# Patient Record
Sex: FEMALE | Race: WHITE | Employment: OTHER | URBAN - METROPOLITAN AREA
[De-identification: names, ages, dates, MRNs, and addresses within clinical notes are randomized per-mention and may not be internally consistent; named-entity substitution may affect disease eponyms.]

---

## 2018-08-28 ENCOUNTER — HOSPITAL ENCOUNTER (OUTPATIENT)
Dept: LAB | Age: 83
Discharge: HOME | End: 2018-08-28
Payer: MEDICARE

## 2018-08-28 ENCOUNTER — OFFICE VISIT (OUTPATIENT)
Dept: INTERNAL MEDICINE CLINIC | Age: 83
End: 2018-08-28

## 2018-08-28 ENCOUNTER — TELEPHONE (OUTPATIENT)
Dept: INTERNAL MEDICINE CLINIC | Age: 83
End: 2018-08-28

## 2018-08-28 VITALS
HEART RATE: 68 BPM | SYSTOLIC BLOOD PRESSURE: 117 MMHG | RESPIRATION RATE: 12 BRPM | HEIGHT: 63 IN | OXYGEN SATURATION: 98 % | BODY MASS INDEX: 24.98 KG/M2 | TEMPERATURE: 97.8 F | WEIGHT: 141 LBS | DIASTOLIC BLOOD PRESSURE: 55 MMHG

## 2018-08-28 DIAGNOSIS — F41.9 ANXIETY: ICD-10-CM

## 2018-08-28 DIAGNOSIS — M51.9 LUMBAR DISC DISEASE: ICD-10-CM

## 2018-08-28 DIAGNOSIS — R60.0 LEG EDEMA: ICD-10-CM

## 2018-08-28 DIAGNOSIS — I10 ESSENTIAL HYPERTENSION: ICD-10-CM

## 2018-08-28 DIAGNOSIS — Z13.220 SCREENING FOR HYPERLIPIDEMIA: ICD-10-CM

## 2018-08-28 DIAGNOSIS — E87.1 HYPONATREMIA: Primary | ICD-10-CM

## 2018-08-28 DIAGNOSIS — K58.2 IRRITABLE BOWEL SYNDROME WITH BOTH CONSTIPATION AND DIARRHEA: ICD-10-CM

## 2018-08-28 DIAGNOSIS — C18.7 CANCER OF SIGMOID (HCC): ICD-10-CM

## 2018-08-28 DIAGNOSIS — Z12.31 ENCOUNTER FOR SCREENING MAMMOGRAM FOR BREAST CANCER: ICD-10-CM

## 2018-08-28 DIAGNOSIS — E87.1 HYPONATREMIA: ICD-10-CM

## 2018-08-28 DIAGNOSIS — R33.9 URINARY RETENTION: ICD-10-CM

## 2018-08-28 DIAGNOSIS — Z13.0 SCREENING FOR DEFICIENCY ANEMIA: ICD-10-CM

## 2018-08-28 DIAGNOSIS — J30.9 ALLERGIC RHINITIS, UNSPECIFIED SEASONALITY, UNSPECIFIED TRIGGER: ICD-10-CM

## 2018-08-28 DIAGNOSIS — E55.9 VITAMIN D DEFICIENCY: ICD-10-CM

## 2018-08-28 DIAGNOSIS — M54.2 NECK PAIN: ICD-10-CM

## 2018-08-28 LAB
ALBUMIN SERPL-MCNC: 3.9 G/DL (ref 3.4–5)
ALBUMIN/GLOB SERPL: 1.1 {RATIO} (ref 0.8–1.7)
ALP SERPL-CCNC: 121 U/L (ref 45–117)
ALT SERPL-CCNC: 19 U/L (ref 13–56)
ANION GAP SERPL CALC-SCNC: 4 MMOL/L (ref 3–18)
AST SERPL-CCNC: 25 U/L (ref 15–37)
BASOPHILS # BLD: 0 K/UL (ref 0–0.1)
BASOPHILS NFR BLD: 1 % (ref 0–2)
BILIRUB SERPL-MCNC: 0.4 MG/DL (ref 0.2–1)
BUN SERPL-MCNC: 15 MG/DL (ref 7–18)
BUN/CREAT SERPL: 16 (ref 12–20)
CALCIUM SERPL-MCNC: 9.4 MG/DL (ref 8.5–10.1)
CHLORIDE SERPL-SCNC: 104 MMOL/L (ref 100–108)
CHOLEST SERPL-MCNC: 212 MG/DL
CO2 SERPL-SCNC: 31 MMOL/L (ref 21–32)
CREAT SERPL-MCNC: 0.94 MG/DL (ref 0.6–1.3)
DIFFERENTIAL METHOD BLD: ABNORMAL
EOSINOPHIL # BLD: 0.2 K/UL (ref 0–0.4)
EOSINOPHIL NFR BLD: 5 % (ref 0–5)
ERYTHROCYTE [DISTWIDTH] IN BLOOD BY AUTOMATED COUNT: 13.4 % (ref 11.6–14.5)
GLOBULIN SER CALC-MCNC: 3.7 G/DL (ref 2–4)
GLUCOSE SERPL-MCNC: 97 MG/DL (ref 74–99)
HCT VFR BLD AUTO: 40.1 % (ref 35–45)
HDLC SERPL-MCNC: 63 MG/DL (ref 40–60)
HDLC SERPL: 3.4 {RATIO} (ref 0–5)
HGB BLD-MCNC: 12.6 G/DL (ref 12–16)
LDLC SERPL CALC-MCNC: 126.4 MG/DL (ref 0–100)
LIPID PROFILE,FLP: ABNORMAL
LYMPHOCYTES # BLD: 1.4 K/UL (ref 0.9–3.6)
LYMPHOCYTES NFR BLD: 31 % (ref 21–52)
MCH RBC QN AUTO: 28.8 PG (ref 24–34)
MCHC RBC AUTO-ENTMCNC: 31.4 G/DL (ref 31–37)
MCV RBC AUTO: 91.8 FL (ref 74–97)
MONOCYTES # BLD: 0.6 K/UL (ref 0.05–1.2)
MONOCYTES NFR BLD: 13 % (ref 3–10)
NEUTS SEG # BLD: 2.3 K/UL (ref 1.8–8)
NEUTS SEG NFR BLD: 50 % (ref 40–73)
PLATELET # BLD AUTO: 284 K/UL (ref 135–420)
PMV BLD AUTO: 10.6 FL (ref 9.2–11.8)
POTASSIUM SERPL-SCNC: 4.3 MMOL/L (ref 3.5–5.5)
PROT SERPL-MCNC: 7.6 G/DL (ref 6.4–8.2)
RBC # BLD AUTO: 4.37 M/UL (ref 4.2–5.3)
SODIUM SERPL-SCNC: 139 MMOL/L (ref 136–145)
T4 FREE SERPL-MCNC: 1 NG/DL (ref 0.7–1.5)
TRIGL SERPL-MCNC: 113 MG/DL (ref ?–150)
TSH SERPL DL<=0.05 MIU/L-ACNC: 2.8 UIU/ML (ref 0.36–3.74)
VLDLC SERPL CALC-MCNC: 22.6 MG/DL
WBC # BLD AUTO: 4.6 K/UL (ref 4.6–13.2)

## 2018-08-28 PROCEDURE — 80061 LIPID PANEL: CPT | Performed by: INTERNAL MEDICINE

## 2018-08-28 PROCEDURE — 84439 ASSAY OF FREE THYROXINE: CPT | Performed by: INTERNAL MEDICINE

## 2018-08-28 PROCEDURE — 36415 COLL VENOUS BLD VENIPUNCTURE: CPT | Performed by: INTERNAL MEDICINE

## 2018-08-28 PROCEDURE — 85025 COMPLETE CBC W/AUTO DIFF WBC: CPT | Performed by: INTERNAL MEDICINE

## 2018-08-28 PROCEDURE — 80053 COMPREHEN METABOLIC PANEL: CPT | Performed by: INTERNAL MEDICINE

## 2018-08-28 RX ORDER — DICYCLOMINE HYDROCHLORIDE 10 MG/1
10 CAPSULE ORAL 4 TIMES DAILY
Qty: 90 CAP | Refills: 3 | Status: SHIPPED | OUTPATIENT
Start: 2018-08-28

## 2018-08-28 RX ORDER — MIRTAZAPINE 15 MG/1
15 TABLET, FILM COATED ORAL
COMMUNITY
End: 2018-08-28 | Stop reason: SDUPTHER

## 2018-08-28 RX ORDER — FUROSEMIDE 20 MG/1
20 TABLET ORAL EVERY OTHER DAY
COMMUNITY
End: 2018-08-28 | Stop reason: SDUPTHER

## 2018-08-28 RX ORDER — FUROSEMIDE 20 MG/1
20 TABLET ORAL EVERY OTHER DAY
Qty: 90 TAB | Refills: 3 | Status: SHIPPED | OUTPATIENT
Start: 2018-08-28 | End: 2021-12-21

## 2018-08-28 RX ORDER — MIRTAZAPINE 15 MG/1
15 TABLET, FILM COATED ORAL
Qty: 90 TAB | Refills: 3 | Status: SHIPPED | OUTPATIENT
Start: 2018-08-28 | End: 2021-04-15

## 2018-08-28 RX ORDER — POLYETHYLENE GLYCOL 3350 17 G/17G
17 POWDER, FOR SOLUTION ORAL
COMMUNITY

## 2018-08-28 RX ORDER — CETIRIZINE HCL 10 MG
10 TABLET ORAL DAILY
COMMUNITY

## 2018-08-28 RX ORDER — CHOLECALCIFEROL (VITAMIN D3) 125 MCG
10 CAPSULE ORAL
COMMUNITY

## 2018-08-28 RX ORDER — DICYCLOMINE HYDROCHLORIDE 10 MG/1
10 CAPSULE ORAL 4 TIMES DAILY
COMMUNITY
End: 2018-08-28 | Stop reason: SDUPTHER

## 2018-08-28 RX ORDER — FAMOTIDINE 10 MG/1
10 TABLET ORAL 2 TIMES DAILY
COMMUNITY
End: 2019-06-11 | Stop reason: DRUGHIGH

## 2018-08-28 RX ORDER — ACETAMINOPHEN 325 MG/1
500 TABLET ORAL
COMMUNITY
End: 2021-03-09

## 2018-08-28 RX ORDER — GLUCOSAMINE SULFATE 1500 MG
1000 POWDER IN PACKET (EA) ORAL DAILY
COMMUNITY

## 2018-08-28 NOTE — PATIENT INSTRUCTIONS
Body Mass Index: Care Instructions  Your Care Instructions    Body mass index (BMI) can help you see if your weight is raising your risk for health problems. It uses a formula to compare how much you weigh with how tall you are. · A BMI lower than 18.5 is considered underweight. · A BMI between 18.5 and 24.9 is considered healthy. · A BMI between 25 and 29.9 is considered overweight. A BMI of 30 or higher is considered obese. If your BMI is in the normal range, it means that you have a lower risk for weight-related health problems. If your BMI is in the overweight or obese range, you may be at increased risk for weight-related health problems, such as high blood pressure, heart disease, stroke, arthritis or joint pain, and diabetes. If your BMI is in the underweight range, you may be at increased risk for health problems such as fatigue, lower protection (immunity) against illness, muscle loss, bone loss, hair loss, and hormone problems. BMI is just one measure of your risk for weight-related health problems. You may be at higher risk for health problems if you are not active, you eat an unhealthy diet, or you drink too much alcohol or use tobacco products. Follow-up care is a key part of your treatment and safety. Be sure to make and go to all appointments, and call your doctor if you are having problems. It's also a good idea to know your test results and keep a list of the medicines you take. How can you care for yourself at home? · Practice healthy eating habits. This includes eating plenty of fruits, vegetables, whole grains, lean protein, and low-fat dairy. · If your doctor recommends it, get more exercise. Walking is a good choice. Bit by bit, increase the amount you walk every day. Try for at least 30 minutes on most days of the week. · Do not smoke. Smoking can increase your risk for health problems. If you need help quitting, talk to your doctor about stop-smoking programs and medicines. These can increase your chances of quitting for good. · Limit alcohol to 2 drinks a day for men and 1 drink a day for women. Too much alcohol can cause health problems. If you have a BMI higher than 25  · Your doctor may do other tests to check your risk for weight-related health problems. This may include measuring the distance around your waist. A waist measurement of more than 40 inches in men or 35 inches in women can increase the risk of weight-related health problems. · Talk with your doctor about steps you can take to stay healthy or improve your health. You may need to make lifestyle changes to lose weight and stay healthy, such as changing your diet and getting regular exercise. If you have a BMI lower than 18.5  · Your doctor may do other tests to check your risk for health problems. · Talk with your doctor about steps you can take to stay healthy or improve your health. You may need to make lifestyle changes to gain or maintain weight and stay healthy, such as getting more healthy foods in your diet and doing exercises to build muscle. Where can you learn more? Go to http://michael-holland.info/. Enter S176 in the search box to learn more about \"Body Mass Index: Care Instructions. \"  Current as of: October 9, 2017  Content Version: 11.7  © 5451-0736 [employer]. Care instructions adapted under license by [employer] (which disclaims liability or warranty for this information). If you have questions about a medical condition or this instruction, always ask your healthcare professional. [place] [address] any warranty or liability for your use of this information.   Patient and daughter were given a copy of the Advanced Medical Directive form and understands to bring it in once completed  Health Maintenance Due   Topic Date Due    DTaP/Tdap/Td series (1 - Tdap) 06/03/1956    ZOSTER VACCINE AGE 60>  04/03/1995    GLAUCOMA SCREENING Q2Y 06/03/2000    Bone Densitometry (Dexa) Screening  06/03/2000    Pneumococcal 65+ Low/Medium Risk (1 of 2 - PCV13) 06/03/2000    Influenza Age 9 to Adult  08/01/2018

## 2018-08-28 NOTE — PROGRESS NOTES
INTERNISTS [facility]:  9/1/2018, MRN: [id number]      Rick Nash is a 80 y.o. female and presents to clinic for Establish Care and Irritable Bowel Syndrome (due to a recent move to our area the IBS is acting up )    Subjective: The patient is an 51-year-old female with history of sigmoid colon cancer status post surgery, history of strictures, osteopenia, hyponatremia, lumbar disc disease, hypertension, IBS, vitamin D deficiency, allergic rhinitis, GERD, and urinary retention. 1. Health Maintenance:  - Last Mammogram: >1 yr ago. No abnormal mammograms. No breast pain/masses. - Pap Hx: No h/o abnormal PAPs per her hx  - Last colon cancer screening: Roughly 5 yrs ago. She had sigmoid colon cancer diagnosed in her 62s. S/p surgery. No chemotherapy. Postoperatively, she had strictures. No hematochezia/melena. - Bone density study: It was done in the past and she was diagnosed with ?osteopenia or osteoporosis and rx was recommended but she declined. - Diet: Lots of fruits of vegetables  - At [address] [place] [address] home/assisted living facility.   - Uses a walker for ambulation. She had one fall that sent her to the ED; she was diagnosed with hyponatremia at that time. - Exercise: She does not regularly exercise  - Tobacco use: Never  - ETOH use: 1-2 glasses of wine per wk  - Drug use: None  - Energy drink consumption: None  - Last formal eye exam: 6 months ago. She wears glasses. 2. BackNeck Pain: +Present for yrs. She has lower back pain associated with BLE weakness/numbness - from disc disease. She completed PT in the spring. She has chronic neck pain - from occipital neuralgia per her hx. She has numbness along the base of her scalp but not her arms. Her neck pain has worsened recently. She believes is the way she watches TV while sitting up. 3. HTN: Taking lasix. Her blood pressure today is 117/55. She denies adverse side effects with taking Lasix.   He has been on Lasix for over 3 months. She has bilateral lower extremity edema thought to be secondary to venous insufficiency. 4. IBS - Mixed: On bentyl for 4 yrs. She has a h/o abdominal pain (that worsens in times of stress) along her epigastric area. Bentyl seems to help with this discomfort. No constipation or diarrhea recently. She has a h/o constipation. As mentioned above, she was diagnosed with sigmoid colon cancer in her 62s. She has not had a colonoscopy for 5 years. No chemotherapy was administered. Her postoperative course was complicated by strictures. 5. Allergic rhinitis: Well controlled on zyrtec. She is presently asymptomatic. 6. Vitamin D Deficiency: She takes vitamin D over-the-counter. She has a history of vitamin D deficiency for over 6 months. 7. GERD: On pepcid. She reports no adverse side effects with taking Pepcid. She is presently asymptomatic. She can tell when she stops his medication. 8. Urinary Retention: She has a history of recurrent UTIs. She was followed by the urology team in the past.  She tried pelvic floor dysfunction PT and now engages in self intermittent catheterization 3 times daily or 4 times daily. She states that she did feel when her bladder is full. No urge incontinence. No stress incontinence. She will often urinate without difficulty at night. During the day, her urinary symptoms are different. She is unable to urinate on demand. 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 9. Anxiety: Present for years. She is taking mirtazapine. She is a refill on this medication. She used to take Lexapro but had hyponatremia which she associated with taking this medication. Her sodium corrected after stopping Lexapro and starting mirtazapine per her history. She believes that mirtazapine is working well to control her symptoms of anxiety.   She denies depression symptoms. Patient Active Problem List    Diagnosis Date Noted    Hyponatremia 09/01/2018    Leg edema 09/01/2018    Urinary retention 09/01/2018    Cancer of sigmoid ([facility] [address] 09/01/2018    Anxiety 09/01/2018    Irritable bowel syndrome with both constipation and diarrhea 09/01/2018    Neck pain 09/01/2018    Lumbar disc disease 09/01/2018           No Known Allergies    Past Medical History:   Diagnosis Date    Arthritis     Cancer [facility]) 2002    Colon     Chronic pain     abdominal due to IBS    Depression     GERD (gastroesophageal reflux disease)     Hypertension     Migraine headache 2014    Occipital neuralgia 2014    PUD (peptic ulcer disease)     S/P colon resection 2002    clear presently        Past Surgical History:   Procedure Laterality Date    HX CHOLECYSTECTOMY  2015    HX HYSTERECTOMY  1965    HX TONSIL AND ADENOIDECTOMY      HX WISDOM TEETH EXTRACTION      x4       Family History   Problem Relation Age of Onset    Hypertension Mother     Stroke Mother     Heart Attack Father     Hypertension Father     Parkinson's Disease Sister     Hypertension Brother     Cancer Brother      prostate    No Known Problems Son     No Known Problems Daughter        Social History   Substance Use Topics    Smoking status: Never Smoker    Smokeless tobacco: Never Used    Alcohol use 1.8 oz/week     3 Glasses of wine per week       ROS   Review of Systems   Constitutional: Negative for chills and fever. HENT: Negative for ear pain and sore throat. Eyes: Negative for blurred vision and pain. Respiratory: Negative for cough and shortness of breath. Cardiovascular: Negative for chest pain. Gastrointestinal: Negative for abdominal pain, blood in stool and melena. Genitourinary: Negative for dysuria and hematuria. Musculoskeletal: Positive for back pain and joint pain. Negative for myalgias. Skin: Negative for rash. Neurological: Positive for tingling. Negative for focal weakness and headaches. Endo/Heme/Allergies: Does not bruise/bleed easily. Psychiatric/Behavioral: Negative for substance abuse. Objective     Vitals:    08/28/18 1415   BP: 117/55   Pulse: 68   Resp: 12   Temp: 97.8 °F (36.6 °C)   TempSrc: Oral   SpO2: 98%   Weight: 141 lb (64 kg)   Height: 5' 2.99\" (1.6 m)   PainSc:   4   PainLoc: Abdomen       Physical Exam   Constitutional: She is oriented to person, place, and time and well-developed, well-nourished, and in no distress. HENT:   Head: Normocephalic and atraumatic. Right Ear: External ear normal.   Left Ear: External ear normal.   Nose: Nose normal.   Mouth/Throat: Oropharynx is clear and moist. No oropharyngeal exudate. Eyes: Conjunctivae and EOM are normal. Right eye exhibits no discharge. Left eye exhibits no discharge. No scleral icterus. Neck: Neck supple. Cardiovascular: Normal rate, regular rhythm, normal heart sounds and intact distal pulses. Exam reveals no gallop and no friction rub. No murmur heard. Pulmonary/Chest: Effort normal and breath sounds normal. No respiratory distress. She has no wheezes. She has no rales. Abdominal: Soft. Bowel sounds are normal. She exhibits no distension. There is no tenderness. There is no rebound and no guarding. Musculoskeletal: She exhibits no edema or tenderness (BUE). All sponge processes are nontender to palpation, except she has mild tenderness to palpation along her lumbar spinous processes. There are no paraspinal muscles are tender to palpation. She has limited range of motion along her cervical spine secondary to some discomfort. Lymphadenopathy:     She has no cervical adenopathy. Neurological: She is alert and oriented to person, place, and time. She exhibits normal muscle tone. Gait normal.   Skin: Skin is warm and dry. No erythema. Psychiatric: Affect normal.   Nursing note and vitals reviewed. Assessment/Plan:   1.  HTN and Hyponatremia Hx: Unlikely to be from lexapro. I suspect it is from lasix. -I will check a CMP and TFTs.  -For now, I will add Lexapro as a medication intolerance to her chart.  -Requesting records from her previous doctor.  -Okay to continue with Lasix which I am refilling for her today pending review of her lab results. ORDERS:  - METABOLIC PANEL, COMPREHENSIVE; Future  - TSH AND FREE T4; Future  - furosemide (LASIX) 20 mg tablet; Take 1 Tab by mouth every other day. Indications: Edema  Dispense: 90 Tab; Refill: 3    2. Health Maintenance:  -Ordering a CBC to screen for anemia.  -Requesting records from her previous PCP. I am also requesting her last colonoscopy, mammogram, and vaccine records.  -A lipid panel was ordered to screen for hyperlipidemia. - A mammogram was ordered to screen for breast cancer.  - RTC for a MWV    ORDERS:  - CBC WITH AUTOMATED DIFF; Future  - LIPID PANEL; Future  - ERIC MAMMO BI SCREENING INCL CAD; Future    3. Urinary retention: +H/o recurrent UTIs per pt hx. Urinary retention is only present during the \"day. \" +Doing self catheterization  - Referral to Urology team placed. - Requesting her last Urology records. ORDERS:  - REFERRAL TO UROLOGY    4. Cancer of sigmoid Hx: No colonoscopy in 5 ys. - Referral to GI team for surveillance. - Requesting her previous cancer records. ORDERS:  - REFERRAL TO GASTROENTEROLOGY    5. Anxiety: Stable. - Refilling mirtazapine. ORDERS:  - mirtazapine (REMERON) 15 mg tablet; Take 1 Tab by mouth nightly. Indications: major depressive disorder  Dispense: 90 Tab; Refill: 3    6. Irritable bowel syndrome with both constipation and diarrhea:   - Bentyl refilled. ORDERS:  - dicyclomine (BENTYL) 10 mg capsule; Take 1 Cap by mouth four (4) times daily. Dispense: 90 Cap; Refill: 3    7. Neck/Lower Back Pain: +H/o lumbar disc disease per pt hx and occipital neuralgia per pt hx.  - Referral to PT placed. - Activity as tolerated.     ORDERS:  - [facility]    8. GERD: Stable. - C/w pepcid    9. Allergic Rhinitis: Stable. - C/w use of an antihistamine OTC of nasal steroid. 10. Vitamin D Deficiency: Stable. ?H/o osteopenia.  - Requesting records. - C/w OTC vitamin D      Health Maintenance Due   Topic Date Due    DTaP/Tdap/Td series (1 - Tdap) 06/03/1956    ZOSTER VACCINE AGE 60>  04/03/1995    GLAUCOMA SCREENING Q2Y  06/03/2000    Bone Densitometry (Dexa) Screening  06/03/2000    Pneumococcal 65+ High/Highest Risk (1 of 2 - PCV13) 06/03/2000    Influenza Age 9 to Adult  08/01/2018    [address] [place] [address]  08/28/2018     Lab review: labs are reviewed in the EHR    I have discussed the diagnosis with the patient and the intended plan as seen in the above orders. The patient has received an after-visit summary and questions were answered concerning future plans. I have discussed medication side effects and warnings with the patient as well. I have reviewed the plan of care with the patient, accepted their input and they are in agreement with the treatment goals. All questions were answered. The patient understands the plan of care. Handouts provided today with above information. Pt instructed if symptoms worsen to call the office or report to the ED for continued care. Greater than 50% of the visit time was spent in counseling and/or coordination of care. Voice recognition was used to generate this report, which may have resulted in some phonetic based errors in grammar and contents. Even though attempts were made to correct all the mistakes, some may have been missed, and remained in the body of the document. Follow-up Disposition:  Return in about 4 weeks (around 9/25/2018) for lab results, hyponatremia, IBS.     Ashely Cali MD

## 2018-08-28 NOTE — ACP (ADVANCE CARE PLANNING)
Patient and daughter were given a copy of the Advanced Medical Directive form and understands to bring it in once completed

## 2018-08-28 NOTE — PROGRESS NOTES
Chief Complaint   Patient presents with   Sandra Establish Care    Irritable Bowel Syndrome     due to a recent move to our area the IBS is acting up      Patient and daughter were given a copy of the Advanced Medical Directive form and understands to bring it in once completed. 1. Have you been to the ER, urgent care clinic since your last visit? Hospitalized since your last visit? No    2. Have you seen or consulted any other health care providers outside of the [address] [place] [address] since your last visit? Include any pap smears or colon screening.  No

## 2018-08-28 NOTE — MR AVS SNAPSHOT
[facility] 
 
 
 [address], [address] [place] [address] [facility] 
[phone] Patient: Theodora Dotson MRN: [id number] NXF:9[birth year] Visit Information Date & Time Provider Department Dept. Phone Encounter #  
 8/28/2018  2:00 PM Shabbir Bernal MD [facility] [phone] Follow-up Instructions Return in about 4 weeks (around 9/25/2018) for lab results, hyponatremia, IBS. Your Appointments 9/27/2018 11:30 AM  
Office Visit with Shabbir Bernal MD  
[facility]) Appt Note: 4 week follow up  
 [address], [address] [place] [address] [address] [place]  
  
   
 [address] [facility] [address] Upcoming Health Maintenance Date Due DTaP/Tdap/Td series (1 - Tdap) 6/3/1956 ZOSTER VACCINE AGE 60> 4/3/1995 GLAUCOMA SCREENING Q2Y 6/3/2000 Bone Densitometry (Dexa) Screening 6/3/2000 Pneumococcal 65+ Low/Medium Risk (1 of 2 - PCV13) 6/3/2000 Influenza Age 5 to Adult 8/1/2018 Allergies as of 8/28/2018  Review Complete On: 8/28/2018 By: Luis Eduardo Petersen No Known Allergies Current Immunizations  Never Reviewed No immunizations on file. Not reviewed this visit You Were Diagnosed With   
  
 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)     ICD-10-CM: C18.7 ICD-9-CM: 153.3 Encounter for screening mammogram for breast cancer     ICD-10-CM: Z12.31 
ICD-9-CM: V76.12 Anxiety     ICD-10-CM: F41.9 ICD-9-CM: 300.00 Irritable bowel syndrome with both constipation and diarrhea     ICD-10-CM: K58.2 ICD-9-CM: 901.9 Neck pain     ICD-10-CM: M54.2 ICD-9-CM: 723.1 Lumbar disc disease     ICD-10-CM: M51.9 ICD-9-CM: 722.93 Vitals BP Pulse Temp Resp Height(growth percentile) Weight(growth percentile) 117/55 (BP 1 Location: Left arm, BP Patient Position: Sitting) 68 97.8 °F (36.6 °C) (Oral) 12 5' 2.99\" (1.6 m) 141 lb (64 kg) SpO2 BMI OB Status Smoking Status 98% 24.98 kg/m2 Hysterectomy Never Smoker BMI and BSA Data Body Mass Index Body Surface Area 24.98 kg/m 2 1.69 m 2 Your Updated Medication List  
  
   
This list is accurate as of 8/28/18  4:04 PM.  Always use your most recent med list.  
  
  
  
  
 ALIGN 4 mg Cap Generic drug:  Bifidobacterium Infantis Take 4 mg by mouth daily. CENTRUM WOMEN PO Take  by mouth daily. dicyclomine 10 mg capsule Commonly known as:  BENTYL Take 1 Cap by mouth four (4) times daily. famotidine 10 mg tablet Commonly known as:  PEPCID Take 10 mg by mouth two (2) times a day. furosemide 20 mg tablet Commonly known as:  LASIX Take 1 Tab by mouth every other day. Indications: Edema  
  
 melatonin Tab tablet Take 5 mg by mouth nightly. MIRALAX 17 gram packet Generic drug:  polyethylene glycol Take 17 g by mouth daily as needed. mirtazapine 15 mg tablet Commonly known as:  Onofre Rosadonel Take 1 Tab by mouth nightly. Indications: major depressive disorder TYLENOL 325 mg tablet Generic drug:  acetaminophen Take 325 mg by mouth every four (4) hours as needed for Pain. VITAMIN D3 1,000 unit Cap Generic drug:  cholecalciferol Take 1,000 Units by mouth daily. ZyrTEC 10 mg tablet Generic drug:  cetirizine Take 10 mg by mouth daily. Prescriptions Printed Refills  
 furosemide (LASIX) 20 mg tablet 3 Sig: Take 1 Tab by mouth every other day. Indications: Edema Class: Print  Route: Oral  
 mirtazapine (REMERON) 15 mg tablet 3  
 Sig: Take 1 Tab by mouth nightly. Indications: major depressive disorder Class: Print Route: Oral  
 dicyclomine (BENTYL) 10 mg capsule 3 Sig: Take 1 Cap by mouth four (4) times daily. Class: Print Route: Oral  
  
We Performed the Following REFERRAL TO GASTROENTEROLOGY [AUE35 Custom] REFERRAL TO PHYSICAL THERAPY [THP64 Custom] REFERRAL TO UROLOGY [HSA787 Custom] Follow-up Instructions Return in about 4 weeks (around 9/25/2018) for lab results, hyponatremia, IBS. To-Do List   
 08/28/2018 Lab:  CBC WITH AUTOMATED DIFF   
  
 08/28/2018 Lab:  LIPID PANEL   
  
 08/28/2018 Imaging:  ERIC MAMMO BI SCREENING INCL CAD   
  
 08/28/2018 Lab:  METABOLIC PANEL, COMPREHENSIVE Around 08/28/2018 Lab:  TSH AND FREE T4 Referral Information Referral ID Referred By Referred To  
  
 [id number] MASOUD, [address] [facility],[address] [place] [address]. Phone: [phone] Fax: [phone] Visits Status Start Date End Date 1 New Request 8/28/18 8/28/19 If your referral has a status of pending review or denied, additional information will be sent to support the outcome of this decision. Referral ID Referred By Referred To  
 [id number] MASOUD, [address] [address] Phone: [phone] Fax: [phone] Visits Status Start Date End Date 1 New Request 8/28/18 8/28/19 If your referral has a status of pending review or denied, additional information will be sent to support the outcome of this decision. Referral ID Referred By Referred To  
 [id number] MASOUD, [facility] [address] [facility] [address]  
   [address] [facility] [address] Phone: [phone] Fax: [phone] Visits Status Start Date End Date 1 New Request 8/28/18 8/28/19 If your referral has a status of pending review or denied, additional information will be sent to support the outcome of this decision. Patient Instructions Body Mass Index: Care Instructions Your Care Instructions Body mass index (BMI) can help you see if your weight is raising your risk for health problems. It uses a formula to compare how much you weigh with how tall you are. · A BMI lower than 18.5 is considered underweight. · A BMI between 18.5 and 24.9 is considered healthy. · A BMI between 25 and 29.9 is considered overweight. A BMI of 30 or higher is considered obese. If your BMI is in the normal range, it means that you have a lower risk for weight-related health problems. If your BMI is in the overweight or obese range, you may be at increased risk for weight-related health problems, such as high blood pressure, heart disease, stroke, arthritis or joint pain, and diabetes. If your BMI is in the underweight range, you may be at increased risk for health problems such as fatigue, lower protection (immunity) against illness, muscle loss, bone loss, hair loss, and hormone problems. BMI is just one measure of your risk for weight-related health problems. You may be at higher risk for health problems if you are not active, you eat an unhealthy diet, or you drink too much alcohol or use tobacco products. Follow-up care is a key part of your treatment and safety. Be sure to make and go to all appointments, and call your doctor if you are having problems. It's also a good idea to know your test results and keep a list of the medicines you take. How can you care for yourself at home? · Practice healthy eating habits. This includes eating plenty of fruits, vegetables, whole grains, lean protein, and low-fat dairy. · If your doctor recommends it, get more exercise. Walking is a good choice. Bit by bit, increase the amount you walk every day.  Try for at least 30 minutes on most days of the week. · Do not smoke. Smoking can increase your risk for health problems. If you need help quitting, talk to your doctor about stop-smoking programs and medicines. These can increase your chances of quitting for good. · Limit alcohol to 2 drinks a day for men and 1 drink a day for women. Too much alcohol can cause health problems. If you have a BMI higher than 25 · Your doctor may do other tests to check your risk for weight-related health problems. This may include measuring the distance around your waist. A waist measurement of more than 40 inches in men or 35 inches in women can increase the risk of weight-related health problems. · Talk with your doctor about steps you can take to stay healthy or improve your health. You may need to make lifestyle changes to lose weight and stay healthy, such as changing your diet and getting regular exercise. If you have a BMI lower than 18.5 · Your doctor may do other tests to check your risk for health problems. · Talk with your doctor about steps you can take to stay healthy or improve your health. You may need to make lifestyle changes to gain or maintain weight and stay healthy, such as getting more healthy foods in your diet and doing exercises to build muscle. Where can you learn more? Go to http://michael-holland.info/. Enter S176 in the search box to learn more about \"Body Mass Index: Care Instructions. \" Current as of: October 9, 2017 Content Version: 11.7 © 9556-1456 [employer]. Care instructions adapted under license by [employer] (which disclaims liability or warranty for this information). If you have questions about a medical condition or this instruction, always ask your healthcare professional. [place] [address] any warranty or liability for your use of this information.  
Patient and daughter were given a copy of the Advanced Medical Directive form and understands to bring it in once completed Health Maintenance Due Topic Date Due  
 DTaP/Tdap/Td series (1 - Tdap) 06/03/1956  ZOSTER VACCINE AGE 60>  04/03/1995  GLAUCOMA SCREENING Q2Y  06/03/2000  Bone Densitometry (Dexa) Screening  06/03/2000  Pneumococcal 65+ Low/Medium Risk (1 of 2 - PCV13) 06/03/2000  Influenza Age 5 to Adult  08/01/2018 Introducing [facility]! [facility] introduces [employer] patient portal. Now you can access parts of your medical record, email your doctor's office, and request medication refills online. 1. In your internet browser, go to [email or web address] 2. Click on the First Time User? Click Here link in the Sign In box. You will see the New Member Sign Up page. 3. Enter your [employer] Access Code exactly as it appears below. You will not need to use this code after youve completed the sign-up process. If you do not sign up before the expiration date, you must request a new code. · [employer] Access Code: [email or web address] Expires: 11/26/2018  1:13 PM 
 
4. Enter the last four digits of your Social Security Number (xxxx) and Date of Birth (mm/dd/yyyy) as indicated and click Submit. You will be taken to the next sign-up page. 5. Create a [employer] ID. This will be your [employer] login ID and cannot be changed, so think of one that is secure and easy to remember. 6. Create a [employer] password. You can change your password at any time. 7. Enter your Password Reset Question and Answer. This can be used at a later time if you forget your password. 8. Enter your e-mail address. You will receive e-mail notification when new information is available in [address] 9. Click Sign Up. You can now view and download portions of your medical record. 10. Click the Download Summary menu link to download a portable copy of your medical information.  
 
If you have questions, please visit the Frequently Asked Questions section of the [employer]. Remember, [employer] is NOT to be used for urgent needs. For medical emergencies, dial 911. Now available from your iPhone and Android! Please provide this summary of care documentation to your next provider. Your primary care clinician is listed as Daniel Isidro. If you have any questions after today's visit, please call [phone].

## 2018-08-28 NOTE — TELEPHONE ENCOUNTER
Frieda is calling from [address] [place] [address]. Stating she had [place] [facility] [place]. Stating everything sent over is from 2017. She wants to know if you can put in a new referral after you see the patient today to establish care.      PT/OT for chronic neck pain and balance and strreghthening

## 2018-08-29 ENCOUNTER — HOME HEALTH ADMISSION (OUTPATIENT)
Dept: HOME HEALTH SERVICES | Facility: HOME HEALTH | Age: 83
End: 2018-08-29

## 2018-08-31 ENCOUNTER — HOME CARE VISIT (OUTPATIENT)
Dept: SCHEDULING | Facility: HOME HEALTH | Age: 83
End: 2018-08-31

## 2018-08-31 ENCOUNTER — TELEPHONE (OUTPATIENT)
Dept: INTERNAL MEDICINE CLINIC | Age: 83
End: 2018-08-31

## 2018-08-31 NOTE — TELEPHONE ENCOUNTER
Katina Tilley from [facility] called- PT was Not admitted to [facility] - she is more suited to out patient therapy  She Needs referral for out patient therapy that would specialize in  Her neck

## 2018-09-01 PROBLEM — F41.9 ANXIETY: Status: ACTIVE | Noted: 2018-09-01

## 2018-09-01 PROBLEM — J30.9 ALLERGIC RHINITIS: Status: ACTIVE | Noted: 2018-09-01

## 2018-09-01 PROBLEM — E55.9 VITAMIN D DEFICIENCY: Status: ACTIVE | Noted: 2018-09-01

## 2018-09-01 PROBLEM — R33.9 URINARY RETENTION: Status: ACTIVE | Noted: 2018-09-01

## 2018-09-01 PROBLEM — M51.9 LUMBAR DISC DISEASE: Status: ACTIVE | Noted: 2018-09-01

## 2018-09-01 PROBLEM — E87.1 HYPONATREMIA: Status: ACTIVE | Noted: 2018-09-01

## 2018-09-01 PROBLEM — M54.2 NECK PAIN: Status: ACTIVE | Noted: 2018-09-01

## 2018-09-01 PROBLEM — R60.0 LEG EDEMA: Status: ACTIVE | Noted: 2018-09-01

## 2018-09-01 PROBLEM — K58.2 IRRITABLE BOWEL SYNDROME WITH BOTH CONSTIPATION AND DIARRHEA: Status: ACTIVE | Noted: 2018-09-01

## 2018-09-01 PROBLEM — I10 ESSENTIAL HYPERTENSION: Status: ACTIVE | Noted: 2018-09-01

## 2018-09-01 PROBLEM — C18.7 CANCER OF SIGMOID (HCC): Status: ACTIVE | Noted: 2018-09-01

## 2018-09-04 DIAGNOSIS — M54.2 NECK PAIN: Primary | ICD-10-CM

## 2018-09-10 ENCOUNTER — TELEPHONE (OUTPATIENT)
Dept: INTERNAL MEDICINE CLINIC | Age: 83
End: 2018-09-10

## 2018-09-10 ENCOUNTER — HOME CARE VISIT (OUTPATIENT)
Dept: HOME HEALTH SERVICES | Facility: HOME HEALTH | Age: 83
End: 2018-09-10

## 2018-09-10 NOTE — PROGRESS NOTES
Please let her know that her electrolytes are normal. Her renal function is normal. Her cholesterol is mildly elevated. Her total cholesterol is 212. Her LDL is 126. We will recheck this in 6-12 months. If her cholesterol increases, she would benefit from a statin. Her thyroid function labs are normal. Her blood count labs show no significant abnormalities at this time.      [facility]  [facility], [address] [place] [address], [address] [place] [address] Str.  Phone: [phone]  Fax: [phone]

## 2018-09-10 NOTE — TELEPHONE ENCOUNTER
[facility] was [facility] to [facility], they have been trying to get in touch with the patient but the phone number is out of service

## 2018-09-10 NOTE — TELEPHONE ENCOUNTER
Chief Complaint   Patient presents with    Labs     done 8-28-18 per Dr Rojelio Neely     Please let her know that her electrolytes are normal. Her renal function is normal. Her cholesterol is mildly elevated. Her total cholesterol is 212. Her LDL is 126. We will recheck this in 6-12 months. If her cholesterol increases, she would benefit from a statin. Her thyroid function labs are normal. Her blood count labs show no significant abnormalities at this time. Patient reached and informed, she states she understands all.

## 2018-09-10 NOTE — TELEPHONE ENCOUNTER
That's because when she was here for her last appt, her demographics were never updated in [facility] when she was checked in. I have updated her numbers.

## 2018-09-10 NOTE — TELEPHONE ENCOUNTER
[facility] called- they received the referral for out patient PT- needs to go to [facility] who ever can work with necks so please resend

## 2018-09-27 ENCOUNTER — HOSPITAL ENCOUNTER (OUTPATIENT)
Dept: LAB | Age: 83
Discharge: HOME | End: 2018-09-27
Payer: MEDICARE

## 2018-09-27 ENCOUNTER — OFFICE VISIT (OUTPATIENT)
Dept: INTERNAL MEDICINE CLINIC | Age: 83
End: 2018-09-27

## 2018-09-27 VITALS
HEIGHT: 62 IN | SYSTOLIC BLOOD PRESSURE: 141 MMHG | OXYGEN SATURATION: 99 % | DIASTOLIC BLOOD PRESSURE: 59 MMHG | BODY MASS INDEX: 26.35 KG/M2 | RESPIRATION RATE: 14 BRPM | WEIGHT: 143.2 LBS | HEART RATE: 58 BPM | TEMPERATURE: 97.1 F

## 2018-09-27 DIAGNOSIS — R31.0 GROSS HEMATURIA: ICD-10-CM

## 2018-09-27 DIAGNOSIS — Z71.89 ADVANCE CARE PLANNING: ICD-10-CM

## 2018-09-27 DIAGNOSIS — M25.562 ACUTE PAIN OF BOTH KNEES: ICD-10-CM

## 2018-09-27 DIAGNOSIS — R35.0 FREQUENT URINATION: ICD-10-CM

## 2018-09-27 DIAGNOSIS — Z23 ENCOUNTER FOR IMMUNIZATION: ICD-10-CM

## 2018-09-27 DIAGNOSIS — Z00.00 INITIAL MEDICARE ANNUAL WELLNESS VISIT: ICD-10-CM

## 2018-09-27 DIAGNOSIS — N30.00 ACUTE CYSTITIS WITHOUT HEMATURIA: Primary | ICD-10-CM

## 2018-09-27 DIAGNOSIS — M25.561 ACUTE PAIN OF BOTH KNEES: ICD-10-CM

## 2018-09-27 DIAGNOSIS — R30.0 DYSURIA: ICD-10-CM

## 2018-09-27 LAB
BILIRUB UR QL STRIP: NEGATIVE
GLUCOSE UR-MCNC: NEGATIVE MG/DL
KETONES P FAST UR STRIP-MCNC: NEGATIVE MG/DL
PH UR STRIP: 6 [PH] (ref 4.6–8)
PROT UR QL STRIP: NEGATIVE
SP GR UR STRIP: 1.01 (ref 1–1.03)
UA UROBILINOGEN AMB POC: NORMAL (ref 0.2–1)
URINALYSIS CLARITY POC: NORMAL
URINALYSIS COLOR POC: YELLOW
URINE BLOOD POC: NORMAL
URINE LEUKOCYTES POC: NORMAL
URINE NITRITES POC: NEGATIVE

## 2018-09-27 PROCEDURE — 87086 URINE CULTURE/COLONY COUNT: CPT | Performed by: INTERNAL MEDICINE

## 2018-09-27 PROCEDURE — 87186 SC STD MICRODIL/AGAR DIL: CPT | Performed by: INTERNAL MEDICINE

## 2018-09-27 PROCEDURE — 87077 CULTURE AEROBIC IDENTIFY: CPT | Performed by: INTERNAL MEDICINE

## 2018-09-27 RX ORDER — LEVOFLOXACIN 750 MG/1
750 TABLET ORAL DAILY
Qty: 5 TAB | Refills: 0 | Status: SHIPPED | OUTPATIENT
Start: 2018-09-27 | End: 2018-10-02

## 2018-09-27 NOTE — PATIENT INSTRUCTIONS
Patient still has a copy of the Advanced Medical Directive form and understands to bring it in once completed. Health Maintenance Due   Topic Date Due    DTaP/Tdap/Td series (1 - Tdap) 06/03/1956    Shingrix Vaccine Age 50> (1 of 2) 06/03/1985    GLAUCOMA SCREENING Q2Y  06/03/2000    Bone Densitometry (Dexa) Screening  06/03/2000    Pneumococcal 65+ High/Highest Risk (1 of 2 - PCV13) 06/03/2000    Influenza Age 5 to Adult  08/01/2018         Medicare Wellness Visit, Female     The best way to live healthy is to have a lifestyle where you eat a well-balanced diet, exercise regularly, limit alcohol use, and quit all forms of tobacco/nicotine, if applicable. Regular preventive services are another way to keep healthy. Preventive services (vaccines, screening tests, monitoring & exams) can help personalize your care plan, which helps you manage your own care. Screening tests can find health problems at the earliest stages, when they are easiest to treat. [facility] follows the current, evidence-based guidelines published by the [facility] [place] (USPSTF) when recommending preventive services for our patients. Because we follow these guidelines, sometimes recommendations change over time as research supports it. (For example, mammograms used to be recommended annually. Even though Medicare will still pay for an annual mammogram, the newer guidelines recommend a mammogram every two years for women of average risk.)  Of course, you and your doctor may decide to screen more often for some diseases, based on your risk and your health status. Preventive services for you include:  - Medicare offers their members a free annual wellness visit, which is time for you and your primary care provider to discuss and plan for your preventive service needs. Take advantage of this benefit every year!  -All adults over the age of 72 should receive the recommended pneumonia vaccines.  Current USPSTF guidelines recommend a series of two vaccines for the best pneumonia protection.   -All adults should have a flu vaccine yearly and a tetanus vaccine every 10 years. All adults age 61 and older should receive a shingles vaccine once in their lifetime.    -A bone mass density test is recommended when a woman turns 65 to screen for osteoporosis. This test is only recommended one time, as a screening. Some providers will use this same test as a disease monitoring tool if you already have osteoporosis. -All adults age 38-68 who are overweight should have a diabetes screening test once every three years.   -Other screening tests and preventive services for persons with diabetes include: an eye exam to screen for diabetic retinopathy, a kidney function test, a foot exam, and stricter control over your cholesterol.   -Cardiovascular screening for adults with routine risk involves an electrocardiogram (ECG) at intervals determined by your doctor.   -Colorectal cancer screenings should be done for adults age 54-65 with no increased risk factors for colorectal cancer. There are a number of acceptable methods of screening for this type of cancer. Each test has its own benefits and drawbacks. Discuss with your doctor what is most appropriate for you during your annual wellness visit. The different tests include: colonoscopy (considered the best screening method), a fecal occult blood test, a fecal DNA test, and sigmoidoscopy. -Breast cancer screenings are recommended every other year for women of normal risk, age 54-69.  -Cervical cancer screenings for women over age 72 are only recommended with certain risk factors.   -All adults born between [facility] should be screened once for Hepatitis C.      Here is a list of your current Health Maintenance items (your personalized list of preventive services) with a due date:  Health Maintenance Due   Topic Date Due    DTaP/Tdap/Td  (1 - Tdap) 06/03/1956    Shingles Vaccine (1 of 2) 06/03/1985    Glaucoma Screening   06/03/2000    Bone Mineral Density   06/03/2000    Pneumococcal Vaccine (1 of 2 - PCV13) 06/03/2000    Flu Vaccine  08/01/2018         Medicare Part B Preventive Services Limitations Recommendation Scheduled   Bone Mass Measurement  (age 72 & older, biennial) Requires diagnosis related to osteoporosis or estrogen deficiency. Biennial benefit unless patient has history of long-term glucocorticoid tx or baseline is needed because initial test was by other method This should be done at age 72 and again if on osteoporosis medication at 2-3 year intervals. Up to date but we do not have records   Cardiovascular Screening Blood Tests (every 5 years)  Total cholesterol, HDL, Triglycerides Order as a panel if possible We should check your cholesterol panel at least once every 5 years. Up to date   Colorectal Cancer Screening  -Fecal occult blood test (annual)  -Flexible sigmoidoscopy (5y)  -Screening colonoscopy (10y)  -Barium Enema  Due per your Gastroenterologist's recommendations. Discussed today   Counseling to Prevent Tobacco Use (up to 8 sessions per year)  - Counseling greater than 3 and up to 10 minutes  - Counseling greater than 10 minutes Patients must be asymptomatic of tobacco-related conditions to receive as preventive service Continue with smoking cessation    Diabetes Screening Tests (at least every 3 years, Medicare covers annually or at 6-month intervals for prediabetic patients)    Fasting blood sugar (FBS) or glucose tolerance test (GTT) Patient must be diagnosed with one of the following:  -Hypertension, Dyslipidemia, obesity, previous impaired FBS or GTT  Or any two of the following: overweight, FH of diabetes, age ? 72, history of gestational diabetes, birth of baby weighing more than 9 pounds Should be done yearly Up to date   Diabetes Self-Management Training (DSMT) (no USPSTF recommendation) Requires referral by treating physician for patient with diabetes or renal disease. 10 hours of initial DSMT session of no less than 30 minutes each in a continuous 12-month period. 2 hours of follow-up DSMT in subsequent years. Not applicable Not applicable   Glaucoma Screening (no USPSTF recommendation) Diabetes mellitus, family history, , age 48 or over,  American, age 72 or over Continue with annual eye exams. Up to date but we do not have records. Human Immunodeficiency Virus (HIV) Screening (annually for increased risk patients)  HIV-1 and HIV-2 by EIA, AURA, rapid antibody test, or oral mucosa transudate Patient must be at increased risk for HIV infection per USPSTF guidelines or pregnant. Tests covered annually for patients at increased risk. Pregnant patients may receive up to 3 test during pregnancy. Not applicable Not applicable   Medical Nutrition Therapy (MNT) (for diabetes or renal disease not recommended schedule) Requires referral by treating physician for patient with diabetes or renal disease. Can be provided in same year as diabetes self-management training (DSMT), and CMS recommends medical nutrition therapy take place after DSMT. Up to 3 hours for initial year and 2 hours in subsequent years. Not applicable Not applicable   Shingles Vaccination A shingles vaccine is also recommended once in a lifetime after age 61 Vaccination recommended for shingles vaccination. Overdue   Seasonal Influenza Vaccination (annually)  Continue with yearly \"flu\" shot annually Overdue   Pneumococcal Vaccination (once after 65)  Please receive this vaccination at age 72. Overdue   Hepatitis B Vaccinations (if medium/high risk) Medium/high risk factors:  End-stage renal disease,  Hemophiliacs who received Factor VIII or IX concentrates, Clients of institutions for the mentally retarded, Persons who live in the same house as a HepB virus carrier, Homosexual men, Illicit injectable drug abusers.  Not applicable Not applicable   Screening Mammography (biennial age 54-69) Annually (age 36 or over) You need a mammogram yearly to screen for breast cancer. Scheduled   Screening Pap Tests and Pelvic Examination (up to age 79 and after 79 if unknown history or abnormal study last 10 years) Every 24 months except high risk You need no Pap smear at this time. Not warranted   Ultrasound Screening for Abdominal Aortic Aneurysm (AAA) (once) Patient must be referred through IPPE and not have had a screening for abdominal aortic aneurysm before under Medicare. Limited to patients who meet one of the following criteria:  - Men who are 73-68 years old and have smoked more than 100 cigarettes in their lifetime.  -Anyone with a FH of AAA  -Anyone recommended for screening by USPSTF Not applicable Not applicable          Body Mass Index: Care Instructions  Your Care Instructions    Body mass index (BMI) can help you see if your weight is raising your risk for health problems. It uses a formula to compare how much you weigh with how tall you are. · A BMI lower than 18.5 is considered underweight. · A BMI between 18.5 and 24.9 is considered healthy. · A BMI between 25 and 29.9 is considered overweight. A BMI of 30 or higher is considered obese. If your BMI is in the normal range, it means that you have a lower risk for weight-related health problems. If your BMI is in the overweight or obese range, you may be at increased risk for weight-related health problems, such as high blood pressure, heart disease, stroke, arthritis or joint pain, and diabetes. If your BMI is in the underweight range, you may be at increased risk for health problems such as fatigue, lower protection (immunity) against illness, muscle loss, bone loss, hair loss, and hormone problems. BMI is just one measure of your risk for weight-related health problems.  You may be at higher risk for health problems if you are not active, you eat an unhealthy diet, or you drink too much alcohol or use tobacco products. Follow-up care is a key part of your treatment and safety. Be sure to make and go to all appointments, and call your doctor if you are having problems. It's also a good idea to know your test results and keep a list of the medicines you take. How can you care for yourself at home? · Practice healthy eating habits. This includes eating plenty of fruits, vegetables, whole grains, lean protein, and low-fat dairy. · If your doctor recommends it, get more exercise. Walking is a good choice. Bit by bit, increase the amount you walk every day. Try for at least 30 minutes on most days of the week. · Do not smoke. Smoking can increase your risk for health problems. If you need help quitting, talk to your doctor about stop-smoking programs and medicines. These can increase your chances of quitting for good. · Limit alcohol to 2 drinks a day for men and 1 drink a day for women. Too much alcohol can cause health problems. If you have a BMI higher than 25  · Your doctor may do other tests to check your risk for weight-related health problems. This may include measuring the distance around your waist. A waist measurement of more than 40 inches in men or 35 inches in women can increase the risk of weight-related health problems. · Talk with your doctor about steps you can take to stay healthy or improve your health. You may need to make lifestyle changes to lose weight and stay healthy, such as changing your diet and getting regular exercise. If you have a BMI lower than 18.5  · Your doctor may do other tests to check your risk for health problems. · Talk with your doctor about steps you can take to stay healthy or improve your health. You may need to make lifestyle changes to gain or maintain weight and stay healthy, such as getting more healthy foods in your diet and doing exercises to build muscle. Where can you learn more? Go to http://michael-holland.info/.   Enter S176 in the search box to learn more about \"Body Mass Index: Care Instructions. \"  Current as of: October 9, 2017  Content Version: 11.7  © 2195-1064 [employer]. Care instructions adapted under license by [employer] (which disclaims liability or warranty for this information). If you have questions about a medical condition or this instruction, always ask your healthcare professional. [address] any warranty or liability for your use of this information. Medicare Wellness Visit, Female     The best way to live healthy is to have a lifestyle where you eat a well-balanced diet, exercise regularly, limit alcohol use, and quit all forms of tobacco/nicotine, if applicable. Regular preventive services are another way to keep healthy. Preventive services (vaccines, screening tests, monitoring & exams) can help personalize your care plan, which helps you manage your own care. Screening tests can find health problems at the earliest stages, when they are easiest to treat. [facility] Joyce follows the current, evidence-based guidelines published by the [facility] and [employer] [place] (USPSTF) when recommending preventive services for our patients. Because we follow these guidelines, sometimes recommendations change over time as research supports it. (For example, mammograms used to be recommended annually. Even though Medicare will still pay for an annual mammogram, the newer guidelines recommend a mammogram every two years for women of average risk.)  Of course, you and your doctor may decide to screen more often for some diseases, based on your risk and your health status. Preventive services for you include:  - Medicare offers their members a free annual wellness visit, which is time for you and your primary care provider to discuss and plan for your preventive service needs.  Take advantage of this benefit every year!  -All adults over the age of 72 should receive the recommended pneumonia vaccines. Current USPSTF guidelines recommend a series of two vaccines for the best pneumonia protection.   -All adults should have a flu vaccine yearly and a tetanus vaccine every 10 years. All adults age 61 and older should receive a shingles vaccine once in their lifetime.    -A bone mass density test is recommended when a woman turns 65 to screen for osteoporosis. This test is only recommended one time, as a screening. Some providers will use this same test as a disease monitoring tool if you already have osteoporosis. -All adults age 38-68 who are overweight should have a diabetes screening test once every three years.   -Other screening tests and preventive services for persons with diabetes include: an eye exam to screen for diabetic retinopathy, a kidney function test, a foot exam, and stricter control over your cholesterol.   -Cardiovascular screening for adults with routine risk involves an electrocardiogram (ECG) at intervals determined by your doctor.   -Colorectal cancer screenings should be done for adults age 54-65 with no increased risk factors for colorectal cancer. There are a number of acceptable methods of screening for this type of cancer. Each test has its own benefits and drawbacks. Discuss with your doctor what is most appropriate for you during your annual wellness visit. The different tests include: colonoscopy (considered the best screening method), a fecal occult blood test, a fecal DNA test, and sigmoidoscopy. -Breast cancer screenings are recommended every other year for women of normal risk, age 54-69.  -Cervical cancer screenings for women over age 72 are only recommended with certain risk factors.   -All adults born between [facility] should be screened once for Hepatitis C.      Here is a list of your current Health Maintenance items (your personalized list of preventive services) with a due date:  Health Maintenance Due   Topic Date Due    DTaP/Tdap/Td (1 - Tdap) 06/03/1956    Shingles Vaccine (1 of 2) 06/03/1985    Glaucoma Screening   06/03/2000    Bone Mineral Density   06/03/2000    Pneumococcal Vaccine (1 of 2 - PCV13) 06/03/2000

## 2018-09-27 NOTE — MR AVS SNAPSHOT
[address] [facility] 
 
 
 [address], [address] [facility] 
[phone] Patient: Truman Doyle MRN: [id number] LGA:[id number] Visit Information Date & Time Provider Department Dept. Phone Encounter #  
 9/27/2018 11:30 AM Everett Goltz, MD [facility] [phone] [id number] Your Appointments 10/18/2018 11:00 AM  
New Patient with Ana Ortega MD  
[facility] ([address] [facility]) Appt Note: NP, urinary incontinence [address] [facility] [address] [address] [address]  
  
   
 [address] [facility] [address] [address] [address] [facility] Upcoming Health Maintenance Date Due DTaP/Tdap/Td series (1 - Tdap) 6[birth year]3/1956 Shingrix Vaccine Age 50> (1 of 2) 6/3/1985 GLAUCOMA SCREENING Q2Y 6/3/2000 Bone Densitometry (Dexa) Screening 6/3/2000 Pneumococcal 65+ High/Highest Risk (1 of 2 - PCV13) 6/3/2000 Influenza Age 5 to Adult 8/1/2018 MEDICARE YEARLY EXAM 9/28/2019 Allergies as of 9/27/2018  Review Complete On: 9/27/2018 By: Everett Goltz, MD  
  
 Severity Noted Reaction Type Reactions Macrobid [Nitrofurantoin Monohyd/m-cryst] High 09/27/2018    Nausea and Vomiting Lexapro [Escitalopram Oxalate] Low 09/01/2018    Other (comments) ? Hyponatremia per pt hx Current Immunizations  Never Reviewed No immunizations on file. Not reviewed this visit You Were Diagnosed With   
  
 Codes Comments Dysuria    -  Primary ICD-10-CM: R30.0 ICD-9-CM: 788.1 Frequent urination     ICD-10-CM: R35.0 ICD-9-CM: 788.41 Acute pain of both knees     ICD-10-CM: M25.561, M25.562 ICD-9-CM: 338.19, 719.46 Gross hematuria     ICD-10-CM: R31.0 ICD-9-CM: 599.71 Acute cystitis without hematuria     ICD-10-CM: N30.00 ICD-9-CM: 595.0 Vitals BP Pulse Temp Resp Height(growth percentile) Weight(growth percentile) 141/59 (BP 1 Location: Right arm, BP Patient Position: Sitting) (!) 58 97.1 °F (36.2 °C) (Oral) 14 5' 2\" (1.575 m) 143 lb 3.2 oz (65 kg) SpO2 BMI OB Status Smoking Status 99% 26.19 kg/m2 Hysterectomy Never Smoker Vitals History BMI and BSA Data Body Mass Index Body Surface Area  
 26.19 kg/m 2 1.69 m 2 Your Updated Medication List  
  
   
This list is accurate as of 9/27/18 12:57 PM.  Always use your most recent med list.  
  
  
  
  
 ALIGN 4 mg Cap Generic drug:  Bifidobacterium Infantis Take 4 mg by mouth daily. CENTRUM WOMEN PO Take  by mouth daily. dicyclomine 10 mg capsule Commonly known as:  BENTYL Take 1 Cap by mouth four (4) times daily. famotidine 10 mg tablet Commonly known as:  PEPCID Take 10 mg by mouth two (2) times a day. furosemide 20 mg tablet Commonly known as:  LASIX Take 1 Tab by mouth every other day. Indications: Edema  
  
 levoFLOXacin 750 mg tablet Commonly known as:  Donold Thurman Take 1 Tab by mouth daily for 5 days. melatonin Tab tablet Take 5 mg by mouth nightly. MIRALAX 17 gram packet Generic drug:  polyethylene glycol Take 17 g by mouth daily as needed. mirtazapine 15 mg tablet Commonly known as:  Chad Maya Take 1 Tab by mouth nightly. Indications: major depressive disorder TYLENOL 325 mg tablet Generic drug:  acetaminophen Take 325 mg by mouth every four (4) hours as needed for Pain.  
  
 varicella-zoster recombinant (PF) 50 mcg/0.5 mL Susr injection Commonly known as:  SHINGRIX  
0.5 mL by IntraMUSCular route every two (2) months. VITAMIN D3 1,000 unit Cap Generic drug:  cholecalciferol Take 1,000 Units by mouth daily. ZyrTEC 10 mg tablet Generic drug:  cetirizine Take 10 mg by mouth daily. Prescriptions Printed  Refills  
 varicella-zoster recombinant, PF, (SHINGRIX) 50 mcg/0.5 mL susr injection 1  
 Si.5 mL by IntraMUSCular route every two (2) months. Class: Print Route: IntraMUSCular  
 levoFLOXacin (LEVAQUIN) 750 mg tablet 0 Sig: Take 1 Tab by mouth daily for 5 days. Class: Print Route: Oral  
  
We Performed the Following AMB POC URINALYSIS DIP STICK AUTO W/O MICRO [52097 CPT(R)] To-Do List   
 2018 Microbiology:  CULTURE, URINE   
  
 10/04/2018 10:30 AM  
  Appointment with [facility] 1 at [address] [place] [address] ([phone]) OUTSIDE FILMS  - Any outside films related to the study being scheduled should be brought with you on the day of the exam.  If this cannot be done there may be a delay in the reading of the study. MEDICATIONS  - Patient must bring a complete list of all medications currently taking to include prescriptions, over-the-counter meds, herbals, vitamins & any dietary supplements  GENERAL INSTRUCTIONS  - On the day of your exam do not use any bath powder, deodorant or lotions on the armpit area. -Tenderness of breasts may cause an increase of discomfort during procedure. If you are experiencing breast tenderness on the day of your appointment and would like to reschedule, please call [phone]. Patient Instructions Patient still has a copy of the Advanced Medical Directive form and understands to bring it in once completed. Health Maintenance Due Topic Date Due  
 DTaP/Tdap/Td series (1 - Tdap) 1956  Shingrix Vaccine Age 50> (1 of 2) 1985  GLAUCOMA SCREENING Q2Y  2000  Bone Densitometry (Dexa) Screening  2000  Pneumococcal 65+ High/Highest Risk (1 of 2 - PCV13) 2000  Influenza Age 5 to Adult  2018 Medicare Wellness Visit, Female The best way to live healthy is to have a lifestyle where you eat a well-balanced diet, exercise regularly, limit alcohol use, and quit all forms of tobacco/nicotine, if applicable. Regular preventive services are another way to keep healthy. Preventive services (vaccines, screening tests, monitoring & exams) can help personalize your care plan, which helps you manage your own care. Screening tests can find health problems at the earliest stages, when they are easiest to treat. [facility] follows the current, evidence-based guidelines published by the [facility] Nino Fernandez (USPSTF) when recommending preventive services for our patients. Because we follow these guidelines, sometimes recommendations change over time as research supports it. (For example, mammograms used to be recommended annually. Even though Medicare will still pay for an annual mammogram, the newer guidelines recommend a mammogram every two years for women of average risk.) Of course, you and your doctor may decide to screen more often for some diseases, based on your risk and your health status. Preventive services for you include: - Medicare offers their members a free annual wellness visit, which is time for you and your primary care provider to discuss and plan for your preventive service needs. Take advantage of this benefit every year! 
-All adults over the age of 72 should receive the recommended pneumonia vaccines. Current USPSTF guidelines recommend a series of two vaccines for the best pneumonia protection.  
-All adults should have a flu vaccine yearly and a tetanus vaccine every 10 years. All adults age 61 and older should receive a shingles vaccine once in their lifetime.   
-A bone mass density test is recommended when a woman turns 65 to screen for osteoporosis. This test is only recommended one time, as a screening. Some providers will use this same test as a disease monitoring tool if you already have osteoporosis. -All adults age 38-68 who are overweight should have a diabetes screening test once every three years. -Other screening tests and preventive services for persons with diabetes include: an eye exam to screen for diabetic retinopathy, a kidney function test, a foot exam, and stricter control over your cholesterol.  
-Cardiovascular screening for adults with routine risk involves an electrocardiogram (ECG) at intervals determined by your doctor.  
-Colorectal cancer screenings should be done for adults age 54-65 with no increased risk factors for colorectal cancer. There are a number of acceptable methods of screening for this type of cancer. Each test has its own benefits and drawbacks. Discuss with your doctor what is most appropriate for you during your annual wellness visit. The different tests include: colonoscopy (considered the best screening method), a fecal occult blood test, a fecal DNA test, and sigmoidoscopy. -Breast cancer screenings are recommended every other year for women of normal risk, age 54-69. 
-Cervical cancer screenings for women over age 72 are only recommended with certain risk factors.  
-All adults born between [facility] should be screened once for Hepatitis C. Here is a list of your current Health Maintenance items (your personalized list of preventive services) with a due date: 
Health Maintenance Due Topic Date Due  
 DTaP/Tdap/Td  (1 - Tdap) 06/03/1956  Shingles Vaccine (1 of 2) 06/03/1985  Glaucoma Screening   06/03/2000  Bone Mineral Density   06/03/2000  Pneumococcal Vaccine (1 of 2 - PCV13) 06/03/2000  Flu Vaccine  08/01/2018 Medicare Part B Preventive Services Limitations Recommendation Scheduled Bone Mass Measurement 
(age 72 & older, biennial) Requires diagnosis related to osteoporosis or estrogen deficiency.  Biennial benefit unless patient has history of long-term glucocorticoid tx or baseline is needed because initial test was by other method This should be done at age 72 and again if on osteoporosis medication at 2-3 year intervals. Up to date but we do not have records Cardiovascular Screening Blood Tests (every 5 years) Total cholesterol, HDL, Triglycerides Order as a panel if possible We should check your cholesterol panel at least once every 5 years. Up to date Colorectal Cancer Screening 
-Fecal occult blood test (annual) -Flexible sigmoidoscopy (5y) 
-Screening colonoscopy (10y) -Barium Enema  Due per your Gastroenterologist's recommendations. Discussed today Counseling to Prevent Tobacco Use (up to 8 sessions per year) - Counseling greater than 3 and up to 10 minutes - Counseling greater than 10 minutes Patients must be asymptomatic of tobacco-related conditions to receive as preventive service Continue with smoking cessation Diabetes Screening Tests (at least every 3 years, Medicare covers annually or at 6-month intervals for prediabetic patients) Fasting blood sugar (FBS) or glucose tolerance test (GTT) Patient must be diagnosed with one of the following: 
-Hypertension, Dyslipidemia, obesity, previous impaired FBS or GTT 
Or any two of the following: overweight, FH of diabetes, age ? 72, history of gestational diabetes, birth of baby weighing more than 9 pounds Should be done yearly Up to date Diabetes Self-Management Training (DSMT) (no USPSTF recommendation) Requires referral by treating physician for patient with diabetes or renal disease. 10 hours of initial DSMT session of no less than 30 minutes each in a continuous 12-month period. 2 hours of follow-up DSMT in subsequent years. Not applicable Not applicable Glaucoma Screening (no USPSTF recommendation) Diabetes mellitus, family history, , age 48 or over,  American, age 72 or over Continue with annual eye exams. Up to date but we do not have records. Human Immunodeficiency Virus (HIV) Screening (annually for increased risk patients) HIV-1 and HIV-2 by EIA, AURA, rapid antibody test, or oral mucosa transudate Patient must be at increased risk for HIV infection per USPSTF guidelines or pregnant. Tests covered annually for patients at increased risk. Pregnant patients may receive up to 3 test during pregnancy. Not applicable Not applicable Medical Nutrition Therapy (MNT) (for diabetes or renal disease not recommended schedule) Requires referral by treating physician for patient with diabetes or renal disease. Can be provided in same year as diabetes self-management training (DSMT), and CMS recommends medical nutrition therapy take place after DSMT. Up to 3 hours for initial year and 2 hours in subsequent years. Not applicable Not applicable Shingles Vaccination A shingles vaccine is also recommended once in a lifetime after age 61 Vaccination recommended for shingles vaccination. Overdue Seasonal Influenza Vaccination (annually)  Continue with yearly \"flu\" shot annually Overdue Pneumococcal Vaccination (once after 65)  Please receive this vaccination at age 72. Overdue Hepatitis B Vaccinations (if medium/high risk) Medium/high risk factors:  End-stage renal disease, Hemophiliacs who received Factor VIII or IX concentrates, Clients of institutions for the mentally retarded, Persons who live in the same house as a HepB virus carrier, Homosexual men, Illicit injectable drug abusers. Not applicable Not applicable Screening Mammography (biennial age 54-69) Annually (age 36 or over) You need a mammogram yearly to screen for breast cancer. Scheduled Screening Pap Tests and Pelvic Examination (up to age 79 and after 79 if unknown history or abnormal study last 10 years) Every 24 months except high risk You need no Pap smear at this time. Not warranted Ultrasound Screening for Abdominal Aortic Aneurysm (AAA) (once) Patient must be referred through IPPE and not have had a screening for abdominal aortic aneurysm before under Medicare. Limited to patients who meet one of the following criteria: 
- Men who are 73-68 years old and have smoked more than 100 cigarettes in their lifetime. 
-Anyone with a FH of AAA 
-Anyone recommended for screening by USPSTF Not applicable Not applicable Body Mass Index: Care Instructions Your Care Instructions Body mass index (BMI) can help you see if your weight is raising your risk for health problems. It uses a formula to compare how much you weigh with how tall you are. · A BMI lower than 18.5 is considered underweight. · A BMI between 18.5 and 24.9 is considered healthy. · A BMI between 25 and 29.9 is considered overweight. A BMI of 30 or higher is considered obese. If your BMI is in the normal range, it means that you have a lower risk for weight-related health problems. If your BMI is in the overweight or obese range, you may be at increased risk for weight-related health problems, such as high blood pressure, heart disease, stroke, arthritis or joint pain, and diabetes. If your BMI is in the underweight range, you may be at increased risk for health problems such as fatigue, lower protection (immunity) against illness, muscle loss, bone loss, hair loss, and hormone problems. BMI is just one measure of your risk for weight-related health problems. You may be at higher risk for health problems if you are not active, you eat an unhealthy diet, or you drink too much alcohol or use tobacco products. Follow-up care is a key part of your treatment and safety. Be sure to make and go to all appointments, and call your doctor if you are having problems. It's also a good idea to know your test results and keep a list of the medicines you take. How can you care for yourself at home? · Practice healthy eating habits. This includes eating plenty of fruits, vegetables, whole grains, lean protein, and low-fat dairy. · If your doctor recommends it, get more exercise. Walking is a good choice. Bit by bit, increase the amount you walk every day. Try for at least 30 minutes on most days of the week. · Do not smoke. Smoking can increase your risk for health problems. If you need help quitting, talk to your doctor about stop-smoking programs and medicines. These can increase your chances of quitting for good. · Limit alcohol to 2 drinks a day for men and 1 drink a day for women. Too much alcohol can cause health problems. If you have a BMI higher than 25 · Your doctor may do other tests to check your risk for weight-related health problems. This may include measuring the distance around your waist. A waist measurement of more than 40 inches in men or 35 inches in women can increase the risk of weight-related health problems. · Talk with your doctor about steps you can take to stay healthy or improve your health. You may need to make lifestyle changes to lose weight and stay healthy, such as changing your diet and getting regular exercise. If you have a BMI lower than 18.5 · Your doctor may do other tests to check your risk for health problems. · Talk with your doctor about steps you can take to stay healthy or improve your health. You may need to make lifestyle changes to gain or maintain weight and stay healthy, such as getting more healthy foods in your diet and doing exercises to build muscle. Where can you learn more? Go to http://michael-holland.info/. Enter S176 in the search box to learn more about \"Body Mass Index: Care Instructions. \" Current as of: October 9, 2017 Content Version: 11.7 © 3645-9198 [employer]. Care instructions adapted under license by [employer] (which disclaims liability or warranty for this information).  If you have questions about a medical condition or this instruction, always ask your healthcare professional. [employer] disclaims any warranty or liability for your use of this information. Introducing [facility]! [facility] introduces [employer] patient portal. Now you can access parts of your medical record, email your doctor's office, and request medication refills online. 1. In your internet browser, go to [email or web address] 2. Click on the First Time User? Click Here link in the Sign In box. You will see the New Member Sign Up page. 3. Enter your [employer] Access Code exactly as it appears below. You will not need to use this code after youve completed the sign-up process. If you do not sign up before the expiration date, you must request a new code. · [employer] Access Code: [email or web address] Expires: 11/26/2018  1:13 PM 
 
4. Enter the last four digits of your Social Security Number (xxxx) and Date of Birth (mm/dd/yyyy) as indicated and click Submit. You will be taken to the next sign-up page. 5. Create a [employer] ID. This will be your [employer] login ID and cannot be changed, so think of one that is secure and easy to remember. 6. Create a [employer] password. You can change your password at any time. 7. Enter your Password Reset Question and Answer. This can be used at a later time if you forget your password. 8. Enter your e-mail address. You will receive e-mail notification when new information is available in [address] 9. Click Sign Up. You can now view and download portions of your medical record. 10. Click the Download Summary menu link to download a portable copy of your medical information. If you have questions, please visit the Frequently Asked Questions section of the [employer] website. Remember, [employer] is NOT to be used for urgent needs. For medical emergencies, dial 911. Now available from your iPhone and Android! Please provide this summary of care documentation to your next provider. Your primary care clinician is listed as Jolan Curling. If you have any questions after today's visit, please call [phone].

## 2018-09-27 NOTE — PROGRESS NOTES
Dino Yo is a 80 y.o. female who presents for routine immunizations. She denies any symptoms , reactions or allergies that would exclude them from being immunized today. Risks and adverse reactions were discussed and the VIS was given to them. All questions were addressed. She was observed for 10 min post injection. There were no reactions observed.     Juliane Gabriel LPN

## 2018-09-27 NOTE — PROGRESS NOTES
INTERNISTS OF [facility] [place]:  10/7/2018, MRN: [id number]      Steve Dickerson is a 80 y.o. female and presents to clinic for Annual Wellness Visit (Medicare )    Subjective: The patient is an 61-year-old female with history of sigmoid colon cancer status post surgery, history of strictures, osteopenia, hyponatremia, lumbar disc disease, hypertension, IBS, vitamin D deficiency, allergic rhinitis, GERD, and urinary retention. 1. UTI: She had dysuria sx. She was evaluated at [facility]. She was prescribed a course of bactrom/ She reports no adverse side effects with taking this rx. Sx resolved after completing the abx. 1 wk later after sx resolution, she had recurrent dysuria sx. \"Painful urination and urgency\" have occurred off/on since then. She has some suprapubic discomfort off/on. No fever/chills. She is scheduled to see Urology. She is taking cranberry rx OTC. Incidentally, patient also has a history of recurrent UTIs per her history. She tried pelvic floor dysfunction PT prior to moving to the [facility], she was told to do self intermittent catheterization 3 times daily or 4 times daily. She continues to do intermittent self-catheterization. She denies urge incontinence or stress incontinence. Interestingly enough, the patient denies urinary retention symptoms at night. Her symptoms are mostly during the day. She has difficulty urinating on demand. 2. B/l Knee Pain: The patient suffered from 2 falls since her last appointment. No loss of consciousness. Falls were attributed to bilateral knee pain. There were accidental.  She states that sometimes her knee joints will give out. Falls typically occurred when attempting to kneel down. Since then, she has had persistent bilateral knee pain, sharp in quality. Symptoms have been present off and on for the past month. She uses her walker and cane. At her last appointment, I referred her to PT.   Although they contacted her to schedule PT sessions, no sessions of actually been scheduled. Patient Active Problem List    Diagnosis Date Noted    Hyponatremia 09/01/2018    Leg edema 09/01/2018    Urinary retention 09/01/2018    Cancer of sigmoid ([address] 09/01/2018    Anxiety 09/01/2018    Irritable bowel syndrome with both constipation and diarrhea 09/01/2018    Neck pain 09/01/2018    Lumbar disc disease 09/01/2018    Essential hypertension 09/01/2018    Allergic rhinitis 09/01/2018    Vitamin D deficiency 09/01/2018       Current Outpatient Prescriptions   Medication Sig Dispense Refill    varicella-zoster recombinant, PF, (SHINGRIX) 50 mcg/0.5 mL susr injection 0.5 mL by IntraMUSCular route every two (2) months. 0.5 mL 1    melatonin tab tablet Take 5 mg by mouth nightly.  cholecalciferol (VITAMIN D3) 1,000 unit cap Take 1,000 Units by mouth daily.  multivitamin/iron/folic acid (CENTRUM WOMEN PO) Take  by mouth daily.  acetaminophen (TYLENOL) 325 mg tablet Take 325 mg by mouth every four (4) hours as needed for Pain.  cetirizine (ZYRTEC) 10 mg tablet Take 10 mg by mouth daily.  famotidine (PEPCID) 10 mg tablet Take 10 mg by mouth two (2) times a day.  polyethylene glycol (MIRALAX) 17 gram packet Take 17 g by mouth daily as needed.  Bifidobacterium Infantis (ALIGN) 4 mg cap Take 4 mg by mouth daily.  furosemide (LASIX) 20 mg tablet Take 1 Tab by mouth every other day. Indications: Edema 90 Tab 3    mirtazapine (REMERON) 15 mg tablet Take 1 Tab by mouth nightly. Indications: major depressive disorder 90 Tab 3    dicyclomine (BENTYL) 10 mg capsule Take 1 Cap by mouth four (4) times daily. 90 Cap 3       Allergies   Allergen Reactions    Macrobid [Nitrofurantoin Monohyd/M-Cryst] Nausea and Vomiting    Lexapro [Escitalopram Oxalate] Other (comments)     ? Hyponatremia per pt hx       Past Medical History:   Diagnosis Date    Arthritis     Cancer [facility]) 2002    Colon     Chronic pain abdominal due to IBS    Depression     GERD (gastroesophageal reflux disease)     Hypertension     Migraine headache 2014    Occipital neuralgia 2014    PUD (peptic ulcer disease)     S/P colon resection 2002    clear presently        Past Surgical History:   Procedure Laterality Date    HX CHOLECYSTECTOMY  2015    HX HYSTERECTOMY  1965    HX TONSIL AND ADENOIDECTOMY      HX WISDOM TEETH EXTRACTION      x4       Family History   Problem Relation Age of Onset    Hypertension Mother     Stroke Mother     Heart Attack Father     Hypertension Father     Parkinson's Disease Sister     Hypertension Brother     Cancer Brother      prostate    No Known Problems Son     No Known Problems Daughter        Social History   Substance Use Topics    Smoking status: Never Smoker    Smokeless tobacco: Never Used    Alcohol use 1.8 oz/week     3 Glasses of wine per week       ROS   Review of Systems   Constitutional: Negative for chills and fever. HENT: Negative for ear pain and sore throat. Eyes: Negative for blurred vision and pain. Respiratory: Negative for shortness of breath. Cardiovascular: Negative for chest pain. Gastrointestinal: Positive for abdominal pain (see HPI). Negative for blood in stool and melena. Genitourinary: Positive for dysuria and frequency. Negative for hematuria. See HPI   Musculoskeletal: Negative for joint pain and myalgias. Skin: Negative for rash. Neurological: Positive for tingling (along BLE). Negative for focal weakness and headaches. Endo/Heme/Allergies: Does not bruise/bleed easily. Psychiatric/Behavioral: Negative for substance abuse.        Objective     Vitals:    09/27/18 1122   BP: 141/59   Pulse: (!) 58   Resp: 14   Temp: 97.1 °F (36.2 °C)   TempSrc: Oral   SpO2: 99%   Weight: 143 lb 3.2 oz (65 kg)   Height: 5' 2\" (1.575 m)   PainSc:   4   PainLoc: Abdomen       Physical Exam   Constitutional: She is oriented to person, place, and time and well-developed, well-nourished, and in no distress. HENT:   Head: Normocephalic and atraumatic. Right Ear: External ear normal.   Left Ear: External ear normal.   Nose: Nose normal.   Mouth/Throat: Oropharynx is clear and moist. No oropharyngeal exudate. Eyes: Conjunctivae and EOM are normal. Pupils are equal, round, and reactive to light. Right eye exhibits no discharge. Left eye exhibits no discharge. No scleral icterus. Neck: Neck supple. Cardiovascular: Normal rate, regular rhythm, normal heart sounds and intact distal pulses. Exam reveals no gallop and no friction rub. No murmur heard. Pulmonary/Chest: Effort normal and breath sounds normal. No respiratory distress. She has no wheezes. She has no rales. Abdominal: Soft. Bowel sounds are normal. She exhibits no distension. There is no tenderness. There is no rebound and no guarding. Musculoskeletal: She exhibits no edema or tenderness (Bue). Crepitus with flexion/extension along b/l knee jts. B/l knee jts are NTTP   Lymphadenopathy:     She has no cervical adenopathy. Neurological: She is alert and oriented to person, place, and time. She exhibits normal muscle tone. Gait normal.   Skin: Skin is warm and dry. No erythema. Psychiatric: Affect normal.   Nursing note and vitals reviewed.       LABS   Data Review:   Lab Results   Component Value Date/Time    WBC 4.6 08/28/2018 04:12 PM    HGB 12.6 08/28/2018 04:12 PM    HCT 40.1 08/28/2018 04:12 PM    PLATELET 397 86/37/6140 04:12 PM    MCV 91.8 08/28/2018 04:12 PM       Lab Results   Component Value Date/Time    Sodium 139 08/28/2018 04:12 PM    Potassium 4.3 08/28/2018 04:12 PM    Chloride 104 08/28/2018 04:12 PM    CO2 31 08/28/2018 04:12 PM    Anion gap 4 08/28/2018 04:12 PM    Glucose 97 08/28/2018 04:12 PM    BUN 15 08/28/2018 04:12 PM    Creatinine 0.94 08/28/2018 04:12 PM    BUN/Creatinine ratio 16 08/28/2018 04:12 PM    GFR est AA >60 08/28/2018 04:12 PM    GFR est non-AA 57 (L) 08/28/2018 04:12 PM    Calcium 9.4 08/28/2018 04:12 PM       Lab Results   Component Value Date/Time    Cholesterol, total 212 (H) 08/28/2018 04:12 PM    HDL Cholesterol 63 (H) 08/28/2018 04:12 PM    LDL, calculated 126.4 (H) 08/28/2018 04:12 PM    VLDL, calculated 22.6 08/28/2018 04:12 PM    Triglyceride 113 08/28/2018 04:12 PM    CHOL/HDL Ratio 3.4 08/28/2018 04:12 PM       No results found for: HBA1C, HGBE8, TYR7KNFA, QTG0OASE, PKG1HFUA    Results for orders placed or performed in visit on 09/27/18   AMB POC URINALYSIS DIP STICK AUTO W/O MICRO     Status: None   Result Value Ref Range Status    Color (UA POC) Yellow  Final    Clarity (UA POC) Cloudy  Final    Glucose (UA POC) Negative Negative Final    Bilirubin (UA POC) Negative Negative Final    Ketones (UA POC) Negative Negative Final    Specific gravity (UA POC) 1.010 1.001 - 1.035 Final    Blood (UA POC) 1+ Negative Final    pH (UA POC) 6.0 4.6 - 8.0 Final    Protein (UA POC) Negative Negative Final    Urobilinogen (UA POC) 0.2 mg/dL 0.2 - 1 Final    Nitrites (UA POC) Negative Negative Final    Leukocyte esterase (UA POC) 2+ Negative Final         Assessment/Plan:   1. Acute Cystitis: Per UA and HPI findings. Urinary sx of frequency/retention are only present during the day. -Prescribing a course of levofloxacin.  -Culturing her urine.  -I encouraged her to follow-up with the urology team given questionable history of recurrent UTIs and urinary retention.  -Requesting outside records. ORDERS:  - AMB POC URINALYSIS DIP STICK OR TABLET REAGENT AUTO W/O MICRO  - CULTURE, URINE; Future  - levoFLOXacin (LEVAQUIN) 750 mg tablet; Take 1 Tab by mouth daily for 5 days. Dispense: 5 Tab; Refill: 0    2. Acute pain of both knees: +2 falls since her last apt.  -Fall risk reduction measures were discussed with her today.   - I will follow-up with the patient to make sure she is scheduled for PT sessions.  -Return to clinic if symptoms worsen.  -Activity as tolerated. -Tylenol as needed. 3. Encounter for immunization:  -The flu vaccine was administered today. ORDERS:  - Influenza virus vaccine ([address]) 72 years and older ([address])      Health Maintenance Due   Topic Date Due    DTaP/Tdap/Td series (1 - Tdap) 06/03/1956    Shingrix Vaccine Age 50> (1 of 2) 06/03/1985    GLAUCOMA SCREENING Q2Y  06/03/2000    Bone Densitometry (Dexa) Screening  06/03/2000    Pneumococcal 65+ High/Highest Risk (1 of 2 - PCV13) 06/03/2000     Lab review: labs are reviewed in the EHR    I have discussed the diagnosis with the patient and the intended plan as seen in the above orders. The patient has received an after-visit summary and questions were answered concerning future plans. I have discussed medication side effects and warnings with the patient as well. I have reviewed the plan of care with the patient, accepted their input and they are in agreement with the treatment goals. All questions were answered. The patient understands the plan of care. Handouts provided today with above information. Pt instructed if symptoms worsen to call the office or report to the ED for continued care. Greater than 50% of the visit time was spent in counseling and/or coordination of care. Voice recognition was used to generate this report, which may have resulted in some phonetic based errors in grammar and contents. Even though attempts were made to correct all the mistakes, some may have been missed, and remained in the body of the document. Follow-up Disposition:  Return in about 6 months (around 3/27/2019), or if symptoms worsen or fail to improve, for BP check.     Sabrina Fuentes MD

## 2018-09-27 NOTE — PROGRESS NOTES
Chief Complaint   Patient presents with   [facility] Annual Wellness Visit     Medicare      Patient still has a copy of the Advanced Medical Directive form and understands to bring it in once completed. 1. Have you been to the ER, urgent care clinic since your last visit? Hospitalized since your last visit? Yes Where: Urgent Care facility When: 9-09-18 Reason: painful urination and diagnosed with Bladder Infection and treated with Bactrim 500 mg twice a day for 7 days. 2. Have you seen or consulted any other health care providers outside of the [address] [place] [address] since your last visit? Include any pap smears or colon screening. No      This is the Subsequent Medicare Annual Wellness Exam, performed 12 months or more after the Initial AWV or the last Subsequent AWV    I have reviewed the patient's medical history in detail and updated the computerized patient record. History   The patient is an 72-year-old female with history of sigmoid colon cancer status post surgery, history of strictures, osteopenia, hyponatremia, lumbar disc disease, hypertension, IBS, vitamin D deficiency, allergic rhinitis, GERD, and urinary retention. Health Maintenance History  Immunizations reviewed: Tdap over-due   Pneumovax: over-due   Flu: over-due  Zoster: over-due    Immunization History   Administered Date(s) Administered    Influenza High Dose Vaccine PF 09/27/2018       Colonoscopy: Discussed today. No hematochezia/melena. She has a h/o sigmoid colon cancer diagnosed in her 61. S/p surgery (no chemotherapy). Recovery was complicated by strictures. Eye exam: Up to date but we do not have records. Mammo: Scheduled. No breast pain/masses. Dexascan: Up to date but we do not have records. She was told that her study was abnormal. She declines rx.     Pelvic/Pap: No vaginal bleeding      Past Medical History:   Diagnosis Date    Arthritis     Cancer [facility]) 2002    Colon     Chronic pain     abdominal due to IBS    Depression     GERD (gastroesophageal reflux disease)     Hypertension     Migraine headache 2014    Occipital neuralgia 2014    PUD (peptic ulcer disease)     S/P colon resection 2002    clear presently       Past Surgical History:   Procedure Laterality Date    HX CHOLECYSTECTOMY  2015    HX HYSTERECTOMY  1965    HX TONSIL AND ADENOIDECTOMY      HX WISDOM TEETH EXTRACTION      x4     Current Outpatient Prescriptions   Medication Sig Dispense Refill    varicella-zoster recombinant, PF, (SHINGRIX) 50 mcg/0.5 mL susr injection 0.5 mL by IntraMUSCular route every two (2) months. 0.5 mL 1    melatonin tab tablet Take 5 mg by mouth nightly.  cholecalciferol (VITAMIN D3) 1,000 unit cap Take 1,000 Units by mouth daily.  multivitamin/iron/folic acid (CENTRUM WOMEN PO) Take  by mouth daily.  acetaminophen (TYLENOL) 325 mg tablet Take 325 mg by mouth every four (4) hours as needed for Pain.  cetirizine (ZYRTEC) 10 mg tablet Take 10 mg by mouth daily.  famotidine (PEPCID) 10 mg tablet Take 10 mg by mouth two (2) times a day.  polyethylene glycol (MIRALAX) 17 gram packet Take 17 g by mouth daily as needed.  Bifidobacterium Infantis (ALIGN) 4 mg cap Take 4 mg by mouth daily.  furosemide (LASIX) 20 mg tablet Take 1 Tab by mouth every other day. Indications: Edema 90 Tab 3    mirtazapine (REMERON) 15 mg tablet Take 1 Tab by mouth nightly. Indications: major depressive disorder 90 Tab 3    dicyclomine (BENTYL) 10 mg capsule Take 1 Cap by mouth four (4) times daily. 90 Cap 3     Allergies   Allergen Reactions    Macrobid [Nitrofurantoin Monohyd/M-Cryst] Nausea and Vomiting    Lexapro [Escitalopram Oxalate] Other (comments)     ? Hyponatremia per pt hx     Family History   Problem Relation Age of Onset    Hypertension Mother     Stroke Mother     Heart Attack Father     Hypertension Father     Parkinson's Disease Sister     Hypertension Brother     Cancer Brother prostate    No Known Problems Son     No Known Problems Daughter      Social History   Substance Use Topics    Smoking status: Never Smoker    Smokeless tobacco: Never Used    Alcohol use 1.8 oz/week     3 Glasses of wine per week     Patient Active Problem List   Diagnosis Code    Hyponatremia E87.1    Leg edema R60.0    Urinary retention R33.9    Cancer of sigmoid ([facility] [address] C18.7    Anxiety F41.9    Irritable bowel syndrome with both constipation and diarrhea K58.2    Neck pain M54.2    Lumbar disc disease M51.9    Essential hypertension I10    Allergic rhinitis J30.9    Vitamin D deficiency E55.9       Depression Risk Factor Screening:     PHQ over the last two weeks 9/27/2018   Little interest or pleasure in doing things Not at all   Feeling down, depressed, irritable, or hopeless Not at all   Total Score PHQ 2 0     Alcohol Risk Factor Screening:   Very rarely    Functional Ability and Level of Safety:   Hearing Loss  Hearing is good. Activities of Daily Living  The home contains: hand rails in shower   Patient does total self care    Fall Risk  Fall Risk Assessment, last 12 mths 9/27/2018   Able to walk? Yes   Fall in past 12 months? Yes   Fall with injury? Yes   Number of falls in past 12 months 3   Fall Risk Score 4       Abuse Screen  Patient is not abused    ROS:   Gen: No fever/chlls  HEENT: No sore throat, eye pain, ear pain, or congestion. No HA  CV: No CP  Resp: No cough/SOB  GI: No abdominal pain  : No hematuria.  +Dysuria and urinary frequency  Derm: No rash  Neuro: No new paresthesias/weakness  Musc: No new myalgias/jt pain  Psych: No depression sx      Cognitive Screening   Evaluation of Cognitive Function:  Has your family/caregiver stated any concerns about your memory: no  Normal    Visit Vitals    /59 (BP 1 Location: Right arm, BP Patient Position: Sitting)    Pulse (!) 58    Temp 97.1 °F (36.2 °C) (Oral)    Resp 14    Ht 5' 2\" (1.575 m)    Wt 143 lb 3.2 oz (65 kg)    SpO2 99%    BMI 26.19 kg/m2     General:  Alert, cooperative, no distress   Head:  Normocephalic, without obvious abnormality, atraumatic. Eyes:  Conjunctivae clear   Ears:  Clear external auditory canals   Nose: Nares normal. Septum midline. Mucosa normal. No drainage or sinus tenderness. Throat: Lips, mucosa, and tongue normal. Unremarkable oropharynx   Neck: Supple, symmetrical, trachea midline, no adenopathy. Lungs:   Clear to auscultation bilaterally. Heart:  Regular rate and rhythm, S1, S2 normal, no murmur, click, rub or gallop. Abdomen:   Soft, non-tender. Bowel sounds normal. No masses. Extremities: Extremities normal no edema. Pulses: +2 radial pulses b/l   Skin:  No rashes or lesions. Lymph nodes: Cervical LN normal.   Neurologic:  Psych: Stable gait  Euthymic       3/3 short item recall  She had difficulty completing the clock drawing exercise. Patient Care Team   Patient Care Team:  Lina Lyons MD as PCP - [facility])    Assessment/Plan   Education and counseling provided:  Are appropriate based on today's review and evaluation    Diagnoses and all orders for this visit:    1. Dysuria  -     AMB POC URINALYSIS DIP STICK OR TABLET REAGENT AUTO W/O MICRO  -     CULTURE, URINE; Future    2. Frequent urination  -     AMB POC URINALYSIS DIP STICK OR TABLET REAGENT AUTO W/O MICRO  -     CULTURE, URINE; Future    3. Acute pain of both knees    4. Gross hematuria  -     CULTURE, URINE; Future    5. Acute cystitis without hematuria  -     levoFLOXacin (LEVAQUIN) 750 mg tablet; Take 1 Tab by mouth daily for 5 days. 6. Encounter for immunization  -     Influenza virus vaccine ([address]) 72 years and older ([address])    7. Initial Medicare annual wellness visit    Other orders  -     varicella-zoster recombinant, PF, (SHINGRIX) 50 mcg/0.5 mL susr injection; 0.5 mL by IntraMUSCular route every two (2) months.       Health Maintenance Due Topic Date Due    DTaP/Tdap/Td series (1 - Tdap) 06/03/1956    Shingrix Vaccine Age 50> (1 of 2) 06/03/1985    GLAUCOMA SCREENING Q2Y  06/03/2000    Bone Densitometry (Dexa) Screening  06/03/2000    Pneumococcal 65+ High/Highest Risk (1 of 2 - PCV13) 06/03/2000     Health Maintenance:  - The flu vaccine was administered today.  -I encouraged her to get the shingles vaccine.  - Requesting records from her last PCP regarding all of her vaccine records. - I encouraged her to get her scheduled mammogram.  -She declined getting colon cancer screening.  -Requesting her last formal eye exam records and her last bone density study records. I will check her memory at her follow-up appointment      Advance Care Planning (ACP) Provider Conversation Snapshot    Date of ACP Conversation:  9/27/18  Persons included in Conversation:  patient and family  Length of ACP Conversation in minutes:  <16 minutes (Non-Billable)    Authorized Decision Maker (if patient is incapable of making informed decisions): This person is:   Healthcare Agent/Medical Power of  under Advance Directive - see below. For Patients with Decision Making Capacity:   Values/Goals: Exploration of values, goals, and preferences if recovery is not expected, even with continued medical treatment in the event of:  Imminent death  Severe, permanent brain injury    Conversation Outcomes / Follow-Up Plan:   Recommended completion of Advance Directive form after review of ACP materials and conversation with prospective healthcare agent . I encouraged the patient to complete the advance directive so that it can be scanned into the EHR. We discussed the content of the advance directive. All questions were answered.       Dr. Guerita Chappell  Internists of [address], [address] [place] [address].  Phone: [phone]  Fax: [phone]

## 2018-09-28 ENCOUNTER — TELEPHONE (OUTPATIENT)
Dept: INTERNAL MEDICINE CLINIC | Age: 83
End: 2018-09-28

## 2018-09-28 NOTE — TELEPHONE ENCOUNTER
Chief Complaint   Patient presents with    Documentation     per DR Nadira Lujan faxed completed [facility] [address] Visit Form     Per DR Nadira Lujan all Physician Visit, Physician's Orders, copy of recent Lab Result documents completed and recent Lab Results also faxed to the [facility] of [facility] facility fax number [phone]. Copies made to scan into the patient chart has been done.

## 2018-09-29 LAB
BACTERIA SPEC CULT: ABNORMAL
SERVICE CMNT-IMP: ABNORMAL

## 2018-10-01 ENCOUNTER — TELEPHONE (OUTPATIENT)
Dept: INTERNAL MEDICINE CLINIC | Age: 83
End: 2018-10-01

## 2018-10-01 NOTE — TELEPHONE ENCOUNTER
Chasidy Delacruz with [address] called--pt needs a referral for outpatient therapy to [facility], not in home care. Please call her at [phone] when done.

## 2018-10-04 ENCOUNTER — HOSPITAL ENCOUNTER (OUTPATIENT)
Dept: MAMMOGRAPHY | Age: 83
Discharge: HOME | End: 2018-10-04
Attending: INTERNAL MEDICINE
Payer: MEDICARE

## 2018-10-04 DIAGNOSIS — Z12.31 ENCOUNTER FOR SCREENING MAMMOGRAM FOR BREAST CANCER: ICD-10-CM

## 2018-10-04 PROCEDURE — 77067 SCR MAMMO BI INCL CAD: CPT

## 2018-10-07 NOTE — ACP (ADVANCE CARE PLANNING)
Advance Care Planning (ACP) Provider Conversation Snapshot    Date of ACP Conversation:  9/27/18  Persons included in Conversation:  patient and family  Length of ACP Conversation in minutes:  <16 minutes (Non-Billable)    Authorized Decision Maker (if patient is incapable of making informed decisions): This person is:   Healthcare Agent/Medical Power of  under Advance Directive - see below. For Patients with Decision Making Capacity:   Values/Goals: Exploration of values, goals, and preferences if recovery is not expected, even with continued medical treatment in the event of:  Imminent death  Severe, permanent brain injury    Conversation Outcomes / Follow-Up Plan:   Recommended completion of Advance Directive form after review of ACP materials and conversation with prospective healthcare agent . I encouraged the patient to complete the advance directive so that it can be scanned into the EHR. We discussed the content of the advance directive. All questions were answered.       Dr. Erika Raymundo  Internists of [address], [address] [place] [address].  Phone: [phone]  Fax: [phone]

## 2018-10-08 ENCOUNTER — DOCUMENTATION ONLY (OUTPATIENT)
Dept: INTERNAL MEDICINE CLINIC | Age: 83
End: 2018-10-08

## 2018-10-08 ENCOUNTER — TELEPHONE (OUTPATIENT)
Dept: INTERNAL MEDICINE CLINIC | Age: 83
End: 2018-10-08

## 2018-10-08 NOTE — PROGRESS NOTES
Received Physician's Order form from [address] [place] [address] and Dr Romel Abobtt has signed the form for Vitamin D 89264 international units by mouth once a week for 120 days. This form has been faxed to [phone].

## 2018-10-08 NOTE — TELEPHONE ENCOUNTER
PT has a Question about Vit D2 50,000 units once a week-she doesn't know why she needs to take this- if she needs to take this does she still take daily Vit D 3.  Please advise

## 2018-10-12 ENCOUNTER — TELEPHONE (OUTPATIENT)
Dept: INTERNAL MEDICINE CLINIC | Age: 83
End: 2018-10-12

## 2018-10-12 DIAGNOSIS — M54.2 NECK PAIN: Primary | ICD-10-CM

## 2018-10-12 NOTE — TELEPHONE ENCOUNTER
Angelic Grey with [facility] health calling again (see message from 10/1/18) to find out if patient has been referred to outpatient physicial therapy to [facility] because patient told them she hasn't heard anything from anyone about getting scheduled.

## 2018-10-15 NOTE — TELEPHONE ENCOUNTER
It looks like they tried to call her but none of her numbers were in service so they closed the referral after multiple attempts to reach her  2nd referral for PT placed     Tried to call venkat to let her know  but no phone number was listed for her

## 2018-10-16 ENCOUNTER — HOSPITAL ENCOUNTER (EMERGENCY)
Age: 83
Discharge: STILL ADMITTED OR ADMINISTRATIVE | End: 2018-10-16
Attending: EMERGENCY MEDICINE

## 2018-10-16 ENCOUNTER — HOSPITAL ENCOUNTER (EMERGENCY)
Age: 83
Discharge: HOME | End: 2018-10-16
Attending: EMERGENCY MEDICINE
Payer: MEDICARE

## 2018-10-16 VITALS
DIASTOLIC BLOOD PRESSURE: 54 MMHG | HEART RATE: 62 BPM | RESPIRATION RATE: 12 BRPM | OXYGEN SATURATION: 100 % | TEMPERATURE: 97.7 F | SYSTOLIC BLOOD PRESSURE: 132 MMHG

## 2018-10-16 DIAGNOSIS — R55 SYNCOPE AND COLLAPSE: ICD-10-CM

## 2018-10-16 DIAGNOSIS — E86.0 DEHYDRATION: Primary | ICD-10-CM

## 2018-10-16 LAB
ANION GAP SERPL CALC-SCNC: 5 MMOL/L (ref 3–18)
ATRIAL RATE: 62 BPM
BASOPHILS # BLD: 0 K/UL (ref 0–0.1)
BASOPHILS NFR BLD: 1 % (ref 0–2)
BUN SERPL-MCNC: 20 MG/DL (ref 7–18)
BUN/CREAT SERPL: 21 (ref 12–20)
CALCIUM SERPL-MCNC: 8.7 MG/DL (ref 8.5–10.1)
CALCULATED P AXIS, ECG09: 73 DEGREES
CALCULATED R AXIS, ECG10: 33 DEGREES
CALCULATED T AXIS, ECG11: -19 DEGREES
CHLORIDE SERPL-SCNC: 105 MMOL/L (ref 100–108)
CO2 SERPL-SCNC: 30 MMOL/L (ref 21–32)
CREAT SERPL-MCNC: 0.96 MG/DL (ref 0.6–1.3)
DIAGNOSIS, 93000: NORMAL
DIFFERENTIAL METHOD BLD: ABNORMAL
EOSINOPHIL # BLD: 0.1 K/UL (ref 0–0.4)
EOSINOPHIL NFR BLD: 4 % (ref 0–5)
ERYTHROCYTE [DISTWIDTH] IN BLOOD BY AUTOMATED COUNT: 12.8 % (ref 11.6–14.5)
GLUCOSE SERPL-MCNC: 137 MG/DL (ref 74–99)
HCT VFR BLD AUTO: 35.8 % (ref 35–45)
HGB BLD-MCNC: 11.9 G/DL (ref 12–16)
LYMPHOCYTES # BLD: 1.2 K/UL (ref 0.9–3.6)
LYMPHOCYTES NFR BLD: 37 % (ref 21–52)
MCH RBC QN AUTO: 28.1 PG (ref 24–34)
MCHC RBC AUTO-ENTMCNC: 33.2 G/DL (ref 31–37)
MCV RBC AUTO: 84.6 FL (ref 74–97)
MONOCYTES # BLD: 0.3 K/UL (ref 0.05–1.2)
MONOCYTES NFR BLD: 8 % (ref 3–10)
NEUTS SEG # BLD: 1.6 K/UL (ref 1.8–8)
NEUTS SEG NFR BLD: 50 % (ref 40–73)
P-R INTERVAL, ECG05: 144 MS
PLATELET # BLD AUTO: 194 K/UL (ref 135–420)
PMV BLD AUTO: 9.8 FL (ref 9.2–11.8)
POTASSIUM SERPL-SCNC: 4.1 MMOL/L (ref 3.5–5.5)
Q-T INTERVAL, ECG07: 416 MS
QRS DURATION, ECG06: 74 MS
QTC CALCULATION (BEZET), ECG08: 422 MS
RBC # BLD AUTO: 4.23 M/UL (ref 4.2–5.3)
SODIUM SERPL-SCNC: 140 MMOL/L (ref 136–145)
VENTRICULAR RATE, ECG03: 62 BPM
WBC # BLD AUTO: 3.2 K/UL (ref 4.6–13.2)

## 2018-10-16 PROCEDURE — 93005 ELECTROCARDIOGRAM TRACING: CPT

## 2018-10-16 PROCEDURE — 99285 EMERGENCY DEPT VISIT HI MDM: CPT

## 2018-10-16 PROCEDURE — 74011250636 HC RX REV CODE- 250/636: Performed by: STUDENT IN AN ORGANIZED HEALTH CARE EDUCATION/TRAINING PROGRAM

## 2018-10-16 PROCEDURE — 96361 HYDRATE IV INFUSION ADD-ON: CPT

## 2018-10-16 PROCEDURE — 80048 BASIC METABOLIC PNL TOTAL CA: CPT

## 2018-10-16 PROCEDURE — 85025 COMPLETE CBC W/AUTO DIFF WBC: CPT

## 2018-10-16 PROCEDURE — 96360 HYDRATION IV INFUSION INIT: CPT

## 2018-10-16 RX ORDER — SODIUM CHLORIDE 9 MG/ML
500 INJECTION, SOLUTION INTRAVENOUS ONCE
Status: COMPLETED | OUTPATIENT
Start: 2018-10-16 | End: 2018-10-16

## 2018-10-16 RX ADMIN — SODIUM CHLORIDE 500 ML: 900 INJECTION, SOLUTION INTRAVENOUS at 09:13

## 2018-10-16 RX ADMIN — SODIUM CHLORIDE 500 ML: 900 INJECTION, SOLUTION INTRAVENOUS at 10:05

## 2018-10-16 NOTE — ED PROVIDER NOTES
Patient is a 80 y.o. female presenting with syncope. The history is provided by the patient and the EMS personnel. Syncope   This is a new problem. The current episode started 1 to 2 hours ago. Pertinent negatives include no chest pain, no abdominal pain, no headaches and no shortness of breath. Nothing aggravates the symptoms. She has tried nothing for the symptoms. Was standing helping her  dress when she felt warm/sweaty and weak and sat down on the floor. When she tried to stand up again she passed out. Down for approx 1 min per . Slid against wall - did not hit head or neck. Denies cardiac history. Started on Flomax yesterday for urinary retention leading to recurrent UTIs. UA on 10/11/18 negative for infection. States, \"I probably don't drink enough water\"    Past Medical History:   Diagnosis Date    Arthritis     Cancer ([facility] [address] 2002    Colon     Chronic pain     abdominal due to IBS    Depression     GERD (gastroesophageal reflux disease)     Hypertension     Migraine headache 2014    Occipital neuralgia 2014    PUD (peptic ulcer disease)     S/P colon resection 2002    clear presently        Past Surgical History:   Procedure Laterality Date    HX CHOLECYSTECTOMY  2015    HX HYSTERECTOMY  1965    HX TONSIL AND ADENOIDECTOMY      HX WISDOM TEETH EXTRACTION      x4         Family History:   Problem Relation Age of Onset    Hypertension Mother     Stroke Mother     Heart Attack Father     Hypertension Father     Parkinson's Disease Sister     Hypertension Brother     Cancer Brother      prostate    No Known Problems Son     No Known Problems Daughter        Social History     Social History    Marital status:      Spouse name: N/A    Number of children: N/A    Years of education: N/A     Occupational History    Not on file.      Social History Main Topics    Smoking status: Never Smoker    Smokeless tobacco: Never Used    Alcohol use 1.8 oz/week     3 Glasses of wine per week    Drug use: Not on file    Sexual activity: No     Other Topics Concern    Not on file     Social History Narrative         ALLERGIES: Macrobid [nitrofurantoin monohyd/m-cryst] and Lexapro [escitalopram oxalate]    Review of Systems   Constitutional: Negative for chills and fever. Respiratory: Negative for cough, chest tightness and shortness of breath. Cardiovascular: Positive for syncope. Negative for chest pain. Gastrointestinal: Negative for abdominal pain. Endocrine: Negative for polyuria. Genitourinary: Negative for dysuria and hematuria. Neurological: Negative for seizures, syncope, numbness and headaches. Vitals:    10/16/18 0846 10/16/18 0852   BP: 122/46 116/47   Pulse: 64 64   Resp: 16 15   Temp: 97.7 °F (36.5 °C)    SpO2: 100%             Physical Exam   Constitutional: She is oriented to person, place, and time. She appears well-developed and well-nourished. No distress. HENT:   Head: Normocephalic and atraumatic. Head is without abrasion, without contusion and without laceration. Eyes: EOM are normal. Pupils are equal, round, and reactive to light. Neck: Normal range of motion and full passive range of motion without pain. Neck supple. No spinous process tenderness present. Cardiovascular: Normal rate, regular rhythm, normal heart sounds and intact distal pulses. Exam reveals no gallop and no friction rub. No murmur heard. Pulmonary/Chest: Effort normal and breath sounds normal. No accessory muscle usage. No apnea and no tachypnea. No respiratory distress. She has no decreased breath sounds. She has no wheezes. She has no rhonchi. She has no rales. She exhibits no tenderness. Abdominal: Soft. Bowel sounds are normal. She exhibits no distension and no mass. There is no tenderness. There is no rebound and no guarding. Musculoskeletal: Normal range of motion. She exhibits no edema.    Neurological: She is alert and oriented to person, place, and time. She has normal strength and normal reflexes. No cranial nerve deficit or sensory deficit. Coordination normal.   Skin: Skin is warm, dry and intact. She is not diaphoretic. Psychiatric: She has a normal mood and affect. Her speech is normal and behavior is normal.      Recent Results (from the past 8 hour(s))   EKG, 12 LEAD, INITIAL    Collection Time: 10/16/18  8:44 AM   Result Value Ref Range    Ventricular Rate 62 BPM    Atrial Rate 62 BPM    P-R Interval 144 ms    QRS Duration 74 ms    Q-T Interval 416 ms    QTC Calculation (Bezet) 422 ms    Calculated P Axis 73 degrees    Calculated R Axis 33 degrees    Calculated T Axis -19 degrees    Diagnosis       Normal sinus rhythm  Nonspecific ST and T wave abnormality  Abnormal ECG  No previous ECGs available     CBC WITH AUTOMATED DIFF    Collection Time: 10/16/18  9:05 AM   Result Value Ref Range    WBC 3.2 (L) 4.6 - 13.2 K/uL    RBC 4.23 4.20 - 5.30 M/uL    HGB 11.9 (L) 12.0 - 16.0 g/dL    HCT 35.8 35.0 - 45.0 %    MCV 84.6 74.0 - 97.0 FL    MCH 28.1 24.0 - 34.0 PG    MCHC 33.2 31.0 - 37.0 g/dL    RDW 12.8 11.6 - 14.5 %    PLATELET 703 447 - 538 K/uL    MPV 9.8 9.2 - 11.8 FL    NEUTROPHILS 50 40 - 73 %    LYMPHOCYTES 37 21 - 52 %    MONOCYTES 8 3 - 10 %    EOSINOPHILS 4 0 - 5 %    BASOPHILS 1 0 - 2 %    ABS. NEUTROPHILS 1.6 (L) 1.8 - 8.0 K/UL    ABS. LYMPHOCYTES 1.2 0.9 - 3.6 K/UL    ABS. MONOCYTES 0.3 0.05 - 1.2 K/UL    ABS. EOSINOPHILS 0.1 0.0 - 0.4 K/UL    ABS.  BASOPHILS 0.0 0.0 - 0.1 K/UL    DF AUTOMATED     METABOLIC PANEL, BASIC    Collection Time: 10/16/18  9:05 AM   Result Value Ref Range    Sodium 140 136 - 145 mmol/L    Potassium 4.1 3.5 - 5.5 mmol/L    Chloride 105 100 - 108 mmol/L    CO2 30 21 - 32 mmol/L    Anion gap 5 3.0 - 18 mmol/L    Glucose 137 (H) 74 - 99 mg/dL    BUN 20 (H) 7.0 - 18 MG/DL    Creatinine 0.96 0.6 - 1.3 MG/DL    BUN/Creatinine ratio 21 (H) 12 - 20      GFR est AA >60 >60 ml/min/1.73m2    GFR est non-AA 56 (L) >60 ml/min/1.73m2    Calcium 8.7 8.5 - 10.1 MG/DL       MDM  Number of Diagnoses or Management Options  Dehydration:   Syncope and collapse:   Diagnosis management comments: 81 yo female with episode of syncope this AM. Slid down wall - denies injury from the fall. Currently well appearing, vitals WNL with exception of mildly low BP. No CP or SOB concerning for PE or Cardiac Etiology. No headache concerning for bleed. History reassuring for vasovagal with warmth/sweating preceding event. Physical exam reassuring. EKG with no concerning findings - specifically no e/o long QT, WPW, HOCM, Brugada, or MI. No murmur concerning for AS. Recently started on Flomax and admits that she does not drink enough water. Strong suspicion for dehydration. WIll obtain CBC/BMP and start fluids. 10:09 AM  Patient endorses feeling better with fluids. BP still mildly decreased. Labs largely unremarkable with exception of increased BUN/Cr ratio - support for dehydration as cause. Will give another 500cc NS and reassess. 11:28 AM  Second 500cc bolus in. Patient continues to feel better. Blood pressure has normalized. Patient stable for discharge. Discussed importance of hydration with patient, especially while taking Flomax. Given return precautions for worsening of symptoms or development of new symptoms. Encouraged patient to follow up with her PCP. Patient expressed understanding and agreement with plan.        Amount and/or Complexity of Data Reviewed  Clinical lab tests: ordered and reviewed  Review and summarize past medical records: yes    Patient Progress  Patient progress: improved        ED Course       Procedures

## 2018-10-16 NOTE — DISCHARGE INSTRUCTIONS
Dehydration: Care Instructions  Your Care Instructions  Dehydration happens when your body loses too much fluid. This might happen when you do not drink enough water or you lose large amounts of fluids from your body because of diarrhea, vomiting, or sweating. Severe dehydration can be life-threatening. Water and minerals called electrolytes help put your body fluids back in balance. Learn the early signs of fluid loss, and drink more fluids to prevent dehydration. Follow-up care is a key part of your treatment and safety. Be sure to make and go to all appointments, and call your doctor if you are having problems. It's also a good idea to know your test results and keep a list of the medicines you take. How can you care for yourself at home? · To prevent dehydration, drink plenty of fluids, enough so that your urine is light yellow or clear like water. Choose water and other caffeine-free clear liquids until you feel better. If you have kidney, heart, or liver disease and have to limit fluids, talk with your doctor before you increase the amount of fluids you drink. · If you do not feel like eating or drinking, try taking small sips of water, sports drinks, or other rehydration drinks. · Get plenty of rest.  To prevent dehydration  · Add more fluids to your diet and daily routine, unless your doctor has told you not to. · During hot weather, drink more fluids. Drink even more fluids if you exercise a lot. Stay away from drinks with alcohol or caffeine. · Watch for the symptoms of dehydration. These include:  ¨ A dry, sticky mouth. ¨ Dark yellow urine, and not much of it. ¨ Dry and sunken eyes. ¨ Feeling very tired. · Learn what problems can lead to dehydration. These include:  ¨ Diarrhea, fever, and vomiting. ¨ Any illness with a fever, such as pneumonia or the flu. ¨ Activities that cause heavy sweating, such as endurance races and heavy outdoor work in hot or humid weather.   ¨ Alcohol or drug abuse or withdrawal.  ¨ Certain medicines, such as cold and allergy pills (antihistamines), diet pills (diuretics), and laxatives. ¨ Certain diseases, such as diabetes, cancer, and heart or kidney disease. When should you call for help? Call 911 anytime you think you may need emergency care. For example, call if:    · You passed out (lost consciousness).    Call your doctor now or seek immediate medical care if:    · You are confused and cannot think clearly.     · You are dizzy or lightheaded, or you feel like you may faint.     · You have signs of needing more fluids. You have sunken eyes and a dry mouth, and you pass only a little dark urine.     · You cannot keep fluids down.    Watch closely for changes in your health, and be sure to contact your doctor if:    · You are not making tears.     · Your skin is very dry and sags slowly back into place after you pinch it.     · Your mouth and eyes are very dry. Where can you learn more? Go to http://michael-holland.info/. Enter C197 in the search box to learn more about \"Dehydration: Care Instructions. \"  Current as of: November 20, 2017  Content Version: 11.8  © 8151-5540 [employer]. Care instructions adapted under license by [employer] (which disclaims liability or warranty for this information). If you have questions about a medical condition or this instruction, always ask your healthcare professional. [place] [address] any warranty or liability for your use of this information. Fainting: Care Instructions  Your Care Instructions    When you faint, or pass out, you lose consciousness for a short time. A brief drop in blood flow to the brain often causes it. When you fall or lie down, more blood flows to your brain and you regain consciousness. Emotional stress, pain, or overheating--especially if you have been standing--can make you faint. In these cases, fainting is usually not serious. But fainting can be a sign of a more serious problem. Your doctor may want you to have more tests to rule out other causes. The treatment you need depends on the reason why you fainted. The doctor has checked you carefully, but problems can develop later. If you notice any problems or new symptoms, get medical treatment right away. Follow-up care is a key part of your treatment and safety. Be sure to make and go to all appointments, and call your doctor if you are having problems. It's also a good idea to know your test results and keep a list of the medicines you take. How can you care for yourself at home? · Drink plenty of fluids to prevent dehydration. If you have kidney, heart, or liver disease and have to limit fluids, talk with your doctor before you increase your fluid intake. When should you call for help? Call 911 anytime you think you may need emergency care. For example, call if:    · You have symptoms of a heart problem. These may include:  ¨ Chest pain or pressure. ¨ Severe trouble breathing. ¨ A fast or irregular heartbeat. ¨ Lightheadedness or sudden weakness. ¨ Coughing up pink, foamy mucus. ¨ Passing out. After you call 911, the  may tell you to chew 1 adult-strength or 2 to 4 low-dose aspirin. Wait for an ambulance. Do not try to drive yourself.     · You have symptoms of a stroke. These may include:  ¨ Sudden numbness, tingling, weakness, or loss of movement in your face, arm, or leg, especially on only one side of your body. ¨ Sudden vision changes. ¨ Sudden trouble speaking. ¨ Sudden confusion or trouble understanding simple statements. ¨ Sudden problems with walking or balance. ¨ A sudden, severe headache that is different from past headaches.     · You passed out (lost consciousness) again.    Watch closely for changes in your health, and be sure to contact your doctor if:    · You do not get better as expected. Where can you learn more?   Go to http://michael-holland.info/. Enter W266 in the search box to learn more about \"Fainting: Care Instructions. \"  Current as of: November 20, 2017  Content Version: 11.8  © 0838-1970 [employer], [employer]. Care instructions adapted under license by [employer] (which disclaims liability or warranty for this information). If you have questions about a medical condition or this instruction, always ask your healthcare professional. [place] [address] any warranty or liability for your use of this information.

## 2018-10-17 ENCOUNTER — PATIENT OUTREACH (OUTPATIENT)
Dept: INTERNAL MEDICINE CLINIC | Age: 83
End: 2018-10-17

## 2018-10-17 NOTE — PROGRESS NOTES
ED Discharge Follow-Up    Date/Time:     10/17/2018 10:19 AM    Patient presented to [facility]  ED on 10/16/2018 and was diagnosed with syncope. Top Challenges reviewed with the provider   None       Method of communication with provider :none    Nurse Navigator(NN) contacted the  patient  by telephone to perform post ED discharge assessment. Verified name and  with patient as identifiers. Provided introduction to self, and explanation of the Nurse Navigator role. Patient reported assessment:   Saw Dr. Titus Keys last week and was prescribed Flomax  Was not drinking enough water  Stopped taking Flomax  Increased water intake  No issues with urination  Blood pressure was 156/66 this morning  Able to teach back signs and symptoms of dehydration  Trace edema to bilateral ankles-elevating when at rest    Medication(s):   New Medications at Discharge: None  Changed Medications at Discharge: None  Discontinued Medications at Discharge: None    Reviewed discharge instructions and red flags with  patient who voiced understanding. Patient given an opportunity to ask questions and does not have any further questions or concerns at this time. The patient agrees to contact the PCP office for questions related to their healthcare. Patient reminded that there are physicians on call 24 hours a day / 7 days a week (M-F 5 pm to 8 am and from Friday 5 pm until Monday 8 am for the weekend) should the patient have questions or concerns. NN provided contact information for future reference.       Offered follow up appointment with PCP: yes and declined   [facility] follow up appointment(s):   Future Appointments   Date Time Provider Sherman Heard   2018 11:00 AM Fatoumata Dunlap PT [id number]   2018 11:30 AM Mitzy Pulido [address]   2018 11:45 AM MD Christiano Ratliff      Non-[facility] follow up appointment(s): n/a  [facility]:  n/a [email or web address] 9 AM- 9 PM.   Phone [phone]      Goals        General     Hydration (pt-stated)      10/17/2018: Patient stopped Flomax and increasing water intake to prevent dehydration. Patient was able to teach back signs and symptoms of dehydration.           Post ED     Reduce ED Utilization      No unscheduled admissions within 30 days post discharge, 10/16/2018

## 2018-11-01 ENCOUNTER — HOSPITAL ENCOUNTER (OUTPATIENT)
Dept: PHYSICAL THERAPY | Age: 83
Discharge: HOME | End: 2018-11-01
Payer: MEDICARE

## 2018-11-01 PROCEDURE — G8988 SELF CARE GOAL STATUS: HCPCS

## 2018-11-01 PROCEDURE — 97162 PT EVAL MOD COMPLEX 30 MIN: CPT

## 2018-11-01 PROCEDURE — 97110 THERAPEUTIC EXERCISES: CPT

## 2018-11-01 PROCEDURE — G8987 SELF CARE CURRENT STATUS: HCPCS

## 2018-11-01 NOTE — PROGRESS NOTES
PT DAILY TREATMENT NOTE/CERVICAL [id number]    Patient Name: Candi Latham  Date:2018  : 1935  [x]  Patient  Verified  [place]or: [place] MED[place]ARE / Plan: [place] MEDICARE PART A & B / Product Type: Medicare /    In time: 11:02  Out time: 11:43  Total Treatment Time (min): 42  Visit #: 1 of 16    Medicare/BCBS Only   Total Timed Codes (min):  16 1:1 Treatment Time:  42     Treatment Area: Cervicalgia [M54.2]    SUBJECTIVE  Pain Level (0-10 scale):  5/10  []constant []intermittent []improving []worsening []no change since onset    Any medication changes, allergies to medications, adverse drug reactions, diagnosis change, or new procedure performed?: [x] No    [] Yes (see summary sheet for update)  Subjective functional status/changes:     PLOF: lives in assisted living. reading  Mechanism of Injury:  5 years ago. PT in the past helped. Hard watching TV with head tipped up and turned slightly. Pain in neck and behind ears to top of head. Saw a neurologist and diagnosed with occipital neuralgia a while back. Self massage helps some. Unable to turn head which is affecting her balance. Reports dizziness when turning as well. Denies symptoms down arm. Denies nausea. Stopped driving. Difficulty making bed.    Work Hx: retired  Living Situation: lives with  in assisted living  Pt Goals: to get rid of the pain    OBJECTIVE/EXAMINATION    8 min Therapeutic Exercise:  [] See flow sheet : HEP   Rationale: increase ROM and increase strength to improve the patients ability to increase ease of ADLs    8 min Manual Therapy:  Cervical PAIVM mobs, cervical distraction   Rationale: decrease pain, increase ROM and increase tissue extensibility to increase ease of ADLs    With   [x] TE   [] TA   [] neuro   [] other: Patient Education: [x] Review HEP    [] Progressed/Changed HEP based on:   [] positioning   [] body mechanics   [] transfers   [] heat/ice application    [] other:      Physical Therapy Evaluation Cervical Spine OBJECTIVE  Posture: [] WNL  Head Position: fwd  Shoulder/Scapular Position: fwd  C-Kyphosis:  [] increased   [] decreased   C-Lordosis:   [] increased   [x] decreased  T-Kyphosis:  [] increased   [] decreased  T-Lordosis:   [] increased   [] decreased     Cervical Retraction: [] WNL    [x] Abnormal: unable to perform deep cervical flexion endurance    Shoulder/Scapular Screen: [] WNL    [] Abnormal: flex: right: 145 left: 120 degrees    Active Movements: [] N/A   [] Too acute   [] Other:  ROM % AROM % PROM Comments:pain, area   Forward flexion 12  pain   Extension 12  Most pain   SB right 10  pain   SB left  10  pain   Rotation right 30  pain   Rotation left 21  pain     Palpation:  [] Min  [x] Mod  [] Severe    Location: cervical parapinals  [] Min  [] Mod  [] Severe    Location:  [] Min  [] Mod  [] Severe    Location:    Neuro Screen (myotome/dematome/felexes): [] WNL  Myotome Level Muscle Test Myotome Level Muscle Test   C5 Shoulder Adduction - Deltoid B: 4/5 C8 Finger Flexors B: 4/5   C6 Wrist Extension B: 4/5 T1 Finger Abduction - Interossei B:4/5   C7 Elbow Extension B: 4/5     Comments:    Special Tests:  Cervical:        Vertebral Artery:  [] R    [] L    [] +    [] -       Alar Ligament: [x] R    [x] L    [] +    [x] -       Transverse Lig: [x] R    [x] L    [] +    [] -       Spurling's:  [] R    [] L    [] +    [] -       Distraction:  [x] R    [x] L    [] +    [x] -       Compression: [x] R    [x] L    [] +    [x] -    Muscle Flexibility: [] N/A   Scalenes: [] WNL    [x] Tight    [x] R    [x] L   Upper Trap: [] WNL    [x] Tight    [x] R    [x] L   Levator: [] WNL    [x] Tight    [x] R    [x] L   Pect.  Minor: [] WNL    [x] Tight    [x] R    [x] L    Other tests/comments:       Pain Level (0-10 scale) post treatment: 5/10    ASSESSMENT/Changes in Function:      [x]  See Plan of Care  []  See progress note/recertification  []  See Discharge Summary         Progress towards goals / Updated goals:  See POC    PLAN  []  Upgrade activities as tolerated     [x]  Continue plan of care  []  Update interventions per flow sheet       []  Discharge due to:_  []  Other:_      Hyacinth Benitez, PT 11/1/2018  11:

## 2018-11-01 NOTE — PROGRESS NOTES
[facility] [employer]  [address] [place] [address]  [phone] fax    Plan of Care/ Statement of Necessity for Physical Therapy Services    Patient name: Diomedes Abraham Start of Care: 2018   Referral source: Jolene Bloch, MD : 1935    Medical Diagnosis: Cervicalgia [M54.2]  Payor: [place] MEDICARE / Plan: [place] MEDICARE PART A & B / Product Type: Medicare /  Onset Date: 5 years ago    Treatment Diagnosis: neck pain   Prior Hospitalization: see medical history Provider#: [id number]   Medications: Verified on Patient summary List    Comorbidities:  Arthritis, depression   Prior Level of Function: lives with  in assisted living, Ind with ADLs, uses rollator for ambulation       The Plan of Care and following information is based on the information from the initial evaluation. Assessment/ key information:  Pt. Is an 80year old female c/o neck pain that began 5 years ago. She reports have PT multiple times in the past which was helpful. She reports having pain with turning her head and also has dizziness symptoms with quick movements. She has difficulty reading books and making her bed. Denies symptoms into her arms. She presents with significant decrease in cervical mobility in all directions with most pain into extension. No pain with B shoulder AROM. No myotome involvement was noted. She has decreased cervical accessory motion bilaterally and tightness along cervical paraspinals. Skilled PT is medically necessary in order to improve cervical mobility and strength for increased ease of ADLs and improved quality of life.      Evaluation Complexity History MEDIUM  Complexity : 1-2 comorbidities / personal factors will impact the outcome/ POC ; Examination MEDIUM Complexity : 3 Standardized tests and measures addressing body structure, function, activity limitation and / or participation in recreation  ;Presentation MEDIUM Complexity : Evolving with changing characteristics  ; Clinical Decision Making MEDIUM Complexity : FOTO score of 26-74  Overall Complexity Rating: MEDIUM  Problem List: pain affecting function, decrease ROM, decrease strength, impaired gait/ balance, decrease ADL/ functional abilitiies, decrease activity tolerance, decrease flexibility/ joint mobility and decrease transfer abilities   Treatment Plan may include any combination of the following: Therapeutic exercise, Therapeutic activities, Neuromuscular re-education, Physical agent/modality, Gait/balance training, Manual therapy, Patient education, Self Care training, Functional mobility training and Home safety training  Patient / Family readiness to learn indicated by: asking questions  Persons(s) to be included in education: patient (P)  Barriers to Learning/Limitations: None  Patient Goal (s): to have less pain and improve mobility  Patient Self Reported Health Status: good  Rehabilitation Potential: good    Short Term Goals: To be accomplished in 1 weeks:  1. Patient will demonstrate compliance with HEP in order to improve cervical mobility for increased ease of ADLs. Long Term Goals: To be accomplished in 8 weeks:  1. Patient will improve FOTO score by 10 points in order to demonstrate a significant improvement in function. 2. Patient will improve cervical AROM rotation to 45 degrees bilaterally in order to increase ease of household chores  3. Patient will improve deep cervical flexion endurance to 10 seconds in order to increase ease of ADLs    Frequency / Duration: Patient to be seen 2 times per week for 8 weeks. Patient/ Caregiver education and instruction: Diagnosis, prognosis, exercises   [x]  Plan of care has been reviewed with PTA    G-Codes (GP)  Self Care   Current  CK= 40-59%   Goal  CK= 40-59%    The severity rating is based on clinical judgment and the FOTO score.     Certification Period: 11/1/18-12/31/18  Alyssa Chappell, PT 11/1/2018 11:51 AM    ________________________________________________________________________    I certify that the above Therapy Services are being furnished while the patient is under my care. I agree with the treatment plan and certify that this therapy is necessary.     Physician's Signature:____________Date:_________TIME:________    ** Signature, Date and Time must be completed for valid certification **    Please sign and return to [facility] - [facility] [employer]  [address] [place] [address]  [phone] fax

## 2018-11-02 ENCOUNTER — TELEPHONE (OUTPATIENT)
Dept: INTERNAL MEDICINE CLINIC | Age: 83
End: 2018-11-02

## 2018-11-02 NOTE — TELEPHONE ENCOUNTER
Chief Complaint   Patient presents with    Results     done 10-04-18 Mammogram per Dr Blair Mems     Please let her know that there are no suspicious findings on her mammogram.     Dr. Tate Lynch   Patient reached and informed, she understands all.

## 2018-11-02 NOTE — PROGRESS NOTES
Please let her know that there are no suspicious findings on her mammogram.    Dr. Arechiga Flatten  [facility], [address], [address]  Phone: [phone]  Fax: [phone]

## 2018-11-06 ENCOUNTER — PATIENT OUTREACH (OUTPATIENT)
Dept: INTERNAL MEDICINE CLINIC | Age: 83
End: 2018-11-06

## 2018-11-08 ENCOUNTER — PATIENT OUTREACH (OUTPATIENT)
Dept: INTERNAL MEDICINE CLINIC | Age: 83
End: 2018-11-08

## 2018-11-08 NOTE — PROGRESS NOTES
Patient has graduated from the Transitions of Care Coordination  program on 11/8/2018. Patient's symptoms are stable at this time. Patient/family has the ability to self-manage. Care management goals have been completed at this time. No further nurse navigator follow up scheduled. Goals Addressed                 This Visit's Progress       General     COMPLETED: Hydration (pt-stated)        10/17/2018: Patient stopped Flomax and increasing water intake to prevent dehydration. Patient was able to teach back signs and symptoms of dehydration. Post ED     COMPLETED: Reduce ED Utilization        No unscheduled admissions within 30 days post discharge, 10/16/2018            Pt has nurse navigator's contact information for any further questions, concerns, or needs.   Patients upcoming visits:    Future Appointments   Date Time Provider Sherman [place]   11/9/2018  9:30 AM Chente Liriano, PT [facility]   11/15/2018  2:00 PM Chente Liriano PT [facility]   11/20/2018  1:00 PM Kennedi Jimneez PTA [facility]   11/27/2018 11:30 AM [address]   11/30/2018 10:00 AM Kennedi Jimenez PTA [facility]   12/13/2018 11:45 AM [facility] Alex MD [facility]

## 2018-11-09 ENCOUNTER — HOSPITAL ENCOUNTER (OUTPATIENT)
Dept: PHYSICAL THERAPY | Age: 83
Discharge: HOME | End: 2018-11-09
Payer: MEDICARE

## 2018-11-09 PROCEDURE — 97140 MANUAL THERAPY 1/> REGIONS: CPT

## 2018-11-09 PROCEDURE — 97110 THERAPEUTIC EXERCISES: CPT

## 2018-11-09 NOTE — PROGRESS NOTES
PT DAILY TREATMENT NOTE 10-18    Patient Name: Jennifer Ren  Date:2018  : 1935  [x]  Patient  Verified  Payor: VA MEDICARE / Plan: VA MEDICARE PART A & B / Product Type: Medicare /    In time: 9:30  Out time: 10:15  Total Treatment Time (min): 45  Visit #: 2 of 16    Medicare/BCBS Only   Total Timed Codes (min):  35 1:1 Treatment Time:  35       Treatment Area: Cervicalgia [M54.2]    SUBJECTIVE  Pain Level (0-10 scale): 5/10  Any medication changes, allergies to medications, adverse drug reactions, diagnosis change, or new procedure performed?: [x] No    [] Yes (see summary sheet for update)  Subjective functional status/changes:   [] No changes reported  Pt. Reports she is doing about the same today.  She has been doing her HEP    OBJECTIVE    Modality rationale: decrease pain and increase tissue extensibility to improve the patients ability to increase ease of ADLs   Min Type Additional Details    [] Estim:  []Unatt       []IFC  []Premod                        []Other:  []w/ice   []w/heat  Position:  Location:    [] Estim: []Att    []TENS instruct  []NMES                    []Other:  []w/US   []w/ice   []w/heat  Position:  Location:    []  Traction: [] Cervical       []Lumbar                       [] Prone          []Supine                       []Intermittent   []Continuous Lbs:  [] before manual  [] after manual    []  Ultrasound: []Continuous   [] Pulsed                           []1MHz   []3MHz W/cm2:  Location:    []  Iontophoresis with dexamethasone         Location: [] Take home patch   [] In clinic   10 []  Ice     [x]  heat  []  Ice massage  []  Laser   []  Anodyne Position: seated  Location: neck    []  Laser with stim  []  Other:  Position:  Location:    []  Vasopneumatic Device Pressure:       [] lo [] med [] hi   Temperature: [] lo [] med [] hi   [x] Skin assessment post-treatment:  [x]intact []redness- no adverse reaction    []redness - adverse reaction:     15 min Therapeutic Exercise:  [x] See flow sheet :   Rationale: increase ROM and increase strength to improve the patients ability to increase ease of ADLs    20 min: manual: trigger point release B UT and cervical paraspinals, PAIVM mobs, cervical distraction  Rationale: increase ROM for increased ease of ADLs          With   [x] TE   [] TA   [] neuro   [] other: Patient Education: [x] Review HEP    [] Progressed/Changed HEP based on:   [] positioning   [] body mechanics   [] transfers   [] heat/ice application    [] other:      Other Objective/Functional Measures:   Pt. Tolerated PT well with no reports of increased pain  She continues to have significant decrease in cervical AROM  She required cues to perform cervical rotations correctly. Pain Level (0-10 scale) post treatment: 4/10    ASSESSMENT/Changes in Function:  Pt. Initiated PT and is compliant with her HEP. Patient will continue to benefit from skilled PT services to modify and progress therapeutic interventions, address functional mobility deficits, address ROM deficits, address strength deficits, analyze and address soft tissue restrictions, analyze and cue movement patterns, analyze and modify body mechanics/ergonomics and assess and modify postural abnormalities to attain remaining goals. Progress towards goals / Updated goals:  Short Term Goals: To be accomplished in 1 weeks:  1. Patient will demonstrate compliance with HEP in order to improve cervical mobility for increased ease of ADLs. met     Long Term Goals: To be accomplished in 8 weeks:  1. Patient will improve FOTO score by 10 points in order to demonstrate a significant improvement in function. 2. Patient will improve cervical AROM rotation to 45 degrees bilaterally in order to increase ease of household chores  3.  Patient will improve deep cervical flexion endurance to 10 seconds in order to increase ease of ADLs        PLAN  []  Upgrade activities as tolerated     [x]  Continue plan of care  []  Update interventions per flow sheet       []  Discharge due to:_  []  Other:_      Geena Reddy, PT 11/9/2018  8:03 AM    Future Appointments   Date Time Provider Sherman Heard   11/9/2018  9:30 AM Tony Reynoso, PT [facility]   11/15/2018  2:00 PM Tony Reynoso, PT [facility]   11/20/2018  1:00 PM Beni Gordon PTA [facility]   11/27/2018 11:30 AM BILLIE Romero [facility]   11/30/2018 10:00 AM Beni Gordon PTA [facility]   12/13/2018 11:45 AM Korina Stringer MD [id number] [facility]

## 2018-11-15 ENCOUNTER — HOSPITAL ENCOUNTER (OUTPATIENT)
Dept: PHYSICAL THERAPY | Age: 83
Discharge: HOME | End: 2018-11-15
Payer: MEDICARE

## 2018-11-15 PROCEDURE — 97110 THERAPEUTIC EXERCISES: CPT

## 2018-11-15 PROCEDURE — 97140 MANUAL THERAPY 1/> REGIONS: CPT

## 2018-11-15 NOTE — PROGRESS NOTES
PT DAILY TREATMENT NOTE 10-18    Patient Name: Mel Silva  Date:11/15/2018  : 1935  [x]  Patient  Verified  Payor: VA MEDICARE / Plan: VA MEDICARE PART A & B / Product Type: Medicare /    In time: 2:00  Out time: 2:30  Total Treatment Time (min): 30  Visit #: 3 of 16    Medicare/BCBS Only   Total Timed Codes (min):  30 1:1 Treatment Time:  30       Treatment Area: Cervicalgia [M54.2]    SUBJECTIVE  Pain Level (0-10 scale):  210  Any medication changes, allergies to medications, adverse drug reactions, diagnosis change, or new procedure performed?: [x] No    [] Yes (see summary sheet for update)  Subjective functional status/changes:   [] No changes reported  Pt. Reports she had a lot of soreness after last PT session but feels like she is moving better now. OBJECTIVE    10 min Therapeutic Exercise:  [x] See flow sheet :   Rationale: increase ROM and increase strength to improve the patients ability to increase ease of ADLs    20 min Manual Therapy:  Trigger point release B UT and cervical paraspinals, B cervical PAIVM mobs, cervical distraction    Rationale: decrease pain, increase ROM and increase tissue extensibility to increase ease of ADLs          With   [x] TE   [] TA   [] neuro   [] other: Patient Education: [x] Review HEP    [] Progressed/Changed HEP based on:   [] positioning   [] body mechanics   [] transfers   [] heat/ice application    [] other:      Other Objective/Functional Measures:   Cervical AROM rotation right: 38 left: 35 degrees   Pt. Was challenged with laser proprioception exercises   Pt. Tolerated PT well with no reports of increased pain    Pain Level (0-10 scale) post treatment: 2/10    ASSESSMENT/Changes in Function:  Pt. Is progressing slowly towards goals. Cervical mobility has improved since [facility] but she continues to have significant limitations. She also continues to have decreased cervical strength.      Patient will continue to benefit from skilled PT services to modify and progress therapeutic interventions, address functional mobility deficits, address ROM deficits, address strength deficits, analyze and address soft tissue restrictions, analyze and cue movement patterns, analyze and modify body mechanics/ergonomics and assess and modify postural abnormalities to attain remaining goals. Progress towards goals / Updated goals:  Short Term Goals: To be accomplished in 1 weeks:  1. Patient will demonstrate compliance with HEP in order to improve cervical mobility for increased ease of ADLs.    met     Long Term Goals: To be accomplished in 8 weeks:  1. Patient will improve FOTO score by 10 points in order to demonstrate a significant improvement in function. 2. Patient will improve cervical AROM rotation to 45 degrees bilaterally in order to increase ease of household chores  Progressing: right: 38 left: 35 degrees (11/15/18)  3.  Patient will improve deep cervical flexion endurance to 10 seconds in order to increase ease of ADLs        PLAN  []  Upgrade activities as tolerated     [x]  Continue plan of care  []  Update interventions per flow sheet       []  Discharge due to:_  []  Other:_      Barbie Au PT 11/15/2018  1:57 PM    Future Appointments   Date Time Provider Sherman Heard   11/15/2018  2:00 PM Scott Harrell PT [facility]   11/20/2018  1:00 PM Verito Parker PTA [facility]   11/27/2018 11:30 AM [address]   11/30/2018 10:00 AM Verito Parker PTA [facility]   12/13/2018 11:45 AM MD Christiano Matias

## 2018-11-20 ENCOUNTER — APPOINTMENT (OUTPATIENT)
Dept: PHYSICAL THERAPY | Age: 83
End: 2018-11-20
Payer: MEDICARE

## 2018-11-30 ENCOUNTER — HOSPITAL ENCOUNTER (OUTPATIENT)
Dept: PHYSICAL THERAPY | Age: 83
Discharge: HOME | End: 2018-11-30
Payer: MEDICARE

## 2018-11-30 PROCEDURE — 97140 MANUAL THERAPY 1/> REGIONS: CPT

## 2018-11-30 PROCEDURE — 97110 THERAPEUTIC EXERCISES: CPT

## 2018-11-30 NOTE — PROGRESS NOTES
PT DAILY TREATMENT NOTE 10-18    Patient Name: Candi Latham  Date:2018  : 1935  [x]  Patient  Verified  Payor: VA MEDICARE / Plan: VA MEDICARE PART A & B / Product Type: Medicare /    In time:1000  Out time:1036  Total Treatment Time (min): 36  Visit #: 4 of 16    Medicare/BCBS Only   Total Timed Codes (min):  26 1:1 Treatment Time:  26       Treatment Area: Cervicalgia [M54.2]    SUBJECTIVE  Pain Level (0-10 scale): 3/10  Any medication changes, allergies to medications, adverse drug reactions, diagnosis change, or new procedure performed?: [x] No    [] Yes (see summary sheet for update)  Subjective functional status/changes:   [] No changes reported  Pt stated that her pain increases in the evenings while watching TV    OBJECTIVE    Modality rationale: decrease pain and increase tissue extensibility to improve the patients ability to increase ease with ADLs   Min Type Additional Details    [] Estim:  []Unatt       []IFC  []Premod                        []Other:  []w/ice   []w/heat  Position:  Location:    [] Estim: []Att    []TENS instruct  []NMES                    []Other:  []w/US   []w/ice   []w/heat  Position:  Location:    []  Traction: [] Cervical       []Lumbar                       [] Prone          []Supine                       []Intermittent   []Continuous Lbs:  [] before manual  [] after manual    []  Ultrasound: []Continuous   [] Pulsed                           []1MHz   []3MHz W/cm2:  Location:    []  Iontophoresis with dexamethasone         Location: [] Take home patch   [] In clinic   10 []  Ice     [x]  heat  []  Ice massage  []  Laser   []  Anodyne Position: seated  Location:neck    []  Laser with stim  []  Other:  Position:  Location:    []  Vasopneumatic Device Pressure:       [] lo [] med [] hi   Temperature: [] lo [] med [] hi   [x] Skin assessment post-treatment:  [x]intact []redness- no adverse reaction    []redness - adverse reaction:     11 min Therapeutic Exercise: [x] See flow sheet :   Rationale: increase ROM and increase strength to improve the patients ability to increase ease with ADLs    15 min Manual Therapy:  DTM to C/S paraspinals. SOR and manual traction   Rationale: decrease pain, increase ROM and increase tissue extensibility to increase ease with ADLs    With   [x] TE   [] TA   [] neuro   [] other: Patient Education: [x] Review HEP    [] Progressed/Changed HEP based on:   [] positioning   [] body mechanics   [] transfers   [] heat/ice application    [] other:      Other Objective/Functional Measures:   Pt had significant tightness in sub-occipitals and C/S paraspinals that resolved well with manual.   Had poor tolerance with exercises  Had significantly decreased range of motion for all directions     Pain Level (0-10 scale) post treatment: 2/10    ASSESSMENT/Changes in Function:   Pt is making limited progress toward goals. Pt cont with decreased strength and range of motion in cervical musculature. Cont to report decreased ability to perform ADLs and to watch TV comfortably    Patient will continue to benefit from skilled PT services to modify and progress therapeutic interventions, address functional mobility deficits, address ROM deficits and address strength deficits to attain remaining goals. [x]  See Plan of Care  []  See progress note/recertification  []  See Discharge Summary         Progress towards goals / Updated goals:  Short Term Goals: To be accomplished in 1 weeks:  1. Patient will demonstrate compliance with HEP in order to improve cervical mobility for increased ease of ADLs.    met     Long Term Goals: To be accomplished in 8 weeks:  1. Patient will improve FOTO score by 10 points in order to demonstrate a significant improvement in function. 2. Patient will improve cervical AROM rotation to 45 degrees bilaterally in order to increase ease of household chores  Progressing: right: 38 left: 35 degrees (11/15/18)  3.  Patient will improve deep cervical flexion endurance to 10 seconds in order to increase ease of ADLs    PLAN  []  Upgrade activities as tolerated     [x]  Continue plan of care  []  Update interventions per flow sheet       []  Discharge due to:_  []  Other:_      Sylvia Herrera PTA 11/30/2018  9:50 AM    Future Appointments   Date Time Provider Sherman Orquidea   11/30/2018 10:00 AM Rober Torrez PTA [facility]   12/10/2018 10:00 AM Oswald Ratliff, PT [facility]   12/13/2018 11:45 AM Swati King MD [facility] JESSICA   12/14/2018 10:00 AM Dana Styles PTA [facility]   12/17/2018 11:00 AM Oswald Ratliff, PT [facility]   12/19/2018  9:30 AM Dana Styles PTA [facility]   12/26/2018  9:30 AM Oswald Ratliff, PT [facility]   12/28/2018  1:00 PM Dana Styles PTA [facility]

## 2018-12-10 ENCOUNTER — HOSPITAL ENCOUNTER (OUTPATIENT)
Dept: PHYSICAL THERAPY | Age: 83
End: 2018-12-10
Payer: MEDICARE

## 2018-12-14 ENCOUNTER — HOSPITAL ENCOUNTER (OUTPATIENT)
Dept: PHYSICAL THERAPY | Age: 83
Discharge: HOME | End: 2018-12-14
Payer: MEDICARE

## 2018-12-14 PROCEDURE — 97140 MANUAL THERAPY 1/> REGIONS: CPT

## 2018-12-14 PROCEDURE — 97110 THERAPEUTIC EXERCISES: CPT

## 2018-12-14 NOTE — PROGRESS NOTES
PT DAILY TREATMENT NOTE 10-18    Patient Name: Rosalia Taylor  Date:2018  : 1935  [x]  Patient  Verified  Payor: [place] MEDICARE / Plan: [place] MEDICARE PART A & B / Product Type: Medicare /    In time: 10:00 Out time:11:01  Total Treatment Time (min): 61  Visit #: 5 of 16    Medicare/BCBS Only   Total Timed Codes (min):  51 1:1 Treatment Time:  45       Treatment Area: Cervicalgia [M54.2]    SUBJECTIVE  Pain Level (0-10 scale): 7/10  Any medication changes, allergies to medications, adverse drug reactions, diagnosis change, or new procedure performed?: [x] No    [] Yes (see summary sheet for update)  Subjective functional status/changes:   [] No changes reported  Pt reports an easier time turning her head since starting and she does her exercises 2 times a day. She notes difficulty turning her head to the right to look at her clock when laying in bed. She does have pain watching TV and sits on the computer a lot (desktop computer). She is having an easier time turning her head when driving.      OBJECTIVE    Modality rationale: decrease pain and increase tissue extensibility to improve the patients ability to perform ADLs with decreased pain   Min Type Additional Details    [] Estim:  []Unatt       []IFC  []Premod                        []Other:  []w/ice   []w/heat  Position:  Location:    [] Estim: []Att    []TENS instruct  []NMES                    []Other:  []w/US   []w/ice   []w/heat  Position:  Location:    []  Traction: [] Cervical       []Lumbar                       [] Prone          []Supine                       []Intermittent   []Continuous Lbs:  [] before manual  [] after manual    []  Ultrasound: []Continuous   [] Pulsed                           []1MHz   []3MHz W/cm2:  Location:    []  Iontophoresis with dexamethasone         Location: [] Take home patch   [] In clinic   10 []  Ice     [x]  heat  []  Ice massage  []  Laser   []  Anodyne Position: seated  Location: c/s    []  Laser with stim  []  Other:  Position:  Location:    []  Vasopneumatic Device Pressure:       [] lo [] med [] hi   Temperature: [] lo [] med [] hi   [x] Skin assessment post-treatment:  [x]intact []redness- no adverse reaction    []redness - adverse reaction:     36 min Therapeutic Exercise:  [x] See flow sheet :   Rationale: increase ROM and increase strength to improve the patients ability to watch TV without c/s pain    15 min Manual Therapy:  MET for left C3-C5 rotation; SOR; TPR to right scalenes near origin   Rationale: decrease pain, increase ROM and increase tissue extensibility to improve ease of watching TV with less pain          With   [] TE   [] TA   [] neuro   [] other: Patient Education: [x] Review HEP    [] Progressed/Changed HEP based on:   [] positioning   [] body mechanics   [] transfers   [] heat/ice application    [] other:      Other Objective/Functional Measures:   Posture education regarding computers (given computer ergonomics sheet to correct height of monitor for computer)  Education on tennis ball in sock and cane use for TPR and SOR  Reviewed SNAGs for correct form and to work on stiff areas keeping head in neutral (pt likes to go into extension while performing)  Improved ease of right rotation after MET for left c/s rotation  Reviewed having low back support to keep head from going into forward posture     Pain Level (0-10 scale) post treatment: 1/10    ASSESSMENT/Changes in Function: Pt making progress with improving ease of turning her head when driving. She does still have difficulty with right rotation to look at her alarm clock when laying in bed. She has forward head posture especially in sitting to watch TV and work on her computer. Will continue working to correct posture, improve c/s mobility and decrease muscle restrictions for pain free movement.      Progress towards goals / Updated goals:  Short Term Goals: To be accomplished in 1 weeks:  1. Patient will demonstrate compliance with HEP in order to improve cervical mobility for increased ease of ADLs.    met   Long Term Goals: To be accomplished in 8 weeks:  1. Patient will improve FOTO score by 10 points in order to demonstrate a significant improvement in function. 2. Patient will improve cervical AROM rotation to 45 degrees bilaterally in order to increase ease of household chores  Progressing: right: 38 left: 35 degrees (11/15/18)  3.  Patient will improve deep cervical flexion endurance to 10 seconds in order to increase ease of ADLs    PLAN  [x]  Upgrade activities as tolerated     [x]  Continue plan of care  []  Update interventions per flow sheet       []  Discharge due to:_  []  Other:_      Martinez Mansfield, PTA 12/14/2018  10:06 AM    Future Appointments   Date Time Provider Sherman Heard   12/17/2018 11:00 AM Janis Evans PT [facility]   12/19/2018  9:30 AM Mukesh Cain PTA [facility]   12/26/2018  9:30 AM Janis Evans PT [facility]   12/28/2018  1:00 PM Mukesh aCin PTA [facility]   1/29/2019  1:15 PM MD Christiano Wells

## 2018-12-17 ENCOUNTER — APPOINTMENT (OUTPATIENT)
Dept: PHYSICAL THERAPY | Age: 83
End: 2018-12-17
Payer: MEDICARE

## 2018-12-19 ENCOUNTER — APPOINTMENT (OUTPATIENT)
Dept: PHYSICAL THERAPY | Age: 83
End: 2018-12-19
Payer: MEDICARE

## 2018-12-26 ENCOUNTER — APPOINTMENT (OUTPATIENT)
Dept: PHYSICAL THERAPY | Age: 83
End: 2018-12-26
Payer: MEDICARE

## 2018-12-28 ENCOUNTER — APPOINTMENT (OUTPATIENT)
Dept: PHYSICAL THERAPY | Age: 83
End: 2018-12-28
Payer: MEDICARE

## 2019-01-11 NOTE — PROGRESS NOTES
[facility]  CARLA  [address] [place] [address]  [phone] fax    Physical Therapy Discharge Summary    Patient name: Britta Andre Start of Care:  2018   Referral source: Danis Pichardo MD : 1935   Medical/Treatment Diagnosis: Cervicalgia [M54.2]  Payor: Enoc Reynoso / Plan: [place] MEDICARE PART A & B / Product Type: Medicare /  Onset Date: 5 years ago     Prior Hospitalization: see medical history Provider#: [id number]   Medications: Verified on Patient Summary List    Comorbidities:  Arthritis, depression  Prior Level of Function: lives with  in assisted living, Ind with ADLs, uses rollator for ambulation     Visits from Start of Care: 5    Missed Visits: 2    Reporting Period : 18 to 18    Summary of Care:  Goal: Patient will improve FOTO score by 10 points in order to demonstrate a significant improvement in function. Status at last note/certification: 45  Status at discharge: not met    Goal: Patient will improve cervical AROM rotation to 45 degrees bilaterally in order to increase ease of household chores  Status at last note/certification: right: 30 left: 21 degrees  Status at discharge: not met    Goal: Patient will improve deep cervical flexion endurance to 10 seconds in order to increase ease of ADLs  Status at last note/certification: unable  Status at discharge: not met    Pt. Was seen for 5 visits and then did not return to PT secondary to lack or time and difficulty with transportation. She made some improvement in cervical mobility rotation right: 38 degrees, left: 35 degrees. She was educated on a HEP at [facility].      ASSESSMENT/RECOMMENDATIONS:  [x]Discontinue therapy: []Patient has reached or is progressing toward set goals      [x]Patient is non-compliant or has abdicated      []Due to lack of appreciable progress towards set goals    Carlos Meza, PT 2019 9:34 AM

## 2019-03-07 ENCOUNTER — HOSPITAL ENCOUNTER (OUTPATIENT)
Dept: CT IMAGING | Age: 84
Discharge: HOME | End: 2019-03-07
Attending: NURSE PRACTITIONER
Payer: MEDICARE

## 2019-03-07 DIAGNOSIS — Z85.038 PERSONAL HISTORY OF COLON CANCER: ICD-10-CM

## 2019-03-07 DIAGNOSIS — R10.9 ABDOMINAL PAIN: ICD-10-CM

## 2019-03-07 DIAGNOSIS — R19.7 DIARRHEA: ICD-10-CM

## 2019-03-07 LAB — CREAT UR-MCNC: 0.9 MG/DL (ref 0.6–1.3)

## 2019-03-07 PROCEDURE — 74011636320 HC RX REV CODE- 636/320: Performed by: NURSE PRACTITIONER

## 2019-03-07 PROCEDURE — 82565 ASSAY OF CREATININE: CPT

## 2019-03-07 PROCEDURE — 74177 CT ABD & PELVIS W/CONTRAST: CPT

## 2019-03-07 RX ADMIN — IOPAMIDOL 100 ML: 612 INJECTION, SOLUTION INTRAVENOUS at 13:41

## 2019-03-29 ENCOUNTER — TELEPHONE (OUTPATIENT)
Dept: INTERNAL MEDICINE CLINIC | Age: 84
End: 2019-03-29

## 2019-03-29 NOTE — TELEPHONE ENCOUNTER
Patient's daughter dropped off DMV forms to be completed to handicap placard. Placed in Dr. Conroy Beverage box.

## 2019-05-07 ENCOUNTER — OFFICE VISIT (OUTPATIENT)
Dept: INTERNAL MEDICINE CLINIC | Age: 84
End: 2019-05-07

## 2019-05-07 VITALS
SYSTOLIC BLOOD PRESSURE: 151 MMHG | OXYGEN SATURATION: 99 % | TEMPERATURE: 97 F | RESPIRATION RATE: 16 BRPM | HEIGHT: 62 IN | DIASTOLIC BLOOD PRESSURE: 68 MMHG | BODY MASS INDEX: 28.34 KG/M2 | HEART RATE: 67 BPM | WEIGHT: 154 LBS

## 2019-05-07 DIAGNOSIS — M51.9 LUMBAR DISC DISEASE: Primary | ICD-10-CM

## 2019-05-07 RX ORDER — GABAPENTIN 100 MG/1
CAPSULE ORAL
Qty: 180 CAP | Refills: 1 | Status: SHIPPED | OUTPATIENT
Start: 2019-05-07 | End: 2019-06-11

## 2019-05-07 RX ORDER — METHYLPREDNISOLONE 4 MG/1
TABLET ORAL
Qty: 1 DOSE PACK | Refills: 0 | Status: SHIPPED | OUTPATIENT
Start: 2019-05-07 | End: 2019-06-11 | Stop reason: ALTCHOICE

## 2019-05-07 NOTE — PROGRESS NOTES
Chica Mir presents today for   Chief Complaint   Patient presents with    Leg Pain     Patient reports having bilateral leg pain in the morning described as a \"burning pain\"              Depression Screening:  3 most recent PHQ Screens 5/7/2019   Little interest or pleasure in doing things Not at all   Feeling down, depressed, irritable, or hopeless Not at all   Total Score PHQ 2 0       Learning Assessment:  Learning Assessment 8/28/2018   PRIMARY LEARNER Patient   HIGHEST LEVEL OF EDUCATION - PRIMARY LEARNER  GRADUATED HIGH SCHOOL OR GED   BARRIERS PRIMARY LEARNER NONE   CO-LEARNER CAREGIVER No   PRIMARY LANGUAGE ENGLISH   LEARNER PREFERENCE PRIMARY DEMONSTRATION   ANSWERED BY patient   RELATIONSHIP SELF       Abuse Screening:  Abuse Screening Questionnaire 5/7/2019   Do you ever feel afraid of your partner? N   Are you in a relationship with someone who physically or mentally threatens you? N   Is it safe for you to go home? Y       Fall Risk  Fall Risk Assessment, last 12 mths 5/7/2019   Able to walk? Yes   Fall in past 12 months? No   Fall with injury? -   Number of falls in past 12 months -   Fall Risk Score -           Coordination of Care:  1. Have you been to the ER, urgent care clinic since your last visit? Hospitalized since your last visit? no    2. Have you seen or consulted any other health care providers outside of the [address] [place] [address] since your last visit? Include any pap smears or colon screening. yes      Advance Directive:  1. Do you have an advance directive in place? Patient Reply:yes        2. Per patient no changes to their ACP contact no.

## 2019-05-07 NOTE — PATIENT INSTRUCTIONS
Gabapentin (By mouth)   Gabapentin (angela-a-PEN-tin)  Treats seizures and pain caused by shingles. Brand Name(s): ACTIVE-PAC with Gabapentin, Convenience Ernesto, Cyclo/Fred 10/300 Pack, FusePaq Fanatrex, Fred-V, Gralise, [address] Pack, Neurontin, SmartRx Fred Kit   There may be other brand names for this medicine. When This Medicine Should Not Be Used: This medicine is not right for everyone. Do not use it if you had an allergic reaction to gabapentin. How to Use This Medicine:   Capsule, Liquid, Tablet  · Take your medicine as directed. Your dose may need to be changed several times to find what works best for you. If you have epilepsy, do not allow more than 12 hours to pass between doses. · Capsule: Swallow the capsule whole with plenty of water. Do not open, crush, or chew it. · Gralise® tablet: Swallow the tablet whole . Do not crush, break, or chew it. · Neurontin® tablet: If you break a tablet into 2 pieces, use the second half as your next dose. If you don't use it within 28 days, throw it away. · Measure the oral liquid medicine with a marked measuring spoon, oral syringe, or medicine cup. · This medicine should come with a Medication Guide. Ask your pharmacist for a copy if you do not have one. · Missed dose: Take a dose as soon as you remember. If it is almost time for your next dose, wait until then and take a regular dose. Do not take extra medicine to make up for a missed dose. · Store the medicine in a closed container at room temperature, away from heat, moisture, and direct light. Store the Neurontin® oral liquid in the refrigerator. Do not freeze. Drugs and Foods to Avoid:   Ask your doctor or pharmacist before using any other medicine, including over-the-counter medicines, vitamins, and herbal products. · Some medicines can affect how gabapentin works.  Tell your doctor if you also use any of the following:   ¨ Hydrocodone  ¨ Morphine  · If you take an antacid, wait at least 2 hours before you take gabapentin. · Tell your doctor if you use anything else that makes you sleepy. Some examples are allergy medicine, narcotic pain medicine, and alcohol. Warnings While Using This Medicine:   · Tell your doctor if you are pregnant or breastfeeding, or if you have kidney problems or are receiving dialysis. Tell your doctor if you have a history of depression or mental health problems. · This medicine may increase depression or thoughts of suicide. Tell your doctor right away if you start to feel more depressed or think about hurting yourself. · This medicine may cause a serious allergic reaction called multiorgan hypersensitivity, which can damage organs and be life-threatening. · Do not stop using this medicine suddenly. Your doctor will need to slowly decrease your dose before you stop it completely. If you take this medicine to prevent seizures, your seizures may return or occur more often if you stop this medicine suddenly. · This medicine may make you dizzy or drowsy. Do not drive or do anything else that could be dangerous until you know how this medicine affects you. · Tell any doctor or dentist who treats you that you are using this medicine. This medicine may affect certain medical test results. · Your doctor will check your progress and the effects of this medicine at regular visits. Keep all appointments. · Keep all medicine out of the reach of children. Never share your medicine with anyone.   Possible Side Effects While Using This Medicine:   Call your doctor right away if you notice any of these side effects:  · Allergic reaction: Itching or hives, swelling in your face or hands, swelling or tingling in your mouth or throat, chest tightness, trouble breathing  · Behavior problems, aggression, restlessness, trouble concentrating, moodiness (especially in children)  · Blistering, peeling, red skin rash  · Change in how much or how often you urinate, bloody or cloudy urine,  · Chest pain, fast heartbeat, trouble breathing  · Dark urine or pale stools, nausea, vomiting, loss of appetite, stomach pain, yellow skin or eyes  · Fever, rash, swollen or tender glands in the neck, armpit, or groin  · Problems with coordination, shakiness, unsteadiness  · Rapid weight gain, swelling in your hands, ankles, or feet  · Unusual moods or behaviors, thoughts of hurting yourself, feeling depressed  If you notice these less serious side effects, talk with your doctor:   · Dizziness, drowsiness, sleepiness, tiredness  If you notice other side effects that you think are caused by this medicine, tell your doctor. Call your doctor for medical advice about side effects. You may report side effects to FDA at 9-499-FDA-8441  © 2017 [facility] Information is for End User's use only and may not be sold, redistributed or otherwise used for commercial purposes. The above information is an  only. It is not intended as medical advice for individual conditions or treatments. Talk to your doctor, nurse or pharmacist before following any medical regimen to see if it is safe and effective for you.

## 2019-05-07 NOTE — PROGRESS NOTES
[facility] [place]:  5/7/2019, MRN: [id number]      Rafy Ramirez is a 80 y.o. female and presents to clinic for Leg Pain (Patient reports having bilateral leg pain in the morning described as a \"burning pain\")    Subjective: The patient is an 75-year-old female with history of sigmoid colon cancer status post surgery, history of strictures, osteopenia, hyponatremia, lumbar disc disease, hypertension, IBS, vitamin D deficiency, allergic rhinitis, GERD, and urinary retention. B/l Leg Pain and Chronic Lower Back Pain: Present x yrs but worse along her RLE. Associated sx: flushing sensation along her legs. Aggravating factors: walking. Pain is burning in quality \"like someone put boiling water on them. \" Exercises/stretching help to improve her sx. Sx are worsening. No recent trauma. Her sx are limiting her mobility. She uses a walker long term for ambulation. She has lumbar disc disease related pain for yrs. She continues to have lower back pain and has for yrs. Pain is along \"the base of my spine. \" She also uses asper cream and warm compresses for sx relief. She had some weakness in her legs over the weekend when walking in the grocery store with lower back pain and b/l leg pain. No falls recently. Patient Active Problem List    Diagnosis Date Noted    Hyponatremia 09/01/2018    Leg edema 09/01/2018    Urinary retention 09/01/2018    Cancer of sigmoid ([facility] [address] 09/01/2018    Anxiety 09/01/2018    Irritable bowel syndrome with both constipation and diarrhea 09/01/2018    Neck pain 09/01/2018    Lumbar disc disease 09/01/2018    Essential hypertension 09/01/2018    Allergic rhinitis 09/01/2018    Vitamin D deficiency 09/01/2018       Current Outpatient Medications   Medication Sig Dispense Refill    montelukast (SINGULAIR) 10 mg tablet       famotidine (PEPCID) 20 mg tablet       tamsulosin (FLOMAX) 0.4 mg capsule Take 1 Cap by mouth daily (after dinner).  90 Cap 3    melatonin tab tablet Take 5 mg by mouth nightly.  cholecalciferol (VITAMIN D3) 1,000 unit cap Take 1,000 Units by mouth daily.  multivitamin/iron/folic acid (CENTRUM WOMEN PO) Take  by mouth daily.  acetaminophen (TYLENOL) 325 mg tablet Take 325 mg by mouth every four (4) hours as needed for Pain.  cetirizine (ZYRTEC) 10 mg tablet Take 10 mg by mouth daily.  famotidine (PEPCID) 10 mg tablet Take 10 mg by mouth two (2) times a day.  polyethylene glycol (MIRALAX) 17 gram packet Take 17 g by mouth daily as needed.  Bifidobacterium Infantis (ALIGN) 4 mg cap Take 4 mg by mouth daily.  furosemide (LASIX) 20 mg tablet Take 1 Tab by mouth every other day. Indications: Edema 90 Tab 3    mirtazapine (REMERON) 15 mg tablet Take 1 Tab by mouth nightly. Indications: major depressive disorder 90 Tab 3    dicyclomine (BENTYL) 10 mg capsule Take 1 Cap by mouth four (4) times daily. 90 Cap 3    trimethoprim-sulfamethoxazole (BACTRIM DS, SEPTRA DS) 160-800 mg per tablet Take 1 Tab by mouth two (2) times a day. 10 Tab 0    varicella-zoster recombinant, PF, (SHINGRIX) 50 mcg/0.5 mL susr injection 0.5 mL by IntraMUSCular route every two (2) months. 0.5 mL 1       Allergies   Allergen Reactions    Macrobid [Nitrofurantoin Monohyd/M-Cryst] Nausea and Vomiting    Lexapro [Escitalopram Oxalate] Other (comments)     ? Hyponatremia per pt hx       Past Medical History:   Diagnosis Date    Arthritis     Cancer [facility]) 2002    Colon     Chronic pain     abdominal due to IBS    Depression     GERD (gastroesophageal reflux disease)     Hypertension     Migraine headache 2014    Occipital neuralgia 2014    PUD (peptic ulcer disease)     S/P colon resection 2002    clear presently        Past Surgical History:   Procedure Laterality Date    HX CHOLECYSTECTOMY  2015    HX HYSTERECTOMY  1965    HX TONSIL AND ADENOIDECTOMY      HX WISDOM TEETH EXTRACTION      x4       Family History   Problem Relation Age of Onset  Hypertension Mother     Stroke Mother     Heart Attack Father     Hypertension Father     Parkinson's Disease Sister     Hypertension Brother     Cancer Brother         prostate    No Known Problems Son     No Known Problems Daughter        Social History     Tobacco Use    Smoking status: Never Smoker    Smokeless tobacco: Never Used   Substance Use Topics    Alcohol use: Yes     Alcohol/week: 1.8 oz     Types: 3 Glasses of wine per week       ROS   Review of Systems   Constitutional: Negative for chills and fever. HENT: Negative for ear pain and sore throat. Eyes: Negative for blurred vision and pain. Respiratory: Negative for cough and shortness of breath. Cardiovascular: Negative for chest pain. Gastrointestinal: Negative for abdominal pain, blood in stool and melena. Genitourinary: Negative for dysuria and hematuria. Musculoskeletal: Positive for joint pain. Negative for myalgias. Skin: Negative for rash. Neurological: Negative for tingling, focal weakness and headaches. Endo/Heme/Allergies: Does not bruise/bleed easily. Psychiatric/Behavioral: Negative for substance abuse. Objective     Vitals:    05/07/19 1124 05/07/19 1127   BP: 157/71 151/68   Pulse: 67    Resp: 16    Temp: 97 °F (36.1 °C)    TempSrc: Oral    SpO2: 99%    Weight: 154 lb (69.9 kg)    Height: 5' 2\" (1.575 m)    PainSc:   0 - No pain        Physical Exam   Constitutional: She is oriented to person, place, and time and well-developed, well-nourished, and in no distress. HENT:   Head: Normocephalic and atraumatic. Right Ear: External ear normal.   Left Ear: External ear normal.   Nose: Nose normal.   Mouth/Throat: Oropharynx is clear and moist. No oropharyngeal exudate. Eyes: Pupils are equal, round, and reactive to light. Conjunctivae and EOM are normal. Right eye exhibits no discharge. Left eye exhibits no discharge. No scleral icterus. Neck: Neck supple.    Cardiovascular: Normal rate, regular rhythm, normal heart sounds and intact distal pulses. Exam reveals no gallop and no friction rub. No murmur heard. Pulmonary/Chest: Effort normal and breath sounds normal. No respiratory distress. She has no wheezes. She has no rales. Abdominal: Soft. Bowel sounds are normal. She exhibits no distension. There is no tenderness. There is no rebound and no guarding. Musculoskeletal: She exhibits no edema or tenderness (BUE). Lymphadenopathy:     She has no cervical adenopathy. Neurological: She is alert and oriented to person, place, and time. She exhibits normal muscle tone. Gait normal.   Skin: Skin is warm and dry. No erythema. Psychiatric: Affect normal.   Nursing note and vitals reviewed.       LABS   Data Review:   Lab Results   Component Value Date/Time    WBC 3.2 (L) 10/16/2018 09:05 AM    HGB 11.9 (L) 10/16/2018 09:05 AM    HCT 35.8 10/16/2018 09:05 AM    PLATELET 318 43/49/8436 09:05 AM    MCV 84.6 10/16/2018 09:05 AM       Lab Results   Component Value Date/Time    Sodium 140 10/16/2018 09:05 AM    Potassium 4.1 10/16/2018 09:05 AM    Chloride 105 10/16/2018 09:05 AM    CO2 30 10/16/2018 09:05 AM    Anion gap 5 10/16/2018 09:05 AM    Glucose 137 (H) 10/16/2018 09:05 AM    BUN 20 (H) 10/16/2018 09:05 AM    Creatinine 0.96 10/16/2018 09:05 AM    BUN/Creatinine ratio 21 (H) 10/16/2018 09:05 AM    GFR est AA >60 10/16/2018 09:05 AM    GFR est non-AA 56 (L) 10/16/2018 09:05 AM    Calcium 8.7 10/16/2018 09:05 AM       Lab Results   Component Value Date/Time    Cholesterol, total 212 (H) 08/28/2018 04:12 PM    HDL Cholesterol 63 (H) 08/28/2018 04:12 PM    LDL, calculated 126.4 (H) 08/28/2018 04:12 PM    VLDL, calculated 22.6 08/28/2018 04:12 PM    Triglyceride 113 08/28/2018 04:12 PM    CHOL/HDL Ratio 3.4 08/28/2018 04:12 PM       No results found for: HBA1C, HGBE8, ZZK7UUJD, ICV8YRPI, UUO3TSBQ    Assessment/Plan:   B/l Leg Pain:  +Chronic lower back pain from lumbar disc disease  - Activity as tolerated. -Ordering a Medrol Dosepak. -Trial of gabapentin prescribed.  -We discussed how gabapentin can interact with her mirtazapine. She will let me know she is having any adverse side effects of taking gabapentin.  -We would discuss having her return to PT for her lower back at her follow-up appointment, when she is more mobile. We discussed briefly today but per her symptoms, I would recommend waiting on a PT referral for 4 weeks. Health Maintenance Due   Topic Date Due    DTaP/Tdap/Td series (1 - Tdap) 06/03/1956    Shingrix Vaccine Age 50> (1 of 2) 06/03/1985    GLAUCOMA SCREENING Q2Y  06/03/2000    Bone Densitometry (Dexa) Screening  06/03/2000    Pneumococcal 65+ years (1 of 2 - PCV13) 06/03/2000     Lab review: labs are reviewed in the EHR    I have discussed the diagnosis with the patient and the intended plan as seen in the above orders. The patient has received an after-visit summary and questions were answered concerning future plans. I have discussed medication side effects and warnings with the patient as well. I have reviewed the plan of care with the patient, accepted their input and they are in agreement with the treatment goals. All questions were answered. The patient understands the plan of care. Handouts provided today with above information. Pt instructed if symptoms worsen to call the office or report to the ED for continued care. Greater than 50% of the visit time was spent in counseling and/or coordination of care. Voice recognition was used to generate this report, which may have resulted in some phonetic based errors in grammar and contents. Even though attempts were made to correct all the mistakes, some may have been missed, and remained in the body of the document.           Debbi Lee MD

## 2019-06-05 ENCOUNTER — HOSPITAL ENCOUNTER (OUTPATIENT)
Dept: CT IMAGING | Age: 84
Discharge: HOME | End: 2019-06-05
Attending: NURSE PRACTITIONER
Payer: MEDICARE

## 2019-06-05 DIAGNOSIS — R19.7 DIARRHEA: ICD-10-CM

## 2019-06-05 DIAGNOSIS — Z85.038 PERSONAL HISTORY OF COLON CANCER: ICD-10-CM

## 2019-06-05 DIAGNOSIS — R10.9 ABDOMINAL PAIN: ICD-10-CM

## 2019-06-05 LAB — CREAT UR-MCNC: 0.8 MG/DL (ref 0.6–1.3)

## 2019-06-05 PROCEDURE — 74011636320 HC RX REV CODE- 636/320: Performed by: NURSE PRACTITIONER

## 2019-06-05 PROCEDURE — 82565 ASSAY OF CREATININE: CPT

## 2019-06-05 PROCEDURE — 74177 CT ABD & PELVIS W/CONTRAST: CPT

## 2019-06-05 RX ADMIN — IOPAMIDOL 85 ML: 612 INJECTION, SOLUTION INTRAVENOUS at 10:00

## 2019-06-07 ENCOUNTER — HOSPITAL ENCOUNTER (OUTPATIENT)
Age: 84
Discharge: HOME | End: 2019-06-07
Attending: NURSE PRACTITIONER
Payer: MEDICARE

## 2019-06-07 DIAGNOSIS — R93.89 ABNORMAL CT SCAN: ICD-10-CM

## 2019-06-07 DIAGNOSIS — K86.89 MASS OF PANCREAS: ICD-10-CM

## 2019-06-07 PROCEDURE — 74183 MRI ABD W/O CNTR FLWD CNTR: CPT

## 2019-06-07 PROCEDURE — 74011250636 HC RX REV CODE- 250/636: Performed by: NURSE PRACTITIONER

## 2019-06-07 PROCEDURE — A9577 INJ MULTIHANCE: HCPCS | Performed by: NURSE PRACTITIONER

## 2019-06-07 RX ADMIN — GADOBENATE DIMEGLUMINE 20 ML: 529 INJECTION, SOLUTION INTRAVENOUS at 17:00

## 2019-06-11 ENCOUNTER — OFFICE VISIT (OUTPATIENT)
Dept: INTERNAL MEDICINE CLINIC | Age: 84
End: 2019-06-11

## 2019-06-11 VITALS
TEMPERATURE: 96.7 F | HEIGHT: 62 IN | SYSTOLIC BLOOD PRESSURE: 129 MMHG | DIASTOLIC BLOOD PRESSURE: 61 MMHG | HEART RATE: 62 BPM | RESPIRATION RATE: 14 BRPM | WEIGHT: 158.4 LBS | BODY MASS INDEX: 29.15 KG/M2 | OXYGEN SATURATION: 98 %

## 2019-06-11 DIAGNOSIS — Z13.220 SCREENING FOR HYPERLIPIDEMIA: ICD-10-CM

## 2019-06-11 DIAGNOSIS — M51.9 LUMBAR DISC DISEASE: ICD-10-CM

## 2019-06-11 DIAGNOSIS — K86.89 PANCREATIC MASS: Primary | ICD-10-CM

## 2019-06-11 DIAGNOSIS — Z51.81 ENCOUNTER FOR THERAPEUTIC DRUG LEVEL MONITORING: ICD-10-CM

## 2019-06-11 DIAGNOSIS — I10 ESSENTIAL HYPERTENSION: ICD-10-CM

## 2019-06-11 DIAGNOSIS — R73.9 HYPERGLYCEMIA: ICD-10-CM

## 2019-06-11 RX ORDER — LIDOCAINE 50 MG/G
1 PATCH TOPICAL EVERY 12 HOURS
Qty: 30 EACH | Refills: 11 | Status: SHIPPED | OUTPATIENT
Start: 2019-06-11 | End: 2021-03-09

## 2019-06-11 RX ORDER — GABAPENTIN 300 MG/1
300 CAPSULE ORAL 3 TIMES DAILY
Qty: 90 CAP | Refills: 5 | Status: SHIPPED | OUTPATIENT
Start: 2019-06-11 | End: 2019-07-29 | Stop reason: SDUPTHER

## 2019-06-11 NOTE — PATIENT INSTRUCTIONS
Health Maintenance Due   Topic Date Due    DTaP/Tdap/Td series (1 - Tdap) 06/03/1956    Shingrix Vaccine Age 50> (1 of 2) 06/03/1985    GLAUCOMA SCREENING Q2Y  06/03/2000    Bone Densitometry (Dexa) Screening  06/03/2000    Pneumococcal 65+ years (1 of 2 - PCV13) 06/03/2000     Gabapentin (By mouth)   Gabapentin (angela-a-PEN-tin)  Treats seizures and pain caused by shingles. Brand Name(s): ACTIVE-PAC with Gabapentin, Convenience Ernesto, Cyclo/Fred 10/300 Pack, FusePaq Fanatrex, Fred-V, Gralise, [address] Pack, Neurontin, SmartRx Fred Kit   There may be other brand names for this medicine. When This Medicine Should Not Be Used: This medicine is not right for everyone. Do not use it if you had an allergic reaction to gabapentin. How to Use This Medicine:   Capsule, Liquid, Tablet  · Take your medicine as directed. Your dose may need to be changed several times to find what works best for you. If you have epilepsy, do not allow more than 12 hours to pass between doses. · Capsule: Swallow the capsule whole with plenty of water. Do not open, crush, or chew it. · Gralise® tablet: Swallow the tablet whole . Do not crush, break, or chew it. · Neurontin® tablet: If you break a tablet into 2 pieces, use the second half as your next dose. If you don't use it within 28 days, throw it away. · Measure the oral liquid medicine with a marked measuring spoon, oral syringe, or medicine cup. · This medicine should come with a Medication Guide. Ask your pharmacist for a copy if you do not have one. · Missed dose: Take a dose as soon as you remember. If it is almost time for your next dose, wait until then and take a regular dose. Do not take extra medicine to make up for a missed dose. · Store the medicine in a closed container at room temperature, away from heat, moisture, and direct light. Store the Neurontin® oral liquid in the refrigerator. Do not freeze.   Drugs and Foods to Avoid:   Ask your doctor or pharmacist before using any other medicine, including over-the-counter medicines, vitamins, and herbal products. · Some medicines can affect how gabapentin works. Tell your doctor if you also use any of the following:   ¨ Hydrocodone  ¨ Morphine  · If you take an antacid, wait at least 2 hours before you take gabapentin. · Tell your doctor if you use anything else that makes you sleepy. Some examples are allergy medicine, narcotic pain medicine, and alcohol. Warnings While Using This Medicine:   · Tell your doctor if you are pregnant or breastfeeding, or if you have kidney problems or are receiving dialysis. Tell your doctor if you have a history of depression or mental health problems. · This medicine may increase depression or thoughts of suicide. Tell your doctor right away if you start to feel more depressed or think about hurting yourself. · This medicine may cause a serious allergic reaction called multiorgan hypersensitivity, which can damage organs and be life-threatening. · Do not stop using this medicine suddenly. Your doctor will need to slowly decrease your dose before you stop it completely. If you take this medicine to prevent seizures, your seizures may return or occur more often if you stop this medicine suddenly. · This medicine may make you dizzy or drowsy. Do not drive or do anything else that could be dangerous until you know how this medicine affects you. · Tell any doctor or dentist who treats you that you are using this medicine. This medicine may affect certain medical test results. · Your doctor will check your progress and the effects of this medicine at regular visits. Keep all appointments. · Keep all medicine out of the reach of children. Never share your medicine with anyone.   Possible Side Effects While Using This Medicine:   Call your doctor right away if you notice any of these side effects:  · Allergic reaction: Itching or hives, swelling in your face or hands, swelling or tingling in your mouth or throat, chest tightness, trouble breathing  · Behavior problems, aggression, restlessness, trouble concentrating, moodiness (especially in children)  · Blistering, peeling, red skin rash  · Change in how much or how often you urinate, bloody or cloudy urine,  · Chest pain, fast heartbeat, trouble breathing  · Dark urine or pale stools, nausea, vomiting, loss of appetite, stomach pain, yellow skin or eyes  · Fever, rash, swollen or tender glands in the neck, armpit, or groin  · Problems with coordination, shakiness, unsteadiness  · Rapid weight gain, swelling in your hands, ankles, or feet  · Unusual moods or behaviors, thoughts of hurting yourself, feeling depressed  If you notice these less serious side effects, talk with your doctor:   · Dizziness, drowsiness, sleepiness, tiredness  If you notice other side effects that you think are caused by this medicine, tell your doctor. Call your doctor for medical advice about side effects. You may report side effects to FDA at 3-562-FDA-3896  © 2017 [facility] Information is for End User's use only and may not be sold, redistributed or otherwise used for commercial purposes. The above information is an  only. It is not intended as medical advice for individual conditions or treatments. Talk to your doctor, nurse or pharmacist before following any medical regimen to see if it is safe and effective for you.

## 2019-06-11 NOTE — PROGRESS NOTES
Chief Complaint   Patient presents with    LOW BACK PAIN     Chronic Back Pain follow up ROOM 4       1. Have you been to the ER, urgent care clinic since your last visit? Hospitalized since your last visit? No    2. Have you seen or consulted any other health care providers outside of the [address] [place] [address] since your last visit? Include any pap smears or colon screening.  No      Health Maintenance Due   Topic Date Due    DTaP/Tdap/Td series (1 - Tdap) 06/03/1956    Shingrix Vaccine Age 50> (1 of 2) 06/03/1985    GLAUCOMA SCREENING Q2Y  06/03/2000    Bone Densitometry (Dexa) Screening  06/03/2000    Pneumococcal 65+ years (1 of 2 - PCV13) 06/03/2000

## 2019-06-11 NOTE — PROGRESS NOTES
INTERNISTS OF [facility]:  6/11/2019, MRN: [id number]      Michaela Mills is a 80 y.o. female and presents to clinic for LOW BACK PAIN (Chronic Back Pain follow up ROOM 4)    Subjective: The patient is an 80-year-old female with history of sigmoid colon cancer status post surgery, history of strictures, osteopenia, hyponatremia, lumbar disc disease, hypertension, IBS, vitamin D deficiency, allergic rhinitis, GERD, and urinary retention. 1.  Pancreatic masses: She has had imaging studies obtained. Findings show cystic-appearing pancreatic lesions. She has off/on mid abdominal pain. \"It just hurts funny. \" \"It doesn't last long. \" Mylanta helps a little to relieve pain sx. Sx are not associated with po intake. No change in sx with defecation or urination. She has chronic constipation for \"years. \" She is taking Miralax prn. No hematuria, vaginal bleeding, or hematochezia. Her next apt with GI is tomorrow. She has a h/o IBS. She is on famotidine given a history of GERD.    3/7/19 Abdomen/Pelvis CT: No acute findings. A subcentimeter cystic lesion in the pancreatic body. From an imaging standpoint, recommend MRI/MRCP of the abdomen at some point to further characterize. Probable renal cysts as above. Status post cholecystectomy and hysterectomy. 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 Recommend MRI follow-up in one year to reassess. Probable hepatic and renal cysts as above. Status post cholecystectomy. 6/5/19 Abdomen/Pelvis CT: Possible 8 mm small mass in the lower outer quadrant of the left breast vs. asymmetric small island of glandular tissue. No definite change when compared to most recent mammography. Small stable low-density mass at the proximal body of the pancreas unchanged x3 months. Small pancreatic malignancy cannot be excluded. As clinically indicated would recommend follow-up CT.  Rectal colonic anastomosis appears intact. Marked increase volume of stool throughout the entire colon. Bilateral renal simple cysts. 2.  Lower Back Pain: At her last appointment, the patient reported bilateral lower extremity paresthesias. She was given a Medrol Dosepak and trial of gabapentin. She has a history of lumbar disc disease. Since her last appointment, she had improvement in her sx with taking the medrol dose pack. She is taking gabapentin since then but over the past 2 days, she had pain that caused her mobility to be limited. Sx are worse with standing from a seated position. She is tolerating gabapentin well. She has some relief with her daughter's lidocaine patches. \"My legs burn so badly. \"     3. Hypertension: BP is 129/61 today. Her weight is 158 pounds. Her weight is increasing. She takes Lasix. No adverse side effects of taking his medication. Patient Active Problem List    Diagnosis Date Noted    Hyponatremia 09/01/2018    Leg edema 09/01/2018    Urinary retention 09/01/2018    Cancer of sigmoid ([facility] [address] 09/01/2018    Anxiety 09/01/2018    Irritable bowel syndrome with both constipation and diarrhea 09/01/2018    Neck pain 09/01/2018    Lumbar disc disease 09/01/2018    Essential hypertension 09/01/2018    Allergic rhinitis 09/01/2018    Vitamin D deficiency 09/01/2018       Current Outpatient Medications   Medication Sig Dispense Refill    gabapentin (NEURONTIN) 300 mg capsule Take 1 Cap by mouth three (3) times daily. 90 Cap 5    lidocaine (LIDODERM) 5 % 1 Patch by TransDERmal route every twelve (12) hours. Apply patch to the affected area for 12 hours a day and remove for 12 hours a day. 30 Each 11    montelukast (SINGULAIR) 10 mg tablet Take 10 mg by mouth daily.  famotidine (PEPCID) 20 mg tablet       melatonin tab tablet Take 5 mg by mouth nightly.  cholecalciferol (VITAMIN D3) 1,000 unit cap Take 1,000 Units by mouth daily.       multivitamin/iron/folic acid (CENTRUM WOMEN PO) Take  by mouth daily.  acetaminophen (TYLENOL) 325 mg tablet Take 325 mg by mouth every four (4) hours as needed for Pain.  cetirizine (ZYRTEC) 10 mg tablet Take 10 mg by mouth daily.  polyethylene glycol (MIRALAX) 17 gram packet Take 17 g by mouth daily as needed.  Bifidobacterium Infantis (ALIGN) 4 mg cap Take 4 mg by mouth daily.  furosemide (LASIX) 20 mg tablet Take 1 Tab by mouth every other day. Indications: Edema 90 Tab 3    mirtazapine (REMERON) 15 mg tablet Take 1 Tab by mouth nightly. Indications: major depressive disorder 90 Tab 3    dicyclomine (BENTYL) 10 mg capsule Take 1 Cap by mouth four (4) times daily. 90 Cap 3    tamsulosin (FLOMAX) 0.4 mg capsule Take 1 Cap by mouth daily (after dinner). 90 Cap 3    varicella-zoster recombinant, PF, (SHINGRIX) 50 mcg/0.5 mL susr injection 0.5 mL by IntraMUSCular route every two (2) months. 0.5 mL 1       Allergies   Allergen Reactions    Macrobid [Nitrofurantoin Monohyd/M-Cryst] Nausea and Vomiting    Lexapro [Escitalopram Oxalate] Other (comments)     ? Hyponatremia per pt hx       Past Medical History:   Diagnosis Date    Arthritis     Cancer [facility]) 2002    Colon     Chronic pain     abdominal due to IBS    Depression     GERD (gastroesophageal reflux disease)     Hypertension     Migraine headache 2014    Occipital neuralgia 2014    PUD (peptic ulcer disease)     S/P colon resection 2002    clear presently        Past Surgical History:   Procedure Laterality Date    HX CHOLECYSTECTOMY  2015    HX HYSTERECTOMY  1965    HX TONSIL AND ADENOIDECTOMY      HX WISDOM TEETH EXTRACTION      x4       Family History   Problem Relation Age of Onset    Hypertension Mother     Stroke Mother     Heart Attack Father     Hypertension Father     Parkinson's Disease Sister     Hypertension Brother     Cancer Brother         prostate    No Known Problems Son     No Known Problems Daughter        Social History     Tobacco Use  Smoking status: Never Smoker    Smokeless tobacco: Never Used   Substance Use Topics    Alcohol use: Yes     Alcohol/week: 0.0 - 0.6 oz       ROS   Review of Systems   Constitutional: Negative for chills and fever. HENT: Negative for ear pain and sore throat. Eyes: Negative for blurred vision and pain. Respiratory: Negative for cough and shortness of breath. Cardiovascular: Negative for chest pain. Gastrointestinal: Positive for abdominal pain. Negative for blood in stool and melena. Genitourinary: Negative for dysuria and hematuria. Musculoskeletal: Positive for back pain. Negative for joint pain and myalgias. Skin: Negative for rash. Neurological: Positive for tingling. Negative for focal weakness and headaches. Endo/Heme/Allergies: Does not bruise/bleed easily. Psychiatric/Behavioral: Negative for substance abuse. Objective     Vitals:    06/11/19 1107 06/11/19 1108   BP: 129/61    Pulse: 62    Resp: 14    Temp: 96.7 °F (35.9 °C)    TempSrc: Oral    SpO2: 98%    Weight: 158 lb 6.4 oz (71.8 kg)    Height: 5' 2\" (1.575 m)    PainSc:  10 - Worst pain ever  10 - Worst pain ever   PainLoc: Back Leg       Physical Exam   Constitutional: She is oriented to person, place, and time and well-developed, well-nourished, and in no distress. HENT:   Head: Normocephalic and atraumatic. Right Ear: External ear normal.   Left Ear: External ear normal.   Nose: Nose normal.   Mouth/Throat: No oropharyngeal exudate. Eyes: Conjunctivae and EOM are normal. Right eye exhibits no discharge. Left eye exhibits no discharge. No scleral icterus. Neck: Neck supple. Cardiovascular: Normal rate, regular rhythm, normal heart sounds and intact distal pulses. Exam reveals no gallop and no friction rub. No murmur heard. Pulmonary/Chest: Effort normal and breath sounds normal. No respiratory distress. She has no wheezes. She has no rales. Abdominal: Soft.  Bowel sounds are normal. She exhibits no distension. There is no tenderness. There is no rebound and no guarding. Musculoskeletal: She exhibits no edema or tenderness (BUE). She has no tenderness to palpation along paraspinal muscles and spinous processes. She has pain along her lower back that shoots down her legs when she stands from a seated position. Lymphadenopathy:     She has no cervical adenopathy. Neurological: She is alert and oriented to person, place, and time. She exhibits normal muscle tone. Skin: Skin is warm and dry. No erythema. Psychiatric: Affect normal.   Nursing note and vitals reviewed. LABS   Data Review:   Lab Results   Component Value Date/Time    WBC 3.2 (L) 10/16/2018 09:05 AM    HGB 11.9 (L) 10/16/2018 09:05 AM    HCT 35.8 10/16/2018 09:05 AM    PLATELET 253 57/21/2146 09:05 AM    MCV 84.6 10/16/2018 09:05 AM       Lab Results   Component Value Date/Time    Sodium 140 10/16/2018 09:05 AM    Potassium 4.1 10/16/2018 09:05 AM    Chloride 105 10/16/2018 09:05 AM    CO2 30 10/16/2018 09:05 AM    Anion gap 5 10/16/2018 09:05 AM    Glucose 137 (H) 10/16/2018 09:05 AM    BUN 20 (H) 10/16/2018 09:05 AM    Creatinine 0.96 10/16/2018 09:05 AM    BUN/Creatinine ratio 21 (H) 10/16/2018 09:05 AM    GFR est AA >60 10/16/2018 09:05 AM    GFR est non-AA 56 (L) 10/16/2018 09:05 AM    Calcium 8.7 10/16/2018 09:05 AM       Lab Results   Component Value Date/Time    Cholesterol, total 212 (H) 08/28/2018 04:12 PM    HDL Cholesterol 63 (H) 08/28/2018 04:12 PM    LDL, calculated 126.4 (H) 08/28/2018 04:12 PM    VLDL, calculated 22.6 08/28/2018 04:12 PM    Triglyceride 113 08/28/2018 04:12 PM    CHOL/HDL Ratio 3.4 08/28/2018 04:12 PM       No results found for: HBA1C, HGBE8, EJC8VIMY, AFC2BZEJ, LNI2PNXZ    Assessment/Plan:   1. Lumbar disc disease:   -Increasing her gabapentin to 300 mg 3 times daily.  - Placing a referral to Orthopedics  -Activity as tolerated.   - UDS ordered so that we are compliant with [facility] regarding controlled medications. ORDERS:  - gabapentin (NEURONTIN) 300 mg capsule; Take 1 Cap by mouth three (3) times daily. Dispense: 90 Cap; Refill: 5  - REFERRAL TO ORTHOPEDICS    2. Pancreatic masses: She also has IBS and GERD. She also has chronic constipation. She has a stool burden present on her most recent CT.  -We discussed the MRI and CT findings of her most recent studies. -I encouraged her to follow-up with GI tomorrow for recommendations regarding pancreatic mass surveillance recommendations.  - Okay to continue taking famotidine.  -I encouraged her to take MiraLAX daily for constipation symptoms.  - Checking a CBC, lipase, and CMP just before her f/u apt.    3.  Hypertension: Stable. -Continue with Rx as prescribed.  -Checking a urine protein screen, CMP, and a lipid panel just before her follow-up appointment. - I encouraged her to maintain a heart healthy diet. I will recheck her weight at her f/u apt.  - Checking an A1C just before her f/u apt given elevated BGs noted on random BMPs      Health Maintenance Due   Topic Date Due    DTaP/Tdap/Td series (1 - Tdap) 06/03/1956    Shingrix Vaccine Age 50> (1 of 2) 06/03/1985    GLAUCOMA SCREENING Q2Y  06/03/2000    Bone Densitometry (Dexa) Screening  06/03/2000    Pneumococcal 65+ years (1 of 2 - PCV13) 06/03/2000     Lab review: labs are reviewed in the EHR and ordered as mentioned above. I have discussed the diagnosis with the patient and the intended plan as seen in the above orders. The patient has received an after-visit summary and questions were answered concerning future plans. I have discussed medication side effects and warnings with the patient as well. I have reviewed the plan of care with the patient, accepted their input and they are in agreement with the treatment goals. All questions were answered. The patient understands the plan of care. Handouts provided today with above information.  Pt instructed if symptoms worsen to call the office or report to the ED for continued care. Greater than 50% of the visit time was spent in counseling and/or coordination of care. Voice recognition was used to generate this report, which may have resulted in some phonetic based errors in grammar and contents. Even though attempts were made to correct all the mistakes, some may have been missed, and remained in the body of the document. Follow-up and Dispositions    · Return in about 4 months (around 10/11/2019) for BP check, weight check, pancreatic masses, lumbar disc disease.          Yessi Knowles MD

## 2019-06-25 ENCOUNTER — HOME HEALTH ADMISSION (OUTPATIENT)
Dept: HOME HEALTH SERVICES | Facility: HOME HEALTH | Age: 84
End: 2019-06-25
Payer: MEDICARE

## 2019-06-26 ENCOUNTER — HOME CARE VISIT (OUTPATIENT)
Dept: HOME HEALTH SERVICES | Facility: HOME HEALTH | Age: 84
End: 2019-06-26
Payer: MEDICARE

## 2019-06-26 ENCOUNTER — HOME CARE VISIT (OUTPATIENT)
Dept: SCHEDULING | Facility: HOME HEALTH | Age: 84
End: 2019-06-26
Payer: MEDICARE

## 2019-06-26 VITALS
SYSTOLIC BLOOD PRESSURE: 120 MMHG | OXYGEN SATURATION: 98 % | TEMPERATURE: 98 F | HEART RATE: 70 BPM | DIASTOLIC BLOOD PRESSURE: 70 MMHG

## 2019-06-26 PROCEDURE — 400013 HH SOC

## 2019-06-26 PROCEDURE — 3331090002 HH PPS REVENUE DEBIT

## 2019-06-26 PROCEDURE — G0151 HHCP-SERV OF PT,EA 15 MIN: HCPCS

## 2019-06-26 PROCEDURE — 3331090001 HH PPS REVENUE CREDIT

## 2019-06-27 ENCOUNTER — HOME CARE VISIT (OUTPATIENT)
Dept: SCHEDULING | Facility: HOME HEALTH | Age: 84
End: 2019-06-27
Payer: MEDICARE

## 2019-06-27 ENCOUNTER — HOME CARE VISIT (OUTPATIENT)
Dept: HOME HEALTH SERVICES | Facility: HOME HEALTH | Age: 84
End: 2019-06-27
Payer: MEDICARE

## 2019-06-27 VITALS
TEMPERATURE: 98.2 F | OXYGEN SATURATION: 97 % | RESPIRATION RATE: 18 BRPM | HEART RATE: 61 BPM | SYSTOLIC BLOOD PRESSURE: 101 MMHG | DIASTOLIC BLOOD PRESSURE: 61 MMHG

## 2019-06-27 PROCEDURE — G0157 HHC PT ASSISTANT EA 15: HCPCS

## 2019-06-27 PROCEDURE — 3331090001 HH PPS REVENUE CREDIT

## 2019-06-27 PROCEDURE — 3331090002 HH PPS REVENUE DEBIT

## 2019-06-28 PROCEDURE — 3331090002 HH PPS REVENUE DEBIT

## 2019-06-28 PROCEDURE — 3331090001 HH PPS REVENUE CREDIT

## 2019-06-29 PROCEDURE — 3331090002 HH PPS REVENUE DEBIT

## 2019-06-29 PROCEDURE — 3331090001 HH PPS REVENUE CREDIT

## 2019-06-30 PROCEDURE — 3331090001 HH PPS REVENUE CREDIT

## 2019-06-30 PROCEDURE — 3331090002 HH PPS REVENUE DEBIT

## 2019-07-01 ENCOUNTER — HOME CARE VISIT (OUTPATIENT)
Dept: SCHEDULING | Facility: HOME HEALTH | Age: 84
End: 2019-07-01
Payer: MEDICARE

## 2019-07-01 PROCEDURE — 3331090001 HH PPS REVENUE CREDIT

## 2019-07-01 PROCEDURE — G0157 HHC PT ASSISTANT EA 15: HCPCS

## 2019-07-01 PROCEDURE — 3331090002 HH PPS REVENUE DEBIT

## 2019-07-02 VITALS
TEMPERATURE: 97.8 F | HEART RATE: 62 BPM | DIASTOLIC BLOOD PRESSURE: 62 MMHG | OXYGEN SATURATION: 98 % | RESPIRATION RATE: 18 BRPM | SYSTOLIC BLOOD PRESSURE: 120 MMHG

## 2019-07-02 PROCEDURE — 3331090001 HH PPS REVENUE CREDIT

## 2019-07-02 PROCEDURE — 3331090002 HH PPS REVENUE DEBIT

## 2019-07-03 PROCEDURE — 3331090001 HH PPS REVENUE CREDIT

## 2019-07-03 PROCEDURE — 3331090002 HH PPS REVENUE DEBIT

## 2019-07-04 PROCEDURE — 3331090001 HH PPS REVENUE CREDIT

## 2019-07-04 PROCEDURE — 3331090002 HH PPS REVENUE DEBIT

## 2019-07-05 ENCOUNTER — HOME CARE VISIT (OUTPATIENT)
Dept: SCHEDULING | Facility: HOME HEALTH | Age: 84
End: 2019-07-05
Payer: MEDICARE

## 2019-07-05 PROCEDURE — 3331090001 HH PPS REVENUE CREDIT

## 2019-07-05 PROCEDURE — G0157 HHC PT ASSISTANT EA 15: HCPCS

## 2019-07-05 PROCEDURE — 3331090002 HH PPS REVENUE DEBIT

## 2019-07-06 PROCEDURE — 3331090002 HH PPS REVENUE DEBIT

## 2019-07-06 PROCEDURE — 3331090001 HH PPS REVENUE CREDIT

## 2019-07-07 VITALS
TEMPERATURE: 96.9 F | RESPIRATION RATE: 17 BRPM | DIASTOLIC BLOOD PRESSURE: 68 MMHG | OXYGEN SATURATION: 96 % | SYSTOLIC BLOOD PRESSURE: 119 MMHG | HEART RATE: 60 BPM

## 2019-07-07 PROCEDURE — 3331090001 HH PPS REVENUE CREDIT

## 2019-07-07 PROCEDURE — 3331090002 HH PPS REVENUE DEBIT

## 2019-07-08 ENCOUNTER — HOME CARE VISIT (OUTPATIENT)
Dept: SCHEDULING | Facility: HOME HEALTH | Age: 84
End: 2019-07-08
Payer: MEDICARE

## 2019-07-08 ENCOUNTER — TELEPHONE (OUTPATIENT)
Dept: INTERNAL MEDICINE CLINIC | Age: 84
End: 2019-07-08

## 2019-07-08 VITALS
OXYGEN SATURATION: 96 % | HEART RATE: 64 BPM | SYSTOLIC BLOOD PRESSURE: 138 MMHG | DIASTOLIC BLOOD PRESSURE: 60 MMHG | TEMPERATURE: 98.1 F | RESPIRATION RATE: 17 BRPM

## 2019-07-08 DIAGNOSIS — Z74.09 IMPAIRED FUNCTIONAL MOBILITY, BALANCE, GAIT, AND ENDURANCE: ICD-10-CM

## 2019-07-08 DIAGNOSIS — Z91.81 AT RISK FOR FALLS: Primary | ICD-10-CM

## 2019-07-08 PROCEDURE — 3331090001 HH PPS REVENUE CREDIT

## 2019-07-08 PROCEDURE — G0157 HHC PT ASSISTANT EA 15: HCPCS

## 2019-07-08 PROCEDURE — 3331090002 HH PPS REVENUE DEBIT

## 2019-07-08 NOTE — TELEPHONE ENCOUNTER
[facility] nurse, Bisi Gonzales, is requesting order to extend PT effective 7/14 for 1 wk 2 and 2 weeks 3 to improve gait stability and  decrease fall risk.     Fax: [phone]

## 2019-07-09 ENCOUNTER — HOME CARE VISIT (OUTPATIENT)
Dept: SCHEDULING | Facility: HOME HEALTH | Age: 84
End: 2019-07-09
Payer: MEDICARE

## 2019-07-09 VITALS
OXYGEN SATURATION: 38 % | HEART RATE: 59 BPM | TEMPERATURE: 96.9 F | DIASTOLIC BLOOD PRESSURE: 71 MMHG | SYSTOLIC BLOOD PRESSURE: 132 MMHG

## 2019-07-09 PROCEDURE — G0152 HHCP-SERV OF OT,EA 15 MIN: HCPCS

## 2019-07-09 PROCEDURE — 3331090002 HH PPS REVENUE DEBIT

## 2019-07-09 PROCEDURE — 3331090001 HH PPS REVENUE CREDIT

## 2019-07-10 ENCOUNTER — HOME CARE VISIT (OUTPATIENT)
Dept: SCHEDULING | Facility: HOME HEALTH | Age: 84
End: 2019-07-10
Payer: MEDICARE

## 2019-07-10 PROCEDURE — G0157 HHC PT ASSISTANT EA 15: HCPCS

## 2019-07-10 PROCEDURE — 3331090002 HH PPS REVENUE DEBIT

## 2019-07-10 PROCEDURE — 3331090001 HH PPS REVENUE CREDIT

## 2019-07-11 VITALS
TEMPERATURE: 97.4 F | RESPIRATION RATE: 18 BRPM | SYSTOLIC BLOOD PRESSURE: 119 MMHG | HEART RATE: 62 BPM | DIASTOLIC BLOOD PRESSURE: 60 MMHG | OXYGEN SATURATION: 98 %

## 2019-07-11 PROCEDURE — 3331090001 HH PPS REVENUE CREDIT

## 2019-07-11 PROCEDURE — 3331090002 HH PPS REVENUE DEBIT

## 2019-07-12 ENCOUNTER — HOME CARE VISIT (OUTPATIENT)
Dept: SCHEDULING | Facility: HOME HEALTH | Age: 84
End: 2019-07-12
Payer: MEDICARE

## 2019-07-12 PROCEDURE — 3331090001 HH PPS REVENUE CREDIT

## 2019-07-12 PROCEDURE — 3331090002 HH PPS REVENUE DEBIT

## 2019-07-12 PROCEDURE — G0152 HHCP-SERV OF OT,EA 15 MIN: HCPCS

## 2019-07-13 PROCEDURE — 3331090002 HH PPS REVENUE DEBIT

## 2019-07-13 PROCEDURE — 3331090001 HH PPS REVENUE CREDIT

## 2019-07-14 PROCEDURE — 3331090002 HH PPS REVENUE DEBIT

## 2019-07-14 PROCEDURE — 3331090001 HH PPS REVENUE CREDIT

## 2019-07-15 ENCOUNTER — HOME CARE VISIT (OUTPATIENT)
Dept: HOME HEALTH SERVICES | Facility: HOME HEALTH | Age: 84
End: 2019-07-15
Payer: MEDICARE

## 2019-07-15 PROCEDURE — G0151 HHCP-SERV OF PT,EA 15 MIN: HCPCS

## 2019-07-15 PROCEDURE — 3331090001 HH PPS REVENUE CREDIT

## 2019-07-15 PROCEDURE — 3331090002 HH PPS REVENUE DEBIT

## 2019-07-16 VITALS
SYSTOLIC BLOOD PRESSURE: 110 MMHG | OXYGEN SATURATION: 97 % | TEMPERATURE: 97.8 F | HEART RATE: 71 BPM | DIASTOLIC BLOOD PRESSURE: 60 MMHG

## 2019-07-16 PROCEDURE — 3331090002 HH PPS REVENUE DEBIT

## 2019-07-16 PROCEDURE — 3331090001 HH PPS REVENUE CREDIT

## 2019-07-17 ENCOUNTER — HOME CARE VISIT (OUTPATIENT)
Dept: SCHEDULING | Facility: HOME HEALTH | Age: 84
End: 2019-07-17
Payer: MEDICARE

## 2019-07-17 VITALS — SYSTOLIC BLOOD PRESSURE: 131 MMHG | TEMPERATURE: 97 F | DIASTOLIC BLOOD PRESSURE: 66 MMHG

## 2019-07-17 PROCEDURE — G0152 HHCP-SERV OF OT,EA 15 MIN: HCPCS

## 2019-07-17 PROCEDURE — 3331090002 HH PPS REVENUE DEBIT

## 2019-07-17 PROCEDURE — 3331090001 HH PPS REVENUE CREDIT

## 2019-07-18 ENCOUNTER — HOME CARE VISIT (OUTPATIENT)
Dept: SCHEDULING | Facility: HOME HEALTH | Age: 84
End: 2019-07-18
Payer: MEDICARE

## 2019-07-18 VITALS
DIASTOLIC BLOOD PRESSURE: 66 MMHG | SYSTOLIC BLOOD PRESSURE: 118 MMHG | OXYGEN SATURATION: 97 % | HEART RATE: 64 BPM | TEMPERATURE: 96.8 F

## 2019-07-18 PROCEDURE — 3331090002 HH PPS REVENUE DEBIT

## 2019-07-18 PROCEDURE — 3331090001 HH PPS REVENUE CREDIT

## 2019-07-18 PROCEDURE — G0151 HHCP-SERV OF PT,EA 15 MIN: HCPCS

## 2019-07-19 ENCOUNTER — HOME CARE VISIT (OUTPATIENT)
Dept: SCHEDULING | Facility: HOME HEALTH | Age: 84
End: 2019-07-19
Payer: MEDICARE

## 2019-07-19 VITALS — HEART RATE: 78 BPM | SYSTOLIC BLOOD PRESSURE: 124 MMHG | OXYGEN SATURATION: 96 % | DIASTOLIC BLOOD PRESSURE: 78 MMHG

## 2019-07-19 PROCEDURE — 3331090002 HH PPS REVENUE DEBIT

## 2019-07-19 PROCEDURE — G0152 HHCP-SERV OF OT,EA 15 MIN: HCPCS

## 2019-07-19 PROCEDURE — 3331090001 HH PPS REVENUE CREDIT

## 2019-07-20 PROCEDURE — 3331090001 HH PPS REVENUE CREDIT

## 2019-07-20 PROCEDURE — 3331090002 HH PPS REVENUE DEBIT

## 2019-07-21 PROCEDURE — 3331090001 HH PPS REVENUE CREDIT

## 2019-07-21 PROCEDURE — 3331090002 HH PPS REVENUE DEBIT

## 2019-07-22 ENCOUNTER — HOME CARE VISIT (OUTPATIENT)
Dept: SCHEDULING | Facility: HOME HEALTH | Age: 84
End: 2019-07-22
Payer: MEDICARE

## 2019-07-22 PROCEDURE — 3331090001 HH PPS REVENUE CREDIT

## 2019-07-22 PROCEDURE — G0151 HHCP-SERV OF PT,EA 15 MIN: HCPCS

## 2019-07-22 PROCEDURE — 3331090002 HH PPS REVENUE DEBIT

## 2019-07-23 VITALS
SYSTOLIC BLOOD PRESSURE: 104 MMHG | TEMPERATURE: 97 F | HEART RATE: 69 BPM | DIASTOLIC BLOOD PRESSURE: 60 MMHG | OXYGEN SATURATION: 97 %

## 2019-07-23 PROCEDURE — 3331090001 HH PPS REVENUE CREDIT

## 2019-07-23 PROCEDURE — 3331090002 HH PPS REVENUE DEBIT

## 2019-07-24 ENCOUNTER — HOME CARE VISIT (OUTPATIENT)
Dept: SCHEDULING | Facility: HOME HEALTH | Age: 84
End: 2019-07-24
Payer: MEDICARE

## 2019-07-24 PROCEDURE — A4351 STRAIGHT TIP URINE CATHETER: HCPCS

## 2019-07-24 PROCEDURE — G0152 HHCP-SERV OF OT,EA 15 MIN: HCPCS

## 2019-07-24 PROCEDURE — 3331090002 HH PPS REVENUE DEBIT

## 2019-07-24 PROCEDURE — 3331090001 HH PPS REVENUE CREDIT

## 2019-07-25 PROCEDURE — 3331090001 HH PPS REVENUE CREDIT

## 2019-07-25 PROCEDURE — 3331090002 HH PPS REVENUE DEBIT

## 2019-07-26 ENCOUNTER — HOME CARE VISIT (OUTPATIENT)
Dept: HOME HEALTH SERVICES | Facility: HOME HEALTH | Age: 84
End: 2019-07-26
Payer: MEDICARE

## 2019-07-26 PROCEDURE — 3331090002 HH PPS REVENUE DEBIT

## 2019-07-26 PROCEDURE — 3331090001 HH PPS REVENUE CREDIT

## 2019-07-27 ENCOUNTER — APPOINTMENT (OUTPATIENT)
Dept: CT IMAGING | Age: 84
End: 2019-07-27
Attending: EMERGENCY MEDICINE
Payer: MEDICARE

## 2019-07-27 ENCOUNTER — APPOINTMENT (OUTPATIENT)
Dept: GENERAL RADIOLOGY | Age: 84
End: 2019-07-27
Attending: EMERGENCY MEDICINE
Payer: MEDICARE

## 2019-07-27 ENCOUNTER — HOSPITAL ENCOUNTER (EMERGENCY)
Age: 84
Discharge: HOME | End: 2019-07-27
Attending: EMERGENCY MEDICINE
Payer: MEDICARE

## 2019-07-27 VITALS
HEART RATE: 90 BPM | DIASTOLIC BLOOD PRESSURE: 82 MMHG | RESPIRATION RATE: 16 BRPM | TEMPERATURE: 100.3 F | SYSTOLIC BLOOD PRESSURE: 156 MMHG | OXYGEN SATURATION: 98 %

## 2019-07-27 DIAGNOSIS — N30.00 ACUTE CYSTITIS WITHOUT HEMATURIA: Primary | ICD-10-CM

## 2019-07-27 LAB
ALBUMIN SERPL-MCNC: 3.7 G/DL (ref 3.4–5)
ALBUMIN/GLOB SERPL: 1 {RATIO} (ref 0.8–1.7)
ALP SERPL-CCNC: 111 U/L (ref 45–117)
ALT SERPL-CCNC: 36 U/L (ref 13–56)
ANION GAP SERPL CALC-SCNC: 4 MMOL/L (ref 3–18)
APPEARANCE UR: CLEAR
AST SERPL-CCNC: 45 U/L (ref 10–38)
ATRIAL RATE: 85 BPM
BACTERIA URNS QL MICRO: ABNORMAL /HPF
BASOPHILS # BLD: 0 K/UL (ref 0–0.1)
BASOPHILS NFR BLD: 0 % (ref 0–2)
BILIRUB SERPL-MCNC: 0.7 MG/DL (ref 0.2–1)
BILIRUB UR QL: NEGATIVE
BUN SERPL-MCNC: 10 MG/DL (ref 7–18)
BUN/CREAT SERPL: 11 (ref 12–20)
CALCIUM SERPL-MCNC: 9 MG/DL (ref 8.5–10.1)
CALCULATED P AXIS, ECG09: 67 DEGREES
CALCULATED R AXIS, ECG10: 7 DEGREES
CALCULATED T AXIS, ECG11: 28 DEGREES
CHLORIDE SERPL-SCNC: 100 MMOL/L (ref 100–111)
CK MB CFR SERPL CALC: NORMAL % (ref 0–4)
CK MB SERPL-MCNC: <1 NG/ML (ref 5–25)
CK SERPL-CCNC: 59 U/L (ref 26–192)
CO2 SERPL-SCNC: 29 MMOL/L (ref 21–32)
COLOR UR: YELLOW
CREAT SERPL-MCNC: 0.94 MG/DL (ref 0.6–1.3)
DIAGNOSIS, 93000: NORMAL
DIFFERENTIAL METHOD BLD: ABNORMAL
EOSINOPHIL # BLD: 0 K/UL (ref 0–0.4)
EOSINOPHIL NFR BLD: 1 % (ref 0–5)
EPITH CASTS URNS QL MICRO: ABNORMAL /LPF (ref 0–5)
ERYTHROCYTE [DISTWIDTH] IN BLOOD BY AUTOMATED COUNT: 14.1 % (ref 11.6–14.5)
GLOBULIN SER CALC-MCNC: 3.8 G/DL (ref 2–4)
GLUCOSE SERPL-MCNC: 113 MG/DL (ref 74–99)
GLUCOSE UR STRIP.AUTO-MCNC: NEGATIVE MG/DL
HCT VFR BLD AUTO: 39.1 % (ref 35–45)
HGB BLD-MCNC: 13 G/DL (ref 12–16)
HGB UR QL STRIP: NEGATIVE
KETONES UR QL STRIP.AUTO: ABNORMAL MG/DL
LACTATE BLD-SCNC: 1.27 MMOL/L (ref 0.4–2)
LEUKOCYTE ESTERASE UR QL STRIP.AUTO: ABNORMAL
LYMPHOCYTES # BLD: 0.6 K/UL (ref 0.9–3.6)
LYMPHOCYTES NFR BLD: 11 % (ref 21–52)
MAGNESIUM SERPL-MCNC: 2 MG/DL (ref 1.6–2.6)
MCH RBC QN AUTO: 28.1 PG (ref 24–34)
MCHC RBC AUTO-ENTMCNC: 33.2 G/DL (ref 31–37)
MCV RBC AUTO: 84.4 FL (ref 74–97)
MONOCYTES # BLD: 0.4 K/UL (ref 0.05–1.2)
MONOCYTES NFR BLD: 8 % (ref 3–10)
NEUTS SEG # BLD: 4.1 K/UL (ref 1.8–8)
NEUTS SEG NFR BLD: 80 % (ref 40–73)
NITRITE UR QL STRIP.AUTO: NEGATIVE
P-R INTERVAL, ECG05: 132 MS
PH UR STRIP: 7 [PH] (ref 5–8)
PLATELET # BLD AUTO: 217 K/UL (ref 135–420)
PMV BLD AUTO: 9.8 FL (ref 9.2–11.8)
POTASSIUM SERPL-SCNC: 4.2 MMOL/L (ref 3.5–5.5)
PROT SERPL-MCNC: 7.5 G/DL (ref 6.4–8.2)
PROT UR STRIP-MCNC: NEGATIVE MG/DL
Q-T INTERVAL, ECG07: 382 MS
QRS DURATION, ECG06: 78 MS
QTC CALCULATION (BEZET), ECG08: 454 MS
RBC # BLD AUTO: 4.63 M/UL (ref 4.2–5.3)
RBC #/AREA URNS HPF: ABNORMAL /HPF (ref 0–5)
SODIUM SERPL-SCNC: 133 MMOL/L (ref 136–145)
SP GR UR REFRACTOMETRY: 1.02 (ref 1–1.03)
TROPONIN I SERPL-MCNC: <0.02 NG/ML (ref 0–0.04)
UROBILINOGEN UR QL STRIP.AUTO: 0.2 EU/DL (ref 0.2–1)
VENTRICULAR RATE, ECG03: 85 BPM
WBC # BLD AUTO: 5.1 K/UL (ref 4.6–13.2)
WBC URNS QL MICRO: ABNORMAL /HPF (ref 0–4)

## 2019-07-27 PROCEDURE — 80053 COMPREHEN METABOLIC PANEL: CPT

## 2019-07-27 PROCEDURE — 93005 ELECTROCARDIOGRAM TRACING: CPT

## 2019-07-27 PROCEDURE — 71045 X-RAY EXAM CHEST 1 VIEW: CPT

## 2019-07-27 PROCEDURE — 74177 CT ABD & PELVIS W/CONTRAST: CPT

## 2019-07-27 PROCEDURE — 87086 URINE CULTURE/COLONY COUNT: CPT

## 2019-07-27 PROCEDURE — 85025 COMPLETE CBC W/AUTO DIFF WBC: CPT

## 2019-07-27 PROCEDURE — 74011000250 HC RX REV CODE- 250: Performed by: EMERGENCY MEDICINE

## 2019-07-27 PROCEDURE — 74011636320 HC RX REV CODE- 636/320: Performed by: EMERGENCY MEDICINE

## 2019-07-27 PROCEDURE — 74011250637 HC RX REV CODE- 250/637: Performed by: EMERGENCY MEDICINE

## 2019-07-27 PROCEDURE — 96374 THER/PROPH/DIAG INJ IV PUSH: CPT

## 2019-07-27 PROCEDURE — 83605 ASSAY OF LACTIC ACID: CPT

## 2019-07-27 PROCEDURE — 74011250636 HC RX REV CODE- 250/636: Performed by: EMERGENCY MEDICINE

## 2019-07-27 PROCEDURE — 83735 ASSAY OF MAGNESIUM: CPT

## 2019-07-27 PROCEDURE — 3331090001 HH PPS REVENUE CREDIT

## 2019-07-27 PROCEDURE — 96361 HYDRATE IV INFUSION ADD-ON: CPT

## 2019-07-27 PROCEDURE — 3331090002 HH PPS REVENUE DEBIT

## 2019-07-27 PROCEDURE — 81001 URINALYSIS AUTO W/SCOPE: CPT

## 2019-07-27 PROCEDURE — 99285 EMERGENCY DEPT VISIT HI MDM: CPT

## 2019-07-27 PROCEDURE — 82550 ASSAY OF CK (CPK): CPT

## 2019-07-27 PROCEDURE — 87040 BLOOD CULTURE FOR BACTERIA: CPT

## 2019-07-27 RX ORDER — SODIUM CHLORIDE 0.9 % (FLUSH) 0.9 %
5-10 SYRINGE (ML) INJECTION AS NEEDED
Status: DISCONTINUED | OUTPATIENT
Start: 2019-07-27 | End: 2019-07-27 | Stop reason: HOSPADM

## 2019-07-27 RX ORDER — CEPHALEXIN 500 MG/1
500 CAPSULE ORAL 4 TIMES DAILY
Qty: 20 CAP | Refills: 0 | Status: SHIPPED | OUTPATIENT
Start: 2019-07-27 | End: 2019-08-01

## 2019-07-27 RX ORDER — ACETAMINOPHEN 500 MG
1000 TABLET ORAL ONCE
Status: COMPLETED | OUTPATIENT
Start: 2019-07-27 | End: 2019-07-27

## 2019-07-27 RX ADMIN — ACETAMINOPHEN 1000 MG: 500 TABLET ORAL at 14:25

## 2019-07-27 RX ADMIN — CEFTRIAXONE SODIUM 2 G: 2 INJECTION, POWDER, FOR SOLUTION INTRAMUSCULAR; INTRAVENOUS at 14:25

## 2019-07-27 RX ADMIN — IOPAMIDOL 80 ML: 612 INJECTION, SOLUTION INTRAVENOUS at 15:29

## 2019-07-27 RX ADMIN — SODIUM CHLORIDE 250 ML: 900 INJECTION, SOLUTION INTRAVENOUS at 14:27

## 2019-07-27 NOTE — DISCHARGE INSTRUCTIONS
Your evaluation today showed urinary tract infection. Please follow up with a doctor as discussed. The evaluation and treatment done today requires that you follow up with a physician for re-evaluation. Not following up could result in chronic pain/disability/injury. Medical problems can change over time and symptoms can get worse or new symptoms can develop over time, therefore, it is important that you follow up as we discussed or return immedediately to the ER. Diseases don't read textbooks and there are limitations to our evaluation and testing, so please immediately return to the ER if you have any concerns. Call the ER if you have any questions about what we discussed. Please visit [employer] for discounts on any prescriptions you may have. At the [facility] Emergency Department we are genuinely concerned about your health and comfort. You may be selected to participate in a patient satisfaction survey mailed to your home. We are excited about the opportunity to learn from your experience so we may continue to improve. In striving for the very best we believe good is not good enough, but if you rate us as EXCELLENT in all boxes, we have succeeded. We strive to provide EXCELLENT care to you and your family. We appreciate the opportunity to take care of you, and hope you do well. Urinary Tract Infection in Women: Care Instructions  Your Care Instructions    A urinary tract infection, or UTI, is a general term for an infection anywhere between the kidneys and the urethra (where urine comes out). Most UTIs are bladder infections. They often cause pain or burning when you urinate. UTIs are caused by bacteria and can be cured with antibiotics. Be sure to complete your treatment so that the infection goes away. Follow-up care is a key part of your treatment and safety. Be sure to make and go to all appointments, and call your doctor if you are having problems.  It's also a good idea to know your test results and keep a list of the medicines you take. How can you care for yourself at home? · Take your antibiotics as directed. Do not stop taking them just because you feel better. You need to take the full course of antibiotics. · Drink extra water and other fluids for the next day or two. This may help wash out the bacteria that are causing the infection. (If you have kidney, heart, or liver disease and have to limit fluids, talk with your doctor before you increase your fluid intake.)  · Avoid drinks that are carbonated or have caffeine. They can irritate the bladder. · Urinate often. Try to empty your bladder each time. · To relieve pain, take a hot bath or lay a heating pad set on low over your lower belly or genital area. Never go to sleep with a heating pad in place. To prevent UTIs  · Drink plenty of water each day. This helps you urinate often, which clears bacteria from your system. (If you have kidney, heart, or liver disease and have to limit fluids, talk with your doctor before you increase your fluid intake.)  · Urinate when you need to. · Urinate right after you have sex. · Change sanitary pads often. · Avoid douches, bubble baths, feminine hygiene sprays, and other feminine hygiene products that have deodorants. · After going to the bathroom, wipe from front to back. When should you call for help? Call your doctor now or seek immediate medical care if:    · Symptoms such as fever, chills, nausea, or vomiting get worse or appear for the first time.     · You have new pain in your back just below your rib cage. This is called flank pain.     · There is new blood or pus in your urine.     · You have any problems with your antibiotic medicine.    Watch closely for changes in your health, and be sure to contact your doctor if:    · You are not getting better after taking an antibiotic for 2 days.     · Your symptoms go away but then come back. Where can you learn more?   Go to http://michael-holland.info/. Enter K886 in the search box to learn more about \"Urinary Tract Infection in Women: Care Instructions. \"  Current as of: December 19, 2018  Content Version: 12.1  © 8695-3212 [employer], [employer]. Care instructions adapted under license by [employer] (which disclaims liability or warranty for this information). If you have questions about a medical condition or this instruction, always ask your healthcare professional. [address] any warranty or liability for your use of this information.

## 2019-07-27 NOTE — ED PROVIDER NOTES
EMERGENCY DEPARTMENT HISTORY AND PHYSICAL EXAM    2:09 PM  Date: 7/27/2019  Patient Name: Barbara Zamora    History of Presenting Illness     Chief Complaint   Patient presents with    Bladder Infection        History Provided By: Patient    HPI: Barbara Zamora is a 80 y.o. female with History of colon cancer status post resection and strictures. , depression, GERD, hypertension, arthritis, pancreatic mass, here for weakness. Per clinic notes patient had increase in gabapentin for lumbar disc disease, they also following her pancreatic masses. Patient says she has issues with Macrobid but recently she has some fever and urinary frequency and presented to a urgent care where she was prescribed Macrobid. Shortly after taking it she felt generalized weakness. She states that she gets this usually when she takes Macrobid. Denies any seizures, syncope, weakness, numbness. She also has some upper abdominal pain like when she has her prior UTIs.     Location: Suprapubic  Severity: Mild to moderate  Timing/course: Constant  Onset/Duration: 2 days     PCP: Carlito Guo MD    Past History     Past Medical History:  Past Medical History:   Diagnosis Date    Arthritis     Cancer [facility]) 2002    Colon     Chronic pain     abdominal due to IBS    Depression     GERD (gastroesophageal reflux disease)     Hypertension     Migraine headache 2014    Occipital neuralgia 2014    PUD (peptic ulcer disease)     S/P colon resection 2002    clear presently        Past Surgical History:  Past Surgical History:   Procedure Laterality Date    HX CHOLECYSTECTOMY  2015    HX HYSTERECTOMY  1965    HX TONSIL AND ADENOIDECTOMY      HX WISDOM TEETH EXTRACTION      x4       Family History:  Family History   Problem Relation Age of Onset    Hypertension Mother     Stroke Mother     Heart Attack Father     Hypertension Father     Parkinson's Disease Sister     Hypertension Brother     Cancer Brother prostate    No Known Problems Son     No Known Problems Daughter        Social History:  Social History     Tobacco Use    Smoking status: Never Smoker    Smokeless tobacco: Never Used   Substance Use Topics    Alcohol use: Yes     Alcohol/week: 0.0 - 1.0 standard drinks    Drug use: Not on file       Allergies: Allergies   Allergen Reactions    Macrobid [Nitrofurantoin Monohyd/M-Cryst] Nausea and Vomiting    Lexapro [Escitalopram Oxalate] Other (comments)     ? Hyponatremia per pt hx       Review of Systems     Constitutional: Fever. Eyes: No visual symptoms. ENT: No sore throat, runny nose, or ear pain. Respiratory: No cough, dyspnea, or wheezing. Cardiovascular: No chest pain, palpitations, or heaviness. Gastrointestinal: No nausea, vomiting, diarrhea. Genitourinary: Urinary frequency  Musculoskeletal: No joint pain or swelling. Integumentary: No rashes. Neurological: No headaches, sensory or motor symptoms. All other systems negative. Physical Exam     Patient Vitals for the past 12 hrs:   Temp Pulse Resp BP SpO2   07/27/19 1430 -- 90 16 156/82 98 %   07/27/19 1345 -- 83 19 140/50 97 %   07/27/19 1341 100.3 °F (37.9 °C) -- -- -- --   07/27/19 1330 -- 85 20 127/40 97 %   07/27/19 1315 -- 83 14 (!) 107/39 96 %       Physical Exam   Constitutional: Appears well-developed. Is not diaphoretic. Eyes: Conjunctiva clear, EOMI  Head: Normocephalic and atraumatic. Neck: Normal range of motion. No stridor or tracheal deviation. Cardiovascular: Intact distal pulses. Pulmonary/Chest: Effort normal and no respiratory distress. Abdominal: Non distended. Mild suprapubic tenderness. No CVA tenderness bilaterally. Musculoskeletal: Normal range of motion. Exhibits no tenderness. Neurological: Conversant, alert. Skin: Skin is warm and dry. Psychiatric: Has a normal mood and affect. Behavior is normal.   Nursing note and vitals reviewed.     Diagnostic Study Results     Labs -  Recent Results (from the past 12 hour(s))   METABOLIC PANEL, COMPREHENSIVE    Collection Time: 07/27/19  1:00 PM   Result Value Ref Range    Sodium 133 (L) 136 - 145 mmol/L    Potassium 4.2 3.5 - 5.5 mmol/L    Chloride 100 100 - 111 mmol/L    CO2 29 21 - 32 mmol/L    Anion gap 4 3.0 - 18 mmol/L    Glucose 113 (H) 74 - 99 mg/dL    BUN 10 7.0 - 18 MG/DL    Creatinine 0.94 0.6 - 1.3 MG/DL    BUN/Creatinine ratio 11 (L) 12 - 20      GFR est AA >60 >60 ml/min/1.73m2    GFR est non-AA 57 (L) >60 ml/min/1.73m2    Calcium 9.0 8.5 - 10.1 MG/DL    Bilirubin, total 0.7 0.2 - 1.0 MG/DL    ALT (SGPT) 36 13 - 56 U/L    AST (SGOT) 45 (H) 10 - 38 U/L    Alk. phosphatase 111 45 - 117 U/L    Protein, total 7.5 6.4 - 8.2 g/dL    Albumin 3.7 3.4 - 5.0 g/dL    Globulin 3.8 2.0 - 4.0 g/dL    A-G Ratio 1.0 0.8 - 1.7     CBC WITH AUTOMATED DIFF    Collection Time: 07/27/19  1:00 PM   Result Value Ref Range    WBC 5.1 4.6 - 13.2 K/uL    RBC 4.63 4.20 - 5.30 M/uL    HGB 13.0 12.0 - 16.0 g/dL    HCT 39.1 35.0 - 45.0 %    MCV 84.4 74.0 - 97.0 FL    MCH 28.1 24.0 - 34.0 PG    MCHC 33.2 31.0 - 37.0 g/dL    RDW 14.1 11.6 - 14.5 %    PLATELET 161 558 - 503 K/uL    MPV 9.8 9.2 - 11.8 FL    NEUTROPHILS 80 (H) 40 - 73 %    LYMPHOCYTES 11 (L) 21 - 52 %    MONOCYTES 8 3 - 10 %    EOSINOPHILS 1 0 - 5 %    BASOPHILS 0 0 - 2 %    ABS. NEUTROPHILS 4.1 1.8 - 8.0 K/UL    ABS. LYMPHOCYTES 0.6 (L) 0.9 - 3.6 K/UL    ABS. MONOCYTES 0.4 0.05 - 1.2 K/UL    ABS. EOSINOPHILS 0.0 0.0 - 0.4 K/UL    ABS.  BASOPHILS 0.0 0.0 - 0.1 K/UL    DF AUTOMATED     CARDIAC PANEL,(CK, CKMB & TROPONIN)    Collection Time: 07/27/19  1:00 PM   Result Value Ref Range    CK 59 26 - 192 U/L    CK - MB <1.0 <3.6 ng/ml    CK-MB Index  0.0 - 4.0 %     CALCULATION NOT PERFORMED WHEN RESULT IS BELOW LINEAR LIMIT    Troponin-I, QT <0.02 0.0 - 0.045 NG/ML   MAGNESIUM    Collection Time: 07/27/19  1:00 PM   Result Value Ref Range    Magnesium 2.0 1.6 - 2.6 mg/dL   POC LACTIC ACID    Collection Time: 07/27/19 1:16 PM   Result Value Ref Range    Lactic Acid (POC) 1.27 0.40 - 2.00 mmol/L   EKG, 12 LEAD, INITIAL    Collection Time: 07/27/19  1:22 PM   Result Value Ref Range    Ventricular Rate 85 BPM    Atrial Rate 85 BPM    P-R Interval 132 ms    QRS Duration 78 ms    Q-T Interval 382 ms    QTC Calculation (Bezet) 454 ms    Calculated P Axis 67 degrees    Calculated R Axis 7 degrees    Calculated T Axis 28 degrees    Diagnosis       Normal sinus rhythm  Normal ECG  When compared with ECG of 16-OCT-2018 08:44,  No significant change was found     URINALYSIS W/ RFLX MICROSCOPIC    Collection Time: 07/27/19  3:40 PM   Result Value Ref Range    Color YELLOW      Appearance CLEAR      Specific gravity 1.021 1.005 - 1.030      pH (UA) 7.0 5.0 - 8.0      Protein NEGATIVE  NEG mg/dL    Glucose NEGATIVE  NEG mg/dL    Ketone TRACE (A) NEG mg/dL    Bilirubin NEGATIVE  NEG      Blood NEGATIVE  NEG      Urobilinogen 0.2 0.2 - 1.0 EU/dL    Nitrites NEGATIVE  NEG      Leukocyte Esterase TRACE (A) NEG     URINE MICROSCOPIC ONLY    Collection Time: 07/27/19  3:40 PM   Result Value Ref Range    WBC 4 to 11 0 - 4 /hpf    RBC 0 to 3 0 - 5 /hpf    Epithelial cells 2+ 0 - 5 /lpf    Bacteria FEW (A) NEG /hpf       Radiologic Studies -   Ct Abd Pelv W Cont    Result Date: 7/27/2019  IMPRESSION: 1. Minimal ileus. . 2.   No hydronephrosis or hydroureter. Stable simple bilateral renal cystic lesions. . 3. Colostomy. Functional dilatation of the CBD. Unchanged. Xr Chest Port    Result Date: 7/27/2019   IMPRESSION: 1. No acute cardiopulmonary process. Medical Decision Making     ED Course: Progress Notes, Reevaluation, and Consults:    2:09 PM Initial assessment performed. The patients presenting problems have been discussed, and they/their family are in agreement with the care plan formulated and outlined with them. I have encouraged them to ask questions as they arise throughout their visit.     4:47 PM-on reassessment patient feeling much better after ceftriaxone and fluids. I counseled the patient and her daughter on the results so far, and give her a printout of the CT scan results so she may follow-up with her primary doctor Dr. Taiwo Prakash for the incidental findings. She had all questions answered and knew return precautions. Provider Notes (Medical Decision Making): Based on my history, physical exam, and diagnostic evaluation, the patient appears to have symptoms consistent with an acute cystitis. On physical exam patient has no CVA tenderness. Patients urinalysis was consistent with a UTI. Patient tolerates the ceftriaxone here well so we will switch to Keflex for additional p.o. therapy as outpatient. Pt appears well-hydrated and ambulating in the emergency department without difficulty. They will be discharged with instructions to return if severe back pain, not tolerating oral food or fluids, any respiratory distress, or new symptoms. Patient was discharged on oral antibiotics, I encouraged follow-up with the primary care physician for repeat exam in 24-48 hours      Vital Signs-Reviewed the patient's vital signs. Reviewed pt's pulse ox reading. EKG: Interpreted by the EP. Time Interpreted: 1744   Rate: 85   Rhythm: Normal sinus rhythm   Interpretation: Nonspecific STs,        Records Reviewed: Nursing Notes and Old Medical Records (Time of Review: 2:09 PM)  -I am the first provider for this patient.  -I reviewed the vital signs, available nursing notes, past medical history, past surgical history, family history and social history. Current Facility-Administered Medications   Medication Dose Route Frequency Provider Last Rate Last Dose    sodium chloride (NS) flush 5-10 mL  5-10 mL IntraVENous PRN Shraddha Short MD         Current Outpatient Medications   Medication Sig Dispense Refill    cephALEXin (KEFLEX) 500 mg capsule Take 1 Cap by mouth four (4) times daily for 5 days.  20 Cap 0    alum-mag hydroxide-simeth (MYLANTA) 200-200-20 mg/5 mL susp Take 30 mL by mouth daily as needed for Other (PRN: Gastric Reflux, in PM). PRN: Gastric Reflux, in PM      lidocaine 4 % gel Apply 1 mL to affected area daily as needed for Pain (PRN Pain). PRN Pain, instead of Lidocaine Patch      gabapentin (NEURONTIN) 300 mg capsule Take 1 Cap by mouth three (3) times daily. (Patient taking differently: Take 1 Cap by mouth daily.) 90 Cap 5    lidocaine (LIDODERM) 5 % 1 Patch by TransDERmal route every twelve (12) hours. Apply patch to the affected area for 12 hours a day and remove for 12 hours a day. 30 Each 11    montelukast (SINGULAIR) 10 mg tablet Take 10 mg by mouth daily.  famotidine (PEPCID) 20 mg tablet Take 20 mg by mouth daily.  tamsulosin (FLOMAX) 0.4 mg capsule Take 1 Cap by mouth daily (after dinner). 90 Cap 3    varicella-zoster recombinant, PF, (SHINGRIX) 50 mcg/0.5 mL susr injection 0.5 mL by IntraMUSCular route every two (2) months. 0.5 mL 1    melatonin tab tablet Take 5 mg by mouth nightly.  cholecalciferol (VITAMIN D3) 1,000 unit cap Take 1,000 Units by mouth daily.  multivitamin/iron/folic acid (CENTRUM WOMEN PO) Take 1 Tab by mouth daily.  acetaminophen (TYLENOL) 325 mg tablet Take 500 mg by mouth every four (4) hours as needed for Pain. 500 mg tabs      cetirizine (ZYRTEC) 10 mg tablet Take 10 mg by mouth daily.  polyethylene glycol (MIRALAX) 17 gram packet Take 17 g by mouth daily as needed for Other (PRN Constipation). PRN Constipation      Bifidobacterium Infantis (ALIGN) 4 mg cap Take 4 mg by mouth daily.  furosemide (LASIX) 20 mg tablet Take 1 Tab by mouth every other day. Indications: Edema 90 Tab 3    mirtazapine (REMERON) 15 mg tablet Take 1 Tab by mouth nightly. Indications: major depressive disorder 90 Tab 3    dicyclomine (BENTYL) 10 mg capsule Take 1 Cap by mouth four (4) times daily.  (Patient taking differently: Take 1 Cap by mouth four (4) times daily as needed for Other (PRN:  IBS symptoms). PRN:  IBS symptoms) 90 Cap 3        Clinical Impression     Clinical Impression:   1. Acute cystitis without hematuria        Disposition: DC home        This note was dictated utilizing voice recognition software which may lead to typographical errors. I apologize in advance if the situation occurs. If questions arise please do not hesitate to contact me or call our department.

## 2019-07-28 PROCEDURE — 3331090002 HH PPS REVENUE DEBIT

## 2019-07-28 PROCEDURE — 3331090001 HH PPS REVENUE CREDIT

## 2019-07-29 ENCOUNTER — TELEPHONE (OUTPATIENT)
Dept: INTERNAL MEDICINE CLINIC | Age: 84
End: 2019-07-29

## 2019-07-29 DIAGNOSIS — M51.9 LUMBAR DISC DISEASE: ICD-10-CM

## 2019-07-29 LAB
BACTERIA SPEC CULT: NORMAL
SERVICE CMNT-IMP: NORMAL

## 2019-07-29 PROCEDURE — 3331090002 HH PPS REVENUE DEBIT

## 2019-07-29 PROCEDURE — 3331090001 HH PPS REVENUE CREDIT

## 2019-07-30 ENCOUNTER — HOME CARE VISIT (OUTPATIENT)
Dept: SCHEDULING | Facility: HOME HEALTH | Age: 84
End: 2019-07-30
Payer: MEDICARE

## 2019-07-30 PROCEDURE — 3331090001 HH PPS REVENUE CREDIT

## 2019-07-30 PROCEDURE — 3331090002 HH PPS REVENUE DEBIT

## 2019-07-30 RX ORDER — GABAPENTIN 100 MG/1
100 CAPSULE ORAL 3 TIMES DAILY
Qty: 90 CAP | Refills: 5 | Status: SHIPPED | OUTPATIENT
Start: 2019-07-30 | End: 2019-08-12 | Stop reason: SDUPTHER

## 2019-07-31 ENCOUNTER — HOME CARE VISIT (OUTPATIENT)
Dept: SCHEDULING | Facility: HOME HEALTH | Age: 84
End: 2019-07-31
Payer: MEDICARE

## 2019-07-31 PROCEDURE — 3331090001 HH PPS REVENUE CREDIT

## 2019-07-31 PROCEDURE — G0152 HHCP-SERV OF OT,EA 15 MIN: HCPCS

## 2019-07-31 PROCEDURE — 3331090002 HH PPS REVENUE DEBIT

## 2019-08-01 ENCOUNTER — HOME CARE VISIT (OUTPATIENT)
Dept: SCHEDULING | Facility: HOME HEALTH | Age: 84
End: 2019-08-01
Payer: MEDICARE

## 2019-08-01 VITALS
DIASTOLIC BLOOD PRESSURE: 62 MMHG | TEMPERATURE: 98.2 F | HEART RATE: 63 BPM | SYSTOLIC BLOOD PRESSURE: 130 MMHG | OXYGEN SATURATION: 97 %

## 2019-08-01 VITALS
OXYGEN SATURATION: 96 % | SYSTOLIC BLOOD PRESSURE: 141 MMHG | TEMPERATURE: 97 F | DIASTOLIC BLOOD PRESSURE: 68 MMHG | HEART RATE: 61 BPM

## 2019-08-01 PROCEDURE — 3331090001 HH PPS REVENUE CREDIT

## 2019-08-01 PROCEDURE — G0151 HHCP-SERV OF PT,EA 15 MIN: HCPCS

## 2019-08-01 PROCEDURE — 3331090002 HH PPS REVENUE DEBIT

## 2019-08-02 ENCOUNTER — HOME CARE VISIT (OUTPATIENT)
Dept: SCHEDULING | Facility: HOME HEALTH | Age: 84
End: 2019-08-02
Payer: MEDICARE

## 2019-08-02 VITALS
DIASTOLIC BLOOD PRESSURE: 64 MMHG | HEART RATE: 67 BPM | TEMPERATURE: 95.9 F | SYSTOLIC BLOOD PRESSURE: 151 MMHG | OXYGEN SATURATION: 98 %

## 2019-08-02 LAB
BACTERIA SPEC CULT: NORMAL
BACTERIA SPEC CULT: NORMAL
SERVICE CMNT-IMP: NORMAL
SERVICE CMNT-IMP: NORMAL

## 2019-08-02 PROCEDURE — 3331090003 HH PPS REVENUE ADJ

## 2019-08-02 PROCEDURE — 3331090001 HH PPS REVENUE CREDIT

## 2019-08-02 PROCEDURE — G0152 HHCP-SERV OF OT,EA 15 MIN: HCPCS

## 2019-08-02 PROCEDURE — 3331090002 HH PPS REVENUE DEBIT

## 2019-08-03 PROCEDURE — 3331090002 HH PPS REVENUE DEBIT

## 2019-08-03 PROCEDURE — 3331090001 HH PPS REVENUE CREDIT

## 2019-08-04 PROCEDURE — 3331090001 HH PPS REVENUE CREDIT

## 2019-08-04 PROCEDURE — 3331090002 HH PPS REVENUE DEBIT

## 2019-08-05 ENCOUNTER — OFFICE VISIT (OUTPATIENT)
Dept: INTERNAL MEDICINE CLINIC | Age: 84
End: 2019-08-05

## 2019-08-05 VITALS
HEART RATE: 66 BPM | OXYGEN SATURATION: 98 % | RESPIRATION RATE: 12 BRPM | DIASTOLIC BLOOD PRESSURE: 63 MMHG | BODY MASS INDEX: 29.44 KG/M2 | WEIGHT: 160 LBS | TEMPERATURE: 98.1 F | HEIGHT: 62 IN | SYSTOLIC BLOOD PRESSURE: 140 MMHG

## 2019-08-05 DIAGNOSIS — N30.00 ACUTE CYSTITIS WITHOUT HEMATURIA: Primary | ICD-10-CM

## 2019-08-05 DIAGNOSIS — R19.7 DIARRHEA, UNSPECIFIED TYPE: ICD-10-CM

## 2019-08-05 RX ORDER — NITROFURANTOIN 25; 75 MG/1; MG/1
100 CAPSULE ORAL 2 TIMES DAILY
COMMUNITY
Start: 2019-07-25 | End: 2019-10-10 | Stop reason: SINTOL

## 2019-08-05 RX ORDER — AMOXICILLIN AND CLAVULANATE POTASSIUM 875; 125 MG/1; MG/1
TABLET, FILM COATED ORAL
COMMUNITY
Start: 2019-07-24 | End: 2019-10-10 | Stop reason: ALTCHOICE

## 2019-08-05 NOTE — PATIENT INSTRUCTIONS
Health Maintenance Due   Topic Date Due    DTaP/Tdap/Td series (1 - Tdap) 06/03/1956    Shingrix Vaccine Age 50> (1 of 2) 06/03/1985    GLAUCOMA SCREENING Q2Y  06/03/2000    Bone Densitometry (Dexa) Screening  06/03/2000    Pneumococcal 65+ years (1 of 2 - PCV13) 06/03/2000    Influenza Age 9 to Adult  08/01/2019

## 2019-08-05 NOTE — PROGRESS NOTES
Chief Complaint   Patient presents with   [address] [place] [address] ED Follow-up     [facility] 7-27-19 for Acute Cystitis without Hematuria        1. Have you been to the ER, urgent care clinic since your last visit? Hospitalized since your last visit? Yes When: 7-27-19 Where: [facility] Reason: Acute Cystitis without Hematuria. The patient states she had been previously to [facility] back on 7-24-19 for UTI and treated with antibiotic Augmentin 875-125 mg  On 7-25-19 starting having severe diarrhea , due to the antibiotic, and was given a different antibiotic Macrobid, which she informed [facility] facility that she was allergic to [facility], and they told her she had to take it anyway. The patient became sicker, and had to go into [facility] by ambulance on 7-27-19.    2. Have you seen or consulted any other health care providers outside of the [address] [place] [address] since your last visit? Include any pap smears or colon screening.  No      Health Maintenance Due   Topic Date Due    DTaP/Tdap/Td series (1 - Tdap) 06[birth year]03/1956    Shingrix Vaccine Age 50> (1 of 2) 06/03/1985    GLAUCOMA SCREENING Q2Y  06/03/2000    Bone Densitometry (Dexa) Screening  06/03/2000    Pneumococcal 65+ years (1 of 2 - PCV13) 06/03/2000    Influenza Age 9 to Adult  08/01/2019

## 2019-08-05 NOTE — PROGRESS NOTES
INTERNISTS OF [facility]:  8/10/2019, MRN: [id number]      Emma Albright is a 80 y.o. female and presents to clinic for ED Follow-up [facility] 7-27-19 for Acute Cystitis without Hematuria )    Subjective: The patient is an 80-year-old female with history of sigmoid colon cancer status post surgery, history of strictures, osteopenia, hyponatremia, lumbar disc disease, hypertension, IBS, vitamin D deficiency, allergic rhinitis, GERD, and urinary retention. UTI: She was diagnosed with a UTI and augmentin was ordered. S/p part of augmentin course. Augmentin was d/c 2/2 diarrhea. She was prescribed macrobid instead but after 2 doses she developed delirium and was sent to the ED. In the ED, she was diagnosed with a persistent UTI. She was given a course of keflex. She completed this rx w/o adverse side effects. She has not had a BM for 3 days. As a result, she took miralax yesterday. Since then, she had some diarrhea today s/p the miralax dose yesterday. She feels like her abdomen is burning. These sx and some abdominal bloating are improving day by day. She denies urinary symptoms today. In the evaluation of her sx, incidentally, she had a CT study done last month. Findings are listed below. 7/27/19 Abdomen CT: Minimal ileus. No hydronephrosis or hydroureter. Stable simple bilateral renal cystic lesions. Colostomy. Functional dilatation of the CBD. Unchanged.         Patient Active Problem List    Diagnosis Date Noted    Hyponatremia 09/01/2018    Leg edema 09/01/2018    Urinary retention 09/01/2018    Cancer of sigmoid ([facility] [address] 09/01/2018    Anxiety 09/01/2018    Irritable bowel syndrome with both constipation and diarrhea 09/01/2018    Neck pain 09/01/2018    Lumbar disc disease 09/01/2018    Essential hypertension 09/01/2018    Allergic rhinitis 09/01/2018    Vitamin D deficiency 09/01/2018       Current Outpatient Medications   Medication Sig Dispense Refill    gabapentin (NEURONTIN) 100 mg capsule Take 1 Cap by mouth three (3) times daily. Max Daily Amount: 300 mg. (Patient taking differently: Take 1 Cap by mouth three (3) times daily. Takes 100mg at night only) 90 Cap 5    alum-mag hydroxide-simeth (MYLANTA) 200-200-20 mg/5 mL susp Take 30 mL by mouth daily as needed for Other (PRN: Gastric Reflux, in PM). PRN: Gastric Reflux, in PM      lidocaine 4 % gel Apply 1 mL to affected area daily as needed for Pain (PRN Pain). PRN Pain, instead of Lidocaine Patch      lidocaine (LIDODERM) 5 % 1 Patch by TransDERmal route every twelve (12) hours. Apply patch to the affected area for 12 hours a day and remove for 12 hours a day. 30 Each 11    montelukast (SINGULAIR) 10 mg tablet Take 10 mg by mouth daily.  famotidine (PEPCID) 20 mg tablet Take 20 mg by mouth daily.  tamsulosin (FLOMAX) 0.4 mg capsule Take 1 Cap by mouth daily (after dinner). 90 Cap 3    melatonin tab tablet Take 5 mg by mouth nightly.  cholecalciferol (VITAMIN D3) 1,000 unit cap Take 1,000 Units by mouth daily.  multivitamin/iron/folic acid (CENTRUM WOMEN PO) Take 1 Tab by mouth daily.  acetaminophen (TYLENOL) 325 mg tablet Take 500 mg by mouth every four (4) hours as needed for Pain. 500 mg tabs      cetirizine (ZYRTEC) 10 mg tablet Take 10 mg by mouth daily.  polyethylene glycol (MIRALAX) 17 gram packet Take 17 g by mouth daily as needed for Other (PRN Constipation). PRN Constipation      Bifidobacterium Infantis (ALIGN) 4 mg cap Take 4 mg by mouth two (2) times a day.  furosemide (LASIX) 20 mg tablet Take 1 Tab by mouth every other day. Indications: Edema 90 Tab 3    mirtazapine (REMERON) 15 mg tablet Take 1 Tab by mouth nightly. Indications: major depressive disorder 90 Tab 3    amoxicillin-clavulanate (AUGMENTIN) 875-125 mg per tablet       nitrofurantoin, macrocrystal-monohydrate, (MACROBID) 100 mg capsule Take 100 mg by mouth two (2) times a day.       varicella-zoster recombinant, PF, [facility]) 50 mcg/0.5 mL susr injection 0.5 mL by IntraMUSCular route every two (2) months. 0.5 mL 1    dicyclomine (BENTYL) 10 mg capsule Take 1 Cap by mouth four (4) times daily. (Patient taking differently: Take 1 Cap by mouth four (4) times daily as needed for Other (PRN:  IBS symptoms). PRN:  IBS symptoms) 90 Cap 3       Allergies   Allergen Reactions    Macrobid [Nitrofurantoin Monohyd/M-Cryst] Nausea and Vomiting    Lexapro [Escitalopram Oxalate] Other (comments)     ? Hyponatremia per pt hx       Past Medical History:   Diagnosis Date    Arthritis     Cancer [facility]) 2002    Colon     Chronic pain     abdominal due to IBS    Depression     GERD (gastroesophageal reflux disease)     Hypertension     Migraine headache 2014    Occipital neuralgia 2014    PUD (peptic ulcer disease)     S/P colon resection 2002    clear presently        Past Surgical History:   Procedure Laterality Date    HX CHOLECYSTECTOMY  2015    HX HYSTERECTOMY  1965    HX TONSIL AND ADENOIDECTOMY      HX WISDOM TEETH EXTRACTION      x4       Family History   Problem Relation Age of Onset    Hypertension Mother     Stroke Mother     Heart Attack Father     Hypertension Father     Parkinson's Disease Sister     Hypertension Brother     Cancer Brother         prostate    No Known Problems Son     No Known Problems Daughter        Social History     Tobacco Use    Smoking status: Never Smoker    Smokeless tobacco: Never Used   Substance Use Topics    Alcohol use: Yes     Alcohol/week: 0.0 - 1.0 standard drinks       ROS   Review of Systems   Constitutional: Negative for chills and fever. HENT: Negative for ear pain and sore throat. Eyes: Negative for blurred vision and pain. Respiratory: Negative for cough and shortness of breath. Cardiovascular: Negative for chest pain. Gastrointestinal: Positive for abdominal pain (improving) and heartburn. Negative for blood in stool and melena.    Genitourinary: Negative for dysuria and hematuria. Musculoskeletal: Positive for back pain (chronic). Negative for joint pain and myalgias. Skin: Negative for rash. Neurological: Positive for tingling (chronic, unchanged). Negative for focal weakness and headaches. Endo/Heme/Allergies: Does not bruise/bleed easily. Psychiatric/Behavioral: Negative for substance abuse. Objective     Vitals:    08/05/19 1443 08/05/19 1449   BP: 142/65 140/63   Pulse: 67 66   Resp: 14 12   Temp: 98.1 °F (36.7 °C)    TempSrc: Oral    SpO2: 98%    Weight: 160 lb (72.6 kg)    Height: 5' 2\" (1.575 m)    PainSc:   5    PainLoc: Abdomen        Physical Exam   Constitutional: She is oriented to person, place, and time and well-developed, well-nourished, and in no distress. HENT:   Head: Normocephalic and atraumatic. Right Ear: External ear normal.   Left Ear: External ear normal.   Nose: Nose normal.   Mouth/Throat: Oropharynx is clear and moist. No oropharyngeal exudate. Eyes: Conjunctivae and EOM are normal. Right eye exhibits no discharge. Left eye exhibits no discharge. No scleral icterus. Neck: Neck supple. Cardiovascular: Normal rate, regular rhythm, normal heart sounds and intact distal pulses. Exam reveals no gallop and no friction rub. No murmur heard. Pulmonary/Chest: Effort normal and breath sounds normal. No respiratory distress. She has no wheezes. She has no rales. Abdominal: Soft. Bowel sounds are normal. She exhibits no distension. There is no tenderness. There is no rebound and no guarding. Musculoskeletal: She exhibits no edema or tenderness (Bue). Lymphadenopathy:     She has no cervical adenopathy. Neurological: She is alert and oriented to person, place, and time. She exhibits normal muscle tone. Skin: Skin is warm and dry. No erythema. Psychiatric: Affect normal.   Nursing note and vitals reviewed.       LABS   Data Review:   Lab Results   Component Value Date/Time    WBC 5.1 07/27/2019 01:00 PM    HGB 13.0 07/27/2019 01:00 PM    HCT 39.1 07/27/2019 01:00 PM    PLATELET 293 92/01/6802 01:00 PM    MCV 84.4 07/27/2019 01:00 PM       Lab Results   Component Value Date/Time    Sodium 133 (L) 07/27/2019 01:00 PM    Potassium 4.2 07/27/2019 01:00 PM    Chloride 100 07/27/2019 01:00 PM    CO2 29 07/27/2019 01:00 PM    Anion gap 4 07/27/2019 01:00 PM    Glucose 113 (H) 07/27/2019 01:00 PM    BUN 10 07/27/2019 01:00 PM    Creatinine 0.94 07/27/2019 01:00 PM    BUN/Creatinine ratio 11 (L) 07/27/2019 01:00 PM    GFR est AA >60 07/27/2019 01:00 PM    GFR est non-AA 57 (L) 07/27/2019 01:00 PM    Calcium 9.0 07/27/2019 01:00 PM       Lab Results   Component Value Date/Time    Cholesterol, total 212 (H) 08/28/2018 04:12 PM    HDL Cholesterol 63 (H) 08/28/2018 04:12 PM    LDL, calculated 126.4 (H) 08/28/2018 04:12 PM    VLDL, calculated 22.6 08/28/2018 04:12 PM    Triglyceride 113 08/28/2018 04:12 PM    CHOL/HDL Ratio 3.4 08/28/2018 04:12 PM       No results found for: HBA1C, HGBE8, HRX0QUYZ, KIJ1LBRJ, KNQ6VDXW    Assessment/Plan:   1. UTI: S/p antibiotics. Having any urinary symptoms. Observation. 2.  Diarrhea: Status post MiraLAX. - I suspect her symptoms are secondary to taking MiraLAX. I encouraged her to stay hydrated into the notify me if her symptoms worsen. If they worsen, I will rule out C. difficile colitis with a stool antigen test.    Health Maintenance Due   Topic Date Due    DTaP/Tdap/Td series (1 - Tdap) 06/03/1956    Shingrix Vaccine Age 50> (1 of 2) 06/03/1985    GLAUCOMA SCREENING Q2Y  06/03/2000    Bone Densitometry (Dexa) Screening  06/03/2000    Pneumococcal 65+ years (1 of 2 - PCV13) 06/03/2000    Influenza Age 5 to Adult  08/01/2019     Lab review: labs are reviewed in the EHR    I have discussed the diagnosis with the patient and the intended plan as seen in the above orders. The patient has received an after-visit summary and questions were answered concerning future plans.   I have discussed medication side effects and warnings with the patient as well. I have reviewed the plan of care with the patient, accepted their input and they are in agreement with the treatment goals. All questions were answered. The patient understands the plan of care. Handouts provided today with above information. Pt instructed if symptoms worsen to call the office or report to the ED for continued care. Greater than 50% of the visit time was spent in counseling and/or coordination of care. Voice recognition was used to generate this report, which may have resulted in some phonetic based errors in grammar and contents. Even though attempts were made to correct all the mistakes, some may have been missed, and remained in the body of the document.           Cindy Thomas MD

## 2019-08-12 DIAGNOSIS — M51.9 LUMBAR DISC DISEASE: ICD-10-CM

## 2019-08-12 RX ORDER — GABAPENTIN 100 MG/1
100 CAPSULE ORAL 3 TIMES DAILY
Qty: 90 CAP | Refills: 5 | Status: SHIPPED | OUTPATIENT
Start: 2019-08-12 | End: 2020-08-17 | Stop reason: SDUPTHER

## 2019-08-12 NOTE — TELEPHONE ENCOUNTER
Chief Complaint   Patient presents with    Medication Refill     need printed script for Gabapentin per the [facility]     The office received a fax today from [facility] requesting the [place] [facility] for Gabapentin, they are not accepting faxed forms of this medication.

## 2019-08-29 ENCOUNTER — OFFICE VISIT (OUTPATIENT)
Dept: ORTHOPEDIC SURGERY | Age: 84
End: 2019-08-29

## 2019-08-29 VITALS
HEART RATE: 74 BPM | HEIGHT: 62 IN | TEMPERATURE: 98 F | OXYGEN SATURATION: 99 % | DIASTOLIC BLOOD PRESSURE: 85 MMHG | BODY MASS INDEX: 29.3 KG/M2 | SYSTOLIC BLOOD PRESSURE: 164 MMHG | WEIGHT: 159.2 LBS

## 2019-08-29 DIAGNOSIS — M54.2 NECK PAIN: Primary | ICD-10-CM

## 2019-08-29 DIAGNOSIS — M48.062 SPINAL STENOSIS OF LUMBAR REGION WITH NEUROGENIC CLAUDICATION: ICD-10-CM

## 2019-08-29 DIAGNOSIS — M54.50 LUMBAR SPINE PAIN: ICD-10-CM

## 2019-08-29 DIAGNOSIS — M79.18 CERVICAL MYOFASCIAL PAIN SYNDROME: ICD-10-CM

## 2019-08-29 DIAGNOSIS — M47.812 CERVICAL FACET JOINT SYNDROME: ICD-10-CM

## 2019-08-29 DIAGNOSIS — M47.816 LUMBAR FACET ARTHROPATHY: ICD-10-CM

## 2019-08-29 NOTE — PROGRESS NOTES
[address]  [address], [address]  [place]  Phone: [phone]  Fax: [phone]        Margarita Cross  : 1935  PCP: Malini Stringer MD  2019    NEW PATIENT      HISTORY OF PRESENT ILLNESS  Sissy Felipe is a 80 y.o. female c/o neck and low back pain with episodic pain radiating into the RLE. Her neck pain is the most bothersome. Pt notes that over the last 6 weeks, she has had PT and OT at [facility] in [facility]. She notes that she found it beneficial, but when she is complete with PT, her pain returns. She has a limited standing tolerance. Lumbar MRI 4/3/18. Per my read, multilevel degenerative changes, most significantly facet arthropathy, no more than moderate spinal stenosis. Multilevel foraminal stenosis without any focal disc herniation. Pt notes that she is able to do her own laundry, but she has pain reproduced when she makes the bed. She has h/o occipital headaches that she notes have progressively worsened. She reports limited ROM of her neck, especially towards the left. She previously found benefit from PT and manipulations for her neck pain. She finds some relief from heat, massage, and Lidocaine cream. She denies radicular symptoms into her upper extremities. Pt notes that she was previously active with swimming, but over the last 3 years she has been. She is currently taking Gabapentin 100 mg QHS (TID caused somnolence). She recently had a Prednisone dose pack that was not very beneficial. She rates her pain as a 6/10 today. ASSESSMENT  Her pain is likely due to cervical myofascial pain, however, I would like to obtain an MRI to r/o underlying pathology. PLAN  1. Cervical MRI to r/o underlying pathology. 2. PT with dry needling    Pt will f/u after MRI or sooner if needed. Diagnoses and all orders for this visit:    1.  Neck pain  -     MRI CERV SPINE WO CONT; Future  -     REFERRAL TO PHYSICAL THERAPY    2. Cervical myofascial pain syndrome  -     MRI CERV SPINE WO CONT; Future  -     REFERRAL TO PHYSICAL THERAPY    3. Cervical facet joint syndrome  -     MRI CERV SPINE WO CONT; Future  -     REFERRAL TO PHYSICAL THERAPY    4. Lumbar spine pain  -     REFERRAL TO PHYSICAL THERAPY    5. Lumbar facet arthropathy  -     REFERRAL TO PHYSICAL THERAPY    6. Spinal stenosis of lumbar region with neurogenic claudication  -     REFERRAL TO PHYSICAL THERAPY             CHIEF COMPLAINT  Lincoln Lucio is seen today in consultation at the request of Jamey Melgar MD for complaints of neck and low back pain radiating into RLE. PAST MEDICAL HISTORY   Past Medical History:   Diagnosis Date    Arthritis     Cancer [facility]) 2002    Colon     Chronic pain     abdominal due to IBS    Depression     GERD (gastroesophageal reflux disease)     Hypertension     Migraine headache 2014    Occipital neuralgia 2014    PUD (peptic ulcer disease)     S/P colon resection 2002    clear presently        Past Surgical History:   Procedure Laterality Date    HX CHOLECYSTECTOMY  2015    HX HYSTERECTOMY  1965    HX TONSIL AND ADENOIDECTOMY      HX WISDOM TEETH EXTRACTION      x4       MEDICATIONS    Current Outpatient Medications   Medication Sig Dispense Refill    gabapentin (NEURONTIN) 100 mg capsule Take 1 Cap by mouth three (3) times daily. Max Daily Amount: 300 mg. 90 Cap 5    amoxicillin-clavulanate (AUGMENTIN) 875-125 mg per tablet       nitrofurantoin, macrocrystal-monohydrate, (MACROBID) 100 mg capsule Take 100 mg by mouth two (2) times a day.  alum-mag hydroxide-simeth (MYLANTA) 200-200-20 mg/5 mL susp Take 30 mL by mouth daily as needed for Other (PRN: Gastric Reflux, in PM). PRN: Gastric Reflux, in PM      lidocaine 4 % gel Apply 1 mL to affected area daily as needed for Pain (PRN Pain).  PRN Pain, instead of Lidocaine Patch      lidocaine (LIDODERM) 5 % 1 Patch by TransDERmal route every twelve (12) hours. Apply patch to the affected area for 12 hours a day and remove for 12 hours a day. 30 Each 11    montelukast (SINGULAIR) 10 mg tablet Take 10 mg by mouth daily.  famotidine (PEPCID) 20 mg tablet Take 20 mg by mouth daily.  tamsulosin (FLOMAX) 0.4 mg capsule Take 1 Cap by mouth daily (after dinner). 90 Cap 3    varicella-zoster recombinant, PF, (SHINGRIX) 50 mcg/0.5 mL susr injection 0.5 mL by IntraMUSCular route every two (2) months. 0.5 mL 1    melatonin tab tablet Take 5 mg by mouth nightly.  cholecalciferol (VITAMIN D3) 1,000 unit cap Take 1,000 Units by mouth daily.  multivitamin/iron/folic acid (CENTRUM WOMEN PO) Take 1 Tab by mouth daily.  acetaminophen (TYLENOL) 325 mg tablet Take 500 mg by mouth every four (4) hours as needed for Pain. 500 mg tabs      cetirizine (ZYRTEC) 10 mg tablet Take 10 mg by mouth daily.  polyethylene glycol (MIRALAX) 17 gram packet Take 17 g by mouth daily as needed for Other (PRN Constipation). PRN Constipation      Bifidobacterium Infantis (ALIGN) 4 mg cap Take 4 mg by mouth two (2) times a day.  furosemide (LASIX) 20 mg tablet Take 1 Tab by mouth every other day. Indications: Edema 90 Tab 3    mirtazapine (REMERON) 15 mg tablet Take 1 Tab by mouth nightly. Indications: major depressive disorder 90 Tab 3    dicyclomine (BENTYL) 10 mg capsule Take 1 Cap by mouth four (4) times daily. (Patient taking differently: Take 1 Cap by mouth four (4) times daily as needed for Other (PRN:  IBS symptoms). PRN:  IBS symptoms) 90 Cap 3       ALLERGIES  Allergies   Allergen Reactions    Macrobid [Nitrofurantoin Monohyd/M-Cryst] Nausea and Vomiting    Lexapro [Escitalopram Oxalate] Other (comments)     ? Hyponatremia per pt hx          SOCIAL HISTORY    Social History     Socioeconomic History    Marital status:      Spouse name: Not on file    Number of children: Not on file    Years of education: Not on file    Highest education level: Not on file   Tobacco Use    Smoking status: Never Smoker    Smokeless tobacco: Never Used   Substance and Sexual Activity    Alcohol use: Yes     Alcohol/week: 0.0 - 1.0 standard drinks    Sexual activity: Never       FAMILY HISTORY  Family History   Problem Relation Age of Onset    Hypertension Mother     Stroke Mother     Heart Attack Father     Hypertension Father     Parkinson's Disease Sister     Hypertension Brother     Cancer Brother         prostate    No Known Problems Son     No Known Problems Daughter          REVIEW OF SYSTEMS  Review of Systems   Musculoskeletal: Positive for back pain and neck pain. RLE pain         PHYSICAL EXAMINATION  Visit Vitals  /85 (BP 1 Location: Left arm, BP Patient Position: Sitting)   Pulse 74   Temp 98 °F (36.7 °C) (Oral)   Ht 5' 2\" (1.575 m)   Wt 159 lb 3.2 oz (72.2 kg)   SpO2 99%   BMI 29.12 kg/m²         Pain Assessment  8/29/2019   Location of Pain Back;Leg;Hip;Neck   Location Modifiers Right;Left   Severity of Pain 6   Quality of Pain Aching   Duration of Pain Persistent   Frequency of Pain Constant   Aggravating Factors (No Data)   Aggravating Factors Comment sitting too long   Limiting Behavior Yes   Relieving Factors Heat;Ice         Constitutional:  Well developed, well nourished, in no acute distress. Psychiatric: Affect and mood are appropriate. HEENT: Normocephalic, atraumatic. Extraocular movements intact. Integumentary: No rashes or abrasions noted on exposed areas. Cardiovascular: Regular rate and rhythm. Pulmonary: Clear to auscultation bilaterally. SPINE/MUSCULOSKELETAL EXAM    Cervical spine:  Neck is midline. Normal muscle tone. No focal atrophy is noted. ROM limited bilaterally (L>R). Shoulder ROM intact. Tenderness to palpation of trapezii, scalenes, sternocleidomastoids, and cervicothoracic paraspinals bilaterally. Negative Spurling's sign. Negative Tinel's sign.    Negative Gutierrez's sign.               Sensation in the bilateral arms grossly intact to light touch. Lumbar spine:  No rash, ecchymosis, or gross obliquity. No fasciculations. No focal atrophy is noted. No pain with hip ROM. Full range of motion. Mild tenderness to palpation. No tenderness to palpation at the sciatic notch. SI joints non-tender. Trochanters non tender. Sensation in the bilateral legs grossly intact to light touch. No pain with lumbar extension, flexion, or rotation. MOTOR:      Biceps  Triceps Deltoids Wrist Ext Wrist Flex Hand Intrin   Right 5/5 5/5 5/5 5/5 5/5 5/5   Left 5/5 5/5 5/5 5/5 5/5 5/5             Hip Flex  Quads Hamstrings Ankle DF EHL Ankle PF   Right 5/5 5/5 5/5 5/5 5/5 5/5   Left 5/5 5/5 5/5 5/5 5/5 5/5     DTRs are 1+ biceps, triceps, brachioradialis, patella, and Achilles. Negative Straight Leg raise. Squat not tested. No difficulty with tandem gait. Ambulation with Rollator walker. FWB. RADIOGRAPHS  Lumbar MRI images taken on 4/3/18 personally reviewed with patient:  No report available. Per my read, multilevel degenerative changes, most significantly facet arthropathy, no more than moderate spinal stenosis. Multilevel foraminal stenosis without any focal disc herniation.  reviewed    Ms. Benito Fall has a reminder for a \"due or due soon\" health maintenance. I have asked that she contact her primary care provider for follow-up on this health maintenance. 27 minutes of face-to-face contact were spent with the patient during today's visit extensively discussing symptoms and treatment plan. All questions were answered. More than half of this visit today was spent on counseling. Written by Alix Andersen, as dictated by Dr. Shannan Tyson. I, Dr. Shannan Tyson, confirm that all documentation is accurate.

## 2019-09-18 ENCOUNTER — HOSPITAL ENCOUNTER (OUTPATIENT)
Dept: PHYSICAL THERAPY | Age: 84
Discharge: HOME | End: 2019-09-18
Payer: MEDICARE

## 2019-09-18 PROCEDURE — 97162 PT EVAL MOD COMPLEX 30 MIN: CPT

## 2019-09-18 PROCEDURE — 97112 NEUROMUSCULAR REEDUCATION: CPT

## 2019-09-18 NOTE — PROGRESS NOTES
PT DAILY TREATMENT NOTE 10-18    Patient Name: Jaziel Mcwilliams  Date:2019  : 1935  [x]  Patient  Verified  Payor: [place] MEDICARE / Plan: [place] MEDICARE PART A & B / Product Type: Medicare /    In time:3:00  Out time:3:30  Total Treatment Time (min): 30  Visit #: 1 of 10      Medicare/BCBS Only   Total Timed Codes (min):  10 1:1 Treatment Time:  30       Treatment Area: Neck pain [M54.2]  Low back pain [M54.5]     SUBJECTIVE  Pain Level (0-10 scale): 3/10  Any medication changes, allergies to medications, adverse drug reactions, diagnosis change, or new procedure performed?: [x] No    [] Yes (see summary sheet for update)  Subjective functional status/changes:   [x] See paper initial evaluation form in patient chart. OBJECTIVE    20 min [x]Eval                  []Re-Eval     10 min Neuromuscular Re-education:  []  See flow sheet : diagnosis; prognosis; postural correction; HEP given and reviewed with Pt   Rationale: increase ROM, increase strength, improve coordination and increase proprioception  to improve the patients ability to improve postural awareness, flexibility to reduce pain with head movements and with prolonged positioning     With   [] TE   [] TA   [x] neuro   [] other: Patient Education: [x] Review HEP    [] Progressed/Changed HEP based on:   [] positioning   [] body mechanics   [] transfers   [] heat/ice application    [] other:      Other Objective/Functional Measures: FOTO 41 pts     Pain Level (0-10 scale) post treatment: 3/10    ASSESSMENT/Changes in Function:     Patient will continue to benefit from skilled PT services to address functional mobility deficits, address ROM deficits, address strength deficits, analyze and address soft tissue restrictions, analyze and cue movement patterns, analyze and modify body mechanics/ergonomics, assess and modify postural abnormalities, address imbalance/dizziness and instruct in home and community integration to attain remaining goals. [x]  See Plan of Care  []  See progress note/recertification  []  See Discharge Summary         PLAN  []  Upgrade activities as tolerated     [x]  Continue plan of care  []  Update interventions per flow sheet       []  Discharge due to:_  []  Other:_      Samm Austin, PT 9/18/2019  2:44 PM

## 2019-09-18 NOTE — PROGRESS NOTES
[facility] - [facility], [address] [place] [address]  [phone] fax  Plan of Care/ Statement of Necessity for Physical Therapy Services    Patient name: Karina Guerrero Start of Care: 2019   Referral source: Zulay Mauricio MD : 1935    Medical Diagnosis: Neck pain [M54.2]  Low back pain [M54.5]  Payor: Fabio Serrato / Plan: [place] MED[place] & B / Product Type: Medicare /  Onset Date:19    Treatment Diagnosis: Neck, Low Back Pain   Prior Hospitalization: see medical history Provider#: [id number]   Medications: Verified on Patient summary List    Comorbidities: Arthritis; Depression; Headaches; Incontinence; Osteoporosis   Prior Level of Function: Lives with spouse in 2nd floor apt - elevator; uses Rollator for all ambulation; modified independence with ADLs      The Plan of Care and following information is based on the information from the initial evaluation. Assessment/ key information: Pt is a 80 y.o. female who presents with main c/o posterior neck pain that radiates to top of head that has persisted and worsened since  and also central LBP that radiates into right buttocks and lateral thigh/leg intermittently with prolonged amb x several yrs. Functional deficits include: pain with turning head, looking down or up, dizziness with quick head movements that can result in falls, limited standing/amb tolerance, requiring use of Rollator to prevent LOB or falls - no falls in past 3 months.  Upon exam, Pt exhibited kyphotic posture; limited C/S AROM of Flex 38 deg, Ext 10 deg, S/B right 14 deg, left 7 deg, Rot 20 deg B; palpable tenderness, tightness in C/S paraspinals, upper trapezius, scalenes, SCM, L/S paraspinals; poor core/glute activation; 4-/5 B hip flex/abd/ext strength; and LBP and right LE radicular symptom provocation with palpation of right glutes/piriformis, seated slump test.  Pt would benefit from skilled PT to address above deficits to improve posture, mobility, and pain for increased activity tolerance in home/community. Evaluation Complexity History MEDIUM  Complexity : 1-2 comorbidities / personal factors will impact the outcome/ POC ; Examination MEDIUM Complexity : 3 Standardized tests and measures addressing body structure, function, activity limitation and / or participation in recreation  ;Presentation MEDIUM Complexity : Evolving with changing characteristics  ; Clinical Decision Making MEDIUM Complexity : FOTO score of 26-74  Overall Complexity Rating: MEDIUM  Problem List: pain affecting function, decrease ROM, decrease strength, impaired gait/ balance, decrease ADL/ functional abilitiies, decrease activity tolerance, decrease flexibility/ joint mobility and decrease transfer abilities   Treatment Plan may include any combination of the following: Therapeutic exercise, Therapeutic activities, Neuromuscular re-education, Physical agent/modality, Gait/balance training, Manual therapy, Patient education, Functional mobility training and Stair training  Patient / Family readiness to learn indicated by: asking questions, trying to perform skills and interest  Persons(s) to be included in education: patient (P)  Barriers to Learning/Limitations: None  Patient Goal (s): Lessen stiffness and pain in neck and head.   Patient Self Reported Health Status: good  Rehabilitation Potential: good    Short Term Goals: To be accomplished in 1 weeks:  Goal: Pt to be compliant with initial HEP to improve posture and flexibility to reduce neck and lower back pain. Status at last note/certification: Established and reviewed with Pt  Long Term Goals: To be accomplished in 5 weeks:  Goal: Pt to increase C/S AROM by 10 deg all planes for ease with reading and turning head.   Status at last note/certification: Flex 38 deg, Ext 10 deg, S/B right 14 deg, left 7 deg, Rot 20 deg B  Goal: Pt to report no limitations with lifting or carrying objects to increase ease with completing laundry. Status at last note/certification: limited a lot in performing  Goal: Pt to increase B hip flex/abd/ext strength to at least 4/5 grossly to increase stability with ambulation in home, community. Status at last note/certification: 4-/5 grossly  Goal: Pt to report < 5/10 pain at worst to increase ease with ADLs. Status at last note/certification: 24/61 pain at worst  Goal: Pt to report FOTO score of 47 pts to show improved function and quality of life. Status at last note/certification: FOTO 41 pts     Frequency / Duration: Patient to be seen 2 times per week for 5 weeks. Patient/ Caregiver education and instruction: Diagnosis, prognosis, exercises   [x]  Plan of care has been reviewed with PTA    Certification Period: 9/18/19 - 10/17/19  Cadence Austin, PT 9/18/2019 2:52 PM  _____________________________________________________________________  I certify that the above Therapy Services are being furnished while the patient is under my care. I agree with the treatment plan and certify that this therapy is necessary.     Physician's Signature:____________Date:_________TIME:________    ** Signature, Date and Time must be completed for valid certification **    Please sign and return to [facility] - [address]  [phone] fax

## 2019-09-23 ENCOUNTER — HOSPITAL ENCOUNTER (OUTPATIENT)
Dept: PHYSICAL THERAPY | Age: 84
Discharge: HOME | End: 2019-09-23
Payer: MEDICARE

## 2019-09-23 PROCEDURE — 97140 MANUAL THERAPY 1/> REGIONS: CPT

## 2019-09-23 PROCEDURE — 97110 THERAPEUTIC EXERCISES: CPT

## 2019-09-23 NOTE — PROGRESS NOTES
PT DAILY TREATMENT NOTE 10-18    Patient Name: Mckayla Harrell  Date:2019  : 1935  [x]  Patient  Verified  Payor: Flory Dawkins / Plan: [place] MEDICARE PART A & B / Product Type: Medicare /    In time:11:15  Out time:12:10  Total Treatment Time (min): 55  Visit #: 2 of 10    Medicare/BCBS Only   Total Timed Codes (min):  45 1:1 Treatment Time:  45       Treatment Area: Neck pain [M54.2]  Low back pain [M54.5]    SUBJECTIVE  Pain Level (0-10 scale): 4  Any medication changes, allergies to medications, adverse drug reactions, diagnosis change, or new procedure performed?: [x] No    [] Yes (see summary sheet for update)  Subjective functional status/changes:   [] No changes reported  Pt reports feeling pretty good today    OBJECTIVE    Modality rationale: decrease pain to improve the patients ability to perform daily tasks   Min Type Additional Details    [] Estim:  []Unatt       []IFC  []Premod                        []Other:  []w/ice   []w/heat  Position:  Location:    [] Estim: []Att    []TENS instruct  []NMES                    []Other:  []w/US   []w/ice   []w/heat  Position:  Location:    []  Traction: [] Cervical       []Lumbar                       [] Prone          []Supine                       []Intermittent   []Continuous Lbs:  [] before manual  [] after manual    []  Ultrasound: []Continuous   [] Pulsed                           []1MHz   []3MHz W/cm2:  Location:    []  Iontophoresis with dexamethasone         Location: [] Take home patch   [] In clinic   10 []  Ice     [x]  heat  []  Ice massage  []  Laser   []  Anodyne Position:supine  Location:neck    []  Laser with stim  []  Other:  Position:  Location:    []  Vasopneumatic Device Pressure:       [] lo [] med [] hi   Temperature: [] lo [] med [] hi   [] Skin assessment post-treatment:  []intact []redness- no adverse reaction    []redness - adverse reaction:       35 min Therapeutic Exercise:  [x] See flow sheet :   Rationale: increase ROM and increase strength to improve the patients ability to perform ADLs    10 min Manual Therapy:  SOR, STM/DTM to bilateral c/s paraspinals, UT/LS and scalenes   Rationale: decrease pain, increase ROM and increase tissue extensibility to improve functional mobility          With   [] TE   [] TA   [] neuro   [] other: Patient Education: [x] Review HEP    [] Progressed/Changed HEP based on:   [] positioning   [] body mechanics   [] transfers   [] heat/ice application    [] other:      Other Objective/Functional Measures:   First f/u after Eval     Reviewed proper seated posture for reading     Pain Level (0-10 scale) post treatment: 0    ASSESSMENT/Changes in Function: Initiated treatment program per flow sheet. Reviewed HEP for understanding and compliance. Reviewed proper seated posture while reading to decr ms strain and pain as pt reports reading 4-5 hour/day. Noted ms tension and TTP @ bilateral UT and scalenes, left > right. Plan for DN trial next visit. Patient will continue to benefit from skilled PT services to modify and progress therapeutic interventions, address functional mobility deficits, address ROM deficits, address strength deficits, analyze and address soft tissue restrictions, analyze and cue movement patterns, analyze and modify body mechanics/ergonomics and assess and modify postural abnormalities to attain remaining goals. []  See Plan of Care  []  See progress note/recertification  []  See Discharge Summary         Progress towards goals / Updated goals:  Short Term Goals: To be accomplished in 1 weeks:  Goal: Pt to be compliant with initial HEP to improve posture and flexibility to reduce neck and lower back pain. Status at last note/certification: Established and reviewed with Pt  Current: Met- pt reports compliance 9/23/19  Long Term Goals: To be accomplished in 5 weeks:  Goal: Pt to increase C/S AROM by 10 deg all planes for ease with reading and turning head.   Status at last note/certification: Flex 38 deg, Ext 10 deg, S/B right 14 deg, left 7 deg, Rot 20 deg B  Goal: Pt to report no limitations with lifting or carrying objects to increase ease with completing laundry. Status at last note/certification: limited a lot in performing  Goal: Pt to increase B hip flex/abd/ext strength to at least 4/5 grossly to increase stability with ambulation in home, community. Status at last note/certification: 4-/5 grossly  Goal: Pt to report < 5/10 pain at worst to increase ease with ADLs. Status at last note/certification: 31/20 pain at worst  Goal: Pt to report FOTO score of 47 pts to show improved function and quality of life.   Status at last note/certification: FOTO 41 pts     PLAN  []  Upgrade activities as tolerated     [x]  Continue plan of care  []  Update interventions per flow sheet       []  Discharge due to:_  []  Other:_      Unique Ian, PTA 9/23/2019  11:15 AM    Future Appointments   Date Time Provider Sherman Heard   9/25/2019  2:30 PM [facility] MRI RM 1 [facility]   9/27/2019 12:00 PM [facility] CLARISSA [facility]   10/1/2019 10:30 AM Estuardo [facility] CLARISSA [facility]   10/3/2019 10:15 AM IOC NURSE VISIT IO JANAY SCHED   10/4/2019 10:30 AM Estuardo Jones [facility] ROMO [facility]   10/8/2019  2:15 PM Shawna Muir  [address]   10/10/2019 10:15 AM Carlito Guo MD [facility]

## 2019-09-25 ENCOUNTER — HOSPITAL ENCOUNTER (OUTPATIENT)
Dept: MRI IMAGING | Age: 84
Discharge: HOME | End: 2019-09-25
Attending: PHYSICAL MEDICINE & REHABILITATION
Payer: MEDICARE

## 2019-09-25 DIAGNOSIS — M47.812 CERVICAL FACET JOINT SYNDROME: ICD-10-CM

## 2019-09-25 DIAGNOSIS — M54.2 NECK PAIN: ICD-10-CM

## 2019-09-25 DIAGNOSIS — M79.18 CERVICAL MYOFASCIAL PAIN SYNDROME: ICD-10-CM

## 2019-09-25 PROCEDURE — 72141 MRI NECK SPINE W/O DYE: CPT

## 2019-09-27 ENCOUNTER — HOSPITAL ENCOUNTER (OUTPATIENT)
Dept: PHYSICAL THERAPY | Age: 84
Discharge: HOME | End: 2019-09-27
Payer: MEDICARE

## 2019-09-27 PROCEDURE — 97140 MANUAL THERAPY 1/> REGIONS: CPT

## 2019-09-27 PROCEDURE — 97110 THERAPEUTIC EXERCISES: CPT

## 2019-09-27 NOTE — PROGRESS NOTES
PT DAILY TREATMENT NOTE 10-18    Patient Name: Moises Morales  Date:2019  : 1935  [x]  Patient  Verified  Payor: [place] MEDICARE / Plan: [place] MEDICARE PART A & B / Product Type: Medicare /    In time:1201  Out time:1253  Total Treatment Time (min): 52  Visit #: 3 of 10    Medicare/BCBS Only   Total Timed Codes (min):  47 1:1 Treatment Time:  47       Treatment Area: Neck pain [M54.2]  Low back pain [M54.5]    SUBJECTIVE  Pain Level (0-10 scale): 0 pain (4 numbness)  Any medication changes, allergies to medications, adverse drug reactions, diagnosis change, or new procedure performed?: [x] No    [] Yes (see summary sheet for update)  Subjective functional status/changes:   [] No changes reported  I don't have any pain but I'm completely numb in my neck.      OBJECTIVE    Modality rationale: decrease inflammation and decrease pain to improve the patients ability to improve activity tolerance   Min Type Additional Details    [] Estim:  []Unatt       []IFC  []Premod                        []Other:  []w/ice   []w/heat  Position:  Location:    [] Estim: []Att    []TENS instruct  []NMES                    []Other:  []w/US   []w/ice   []w/heat  Position:  Location:    []  Traction: [] Cervical       []Lumbar                       [] Prone          []Supine                       []Intermittent   []Continuous Lbs:  [] before manual  [] after manual    []  Ultrasound: []Continuous   [] Pulsed                           []1MHz   []3MHz W/cm2:  Location:    []  Iontophoresis with dexamethasone         Location: [] Take home patch   [] In clinic   5 [x]  Ice     []  heat  []  Ice massage  []  Laser   []  Anodyne Position:supine  Location:Left neck/shoulder    []  Laser with stim  []  Other:  Position:  Location:    []  Vasopneumatic Device Pressure:       [] lo [] med [] hi   Temperature: [] lo [] med [] hi   [] Skin assessment post-treatment:  []intact []redness- no adverse reaction    []redness - adverse reaction: 22 min Therapeutic Exercise:  [x] See flow sheet :   Rationale: increase ROM and increase strength to improve the patients ability to perform ADL    25 min Manual Therapy:  Prone palpation of cervical and thoracic musculature, STM to bilateral UT and cervical paraspinals, gentle effleurage post-needling   Rationale: decrease pain, increase ROM, increase tissue extensibility and decrease trigger points to reduce numbness  *time of needle insertion not billed  Dry Needling Procedure Note    Procedure: An intramuscular manual therapy (dry needling) and a neuro-muscular re-education treatment was done to deactivate myofascial trigger points with a 30 gauge filament needle under aseptic technique. Indications:  [x] Myofascial pain and dysfunction [x] Muscled spasms  [x] Myalgia/myositis   [] Muscle cramps  [x] Muscle imbalances  [] Temporomandibular Dysfunction  [] Other:    Chart reviewed for the following:  ILazarus, PT, have reviewed the medical history, summary list and precautions/contraindications for Radha Fraser.   TIME OUT performed immediately prior to start of procedure:  Lazarus MCCULLOUGH, PT, have performed the following reviews on Radha Fraser prior to the start of the session:      [x] Verified patient identification by name and date of birth    [x] Agreement on all muscles being treated was verified   [x] Purpose of dry needling, side effects, possible complications, risks and benefits were explained to the patient   [x] Procedure site(s) verified  [x] Patient was positioned for comfort and draped for privacy  [x] Informed Consent was signed (initial visit) and verified verbally (subsequent visits)  [x] Patient was instructed on the need to report the use of blood thinners and/or immunosuppressant medications  [x] How to respond to possible adverse effects of treatment  [x] Self treatment of post needling soreness: ice, heat (moist heat, heat wraps) and stretching  [x] Opportunity was given to ask any questions, all questions were answered            Time: 1205  Date of procedure: 9/27/2019  Treatment: The following muscles were treated today with intramuscular dry needling  [] Left [] Right Masster  [] Left [] Right Temporalis  [] Left [] Right Zygomaticus Major / Minor  [] Left [] Right Lateral Pterygoid  [] Left [] Right Medial Pterygoid  [] Left [] Right Digastric Post / Anterior Belly  [] Left [] Right Sternocleidomastoid  [] Left [] Right Scalene Anterior / Medial / Posterior  [] Left [] Right Extra Laryngeal Muscles  [x] Left [] Right Upper Trapezius  [] Left [] Right Middle Trapezius  [] Left [] Right Lower Trapezius  [] Left [] Right Oblique Capitis Inferior  [x] Left [] Right Splenius Capitis / Cervicis  [x] Left [] Right Semispinalis: Capitis / Cervicis  [x] Left [] Right Multifidi / Rotatores Cervicis / Thoracic  [] Left [] Right Longissimus Thoracis / Illiocostalis  [] Left [] Right Levator Scapulae  [] Left [] Right Supraspinatus / Infraspinatus  [] Left [] Right Teres Major / Minor  [] Left [] Right Rhomboids / Serratus posterior superior  [] Left [] Right Pectoralis Major / Minor  [] Left [] Right Serratus Anterior  [] Left [] Right Latissimus Dorsi  [] Left [] Right Subscapularis  [] Left [] Right Coracobrachialis  [] Left [] Right Biceps Brachii  [] Left [] Right Deltoid: Anterior / Medial / Posterior  [] Left [] Right Brachialis  [] Left [] Right Triceps  [] Left [] Right Brachioradialis  [] Left [] Right Extensor Carpi Radialis Brevis / Extensor Carpi Radialis Longus    [] Left [] Right  Extensor digitorum  [] Left [] Right Supinator / Pronator Teres  [] Left [] Right Flexor Carpi Radialis/ Flexor Carpi Ulnaris   [] Left [] Right  Flexor Digitorum Superficialis/ Flexor Digitorum Profundus  [] Left [] Right Flexor Pollicis Longus / Flexor Pollicis Brevis / Palmaris Longus  [] Left [] Right Abductor Pollicis Longus / Abductor Pollicis Brevis  [] Left [] Right Opponens Pollicis / Adductor Pollicis  [] Left [] Right Dorsal / Palmar Interossei / Lumbricalis  [] Left [] Right Abductor Digiti Minimi / Opponens Digiti Minimi    Patient's response to today's treatment:  [x] Latent Twitch Response     [] Muscle relaxation [] Pain Relief  [x] Post needling soreness    [x] without complications  [] Increased Range of Motion   [] Decreased headaches    [] Decreased Tinnitus  [] Other:     Performed by: Eric Veloz, PT        With   [] TE   [] TA   [] neuro   [] other: Patient Education: [x] Review HEP    [] Progressed/Changed HEP based on:   [] positioning   [] body mechanics   [] transfers   [] heat/ice application    [] other:      Other Objective/Functional Measures: initiated dry needling     Pain Level (0-10 scale) post treatment: 0    ASSESSMENT/Changes in Function: Patient reports having an MRI of the head Wednesday of this week; will follow up next week for results. Initiated dry needling today without complication. Patient able to tolerate full session without having to stop therapist. Patient education on purpose of needling and expected results. Instructed her to drink water today and avoid heavy lifting as well as inactivity. Patient instructed to call with questions. All questions were answered today. Patient will continue to benefit from skilled PT services to modify and progress therapeutic interventions, address functional mobility deficits, address ROM deficits, address strength deficits, analyze and address soft tissue restrictions, analyze and cue movement patterns, analyze and modify body mechanics/ergonomics, assess and modify postural abnormalities, address imbalance/dizziness and instruct in home and community integration to attain remaining goals.      []  See Plan of Care  []  See progress note/recertification  []  See Discharge Summary         Progress towards goals / Updated goals:  Short Term Goals: To be accomplished in 1 weeks:  Goal: Pt to be compliant with initial HEP to improve posture and flexibility to reduce neck and lower back pain. Status at last note/certification: Established and reviewed with Pt  Current: Met- pt reports compliance 9/23/19  Long Term Goals: To be accomplished in 5 weeks:  Goal: Pt to increase C/S AROM by 10 deg all planes for ease with reading and turning head. Status at last note/certification: Flex 38 deg, Ext 10 deg, S/B right 14 deg, left 7 deg, Rot 20 deg B  Current status:   Goal: Pt to report no limitations with lifting or carrying objects to increase ease with completing laundry. Status at last note/certification: limited a lot in performing  Current status:   Goal: Pt to increase B hip flex/abd/ext strength to at least 4/5 grossly to increase stability with ambulation in home, community. Status at last note/certification: 4-/5 grossly  Current status:   Goal: Pt to report < 5/10 pain at worst to increase ease with ADLs. Status at last note/certification: 10/10 pain at worst  Current status:   Goal: Pt to report FOTO score of 47 pts to show improved function and quality of life.   Status at last note/certification: ZXBU 80 TMS   Current status:     PLAN  [x]  Upgrade activities as tolerated     [x]  Continue plan of care  []  Update interventions per flow sheet       []  Discharge due to:_  []  Other:_      Chantell Moralez, PT 9/27/2019  11:58 AM    Future Appointments   Date Time Provider Sherman Heard   9/27/2019 12:00 PM Arturo Lorenzo, PT [facility]   10/1/2019 10:30 AM Arturo Lorenzo PT [facility]   10/3/2019 10:15 AM IO NURSE VISIT IO JANAY [facility]   10/4/2019 10:30 [facility]   10/8/2019  2:15 PM Tanya Dalal  [address]   10/10/2019 10:15 AM Natali Mathias MD [facility]

## 2019-10-01 ENCOUNTER — APPOINTMENT (OUTPATIENT)
Dept: PHYSICAL THERAPY | Age: 84
End: 2019-10-01
Payer: MEDICARE

## 2019-10-03 ENCOUNTER — APPOINTMENT (OUTPATIENT)
Dept: INTERNAL MEDICINE CLINIC | Age: 84
End: 2019-10-03

## 2019-10-03 ENCOUNTER — HOSPITAL ENCOUNTER (OUTPATIENT)
Dept: LAB | Age: 84
Discharge: HOME | End: 2019-10-03
Payer: MEDICARE

## 2019-10-03 DIAGNOSIS — I10 ESSENTIAL HYPERTENSION: ICD-10-CM

## 2019-10-03 DIAGNOSIS — Z13.220 SCREENING FOR HYPERLIPIDEMIA: ICD-10-CM

## 2019-10-03 DIAGNOSIS — R73.9 HYPERGLYCEMIA: ICD-10-CM

## 2019-10-03 DIAGNOSIS — K86.89 PANCREATIC MASS: ICD-10-CM

## 2019-10-03 LAB
ALBUMIN SERPL-MCNC: 3.9 G/DL (ref 3.4–5)
ALBUMIN/GLOB SERPL: 1.1 {RATIO} (ref 0.8–1.7)
ALP SERPL-CCNC: 103 U/L (ref 45–117)
ALT SERPL-CCNC: 21 U/L (ref 13–56)
ANION GAP SERPL CALC-SCNC: 8 MMOL/L (ref 3–18)
AST SERPL-CCNC: 22 U/L (ref 10–38)
BASOPHILS # BLD: 0 K/UL (ref 0–0.1)
BASOPHILS NFR BLD: 0 % (ref 0–2)
BILIRUB SERPL-MCNC: 0.4 MG/DL (ref 0.2–1)
BUN SERPL-MCNC: 14 MG/DL (ref 7–18)
BUN/CREAT SERPL: 16 (ref 12–20)
CALCIUM SERPL-MCNC: 9 MG/DL (ref 8.5–10.1)
CHLORIDE SERPL-SCNC: 100 MMOL/L (ref 100–111)
CO2 SERPL-SCNC: 30 MMOL/L (ref 21–32)
CREAT SERPL-MCNC: 0.88 MG/DL (ref 0.6–1.3)
DIFFERENTIAL METHOD BLD: ABNORMAL
EOSINOPHIL # BLD: 0.3 K/UL (ref 0–0.4)
EOSINOPHIL NFR BLD: 7 % (ref 0–5)
ERYTHROCYTE [DISTWIDTH] IN BLOOD BY AUTOMATED COUNT: 14.2 % (ref 11.6–14.5)
GLOBULIN SER CALC-MCNC: 3.6 G/DL (ref 2–4)
GLUCOSE SERPL-MCNC: 96 MG/DL (ref 74–99)
HBA1C MFR BLD: 6 % (ref 4.2–5.6)
HCT VFR BLD AUTO: 39.3 % (ref 35–45)
HGB BLD-MCNC: 12.2 G/DL (ref 12–16)
LIPASE SERPL-CCNC: 205 U/L (ref 73–393)
LYMPHOCYTES # BLD: 1.6 K/UL (ref 0.9–3.6)
LYMPHOCYTES NFR BLD: 32 % (ref 21–52)
MCH RBC QN AUTO: 27.6 PG (ref 24–34)
MCHC RBC AUTO-ENTMCNC: 31 G/DL (ref 31–37)
MCV RBC AUTO: 88.9 FL (ref 74–97)
MONOCYTES # BLD: 0.6 K/UL (ref 0.05–1.2)
MONOCYTES NFR BLD: 12 % (ref 3–10)
NEUTS SEG # BLD: 2.4 K/UL (ref 1.8–8)
NEUTS SEG NFR BLD: 49 % (ref 40–73)
PLATELET # BLD AUTO: 254 K/UL (ref 135–420)
PMV BLD AUTO: 10.2 FL (ref 9.2–11.8)
POTASSIUM SERPL-SCNC: 4.7 MMOL/L (ref 3.5–5.5)
PROT SERPL-MCNC: 7.5 G/DL (ref 6.4–8.2)
RBC # BLD AUTO: 4.42 M/UL (ref 4.2–5.3)
SODIUM SERPL-SCNC: 138 MMOL/L (ref 136–145)
WBC # BLD AUTO: 5 K/UL (ref 4.6–13.2)

## 2019-10-03 PROCEDURE — 82043 UR ALBUMIN QUANTITATIVE: CPT

## 2019-10-03 PROCEDURE — 80061 LIPID PANEL: CPT

## 2019-10-03 PROCEDURE — 80053 COMPREHEN METABOLIC PANEL: CPT

## 2019-10-03 PROCEDURE — 85025 COMPLETE CBC W/AUTO DIFF WBC: CPT

## 2019-10-03 PROCEDURE — 83690 ASSAY OF LIPASE: CPT

## 2019-10-03 PROCEDURE — 36415 COLL VENOUS BLD VENIPUNCTURE: CPT

## 2019-10-03 PROCEDURE — 83036 HEMOGLOBIN GLYCOSYLATED A1C: CPT

## 2019-10-04 ENCOUNTER — APPOINTMENT (OUTPATIENT)
Dept: PHYSICAL THERAPY | Age: 84
End: 2019-10-04
Payer: MEDICARE

## 2019-10-04 LAB
CHOLEST SERPL-MCNC: 230 MG/DL
CREAT UR-MCNC: 37 MG/DL (ref 30–125)
HDLC SERPL-MCNC: 61 MG/DL (ref 40–60)
HDLC SERPL: 3.8 {RATIO} (ref 0–5)
LDLC SERPL CALC-MCNC: 145.6 MG/DL (ref 0–100)
LIPID PROFILE,FLP: ABNORMAL
MICROALBUMIN UR-MCNC: 1.7 MG/DL (ref 0–3)
MICROALBUMIN/CREAT UR-RTO: 46 MG/G (ref 0–30)
TRIGL SERPL-MCNC: 117 MG/DL (ref ?–150)
VLDLC SERPL CALC-MCNC: 23.4 MG/DL

## 2019-10-07 ENCOUNTER — HOSPITAL ENCOUNTER (OUTPATIENT)
Dept: PHYSICAL THERAPY | Age: 84
Discharge: HOME | End: 2019-10-07
Payer: MEDICARE

## 2019-10-07 PROCEDURE — 97140 MANUAL THERAPY 1/> REGIONS: CPT

## 2019-10-07 PROCEDURE — 97112 NEUROMUSCULAR REEDUCATION: CPT

## 2019-10-07 NOTE — PROGRESS NOTES
PT DAILY TREATMENT NOTE 10-18    Patient Name: Sachi Otero  Date:10/7/2019  : 1935  [x]  Patient  Verified  [place]or: [place] MED[place] / Plan: [place] MEDICARE PART A & B / Product Type: Medicare /    In time:2:02  Out time:2:54  Total Treatment Time (min): 52  Visit #: 4 of 10    Medicare/BCBS Only   Total Timed Codes (min):  42 1:1 Treatment Time:  42       Treatment Area: Neck pain [M54.2]  Low back pain [M54.5]    SUBJECTIVE  Pain Level (0-10 scale): 3/10  Any medication changes, allergies to medications, adverse drug reactions, diagnosis change, or new procedure performed?: [x] No    [] Yes (see summary sheet for update)  Subjective functional status/changes:   [] No changes reported  \"I just have a little pain. The dry needling helped but it was only on one side of my neck. So, I could turn my head better to one side but not the other. I just need to be evened out but it worked out well. \"    OBJECTIVE    Modality rationale: decrease pain and increase tissue extensibility to improve the patients ability to turn head, bend to  items   Min Type Additional Details    [] Estim:  []Unatt       []IFC  []Premod                        []Other:  []w/ice   []w/heat  Position:  Location:    [] Estim: []Att    []TENS instruct  []NMES                    []Other:  []w/US   []w/ice   []w/heat  Position:  Location:    []  Traction: [] Cervical       []Lumbar                       [] Prone          []Supine                       []Intermittent   []Continuous Lbs:  [] before manual  [] after manual    []  Ultrasound: []Continuous   [] Pulsed                           []1MHz   []3MHz W/cm2:  Location:    []  Iontophoresis with dexamethasone         Location: [] Take home patch   [] In clinic   10 []  Ice     [x]  heat  []  Ice massage  []  Laser   []  Anodyne Position: seated  Location: neck, low back    []  Laser with stim  []  Other:  Position:  Location:    []  Vasopneumatic Device Pressure:       [] lo [] med [] hi   Temperature: [] lo [] med [] hi   [] Skin assessment post-treatment:  []intact []redness- no adverse reaction    []redness - adverse reaction:     23 min Neuromuscular Re-education:  []  See flow sheet :   Rationale: increase ROM, increase strength and increase proprioception  to improve the patients ability to improve posture, cervical, scapular stability to reduce pain with ADLs    19 min Manual Therapy:  Supine SOR, STM to B scalenes, SCM, C/S paraspinals, upper trapezius, Levator scapulae; manual stretching   Rationale: increase tissue extensibility and decrease trigger points to reduce pain with turning head and reading          With   [] TE   [] TA   [] neuro   [] other: Patient Education: [x] Review HEP    [] Progressed/Changed HEP based on:   [] positioning   [] body mechanics   [] transfers   [] heat/ice application    [] other:      Other Objective/Functional Measures: Performed exercises per flow sheet to focus on cervical and core stabilization to improve cervical mobility without pain and lower back symptoms. Pain Level (0-10 scale) post treatment: 0/10    ASSESSMENT/Changes in Function: Pt reports she is working on posture while sitting to read and notes less pain when turning to look at the clock in bed. Less pain noted turning to look over shoulder towards the left. Patient will continue to benefit from skilled PT services to address functional mobility deficits, address ROM deficits, address strength deficits, analyze and address soft tissue restrictions, analyze and cue movement patterns, analyze and modify body mechanics/ergonomics, assess and modify postural abnormalities and instruct in home and community integration to attain remaining goals.      []  See Plan of Care  []  See progress note/recertification  []  See Discharge Summary         Progress towards goals / Updated goals:  Short Term Goals: To be accomplished in 1 weeks:  Goal: Pt to be compliant with initial HEP to improve posture and flexibility to reduce neck and lower back pain. Status at last note/certification: Established and reviewed with Pt  Current: Met- pt reports compliance 9/23/19  Long Term Goals: To be accomplished in 5 weeks:  Goal: Pt to increase C/S AROM by 10 deg all planes for ease with reading and turning head. Status at last note/certification: Flex 38 deg, Ext 10 deg, S/B right 14 deg, left 7 deg, Rot 20 deg B  Current status:   Goal: Pt to report no limitations with lifting or carrying objects to increase ease with completing laundry. Status at last note/certification: limited a lot in performing  Current status:   Goal: Pt to increase B hip flex/abd/ext strength to at least 4/5 grossly to increase stability with ambulation in home, community. Status at last note/certification: 4-/5 grossly  Current status:   Goal: Pt to report < 5/10 pain at worst to increase ease with ADLs. Status at last note/certification: 10/10 pain at worst  Current status:   Goal: Pt to report FOTO score of 47 pts to show improved function and quality of life.   Status at last note/certification: FYBV 43 TNI   Current status:     PLAN  []  Upgrade activities as tolerated     [x]  Continue plan of care  []  Update interventions per flow sheet       []  Discharge due to:_  []  Other:_      Braden [place], PT 10/7/2019  2:30 PM    Future Appointments   Date Time Provider Sherman Heard   10/10/2019 10:15 AM Cynthia Espinosa MD [facility]   10/11/2019 11:15 AM [place], [address]   10/21/2019  1:10 PM Nayla Pratt  [address]

## 2019-10-09 NOTE — PROGRESS NOTES
I will discuss her results with her tomorrow at her appointment. Her CBC is unremarkable for anemia. Her eosinophil count is mildly elevated at 7 and her monocyte count is mildly elevated at 12. Her lipase level is normal at 205. Kidney and liver function labs are normal.  Her A1c is 6. Her total cholesterol is 230. Her triglycerides are 117. Her HDL is 61. Her LDL is up to 145 from 126 just a year ago.     Dr. Jayda Corona  [facility], [address] [place] [address], [address] [place] [address] Str.  Phone: [phone]  Fax: [phone]

## 2019-10-10 ENCOUNTER — OFFICE VISIT (OUTPATIENT)
Dept: INTERNAL MEDICINE CLINIC | Age: 84
End: 2019-10-10

## 2019-10-10 VITALS
HEART RATE: 68 BPM | SYSTOLIC BLOOD PRESSURE: 122 MMHG | DIASTOLIC BLOOD PRESSURE: 78 MMHG | BODY MASS INDEX: 29.63 KG/M2 | RESPIRATION RATE: 12 BRPM | OXYGEN SATURATION: 98 % | HEIGHT: 62 IN | WEIGHT: 161 LBS | TEMPERATURE: 97.8 F

## 2019-10-10 DIAGNOSIS — Z23 ENCOUNTER FOR IMMUNIZATION: ICD-10-CM

## 2019-10-10 DIAGNOSIS — N30.00 ACUTE CYSTITIS WITHOUT HEMATURIA: ICD-10-CM

## 2019-10-10 DIAGNOSIS — E78.2 MIXED HYPERLIPIDEMIA: Primary | ICD-10-CM

## 2019-10-10 DIAGNOSIS — I10 ESSENTIAL HYPERTENSION: ICD-10-CM

## 2019-10-10 DIAGNOSIS — M51.9 LUMBAR DISC DISEASE: ICD-10-CM

## 2019-10-10 DIAGNOSIS — R73.02 IMPAIRED GLUCOSE TOLERANCE: ICD-10-CM

## 2019-10-10 DIAGNOSIS — I87.2 VENOUS INSUFFICIENCY: ICD-10-CM

## 2019-10-10 PROBLEM — M54.2 NECK PAIN: Status: RESOLVED | Noted: 2018-09-01 | Resolved: 2019-10-10

## 2019-10-10 PROBLEM — E87.1 HYPONATREMIA: Status: RESOLVED | Noted: 2018-09-01 | Resolved: 2019-10-10

## 2019-10-10 PROBLEM — R60.0 LEG EDEMA: Status: RESOLVED | Noted: 2018-09-01 | Resolved: 2019-10-10

## 2019-10-10 RX ORDER — CEPHALEXIN 500 MG/1
500 CAPSULE ORAL 4 TIMES DAILY
COMMUNITY
Start: 2019-09-30 | End: 2019-12-11 | Stop reason: ALTCHOICE

## 2019-10-10 RX ORDER — PRAVASTATIN SODIUM 10 MG/1
10 TABLET ORAL
Qty: 30 TAB | Refills: 5 | Status: SHIPPED | OUTPATIENT
Start: 2019-10-10 | End: 2020-12-21 | Stop reason: SDUPTHER

## 2019-10-10 NOTE — PROGRESS NOTES
[facility]:  10/10/2019, MRN: [id number]      Dhiraj Macias is a 80 y.o. female and presents to clinic for Hypertension (follow up ROOM  2) and Labs (done 10-03-19 )    Subjective: The patient is an 28-year-old female with history of sigmoid colon cancer status post surgery, prediabetes, history of strictures, osteopenia, hyponatremia, lumbar disc disease, hypertension, IBS, vitamin D deficiency, allergic rhinitis, GERD, and urinary retention. 1.  Prediabetes: Her most recent labs show that her A1C is 6 and c/w prediabetes. This is a new diagnosis for her. 2. HLD: Most recent labs show:  Her total cholesterol is 230. Her triglycerides are 117. Her HDL is 61. Her LDL is up to 145 from 126 just a year ago. She is not on any cholesterol-lowering medication. 3.  Hypertension: Present for 3 months. Taking Lasix. No adverse effects of taking this medicine. Bp is 122/78 today. 4. Lumbar Disc Disease: He continues to have lower back pain, treated with gabapentin. No adverse side effects of taking this medicine. No drug use. No ETOH use. Pain location: lower back. She is participating in PT. Followed by [facility]. Radiation of pain: to her BLE. Paresthesias: BLE. Weakness: BLE, using a walker. 5. UTI and Venous Insufficiency: She had urinary sx and went to an urgent care facility. She was diagnosed with a UTI and given keflex 500mg qid. She is completing this rx w/o adverse side effects. Her 's podiatry doctor mentioned to her that she should consider compression stockings for venous insufficiency. Sx are more pronounced in her BLE at night. No tightness or pain in her BLE.        Patient Active Problem List    Diagnosis Date Noted    Impaired glucose tolerance 10/10/2019    Urinary retention 09/01/2018    Cancer of sigmoid ([facility] [address] 09/01/2018    Anxiety 09/01/2018    Irritable bowel syndrome with both constipation and diarrhea 09/01/2018    Lumbar disc disease 09/01/2018    Essential hypertension 09/01/2018    Allergic rhinitis 09/01/2018    Vitamin D deficiency 09/01/2018       Current Outpatient Medications   Medication Sig Dispense Refill    cephALEXin (KEFLEX) 500 mg capsule Take 500 mg by mouth four (4) times daily.  pravastatin (PRAVACHOL) 10 mg tablet Take 1 Tab by mouth nightly. 30 Tab 5    gabapentin (NEURONTIN) 100 mg capsule Take 1 Cap by mouth three (3) times daily. Max Daily Amount: 300 mg. 90 Cap 5    alum-mag hydroxide-simeth (MYLANTA) 200-200-20 mg/5 mL susp Take 30 mL by mouth daily as needed for Other (PRN: Gastric Reflux, in PM). PRN: Gastric Reflux, in PM      lidocaine 4 % gel Apply 1 mL to affected area daily as needed for Pain (PRN Pain). PRN Pain, instead of Lidocaine Patch      lidocaine (LIDODERM) 5 % 1 Patch by TransDERmal route every twelve (12) hours. Apply patch to the affected area for 12 hours a day and remove for 12 hours a day. 30 Each 11    montelukast (SINGULAIR) 10 mg tablet Take 10 mg by mouth daily.  famotidine (PEPCID) 20 mg tablet Take 20 mg by mouth daily.  tamsulosin (FLOMAX) 0.4 mg capsule Take 1 Cap by mouth daily (after dinner). 90 Cap 3    melatonin tab tablet Take 5 mg by mouth nightly.  cholecalciferol (VITAMIN D3) 1,000 unit cap Take 1,000 Units by mouth daily.  multivitamin/iron/folic acid (CENTRUM WOMEN PO) Take 1 Tab by mouth daily.  acetaminophen (TYLENOL) 325 mg tablet Take 500 mg by mouth every four (4) hours as needed for Pain. 500 mg tabs      cetirizine (ZYRTEC) 10 mg tablet Take 10 mg by mouth daily.  polyethylene glycol (MIRALAX) 17 gram packet Take 17 g by mouth daily as needed for Other (PRN Constipation). PRN Constipation      Bifidobacterium Infantis (ALIGN) 4 mg cap Take 4 mg by mouth daily.  furosemide (LASIX) 20 mg tablet Take 1 Tab by mouth every other day. Indications: Edema 90 Tab 3    mirtazapine (REMERON) 15 mg tablet Take 1 Tab by mouth nightly. Indications: major depressive disorder 90 Tab 3    dicyclomine (BENTYL) 10 mg capsule Take 1 Cap by mouth four (4) times daily. (Patient taking differently: Take 1 Cap by mouth four (4) times daily as needed for Other (PRN:  IBS symptoms). PRN:  IBS symptoms) 90 Cap 3    varicella-zoster recombinant, PF, (SHINGRIX) 50 mcg/0.5 mL susr injection 0.5 mL by IntraMUSCular route every two (2) months. 0.5 mL 1       Allergies   Allergen Reactions    Macrobid [Nitrofurantoin Monohyd/M-Cryst] Nausea and Vomiting    Lexapro [Escitalopram Oxalate] Other (comments)     ? Hyponatremia per pt hx       Past Medical History:   Diagnosis Date    Arthritis     Cancer [facility]) 2002    Colon     Chronic pain     abdominal due to IBS    Depression     GERD (gastroesophageal reflux disease)     Hypertension     Migraine headache 2014    Occipital neuralgia 2014    PUD (peptic ulcer disease)     S/P colon resection 2002    clear presently        Past Surgical History:   Procedure Laterality Date    HX CHOLECYSTECTOMY  2015    HX HYSTERECTOMY  1965    HX TONSIL AND ADENOIDECTOMY      HX WISDOM TEETH EXTRACTION      x4       Family History   Problem Relation Age of Onset    Hypertension Mother     Stroke Mother     Heart Attack Father     Hypertension Father     Parkinson's Disease Sister     Hypertension Brother     Cancer Brother         prostate    No Known Problems Son     No Known Problems Daughter        Social History     Tobacco Use    Smoking status: Never Smoker    Smokeless tobacco: Never Used   Substance Use Topics    Alcohol use: Yes     Alcohol/week: 0.0 - 1.0 standard drinks       ROS   Review of Systems   Constitutional: Negative for chills and fever. HENT: Negative for ear pain and sore throat. Eyes: Negative for blurred vision and pain. Respiratory: Negative for cough and shortness of breath. Cardiovascular: Positive for leg swelling (BLE). Negative for chest pain.    Gastrointestinal: Negative for abdominal pain, blood in stool and melena. Genitourinary: Negative for dysuria and hematuria. Musculoskeletal: Positive for back pain, myalgias and neck pain. Negative for joint pain. Skin: Negative for rash. Neurological: Positive for tingling. Negative for focal weakness and headaches. Endo/Heme/Allergies: Does not bruise/bleed easily. Psychiatric/Behavioral: Negative for substance abuse. Objective     Vitals:    10/10/19 1027   BP: 122/78   Pulse: 68   Resp: 12   Temp: 97.8 °F (36.6 °C)   TempSrc: Oral   SpO2: 98%   Weight: 161 lb (73 kg)   Height: 5' 2\" (1.575 m)   PainSc:   3   PainLoc: Neck       Physical Exam   Constitutional: She is oriented to person, place, and time and well-developed, well-nourished, and in no distress. HENT:   Head: Normocephalic and atraumatic. Right Ear: External ear normal.   Left Ear: External ear normal.   Nose: Nose normal.   Mouth/Throat: Oropharynx is clear and moist. No oropharyngeal exudate. Eyes: Conjunctivae and EOM are normal. Right eye exhibits no discharge. Left eye exhibits no discharge. No scleral icterus. Neck: Neck supple. Cardiovascular: Normal rate, regular rhythm, normal heart sounds and intact distal pulses. Exam reveals no gallop and no friction rub. No murmur heard. Pulmonary/Chest: Effort normal and breath sounds normal. No respiratory distress. She has no wheezes. She has no rales. Abdominal: Soft. Bowel sounds are normal. She exhibits no distension. There is no tenderness. There is no rebound and no guarding. Musculoskeletal: She exhibits no edema (BUE) or tenderness (BUE). Mild edema in BLE - symmetric   Lymphadenopathy:     She has no cervical adenopathy. Neurological: She is alert and oriented to person, place, and time. She exhibits normal muscle tone. Gait normal.   Skin: Skin is warm and dry. No erythema. Psychiatric: Affect normal.   Nursing note and vitals reviewed.       LABS   Data Review:   Lab Results   Component Value Date/Time    WBC 5.0 10/03/2019 10:36 AM    HGB 12.2 10/03/2019 10:36 AM    HCT 39.3 10/03/2019 10:36 AM    PLATELET 279 96/52/5603 10:36 AM    MCV 88.9 10/03/2019 10:36 AM       Lab Results   Component Value Date/Time    Sodium 138 10/03/2019 10:36 AM    Potassium 4.7 10/03/2019 10:36 AM    Chloride 100 10/03/2019 10:36 AM    CO2 30 10/03/2019 10:36 AM    Anion gap 8 10/03/2019 10:36 AM    Glucose 96 10/03/2019 10:36 AM    BUN 14 10/03/2019 10:36 AM    Creatinine 0.88 10/03/2019 10:36 AM    BUN/Creatinine ratio 16 10/03/2019 10:36 AM    GFR est AA >60 10/03/2019 10:36 AM    GFR est non-AA >60 10/03/2019 10:36 AM    Calcium 9.0 10/03/2019 10:36 AM       Lab Results   Component Value Date/Time    Cholesterol, total 230 (H) 10/03/2019 10:36 AM    HDL Cholesterol 61 (H) 10/03/2019 10:36 AM    LDL, calculated 145.6 (H) 10/03/2019 10:36 AM    VLDL, calculated 23.4 10/03/2019 10:36 AM    Triglyceride 117 10/03/2019 10:36 AM    CHOL/HDL Ratio 3.8 10/03/2019 10:36 AM       Lab Results   Component Value Date/Time    Hemoglobin A1c 6.0 (H) 10/03/2019 10:36 AM       Assessment/Plan:   1. Health Maintenance:  -The flu vaccine was administered today. -Return to clinic for Medicare Wellness Visit. ORDERS:  - INFLUENZA VACCINE INACTIVATED (IIV), SUBUNIT, ADJUVANTED, IM  - ADMIN INFLUENZA VIRUS VAC    2. Mixed hyperlipidemia:   -We will try pravastatin. The patient seemed hesitant to try statin. As result, we are trying only 10 mg daily. Return to clinic to assess her response.  -We discussed the potential side effects of this medicine. All questions were answered. ORDERS:  - pravastatin (PRAVACHOL) 10 mg tablet; Take 1 Tab by mouth nightly. Dispense: 30 Tab; Refill: 5    3. UTI: Asymptomatic status post antibiotics. Observation. 4.  Prediabetes: New diagnosis.  -We discussed the need to cut back on her carbs.   I encouraged her to reduce her carbs as a means of improving her weight and her A1c.  I will recheck her weight at her follow-up appointment. 5. HTN: Stable. -Continue with Rx as prescribed. -Return to clinic for BP check. 6. Chronic Lower Back Pain: Stable. -Continue with gabapentin as prescribed.  -Activity as tolerated. -Continue to follow-up with Orthopedics    7. Venous Insufficiency: We had a discussion about her diagnosis of venous insufficiency. All questions were answered. - The patient was continue taking Lasix. - She will try compression stockings        Health Maintenance Due   Topic Date Due    DTaP/Tdap/Td series (1 - Tdap) 06/03/1956    Shingrix Vaccine Age 50> (1 of 2) 06/03/1985    GLAUCOMA SCREENING Q2Y  06/03/2000    Bone Densitometry (Dexa) Screening  06/03/2000    MEDICARE YEARLY EXAM  09/28/2019     Lab review: labs are reviewed in the EHR    I have discussed the diagnosis with the patient and the intended plan as seen in the above orders. The patient has received an after-visit summary and questions were answered concerning future plans. I have discussed medication side effects and warnings with the patient as well. I have reviewed the plan of care with the patient, accepted their input and they are in agreement with the treatment goals. All questions were answered. The patient understands the plan of care. Handouts provided today with above information. Pt instructed if symptoms worsen to call the office or report to the ED for continued care. Greater than 50% of the visit time was spent in counseling and/or coordination of care. Voice recognition was used to generate this report, which may have resulted in some phonetic based errors in grammar and contents. Even though attempts were made to correct all the mistakes, some may have been missed, and remained in the body of the document. Follow-up and Dispositions    · Return in about 2 months (around 12/10/2019) for [address] Courtney Membreno MD

## 2019-10-10 NOTE — PROGRESS NOTES
Chief Complaint   Patient presents with    Hypertension     follow up ROOM  2    Labs     done 10-03-19        1. Have you been to the ER, urgent care clinic since your last visit? Hospitalized since your last visit? Yes When: 10-01-19 [facility] Reason: Urinary Tract Infection. 2. Have you seen or consulted any other health care providers outside of the [address] [place] [address] since your last visit? Include any pap smears or colon screening. No    Patient was given a copy of the Advanced Directive and understands to bring it in once completed. Lea Sport 1935 female   who presents for routine immunizations. Patient denies any symptoms , reactions or allergies that would exclude them from being immunized today. Risks and adverse reactions were discussed and the VIS was given to them. All questions were addressed. Influenza High Dose Vaccine  administered per Dr Qing Brito. Areatha Head with read back. Patient was observed for 15 min post injection. There were no reactions observed.      [facility] Maintenance Due   Topic Date Due    DTaP/Tdap/Td series (1 - Tdap) 06/03/1956    Shingrix Vaccine Age 50> (1 of 2) 06/03/1985    GLAUCOMA SCREENING Q2Y  06/03/2000    Bone Densitometry (Dexa) Screening  06/03/2000    MEDICARE YEARLY EXAM  09/28/2019

## 2019-10-10 NOTE — PATIENT INSTRUCTIONS
Eating Healthy Foods: Care Instructions  Your Care Instructions    Eating healthy foods can help lower your risk for disease. Healthy food gives you energy and keeps your heart strong, your brain active, your muscles working, and your bones strong. A healthy diet includes a variety of foods from the basic food groups: grains, vegetables, fruits, milk and milk products, and meat and beans. Some people may eat more of their favorite foods from only one food group and, as a result, miss getting the nutrients they need. So, it is important to pay attention not only to what you eat but also to what you are missing from your diet. You can eat a healthy, balanced diet by making a few small changes. Follow-up care is a key part of your treatment and safety. Be sure to make and go to all appointments, and call your doctor if you are having problems. It's also a good idea to know your test results and keep a list of the medicines you take. How can you care for yourself at home? Look at what you eat  · Keep a food diary for a week or two and record everything you eat or drink. Track the number of servings you eat from each food group. · For a balanced diet every day, eat a variety of:  ? 6 or more ounce-equivalents of grains, such as cereals, breads, crackers, rice, or pasta, every day. An ounce-equivalent is 1 slice of bread, 1 cup of ready-to-eat cereal, or ½ cup of cooked rice, cooked pasta, or cooked cereal.  ? 2½ cups of vegetables, especially:  § Dark-green vegetables such as broccoli and spinach. § Orange vegetables such as carrots and sweet potatoes. § Dry beans (such as eubanks and kidney beans) and peas (such as lentils). ? 2 cups of fresh, frozen, or canned fruit. A small apple or 1 banana or orange equals 1 cup. ? 3 cups of nonfat or low-fat milk, yogurt, or other milk products. ? 5½ ounces of meat and beans, such as chicken, fish, lean meat, beans, nuts, and seeds.  One egg, 1 tablespoon of peanut butter, ½ ounce nuts or seeds, or ¼ cup of cooked beans equals 1 ounce of meat. · Learn how to read food labels for serving sizes and ingredients. Fast-food and convenience-food meals often contain few or no fruits or vegetables. Make sure you eat some fruits and vegetables to make the meal more nutritious. · Look at your food diary. For each food group, add up what you have eaten and then divide the total by the number of days. This will give you an idea of how much you are eating from each food group. See if you can find some ways to change your diet to make it more healthy. Start small  · Do not try to make dramatic changes to your diet all at once. You might feel that you are missing out on your favorite foods and then be more likely to fail. · Start slowly, and gradually change your habits. Try some of the following:  ? Use whole wheat bread instead of white bread. ? Use nonfat or low-fat milk instead of whole milk. ? Eat brown rice instead of white rice, and eat whole wheat pasta instead of white-flour pasta. ? Try low-fat cheeses and low-fat yogurt. ? Add more fruits and vegetables to meals and have them for snacks. ? Add lettuce, tomato, cucumber, and onion to sandwiches. ? Add fruit to yogurt and cereal.  Enjoy food  · You can still eat your favorite foods. You just may need to eat less of them. If your favorite foods are high in fat, salt, and sugar, limit how often you eat them, but do not cut them out entirely. · Eat a wide variety of foods. Make healthy choices when eating out  · The type of restaurant you choose can help you make healthy choices. Even fast-food chains are now offering more low-fat or healthier choices on the menu. · Choose smaller portions, or take half of your meal home. · When eating out, try:  ? A veggie pizza with a whole wheat crust or grilled chicken (instead of sausage or pepperoni).   ? Pasta with roasted vegetables, grilled chicken, or marinara sauce instead of cream sauce. ? A vegetable wrap or grilled chicken wrap. ? Broiled or poached food instead of fried or breaded items. Make healthy choices easy  · Buy packaged, prewashed, ready-to-eat fresh vegetables and fruits, such as baby carrots, salad mixes, and chopped or shredded broccoli and cauliflower. · Buy packaged, presliced fruits, such as melon or pineapple. · Choose 100% fruit or vegetable juice instead of soda. Limit juice intake to 4 to 6 oz (½ to ¾ cup) a day. · Blend low-fat yogurt, fruit juice, and canned or frozen fruit to make a smoothie for breakfast or a snack. Where can you learn more? Go to http://m[employer]land.info/. Enter T756 in the search box to learn more about \"Eating Healthy Foods: Care Instructions. \"  Current as of: November 7, 2018  Content Version: 12.2  © 1261-5307 [employer]. Care instructions adapted under license by [employer] (which disclaims liability or warranty for this information). If you have questions about a medical condition or this instruction, always ask your healthcare professional. [place] [address] any warranty or liability for your use of this information. Vaccine Information Statement    Influenza (Flu) Vaccine (Inactivated or Recombinant): What You Need to Know    Many Vaccine Information Statements are available in [place] and other languages. See www.immunize.org/vis  Hojas de información sobre vacunas están disponibles en español y en muchos otros idiomas. Visite www.immunize.org/vis    1. Why get vaccinated? Influenza vaccine can prevent influenza (flu). Flu is a contagious disease that spreads around the [place] every year, usually between October and May. Anyone can get the flu, but it is more dangerous for some people.  Infants and young children, people 72years of age and older, pregnant women, and people with certain health conditions or a weakened immune system are at greatest risk of flu complications. Pneumonia, bronchitis, sinus infections and ear infections are examples of flu-related complications. If you have a medical condition, such as heart disease, cancer or diabetes, flu can make it worse. Flu can cause fever and chills, sore throat, muscle aches, fatigue, cough, headache, and runny or stuffy nose. Some people may have vomiting and diarrhea, though this is more common in children than adults. Each year thousands of people in the [facility] die from flu, and many more are hospitalized. Flu vaccine prevents millions of illnesses and flu-related visits to the doctor each year. 2. Influenza vaccines     CDC recommends everyone 10months of age and older get vaccinated every flu season. Children 6 months through 6years of age may need 2 doses during a single flu season. Everyone else needs only 1 dose each flu season. It takes about 2 weeks for protection to develop after vaccination. There are many flu viruses, and they are always changing. Each year a new flu vaccine is made to protect against three or four viruses that are likely to cause disease in the upcoming flu season. Even when the vaccine doesnt exactly match these viruses, it may still provide some protection. Influenza vaccine does not cause flu. Influenza vaccine may be given at the same time as other vaccines. 3. Talk with your health care provider    Tell your vaccine provider if the person getting the vaccine:   Has had an allergic reaction after a previous dose of influenza vaccine, or has any severe, life-threatening allergies.  Has ever had Guillain-Barré Syndrome (also called GBS). In some cases, your health care provider may decide to postpone influenza vaccination to a future visit. People with minor illnesses, such as a cold, may be vaccinated. People who are moderately or severely ill should usually wait until they recover before getting influenza vaccine.     Your health care provider can give you more information. 4. Risks of a reaction     Soreness, redness, and swelling where shot is given, fever, muscle aches, and headache can happen after influenza vaccine.  There may be a very small increased risk of Guillain-Barré Syndrome (GBS) after inactivated influenza vaccine (the flu shot). Mak Mera children who get the flu shot along with pneumococcal vaccine (PCV13), and/or DTaP vaccine at the same time might be slightly more likely to have a seizure caused by fever. Tell your health care provider if a child who is getting flu vaccine has ever had a seizure. People sometimes faint after medical procedures, including vaccination. Tell your provider if you feel dizzy or have vision changes or ringing in the ears. As with any medicine, there is a very remote chance of a vaccine causing a severe allergic reaction, other serious injury, or death. 5. What if there is a serious problem? An allergic reaction could occur after the vaccinated person leaves the clinic. If you see signs of a severe allergic reaction (hives, swelling of the face and throat, difficulty breathing, a fast heartbeat, dizziness, or weakness), call 9-1-1 and get the person to the nearest hospital.    For other signs that concern you, call your health care provider. Adverse reactions should be reported to the Vaccine Adverse Event Reporting System (VAERS). Your health care provider will usually file this report, or you can do it yourself. Visit the VAERS website at www.vaers. hhs.gov or call 0-604.708.6394. VAERS is only for reporting reactions, and VAERS staff do not give medical advice. 6. The National Vaccine Injury Compensation Program    The [facility] Vaccine Injury Compensation Program (VICP) is a federal program that was created to compensate people who may have been injured by certain vaccines.  Visit the VICP website at www.hrsa.gov/vaccinecompensation or call 0-914.943.2825 to learn about the program and about filing a claim. There is a time limit to file a claim for compensation. 7. How can I learn more?  Ask your health care provider.  Call your local or state health department.  Contact the Centers for Disease Control and Prevention (CDC):  - Call 3-888.545.3525 (6-519-PPJ-INFO) or  - Visit CDCs influenza website at www.cdc.gov/flu    Vaccine Information Statement (Interim)  Inactivated Influenza Vaccine   8/15/2019  42 SACHIN Machado [id number]   Department of Health and Human Services  Centers for Disease Control and Prevention    Office Use Only    Health Maintenance Due   Topic Date Due    DTaP/Tdap/Td series (1 - Tdap) 06/03/1956    Shingrix Vaccine Age 50> (1 of 2) 06/03/1985    GLAUCOMA SCREENING Q2Y  06/03/2000    Bone Densitometry (Dexa) Screening  06/03/2000    MEDICARE YEARLY EXAM  09/28/2019

## 2019-10-11 ENCOUNTER — APPOINTMENT (OUTPATIENT)
Dept: PHYSICAL THERAPY | Age: 84
End: 2019-10-11
Payer: MEDICARE

## 2019-10-16 ENCOUNTER — HOSPITAL ENCOUNTER (OUTPATIENT)
Dept: PHYSICAL THERAPY | Age: 84
Discharge: HOME | End: 2019-10-16
Payer: MEDICARE

## 2019-10-16 PROCEDURE — 97140 MANUAL THERAPY 1/> REGIONS: CPT

## 2019-10-16 PROCEDURE — 97110 THERAPEUTIC EXERCISES: CPT

## 2019-10-16 NOTE — PROGRESS NOTES
[facility] - [facility], [address] [place] [address]  [phone] fax    Continued Plan of Care/ Re-certification for Physical Therapy Services    Patient name: Lyudmila Sanchez Start of Care: 2019   Referral source: Sandie Street MD : 1935               Medical Diagnosis: Neck pain [M54.2]  Low back pain [M54.5]  Payor: Stepan Vasquez / Plan: [place] MEDICARE PART A & B / Product Type: Medicare /  Onset Date:19               Treatment Diagnosis: Neck, Low Back Pain   Prior Hospitalization: see medical history Provider#: [id number]   Medications: Verified on Patient summary List    Comorbidities: Arthritis; Depression; Headaches; Incontinence; Osteoporosis   Prior Level of Function: Lives with spouse in 2nd floor apt - elevator; uses Rollator for all ambulation; modified independence with ADLs      Visits from Start of Care: 5    Missed Visits: 0    The Plan of Care and following information is based on the patient's current status:    Short Term Goals: To be accomplished in 1 weeks:  Goal: Pt to be compliant with initial HEP to improve posture and flexibility to reduce neck and lower back pain. Status at last note/certification: Established and reviewed with Pt  Current: Met- pt reports compliance  Long Term Goals: To be accomplished in 5 weeks:  Goal: Pt to increase C/S AROM by 10 deg all planes for ease with reading and turning head. Status at last note/certification: Flex 38 deg, Ext 10 deg, S/B right 14 deg, left 7 deg, Rot 20 deg B  Current status:  progressing- flexion 30 deg, extension 24 deg, left SB 11 deg, right SB 23 deg, rotation 33 deg B  Goal: Pt to report no limitations with lifting or carrying objects to increase ease with completing laundry.   Status at last note/certification: limited a lot in performing  Current status: met- does not feel limited with laundry anymore  Goal: Pt to increase B hip flex/abd/ext strength to at least 4/5 grossly to increase stability with ambulation in home, community. Status at last note/certification: 4-/5 grossly  Current status: progressing- flexion 4-/5 B, abduction 4/5 grossly B  Goal: Pt to report < 5/10 pain at worst to increase ease with ADLs. Status at last note/certification: 10/10 pain at worst  Current status: progressing, 8/10 in right hip   Goal: Pt to report FOTO score of 47 pts to show improved function and quality of life. Status at last note/certification: [facility] 87 XFJ   Current status: 54 pts       Key functional changes:   Functional Gains: neck feels like it is moving better most of the time with improved rotation; no limitation with lifting/cleaning laundry anymore  Functional Deficits: back still feels painful and limited  % improvement: 50%  Pain   Average: 6/10                  Best: 0/10                Worst: 8/10  Patient Goal: \"improve core strength, get the kinks out of my neck\"      Problems/ barriers to goal attainment: transportation     Problem List: pain affecting function, decrease ROM, decrease strength, impaired gait/ balance, decrease ADL/ functional abilitiies, decrease activity tolerance, decrease flexibility/ joint mobility and decrease transfer abilities    Treatment Plan: Therapeutic exercise, Therapeutic activities, Neuromuscular re-education, Physical agent/modality, Gait/balance training, Manual therapy, Patient education, Self Care training and Functional mobility training     Patient Goal (s) has been updated and includes: \"improve core strength, get the kinks out of my neck\"     Goals for this certification period to be accomplished in 10 treatments:  Goal: Pt to increase C/S AROM by 10 deg all planes for ease with reading and turning head.   Status at last note/certification: At eval: Flex 38 deg, Ext 10 deg, S/B right 14 deg, left 7 deg, Rot 20 deg B , At last note :flexion 30 deg, extension 24 deg, left SB 11 deg, right SB 23 deg, rotation 33 deg B  Goal: Pt to increase B hip flex/abd/ext strength to at least 4/5 grossly to increase stability with ambulation in home, community. Status at last note/certification:  flexion 4-/5 B, abduction 4/5 grossly B  Goal: Pt to report < 5/10 pain at worst to increase ease with ADLs. Status at last note/certification: 8/10       Frequency / Duration: Patient to be seen 2 times per week for 10 treatments:    Assessment / Recommendations:Patient has attended therapy with varied consistence for 5 sessions for the treatment of neck and low back pain. At this time, the patient has made improvements in cervical AROM in all planes except forward flexion. However, the patient presents with continued functional deficits with increased neck/low back pain and bilateral hip strength that limits the patient's ability to transfer and ambulate with improved activity tolerance. The patient will benefit from continued skilled outpatient therapy to address these remaining functional deficits and improve overall quality of life by improving core/hip strength to decrease overall pain levels and improve activity tolerance. Certification Period: 10/18/19-11/16/19    Severa Ria 10/16/2019 12:05 PM    ________________________________________________________________________  I certify that the above Therapy Services are being furnished while the patient is under my care. I agree with the treatment plan and certify that this therapy is necessary. [] I have read the above and request that my patient continue as recommended.   [] I have read the above report and request that my patient continue therapy with the following changes/special instructions: ______________________________________  [] I have read the above report and request that my patient be discharged from therapy    Physician's Signature:____________Date:_________TIME:________    ** Signature, Date and Time must be completed for valid certification **    Please sign and return to [facility] [place]  [phone] fax

## 2019-10-16 NOTE — PROGRESS NOTES
PT DAILY TREATMENT NOTE 10-18    Patient Name: Nelly Keys  Date:10/16/2019  : 1935  [x]  Patient  Verified  Payor: VA MEDICARE / Plan: VA MEDICARE PART A & B / Product Type: Medicare /    In time:11:17  Out time:12:00  Total Treatment Time (min): 43  Visit #: 5 of 10    Medicare/BCBS Only   Total Timed Codes (min):  43 1:1 Treatment Time:  43       Treatment Area: Neck pain [M54.2]  Low back pain [M54.5]    SUBJECTIVE  Pain Level (0-10 scale): 6  Any medication changes, allergies to medications, adverse drug reactions, diagnosis change, or new procedure performed?: [x] No    [] Yes (see summary sheet for update)  Subjective functional status/changes:   [] No changes reported  \"I am in more pain today because I haven't been able to come to therapy in the past week due to transportation issues from where I live. \"    OBJECTIVE    18 min Therapeutic Exercise:  [x] See flow sheet :   Rationale: increase ROM and increase strength to improve the patients ability to perform ADLs with improved shoulder/elbow strength and mobility. 25 min Manual Therapy:   Supine SOR; STM to B scalenes, SCM, C/S paraspinals, upper trapezius, Levator scapulae; manual stretching into lateral flexion and cervical rotation   Rationale: decrease pain, increase ROM, increase tissue extensibility, decrease trigger points and increase postural awareness to improve ease of ADLs in the home environment.           With   [x] TE   [] TA   [] neuro   [] other: Patient Education: [x] Review HEP    [] Progressed/Changed HEP based on:   [] positioning   [] body mechanics   [] transfers   [] heat/ice application    [] other:      Other Objective/Functional Measures:   Functional Gains: neck feels like it is moving better most of the time with improved rotation; no limitation with lifting/cleaning laundry anymore  Functional Deficits: back still feels painful and limited  % improvement: 50%  Pain   Average: 6/10       Best: 0/10 Worst: 8/10  Patient Goal: \"improve core strength, get the kinks out of my neck\"       Pain Level (0-10 scale) post treatment: 0    ASSESSMENT/Changes in Function: Patient respond favorably to manual interventions today with increased firm end feel noted with bilateral cervical side bending. She reports that regular transportation to her therapy sessions remains a barrier to progress at this time since she can no longer drive. Patient will continue to benefit from skilled PT services to modify and progress therapeutic interventions, address functional mobility deficits, address ROM deficits, address strength deficits, analyze and address soft tissue restrictions, analyze and cue movement patterns, analyze and modify body mechanics/ergonomics, assess and modify postural abnormalities, address imbalance/dizziness and instruct in home and community integration to attain remaining goals. []  See Plan of Care  [x]  See progress note/recertification  []  See Discharge Summary         Progress towards goals / Updated goals:  Short Term Goals: To be accomplished in 1 weeks:  Goal: Pt to be compliant with initial HEP to improve posture and flexibility to reduce neck and lower back pain. Status at last note/certification: Established and reviewed with Pt  Current: Met- pt reports compliance  Long Term Goals: To be accomplished in 5 weeks:  Goal: Pt to increase C/S AROM by 10 deg all planes for ease with reading and turning head. Status at last note/certification: Flex 38 deg, Ext 10 deg, S/B right 14 deg, left 7 deg, Rot 20 deg B  Current status:  progressing- flexion 30 deg, extension 24 deg, left SB 11 deg, right SB 23 deg, rotation 33 deg B  Goal: Pt to report no limitations with lifting or carrying objects to increase ease with completing laundry.   Status at last note/certification: limited a lot in performing  Current status: met- does not feel limited with laundry anymore  Goal: Pt to increase B hip flex/abd/ext strength to at least 4/5 grossly to increase stability with ambulation in home, community. Status at last note/certification: 4-/5 grossly  Current status: progressing- flexion 4-/5 B, abduction 4/5 grossly B  Goal: Pt to report < 5/10 pain at worst to increase ease with ADLs. Status at last note/certification: 10/10 pain at worst  Current status: progressing, 8/10 in right hip   Goal: Pt to report FOTO score of 47 pts to show improved function and quality of life.   Status at last note/certification: BTZW 00 DMU   Current status: 54 pts     PLAN  [x]  Upgrade activities as tolerated     [x]  Continue plan of care  []  Update interventions per flow sheet       []  Discharge due to:_  []  Other:_      [place] [facility] 10/16/2019  11:20 AM    Future Appointments   Date Time Provider Sherman Heard   10/18/2019 10:30 AM Theron Etienne [facility]   10/21/2019  1:10 PM Rich Luong  [id number]    10/24/2019 10:30 AM Una Rodriguez, PT [facility]   12/11/2019 11:00 AM Alanna Greer MD [facility]

## 2019-10-18 ENCOUNTER — HOSPITAL ENCOUNTER (OUTPATIENT)
Dept: PHYSICAL THERAPY | Age: 84
Discharge: HOME | End: 2019-10-18
Payer: MEDICARE

## 2019-10-18 PROCEDURE — 97140 MANUAL THERAPY 1/> REGIONS: CPT

## 2019-10-18 PROCEDURE — 97110 THERAPEUTIC EXERCISES: CPT

## 2019-10-18 NOTE — PROGRESS NOTES
PT DAILY TREATMENT NOTE 10-18    Patient Name: Deedee Taylor  Date:10/18/2019  : 1935  [x]  Patient  Verified  [place]or: [place] MED[place] / Plan: [place] MEDICARE PART A & B / Product Type: Medicare /    In time:10:31  Out time:11:16  Total Treatment Time (min): 45  Visit #: 1 of 10    Medicare/BCBS Only   Total Timed Codes (min):  39 1:1 Treatment Time:  39       Treatment Area: Neck pain [M54.2]  Low back pain [M54.5]    SUBJECTIVE  Pain Level (0-10 scale): 8  Any medication changes, allergies to medications, adverse drug reactions, diagnosis change, or new procedure performed?: [x] No    [] Yes (see summary sheet for update)  Subjective functional status/changes:   [] No changes reported  \"I think we did too much last session with the manual therapy. My neck has been very sore ever since and I am not able to turn it very well. Mojgan Cari \"    OBJECTIVE    Modality rationale: decrease pain to improve the patients ability to relax and sleep following therapy session to improve restorative sleep patterns.     Min Type Additional Details    [x] Estim:  []Unatt       []IFC  []Premod                        []Other:  []w/ice   []w/heat  Position:   Location:     [] Estim: []Att    []TENS instruct  []NMES                    []Other:  []w/US   []w/ice   []w/heat  Position:  Location:    []  Traction: [] Cervical       []Lumbar                       [] Prone          []Supine                       []Intermittent   []Continuous Lbs:  [] before manual  [] after manual    []  Ultrasound: []Continuous   [] Pulsed                           []1MHz   []3MHz W/cm2:  Location:    []  Iontophoresis with dexamethasone         Location: [] Take home patch   [] In clinic   6 [x]  Ice     []  heat  []  Ice massage  []  Laser   []  Anodyne Position: seated  Location: posterior neck    []  Laser with stim  []  Other:  Position:  Location:    []  Vasopneumatic Device Pressure:       [] lo [] med [] hi   Temperature: [] lo [] med [] hi   [x] Skin assessment post-treatment:  [x]intact []redness- no adverse reaction    []redness - adverse reaction:       16 min Therapeutic Exercise:  [x] See flow sheet :   Rationale: increase ROM and increase strength to improve the patients ability to perform ADLs with improved shoulder/elbow strength and mobility. 23 min Manual Therapy: STM to B scalenes, C/S paraspinals, upper trapezius, Levator scapulae; gentle manual stretching into lateral flexion and cervical rotation; hold relax stretching into bilateral lateral flexion/rotation; gentle manual cervical traction with intermittent pressure   Rationale: decrease pain, increase ROM, increase tissue extensibility, decrease trigger points and increase postural awareness to improve ease of ADLs in the home environment. With   [x] TE   [] TA   [] neuro   [] other: Patient Education: [x] Review HEP    [] Progressed/Changed HEP based on:   [] positioning   [] body mechanics   [] transfers   [] heat/ice application    [] other:      Other Objective/Functional Measures: Decreased intensity of manual therapy per patient's symptoms     Pain Level (0-10 scale) post treatment: 2    ASSESSMENT/Changes in Function: Focus of today's session on decreasing patient's symptoms through gentle manual therapy with associated isometric contractions. She is able to report decreased pain and improved active range of motion at the end of today's session. She requires tactile cuing to isolate posterior pelvic tilt in supine today.     Patient will continue to benefit from skilled PT services to modify and progress therapeutic interventions, address functional mobility deficits, address ROM deficits, address strength deficits, analyze and address soft tissue restrictions, analyze and cue movement patterns, analyze and modify body mechanics/ergonomics, assess and modify postural abnormalities, address imbalance/dizziness and instruct in home and community integration to attain remaining goals.     []  See Plan of Care  []  See progress note/recertification  []  See Discharge Summary         Progress towards goals / Updated goals:  Goal: Pt to increase C/S AROM by 10 deg all planes for ease with reading and turning head. Status at last note/certification: At eval: Flex 38 deg, Ext 10 deg, S/B right 14 deg, left 7 deg, Rot 20 deg B , At last note :flexion 30 deg, extension 24 deg, left SB 11 deg, right SB 23 deg, rotation 33 deg B   Current:   Goal: Pt to increase B hip flex/abd/ext strength to at least 4/5 grossly to increase stability with ambulation in home, community. Status at last note/certification:  flexion 4-/5 B, abduction 4/5 grossly B  Current:  Goal: Pt to report < 5/10 pain at worst to increase ease with ADLs.   Status at last note/certification: 8/10   Current:     PLAN  [x]  Upgrade activities as tolerated     [x]  Continue plan of care  []  Update interventions per flow sheet       []  Discharge due to:_  []  Other:_      [place]up 10/18/2019  10:26 AM    Future Appointments   Date Time Provider [place]   10/18/2019 10:30 AM Grant Bah [facility]   10/21/2019  1:10 PM Cesar Michael  [address]   10/24/2019 10:30 AM [facility]   12/11/2019 11:00 AM Kierra Kc MD [facility]

## 2019-10-21 ENCOUNTER — OFFICE VISIT (OUTPATIENT)
Dept: ORTHOPEDIC SURGERY | Age: 84
End: 2019-10-21

## 2019-10-21 VITALS
DIASTOLIC BLOOD PRESSURE: 66 MMHG | TEMPERATURE: 98.1 F | BODY MASS INDEX: 29.45 KG/M2 | SYSTOLIC BLOOD PRESSURE: 123 MMHG | HEIGHT: 62 IN | HEART RATE: 74 BPM | RESPIRATION RATE: 18 BRPM

## 2019-10-21 DIAGNOSIS — M79.18 CERVICAL MYOFASCIAL PAIN SYNDROME: ICD-10-CM

## 2019-10-21 DIAGNOSIS — M54.2 NECK PAIN: Primary | ICD-10-CM

## 2019-10-21 DIAGNOSIS — M54.12 CERVICAL RADICULOPATHY: ICD-10-CM

## 2019-10-21 DIAGNOSIS — M47.812 CERVICAL FACET JOINT SYNDROME: ICD-10-CM

## 2019-10-21 RX ORDER — FLUTICASONE PROPIONATE 50 MCG
2 SPRAY, SUSPENSION (ML) NASAL
COMMUNITY
End: 2021-03-09

## 2019-10-21 RX ORDER — PHENAZOPYRIDINE HYDROCHLORIDE 100 MG/1
TABLET, FILM COATED ORAL
COMMUNITY
End: 2019-12-11 | Stop reason: ALTCHOICE

## 2019-10-21 RX ORDER — ONDANSETRON 4 MG/1
4 TABLET, FILM COATED ORAL
COMMUNITY

## 2019-10-21 RX ORDER — DOCUSATE SODIUM 100 MG/1
100 CAPSULE, LIQUID FILLED ORAL
COMMUNITY
End: 2021-03-09

## 2019-10-21 NOTE — PROGRESS NOTES
[address]  [address], [address]  [place] [address]  Phone: [phone]  Fax: [phone]        Gretta Moreno  : 1935  PCP: Willa Rouse MD  10/21/2019    PROGRESS NOTE      HISTORY OF PRESENT ILLNESS  Karlie Ambrose is a 80 y.o. female who was seen as a new patient 19 with c/o neck and low back pain with episodic pain radiating into the RLE. Her neck pain is the most bothersome. Pt notes that over the last 6 weeks, she has had PT and OT at [facility] in [place]. She notes that she found it beneficial, but when she is complete with PT, her pain returns. She has a limited standing tolerance. Lumbar MRI 4/3/18. Per my read, multilevel degenerative changes, most significantly facet arthropathy, no more than moderate spinal stenosis. Multilevel foraminal stenosis without any focal disc herniation. Pt notes that she is able to do her own laundry, but she has pain reproduced when she makes the bed. She has h/o occipital headaches that she notes have progressively worsened. She reports limited ROM of her neck, especially towards the left. She previously found benefit from PT and manipulations for her neck pain. She finds some relief from heat, massage, and Lidocaine cream. She denies radicular symptoms into her upper extremities. Pt notes that she was previously active with swimming, but over the last 3 years she has been. She is currently taking Gabapentin 100 mg QHS (TID caused somnolence). She recently had a Prednisone dose pack that was not very beneficial.     Karlie Ambrose comes in to the office today for f/u. She attended PT (19- current; Diane Shine). She notes that she has only had one session of dry needling so far (on the left), but she has found it very beneficial (she is supposed to have her session on the right soon). Pt notes that the exercises in PT have been painful.  Cervical MRI 19: Multilevel degenerative findings along cervical spine most pronounced at C5-6 where there is a kyphosis as well as mild cord impingement. Multilevel foraminal stenoses, some levels of severe, such as on the right at C5-6; as delineated above. She rates her pain as a 3/10 today. ASSESSMENT  Her symptoms are likely multifactorial due to cervical radiculopathy (right C5-6), facet arthropathy, and myofascial pain. She is doing well in PT with dry needling so far. PLAN  1. Continue with PT and dry needling [facility])  2. Cervical interlaminar injections    Pt will f/u after cervical interlaminar injections or sooner as needed. Diagnoses and all orders for this visit:    1. Neck pain    2. Cervical myofascial pain syndrome    3. Cervical facet joint syndrome    4. Cervical radiculopathy  -     REFERRAL TO PAIN MANAGEMENT       PAST MEDICAL HISTORY   Past Medical History:   Diagnosis Date    Arthritis     Cancer [facility]) 2002    Colon     Chronic pain     abdominal due to IBS    Depression     GERD (gastroesophageal reflux disease)     Hypertension     Migraine headache 2014    Occipital neuralgia 2014    PUD (peptic ulcer disease)     S/P colon resection 2002    clear presently        Past Surgical History:   Procedure Laterality Date    HX CHOLECYSTECTOMY  2015    HX HYSTERECTOMY  1965    HX TONSIL AND ADENOIDECTOMY      HX WISDOM TEETH EXTRACTION      x4   . MEDICATIONS    Current Outpatient Medications   Medication Sig Dispense Refill    cephALEXin (KEFLEX) 500 mg capsule Take 500 mg by mouth four (4) times daily.  pravastatin (PRAVACHOL) 10 mg tablet Take 1 Tab by mouth nightly. 30 Tab 5    gabapentin (NEURONTIN) 100 mg capsule Take 1 Cap by mouth three (3) times daily. Max Daily Amount: 300 mg. 90 Cap 5    alum-mag hydroxide-simeth (MYLANTA) 200-200-20 mg/5 mL susp Take 30 mL by mouth daily as needed for Other (PRN: Gastric Reflux, in PM).  PRN: Gastric Reflux, in PM      lidocaine 4 % gel Apply 1 mL to affected area daily as needed for Pain (PRN Pain). PRN Pain, instead of Lidocaine Patch      lidocaine (LIDODERM) 5 % 1 Patch by TransDERmal route every twelve (12) hours. Apply patch to the affected area for 12 hours a day and remove for 12 hours a day. 30 Each 11    montelukast (SINGULAIR) 10 mg tablet Take 10 mg by mouth daily.  famotidine (PEPCID) 20 mg tablet Take 20 mg by mouth daily.  tamsulosin (FLOMAX) 0.4 mg capsule Take 1 Cap by mouth daily (after dinner). 90 Cap 3    varicella-zoster recombinant, PF, (SHINGRIX) 50 mcg/0.5 mL susr injection 0.5 mL by IntraMUSCular route every two (2) months. 0.5 mL 1    melatonin tab tablet Take 5 mg by mouth nightly.  cholecalciferol (VITAMIN D3) 1,000 unit cap Take 1,000 Units by mouth daily.  multivitamin/iron/folic acid (CENTRUM WOMEN PO) Take 1 Tab by mouth daily.  acetaminophen (TYLENOL) 325 mg tablet Take 500 mg by mouth every four (4) hours as needed for Pain. 500 mg tabs      cetirizine (ZYRTEC) 10 mg tablet Take 10 mg by mouth daily.  polyethylene glycol (MIRALAX) 17 gram packet Take 17 g by mouth daily as needed for Other (PRN Constipation). PRN Constipation      Bifidobacterium Infantis (ALIGN) 4 mg cap Take 4 mg by mouth daily.  furosemide (LASIX) 20 mg tablet Take 1 Tab by mouth every other day. Indications: Edema 90 Tab 3    mirtazapine (REMERON) 15 mg tablet Take 1 Tab by mouth nightly. Indications: major depressive disorder 90 Tab 3    dicyclomine (BENTYL) 10 mg capsule Take 1 Cap by mouth four (4) times daily. (Patient taking differently: Take 1 Cap by mouth four (4) times daily as needed for Other (PRN:  IBS symptoms). PRN:  IBS symptoms) 90 Cap 3        ALLERGIES  Allergies   Allergen Reactions    Macrobid [Nitrofurantoin Monohyd/M-Cryst] Nausea and Vomiting    Lexapro [Escitalopram Oxalate] Other (comments)     ? Hyponatremia per pt hx          SOCIAL HISTORY    Social History Socioeconomic History    Marital status:      Spouse name: Not on file    Number of children: Not on file    Years of education: Not on file    Highest education level: Not on file   Tobacco Use    Smoking status: Never Smoker    Smokeless tobacco: Never Used   Substance and Sexual Activity    Alcohol use: Yes     Alcohol/week: 0.0 - 1.0 standard drinks    Sexual activity: Never       FAMILY HISTORY  Family History   Problem Relation Age of Onset    Hypertension Mother     Stroke Mother     Heart Attack Father     Hypertension Father     Parkinson's Disease Sister     Hypertension Brother     Cancer Brother         prostate    No Known Problems Son     No Known Problems Daughter          REVIEW OF SYSTEMS  Review of Systems   Musculoskeletal: Positive for back pain and neck pain. RLE pain           PHYSICAL EXAMINATION  Visit Vitals  /66   Pulse 74   Temp 98.1 °F (36.7 °C) (Oral)   Resp 18   Ht 5' 2\" (1.575 m)   BMI 29.45 kg/m²       Pain Assessment  10/21/2019   Location of Pain Back;Neck   Location Modifiers -   Severity of Pain 3   Quality of Pain Dull;Aching   Duration of Pain Persistent   Frequency of Pain Intermittent   Aggravating Factors -   Aggravating Factors Comment -   Limiting Behavior -   Relieving Factors -       Constitutional:  Well developed, well nourished, in no acute distress. Psychiatric: Affect and mood are appropriate. Integumentary: No rashes or abrasions noted on exposed areas. SPINE/MUSCULOSKELETAL EXAM    Cervical spine:  Neck is midline. Normal muscle tone. No focal atrophy is noted. ROM limited bilaterally (L>R). Shoulder ROM intact. Tenderness to palpation of trapezii, scalenes, sternocleidomastoids, and cervicothoracic paraspinals bilaterally. Negative Spurling's sign. Negative Tinel's sign. Negative Gutierrez's sign. Sensation in the bilateral arms grossly intact to light touch.      Lumbar spine:  No rash, ecchymosis, or gross obliquity. No fasciculations. No focal atrophy is noted. No pain with hip ROM. Full range of motion. Mild tenderness to palpation. No tenderness to palpation at the sciatic notch. SI joints non-tender. Trochanters non tender. Sensation in the bilateral legs grossly intact to light touch.     No pain with lumbar extension, flexion, or rotation. MOTOR:      Biceps  Triceps Deltoids Wrist Ext Wrist Flex Hand Intrin   Right 5/5 5/5 5/5 5/5 5/5 5/5   Left 5/5 5/5 5/5 5/5 5/5 5/5             Hip Flex  Quads Hamstrings Ankle DF EHL Ankle PF   Right 5/5 5/5 5/5 5/5 5/5 5/5   Left 5/5 5/5 5/5 5/5 5/5 5/5     DTRs are 1+ biceps, triceps, brachioradialis, patella, and Achilles.     Negative Straight Leg raise. Squat not tested. No difficulty with tandem gait.      Ambulation with Rollator walker. FWB.       RADIOGRAPHS  Cervical MRI images taken on 9/25/19 personally reviewed with patient:  Alignment: Kyphosis centered at C5-6. Slight C6 retrolisthesis. Vertebral body height: Normal  Marrow signal: No focal concerning marrow signal. There is mild to moderate  discogenic endplate edema straddling C5-6 and less so C6-7. Also some edema  within the dens. No fracture or bone erosion. Disc spaces: Severe narrowing and desiccation of C5-6 followed by C6-7.     Cervicomedullary Junction: Patent  Cervical cord: No cord expansion or atrophy. Further discussed below where  relevant.     On axial imaging, findings at each level are as follows:     C2/C3: Small left paracentral disc protrusion. Bilateral facet arthropathy with  buckling of ligamentum flavum. Minor abutment and flattening of posterior cord  without cord edema. Patent canal and foramina.     C3/C4: Mild disc osteophyte ridge formation. Bilateral facet arthropathy. Buckling of ligamentum flavum.  Patent canal. Moderate left foraminal stenosis.     C4/C5: Slight anterolisthesis. Likely facet ankylosis on the left with  hypertrophic spurring. The canal is patent. Mild to moderate left foraminal  stenosis.     C5/C6: Uncovering of the disc. Broad-based disc osteophyte complex. Bilateral  facet arthropathy, right more than left. There is low-grade active facet  inflammation on the right. Buckling of ligamentum flavum. Anterior cord contact  and flattening. No cord edema. Minor spinal stenosis. Severe right foraminal  stenosis.     C6/C7: Bilobed broadly based disc osteophyte complex. Minor buckling of  ligamentum flavum. Minor spinal stenosis. Moderate foraminal stenoses.     C7/T1: Patent canal and foramina.     Other structures: Unremarkable     IMPRESSION  IMPRESSION:     1. Multilevel degenerative findings along cervical spine  -most pronounced at C5-6 where there is a kyphosis as well as mild cord  impingement  -Multilevel foraminal stenoses, some levels of severe, such as on the right at  C5-6; as delineated above    Lumbar MRI images taken on 4/3/18 personally reviewed with patient:  No report available.     Per my read, multilevel degenerative changes, most significantly facet arthropathy, no more than moderate spinal stenosis. Multilevel foraminal stenosis without any focal disc herniation. 15 minutes of face-to-face contact were spent with the patient during today's visit extensively discussing symptoms and treatment plan. All questions were answered. More than half of this visit today was spent on counseling.      Written by Angelina Kramer as dictated by Pernell Pelletier MD

## 2019-10-24 ENCOUNTER — HOSPITAL ENCOUNTER (OUTPATIENT)
Dept: PHYSICAL THERAPY | Age: 84
Discharge: HOME | End: 2019-10-24
Payer: MEDICARE

## 2019-10-24 PROCEDURE — 97112 NEUROMUSCULAR REEDUCATION: CPT

## 2019-10-24 PROCEDURE — 97140 MANUAL THERAPY 1/> REGIONS: CPT

## 2019-10-24 PROCEDURE — 97110 THERAPEUTIC EXERCISES: CPT

## 2019-10-24 NOTE — PROGRESS NOTES
PT DAILY TREATMENT NOTE 10-18    Patient Name: Lynnell Buerger  Date:10/24/2019  : 1935  [x]  Patient  Verified  Payor: [place] MED[place] / Plan: [place] MEDICARE PART A & B / Product Type: Medicare /    In time:1030  Out time:1122  Total Treatment Time (min): 52  Visit #: 2 of 10    Medicare/BCBS Only   Total Timed Codes (min):  47 1:1 Treatment Time:  47       Treatment Area: Neck pain [M54.2]  Low back pain [M54.5]    SUBJECTIVE  Pain Level (0-10 scale): 3  Any medication changes, allergies to medications, adverse drug reactions, diagnosis change, or new procedure performed?: [x] No    [] Yes (see summary sheet for update)  Subjective functional status/changes:   [] No changes reported  I saw the doctor this week. I told him that I was benefiting from this.     OBJECTIVE    Modality rationale: decrease inflammation and decrease pain to improve the patients ability to improve activity tolerance   Min Type Additional Details    [] Estim:  []Unatt       []IFC  []Premod                        []Other:  []w/ice   []w/heat  Position:  Location:    [] Estim: []Att    []TENS instruct  []NMES                    []Other:  []w/US   []w/ice   []w/heat  Position:  Location:    []  Traction: [] Cervical       []Lumbar                       [] Prone          []Supine                       []Intermittent   []Continuous Lbs:  [] before manual  [] after manual    []  Ultrasound: []Continuous   [] Pulsed                           []1MHz   []3MHz W/cm2:  Location:    []  Iontophoresis with dexamethasone         Location: [] Take home patch   [] In clinic   5 [x]  Ice     []  heat  []  Ice massage  []  Laser   []  Anodyne Position:seated  Location:Right shoulder/neck    []  Laser with stim  []  Other:  Position:  Location:    []  Vasopneumatic Device Pressure:       [] lo [] med [] hi   Temperature: [] lo [] med [] hi   [] Skin assessment post-treatment:  []intact []redness- no adverse reaction    []redness - adverse reaction: 20 min Therapeutic Exercise:  [x] See flow sheet :   Rationale: increase ROM and increase strength to improve the patients ability to perform ADL    8 min Neuromuscular Re-education:  [x]  See flow sheet :   Rationale: increase strength and improve coordination  to improve the patients ability to improve core activation     23 min Manual Therapy:  Prone STM bilateral cervical paraspinals/Right UT/levator scapulae, gentle effleurage post needling   Rationale: decrease pain, increase ROM, increase tissue extensibility and decrease trigger points to improve ease of turning head  *not billed for time of needle insertion      Dry Needling Procedure Note    Procedure: An intramuscular manual therapy (dry needling) and a neuro-muscular re-education treatment was done to deactivate myofascial trigger points with a 30 gauge filament needle under aseptic technique. Indications:  [x] Myofascial pain and dysfunction [] Muscled spasms  [x] Myalgia/myositis   [] Muscle cramps  [x] Muscle imbalances  [] Temporomandibular Dysfunction  [] Other:    Chart reviewed for the following:  ICelso PT, have reviewed the medical history, summary list and precautions/contraindications for Stephanie Reid.   TIME OUT performed immediately prior to start of procedure:  Celso MCCULLOUGH PT, have performed the following reviews on Stephanie Reid prior to the start of the session:      [x] Verified patient identification by name and date of birth    [x] Agreement on all muscles being treated was verified   [x] Purpose of dry needling, side effects, possible complications, risks and benefits were explained to the patient   [x] Procedure site(s) verified  [x] Patient was positioned for comfort and draped for privacy  [x] Informed Consent was signed (initial visit) and verified verbally (subsequent visits)  [x] Patient was instructed on the need to report the use of blood thinners and/or immunosuppressant medications  [x] How to respond to possible adverse effects of treatment  [x] Self treatment of post needling soreness: ice, heat (moist heat, heat wraps) and stretching  [x] Opportunity was given to ask any questions, all questions were answered            Time: 1035  Date of procedure: 10/24/2019  Treatment: The following muscles were treated today with intramuscular dry needling  [] Left [] Right Masster  [] Left [] Right Temporalis  [] Left [] Right Zygomaticus Major / Minor  [] Left [] Right Lateral Pterygoid  [] Left [] Right Medial Pterygoid  [] Left [] Right Digastric Post / Anterior Belly  [] Left [] Right Sternocleidomastoid  [] Left [] Right Scalene Anterior / Medial / Posterior  [] Left [] Right Extra Laryngeal Muscles  [] Left [x] Right Upper Trapezius  [] Left [] Right Middle Trapezius  [] Left [] Right Lower Trapezius  [] Left [] Right Oblique Capitis Inferior  [] Left [x] Right Splenius Capitis / Cervicis  [] Left [x] Right Semispinalis: Capitis / Cervicis  [] Left [x] Right Multifidi / Rotatores Cervicis / Thoracic  [] Left [] Right Longissimus Thoracis / Illiocostalis  [] Left [x] Right Levator Scapulae  [] Left [] Right Supraspinatus / Infraspinatus  [] Left [] Right Teres Major / Minor  [] Left [] Right Rhomboids / Serratus posterior superior  [] Left [] Right Pectoralis Major / Minor  [] Left [] Right Serratus Anterior  [] Left [] Right Latissimus Dorsi  [] Left [] Right Subscapularis  [] Left [] Right Coracobrachialis  [] Left [] Right Biceps Brachii  [] Left [] Right Deltoid: Anterior / Medial / Posterior  [] Left [] Right Brachialis  [] Left [] Right Triceps  [] Left [] Right Brachioradialis  [] Left [] Right Extensor Carpi Radialis Brevis / Extensor Carpi Radialis Longus    [] Left [] Right  Extensor digitorum  [] Left [] Right Supinator / Pronator Teres  [] Left [] Right Flexor Carpi Radialis/ Flexor Carpi Ulnaris   [] Left [] Right  Flexor Digitorum Superficialis/ Flexor Digitorum Profundus  [] Left [] Right Flexor Pollicis Longus / Flexor Pollicis Brevis / Palmaris Longus  [] Left [] Right Abductor Pollicis Longus / Abductor Pollicis Brevis  [] Left [] Right Opponens Pollicis / Adductor Pollicis  [] Left [] Right Dorsal / Palmar Interossei / Lumbricalis  [] Left [] Right Abductor Digiti Minimi / Opponens Digiti Minimi    Patient's response to today's treatment:  [x] Latent Twitch Response     [] Muscle relaxation [] Pain Relief  [x] Post needling soreness    [x] without complications  [x] Increased Range of Motion   [] Decreased headaches    [] Decreased Tinnitus  [] Other:     Performed by: Ashely Barker PT      With   [] TE   [] TA   [] neuro   [] other: Patient Education: [x] Review HEP    [] Progressed/Changed HEP based on:   [] positioning   [] body mechanics   [] transfers   [] heat/ice application    [] other:      Other Objective/Functional Measures: added swiss ball roll ups     Pain Level (0-10 scale) post treatment: 3    ASSESSMENT/Changes in Function: Resumed dry needling this morning, to the Right neck/shoulder due to good results with needling the Left side. Patient reports pain in the Right UT with needling but overall improved mobility following. Instructed to call with questions and drink water throughout the day. Patient reports decreased neck pain recently, but increased low back pain and LE pain with standing/walking. Added in swiss ball roll ups to improve core activation and will progress core strengthening as able to improve overall stability and reduced lumbar strain. No questions or concerns at end of session today.      Patient will continue to benefit from skilled PT services to modify and progress therapeutic interventions, address functional mobility deficits, address ROM deficits, address strength deficits, analyze and address soft tissue restrictions, analyze and cue movement patterns, analyze and modify body mechanics/ergonomics, assess and modify postural abnormalities, address imbalance/dizziness and instruct in home and community integration to attain remaining goals. []  See Plan of Care  []  See progress note/recertification  []  See Discharge Summary         Progress towards goals / Updated goals:  Goal: Pt to increase C/S AROM by 10 deg all planes for ease with reading and turning head. Status at last note/certification: At eval: Flex 38 deg, Ext 10 deg, S/B right 14 deg, left 7 deg, Rot 20 deg B , At last note :flexion 30 deg, extension 24 deg, left SB 11 deg, right SB 23 deg, rotation 33 deg B   Current: reassess next visit  Goal: Pt to increase B hip flex/abd/ext strength to at least 4/5 grossly to increase stability with ambulation in home, community. Status at last note/certification:  flexion 4-/5 B, abduction 4/5 grossly B  Current: reassess next visit  Goal: Pt to report < 5/10 pain at worst to increase ease with ADLs.   Status at last note/certification: 8/10   Current: progressing, 5/10 neck    PLAN  [x]  Upgrade activities as tolerated     [x]  Continue plan of care  []  Update interventions per flow sheet       []  Discharge due to:_  []  Other:_      [place], PT 10/24/2019  10:32 AM    Future Appointments   Date Time Provider Sherman Heard   12/11/2019 11:00 AM Jason Bradford MD [facility]

## 2019-10-30 ENCOUNTER — HOSPITAL ENCOUNTER (OUTPATIENT)
Dept: PHYSICAL THERAPY | Age: 84
Discharge: HOME | End: 2019-10-30
Payer: MEDICARE

## 2019-10-30 PROCEDURE — 97140 MANUAL THERAPY 1/> REGIONS: CPT

## 2019-10-30 PROCEDURE — 97112 NEUROMUSCULAR REEDUCATION: CPT

## 2019-10-30 NOTE — PROGRESS NOTES
PT DAILY TREATMENT NOTE 10-18    Patient Name: Jarrett Ramos  Date:10/30/2019  : 1935  [x]  Patient  Verified  Payor: Rubin Bloch / Plan: [place] MEDICARE PART A & B / Product Type: Medicare /    In time:1030  Out time:  Total Treatment Time (min): 48  Visit #: 3 of 10    Medicare/BCBS Only   Total Timed Codes (min):  48 1:1 Treatment Time:  48       Treatment Area: Neck pain [M54.2]  Low back pain [M54.5]    SUBJECTIVE  Pain Level (0-10 scale): 2  Any medication changes, allergies to medications, adverse drug reactions, diagnosis change, or new procedure performed?: [x] No    [] Yes (see summary sheet for update)  Subjective functional status/changes:   [] No changes reported  It ached like crazy after the needling but once it wore off I could turn my head better.      OBJECTIVE    Modality rationale: Patient declined   Min Type Additional Details    [] Estim:  []Unatt       []IFC  []Premod                        []Other:  []w/ice   []w/heat  Position:  Location:    [] Estim: []Att    []TENS instruct  []NMES                    []Other:  []w/US   []w/ice   []w/heat  Position:  Location:    []  Traction: [] Cervical       []Lumbar                       [] Prone          []Supine                       []Intermittent   []Continuous Lbs:  [] before manual  [] after manual    []  Ultrasound: []Continuous   [] Pulsed                           []1MHz   []3MHz W/cm2:  Location:    []  Iontophoresis with dexamethasone         Location: [] Take home patch   [] In clinic    []  Ice     []  heat  []  Ice massage  []  Laser   []  Anodyne Position:  Location:    []  Laser with stim  []  Other:  Position:  Location:    []  Vasopneumatic Device Pressure:       [] lo [] med [] hi   Temperature: [] lo [] med [] hi   [] Skin assessment post-treatment:  []intact []redness- no adverse reaction    []redness - adverse reaction:     33 min Neuromuscular Re-education:  [x]  See flow sheet :   Rationale: increase strength, improve coordination and increase proprioception  to improve the patients ability to improve cervical/core stability with standing/reaching/lifting tasks    15 min Manual Therapy:  Supine SOR, STM bilateral cervical paraspinals/UTs/scalenes   Rationale: decrease pain, increase ROM and increase tissue extensibility to improve ease of turning head with functional ADLs          With   [] TE   [] TA   [] neuro   [] other: Patient Education: [x] Review HEP    [] Progressed/Changed HEP based on:   [] positioning   [] body mechanics   [] transfers   [] heat/ice application    [] other:      Other Objective/Functional Measures: completed exercises after manual per flow sheet; see goals     Pain Level (0-10 scale) post treatment: 0    ASSESSMENT/Changes in Function: Patient reports improving cervical mobility and ease of turning her head and turning her body in bed. She does continue to remain limited in her cervical rotation to the Left due to Right muscular restrictions. Progressing hip strength. We will continue to address cervical restrictions through manual interventions, including dry needling, and exercises. Patient will continue to benefit from skilled PT services to modify and progress therapeutic interventions, address functional mobility deficits, address ROM deficits, address strength deficits, analyze and address soft tissue restrictions, analyze and cue movement patterns, analyze and modify body mechanics/ergonomics, assess and modify postural abnormalities, address imbalance/dizziness and instruct in home and community integration to attain remaining goals. []  See Plan of Care  []  See progress note/recertification  []  See Discharge Summary         Progress towards goals / Updated goals:  Goal: Pt to increase C/S AROM by 10 deg all planes for ease with reading and turning head.   Status at last note/certification: At eval: Flex 38 deg, Ext 10 deg, S/B right 14 deg, left 7 deg, Rot 20 deg B , At last note :flexion 30 deg, extension 24 deg, left SB 11 deg, right SB 23 deg, rotation 33 deg B   Current: progressing, flexion 43 deg, extension 40 deg, rotation Right 30 deg Left 20 deg, lateral flexion Right 24 deg Left 9 deg  Goal: Pt to increase B hip flex/abd/ext strength to at least 4/5 grossly to increase stability with ambulation in home, community. Status at last note/certification:  flexion 4-/5 B, abduction 4/5 grossly B  Current: progressing, flexion Right 4+/5 Left 5/5, abduction Right 4-/5 Left 4+/5, extension 3+/5 bilaterally  Goal: Pt to report < 5/10 pain at worst to increase ease with ADLs.   Status at last note/certification: 8/10   Current: progressing, 5/10 neck    PLAN  [x]  Upgrade activities as tolerated     [x]  Continue plan of care  []  Update interventions per flow sheet       []  Discharge due to:_  []  Other:_      Arlene Fitzpatrick, PT 10/30/2019  10:20 AM    Future Appointments   Date Time Provider Sherman Heard   10/30/2019 10:30 AM Eulogio Gallegos [facility]   11/4/2019 11:15 AM Eulogio Gallegos [facility]   11/6/2019  9:45 AM Eulogio Gallegos [facility]   11/11/2019 11:15 AM Eulogio Gallegos [facility]   11/14/2019 10:30 AM Eulogio Gallegos [facility]   12/11/2019 11:00 AM Be Arias MD [facility]

## 2019-11-04 ENCOUNTER — HOSPITAL ENCOUNTER (OUTPATIENT)
Dept: PHYSICAL THERAPY | Age: 84
Discharge: HOME | End: 2019-11-04
Payer: MEDICARE

## 2019-11-04 PROCEDURE — 97140 MANUAL THERAPY 1/> REGIONS: CPT

## 2019-11-04 PROCEDURE — 97112 NEUROMUSCULAR REEDUCATION: CPT

## 2019-11-04 NOTE — PROGRESS NOTES
PT DAILY TREATMENT NOTE 10-18    Patient Name: Moises Morales  Date:2019  : 1935  [x]  Patient  Verified  Payor: [place] MEDICARE / Plan: [place] MEDICARE PART A & B / Product Type: Medicare /    In time:1115  Out time:1158  Total Treatment Time (min): 43  Visit #: 4 of 10    Medicare/BCBS Only   Total Timed Codes (min):  43 1:1 Treatment Time:  43       Treatment Area: Neck pain [M54.2]  Low back pain [M54.5]    SUBJECTIVE  Pain Level (0-10 scale): 0 neck, 4 low back  Any medication changes, allergies to medications, adverse drug reactions, diagnosis change, or new procedure performed?: [x] No    [] Yes (see summary sheet for update)  Subjective functional status/changes:   [] No changes reported  My neck is really good but I twisted my back over the weekend.      OBJECTIVE    Modality rationale: Patient declined   Min Type Additional Details    [] Estim:  []Unatt       []IFC  []Premod                        []Other:  []w/ice   []w/heat  Position:  Location:    [] Estim: []Att    []TENS instruct  []NMES                    []Other:  []w/US   []w/ice   []w/heat  Position:  Location:    []  Traction: [] Cervical       []Lumbar                       [] Prone          []Supine                       []Intermittent   []Continuous Lbs:  [] before manual  [] after manual    []  Ultrasound: []Continuous   [] Pulsed                           []1MHz   []3MHz W/cm2:  Location:    []  Iontophoresis with dexamethasone         Location: [] Take home patch   [] In clinic    []  Ice     []  heat  []  Ice massage  []  Laser   []  Anodyne Position:  Location:    []  Laser with stim  []  Other:  Position:  Location:    []  Vasopneumatic Device Pressure:       [] lo [] med [] hi   Temperature: [] lo [] med [] hi   [] Skin assessment post-treatment:  []intact []redness- no adverse reaction    []redness - adverse reaction:     20 min Neuromuscular Re-education:  [x]  See flow sheet :   Rationale: increase strength, improve coordination and increase proprioception  to improve the patients ability to improve core activation and overall stability in order to reduce lumbar strain with twisting during ADLs and household tasks    23 min Manual Therapy:  Prone STM to bilateral lumbar paraspinals/cervical paraspinals, UTs, levator scapulae, glutes   Rationale: decrease pain, increase ROM and increase tissue extensibility to improve ease of functional mobility, daily tasks       With   [] TE   [] TA   [] neuro   [] other: Patient Education: [x] Review HEP    [] Progressed/Changed HEP based on:   [] positioning   [] body mechanics   [] transfers   [] heat/ice application    [] other:      Other Objective/Functional Measures: progressed stability routine per flow sheet due to no dry needling today; issued green resistance band for HEP     Pain Level (0-10 scale) post treatment: 2 low back, 0 neck    ASSESSMENT/Changes in Function: Patient is progressing well with cervical mobility and decreasing pain levels, but reports more lumbar pain recently. She has expressed interest in transitioning to aquatic therapy if her low back continues to bother her, as she has had relief from back pain in the water in the past. We will monitor symptoms in the coming visits and transition if appropriate in order to then enroll her in our post-rehab aquatic program after discharge. Patient will continue to benefit from skilled PT services to modify and progress therapeutic interventions, address functional mobility deficits, address ROM deficits, address strength deficits, analyze and address soft tissue restrictions, analyze and cue movement patterns, analyze and modify body mechanics/ergonomics, assess and modify postural abnormalities, address imbalance/dizziness and instruct in home and community integration to attain remaining goals.      []  See Plan of Care  []  See progress note/recertification  []  See Discharge Summary         Progress towards goals / Updated goals:  Goal: Pt to increase C/S AROM by 10 deg all planes for ease with reading and turning head. Status at last note/certification: At eval: Flex 38 deg, Ext 10 deg, S/B right 14 deg, left 7 deg, Rot 20 deg B , At last note :flexion 30 deg, extension 24 deg, left SB 11 deg, right SB 23 deg, rotation 33 deg B   Current: progressing, flexion 43 deg, extension 40 deg, rotation Right 30 deg Left 20 deg, lateral flexion Right 24 deg Left 9 deg  Goal: Pt to increase B hip flex/abd/ext strength to at least 4/5 grossly to increase stability with ambulation in home, community. Status at last note/certification:  flexion 4-/5 B, abduction 4/5 grossly B  Current: progressing, flexion Right 4+/5 Left 5/5, abduction Right 4-/5 Left 4+/5, extension 3+/5 bilaterally  Goal: Pt to report < 5/10 pain at worst to increase ease with ADLs.   Status at last note/certification: 8/10   Current: progressing, 5/10 neck    PLAN  [x]  Upgrade activities as tolerated     [x]  Continue plan of care  []  Update interventions per flow sheet       []  Discharge due to:_  []  Other:_      Cait Ellsworth, PT 11/4/2019  11:13 AM    Future Appointments   Date Time Provider Sherman Heard   11/4/2019 11:15 AM Alex Soler PT [facility]   11/8/2019 12:00 PM Alex Soler PT [facility]   11/11/2019 11:15 AM Alex Soler PT [facility]   11/14/2019 10:30 AM Alex Soler PT [facility]   12/11/2019 11:00 AM Jeffry Snoi MD [facility]

## 2019-11-06 ENCOUNTER — APPOINTMENT (OUTPATIENT)
Dept: PHYSICAL THERAPY | Age: 84
End: 2019-11-06
Payer: MEDICARE

## 2019-11-11 ENCOUNTER — APPOINTMENT (OUTPATIENT)
Dept: PHYSICAL THERAPY | Age: 84
End: 2019-11-11
Payer: MEDICARE

## 2019-11-14 ENCOUNTER — HOSPITAL ENCOUNTER (OUTPATIENT)
Dept: PHYSICAL THERAPY | Age: 84
Discharge: HOME | End: 2019-11-14
Payer: MEDICARE

## 2019-11-14 PROCEDURE — 97140 MANUAL THERAPY 1/> REGIONS: CPT

## 2019-11-14 PROCEDURE — 97112 NEUROMUSCULAR REEDUCATION: CPT

## 2019-11-14 NOTE — PROGRESS NOTES
PT DISCHARGE DAILY NOTE AND [id number]    Patient name: Stephanie Reid Start of Care: 2019   Referral source: Julio John MD : 1935   Medical/Treatment Diagnosis: Neck pain [M54.2]  Low back pain [M54.5] Onset Date:2019     Prior Hospitalization: see medical history Provider#: [id number]   Medications: Verified on Patient Summary List    Comorbidities: Arthritis; Depression; Headaches; Incontinence; Osteoporosis   Prior Level of Function: Lives with spouse in 2nd floor apt - elevator; uses Rollator for all ambulation; modified independence with ADLs*    Visits from Start of Care: 10    Missed Visits: 1    Reporting Period : 10/16/19 to 19    Date:2019  : 1935  [x]  Patient  Verified  Payor: [place] MEDICARE / Plan: [place] MEDICARE PART A & B / Product Type: Medicare /    In time:1030  Out time:1115  Total Treatment Time (min): 45  Visit #: 5 of 10    Medicare/BCBS Only   Total Timed Codes (min):   1:1 Treatment Time:         SUBJECTIVE  Pain Level (0-10 scale): 0  Any medication changes, allergies to medications, adverse drug reactions, diagnosis change, or new procedure performed?: [x] No    [] Yes (see summary sheet for update)  Subjective functional status/changes:   [] No changes reported  I'm just stiff.      OBJECTIVE    Modality rationale:    Min Type Additional Details    [] Estim:  []Unatt       []IFC  []Premod                        []Other:  []w/ice   []w/heat  Position:  Location:    [] Estim: []Att    []TENS instruct  []NMES                    []Other:  []w/US   []w/ice   []w/heat  Position:  Location:    []  Traction: [] Cervical       []Lumbar                       [] Prone          []Supine                       []Intermittent   []Continuous Lbs:  [] before manual  [] after manual    []  Ultrasound: []Continuous   [] Pulsed                           []1MHz   []3MHz W/cm2:  Location:    []  Iontophoresis with dexamethasone         Location: [] Take home patch [] In clinic    []  Ice     []  heat  []  Ice massage  []  Laser   []  Anodyne Position:  Location:    []  Laser with stim  []  Other:  Position:  Location:    []  Vasopneumatic Device Pressure:       [] lo [] med [] hi   Temperature: [] lo [] med [] hi   [] Skin assessment post-treatment:  []intact []redness- no adverse reaction    []redness - adverse reaction:     30 min Neuromuscular Re-education:  [x]  See flow sheet :   Rationale: increase strength, improve coordination and increase proprioception  to improve the patients ability to improve core activation in order to assist with postural awareness and stability     15 min Manual Therapy:  Supine SOR, STM Right UT/levator scapulae/bilateral cervical paraspinals   Rationale: decrease pain, increase ROM and increase tissue extensibility to reduce cervical stiffness and improve cervical mobility           With   [] TE   [] TA   [] neuro   [] other: Patient Education: [x] Review HEP    [] Progressed/Changed HEP based on:   [] positioning   [] body mechanics   [] transfers   [] heat/ice application    [] other:      Other Objective/Functional Measures: reviewed HEP     Pain Level (0-10 scale) post treatment: 0    Summary of Care:  Goal: Pt to increase C/S AROM by 10 deg all planes for ease with reading and turning head. Status at last note/certification: At eval: Flex 38 deg, Ext 10 deg, S/B right 14 deg, left 7 deg, Rot 20 deg B , At last note :flexion 30 deg, extension 24 deg, left SB 11 deg, right SB 23 deg, rotation 33 deg B   Current: progressing, flexion 45 deg, extension 40 deg, rotation Right 30 deg Left 24 deg, lateral flexion Right 24 deg Left 12 deg  Goal: Pt to increase B hip flex/abd/ext strength to at least 4/5 grossly to increase stability with ambulation in home, community. Status at last note/certification:  flexion 4-/5 B, abduction 4/5 grossly B  Current: met  Goal: Pt to report < 5/10 pain at worst to increase ease with ADLs.   Status at last note/certification: 8/10   Current:met, 4/10 neck    ASSESSMENT/Changes in Function: Patient has consistently attended therapy for neck and back pain. She demonstrates good improvement in overall strength and ROM, and reports good reduction in pain with dry needling interventions and exercises. At this time, due to good progress, we will discharge to updated HEP and self-management.      Thank you for this referral!      PLAN  [x]Discontinue therapy: [x]Patient has reached or is progressing toward set goals      []Patient is non-compliant or has abdicated      []Due to lack of appreciable progress towards set goals    Raulito Kramer, PT 11/14/2019  10:31 AM

## 2019-11-14 NOTE — PROGRESS NOTES
Physical Therapy Discharge Instructions      [facility] - [facility], [address] [place] [address]  [phone] fax    Patient: Chester Martins  : 1935      Continue Home Exercise Program 1-2 times per day for 3 weeks, then decrease to 3-4 times per week      Continue with    [x] Ice  as needed 1-2 times per day     [] Heat           Follow up with MD:     [] Upon completion of therapy     [x] As needed      Recommendations:     [x]   Return to activity with home program    []   Return to activity with the following modifications:       []Post Rehab Program    []Join Independent aquatic program     []Return to/join local gym        Additional Comments: Keep up with the exercises at home and keep stretching to keep your neck loose. Happy Holidays, best of luck!           Omid Rocha, PT 2019 11:04 AM

## 2019-12-11 ENCOUNTER — TELEPHONE (OUTPATIENT)
Dept: INTERNAL MEDICINE CLINIC | Age: 84
End: 2019-12-11

## 2019-12-11 ENCOUNTER — OFFICE VISIT (OUTPATIENT)
Dept: INTERNAL MEDICINE CLINIC | Age: 84
End: 2019-12-11

## 2019-12-11 VITALS
RESPIRATION RATE: 14 BRPM | HEIGHT: 62 IN | TEMPERATURE: 98.4 F | SYSTOLIC BLOOD PRESSURE: 113 MMHG | HEART RATE: 66 BPM | WEIGHT: 159.8 LBS | DIASTOLIC BLOOD PRESSURE: 50 MMHG | BODY MASS INDEX: 29.4 KG/M2 | OXYGEN SATURATION: 97 %

## 2019-12-11 DIAGNOSIS — E78.2 MIXED HYPERLIPIDEMIA: Primary | ICD-10-CM

## 2019-12-11 DIAGNOSIS — Z12.31 ENCOUNTER FOR SCREENING MAMMOGRAM FOR BREAST CANCER: ICD-10-CM

## 2019-12-11 DIAGNOSIS — Z78.0 POSTMENOPAUSAL: ICD-10-CM

## 2019-12-11 DIAGNOSIS — M85.80 OSTEOPENIA, UNSPECIFIED LOCATION: ICD-10-CM

## 2019-12-11 DIAGNOSIS — Z00.00 MEDICARE ANNUAL WELLNESS VISIT, SUBSEQUENT: ICD-10-CM

## 2019-12-11 NOTE — TELEPHONE ENCOUNTER
Chief Complaint   Patient presents with    Other     per Dr Benny Payan the patient was given the Alzheimer's and Dementia 24/7 Helpline [phone] for support     12-11-19 Patient reached and 2 identifiers were used: Full Name, and Date of Birth verified. The patient was given the Helpline Contact Number [phone] for Support with the Alzheimer's and Dementia. All understood.

## 2019-12-11 NOTE — PATIENT INSTRUCTIONS
Health Maintenance Due   Topic Date Due    DTaP/Tdap/Td series (1 - Tdap) 06/03/1946    Shingrix Vaccine Age 50> (1 of 2) 06/03/1985    GLAUCOMA SCREENING Q2Y  06/03/2000    Bone Densitometry (Dexa) Screening  06/03/2000    MEDICARE YEARLY EXAM  09/28/2019       Medicare Wellness Visit, Female     The best way to live healthy is to have a lifestyle where you eat a well-balanced diet, exercise regularly, limit alcohol use, and quit all forms of tobacco/nicotine, if applicable. Regular preventive services are another way to keep healthy. Preventive services (vaccines, screening tests, monitoring & exams) can help personalize your care plan, which helps you manage your own care. Screening tests can find health problems at the earliest stages, when they are easiest to treat. [facility] follows the current, evidence-based guidelines published by the [facility] Nino Fernandez ([facility]) when recommending preventive services for our patients. Because we follow these guidelines, sometimes recommendations change over time as research supports it. (For example, mammograms used to be recommended annually. Even though Medicare will still pay for an annual mammogram, the newer guidelines recommend a mammogram every two years for women of average risk). Of course, you and your doctor may decide to screen more often for some diseases, based on your risk and your co-morbidities (chronic disease you are already diagnosed with). Preventive services for you include:  - Medicare offers their members a free annual wellness visit, which is time for you and your primary care provider to discuss and plan for your preventive service needs. Take advantage of this benefit every year!  -All adults over the age of 72 should receive the recommended pneumonia vaccines.  Current USPSTF guidelines recommend a series of two vaccines for the best pneumonia protection.   -All adults should have a flu vaccine yearly and a tetanus vaccine every 10 years.   -All adults age 48 and older should receive the shingles vaccines (series of two vaccines). -All adults age 38-68 who are overweight should have a diabetes screening test once every three years.   -All adults born between 80 and 1965 should be screened once for Hepatitis C.  -Other screening tests and preventive services for persons with diabetes include: an eye exam to screen for diabetic retinopathy, a kidney function test, a foot exam, and stricter control over your cholesterol.   -Cardiovascular screening for adults with routine risk involves an electrocardiogram (ECG) at intervals determined by your doctor.   -Colorectal cancer screenings should be done for adults age 54-65 with no increased risk factors for colorectal cancer. There are a number of acceptable methods of screening for this type of cancer. Each test has its own benefits and drawbacks. Discuss with your doctor what is most appropriate for you during your annual wellness visit. The different tests include: colonoscopy (considered the best screening method), a fecal occult blood test, a fecal DNA test, and sigmoidoscopy.    -A bone mass density test is recommended when a woman turns 65 to screen for osteoporosis. This test is only recommended one time, as a screening. Some providers will use this same test as a disease monitoring tool if you already have osteoporosis. -Breast cancer screenings are recommended every other year for women of normal risk, age 54-69.  -Cervical cancer screenings for women over age 72 are only recommended with certain risk factors.      Here is a list of your current Health Maintenance items (your personalized list of preventive services) with a due date:  Health Maintenance Due   Topic Date Due    DTaP/Tdap/Td  (1 - Tdap) 06/03/1946    Shingles Vaccine (1 of 2) 06/03/1985    Glaucoma Screening   06/03/2000    Bone Mineral Density   06/03/2000    Annual Well Visit  09/28/2019         Medicare Part B Preventive Services Limitations Recommendation Scheduled   Bone Mass Measurement  (age 72 & older, biennial) Requires diagnosis related to osteoporosis or estrogen deficiency. Biennial benefit unless patient has history of long-term glucocorticoid tx or baseline is needed because initial test was by other method This should be done at age 72 and again if on osteoporosis medication at 2-3 year intervals. Overdue per our records   Cardiovascular Screening Blood Tests (every 5 years)  Total cholesterol, HDL, Triglycerides Order as a panel if possible We should check your cholesterol panel at least once every 5 years. Up to date   Colorectal Cancer Screening  -Fecal occult blood test (annual)  -Flexible sigmoidoscopy (5y)  -Screening colonoscopy (10y)  -Barium Enema  Due per your Gastroenterologist's recommendations. Discussed today   Counseling to Prevent Tobacco Use (up to 8 sessions per year)  - Counseling greater than 3 and up to 10 minutes  - Counseling greater than 10 minutes Patients must be asymptomatic of tobacco-related conditions to receive as preventive service Continue with smoking cessation Not applicable   Diabetes Screening Tests (at least every 3 years, Medicare covers annually or at 6-month intervals for prediabetic patients)    Fasting blood sugar (FBS) or glucose tolerance test (GTT) Patient must be diagnosed with one of the following:  -Hypertension, Dyslipidemia, obesity, previous impaired FBS or GTT  Or any two of the following: overweight, FH of diabetes, age ? 72, history of gestational diabetes, birth of baby weighing more than 9 pounds Should be done yearly Up to date   Diabetes Self-Management Training (DSMT) (no USPSTF recommendation) Requires referral by treating physician for patient with diabetes or renal disease. 10 hours of initial DSMT session of no less than 30 minutes each in a continuous 12-month period.   2 hours of follow-up DSMT in subsequent years. Not applicable Not applicable   Glaucoma Screening (no USPSTF recommendation) Diabetes mellitus, family history, , age 48 or over,  American, age 72 or over Continue with annual eye exams. Overdue per our records   Human Immunodeficiency Virus (HIV) Screening (annually for increased risk patients)  HIV-1 and HIV-2 by EIA, AURA, rapid antibody test, or oral mucosa transudate Patient must be at increased risk for HIV infection per USPSTF guidelines or pregnant. Tests covered annually for patients at increased risk. Pregnant patients may receive up to 3 test during pregnancy. Not applicable Not applicable   Medical Nutrition Therapy (MNT) (for diabetes or renal disease not recommended schedule) Requires referral by treating physician for patient with diabetes or renal disease. Can be provided in same year as diabetes self-management training (DSMT), and CMS recommends medical nutrition therapy take place after DSMT. Up to 3 hours for initial year and 2 hours in subsequent years. Not applicable Not applicable   Shingles Vaccination A shingles vaccine is also recommended once in a lifetime after age 61 Vaccination recommended for shingles vaccination. Overdue per our records   Seasonal Influenza Vaccination (annually)  Continue with yearly \"flu\" shot annually Up to date   Pneumococcal Vaccination (once after 65)  Please receive this vaccination at age 72. Overdue per our records   Hepatitis B Vaccinations (if medium/high risk) Medium/high risk factors:  End-stage renal disease,  Hemophiliacs who received Factor VIII or IX concentrates, Clients of institutions for the mentally retarded, Persons who live in the same house as a HepB virus carrier, Homosexual men, Illicit injectable drug abusers. Not applicable Not applicable   Screening Mammography (biennial age 54-69) Annually (age 36 or over) You need a mammogram yearly to screen for breast cancer.  Discussed today Screening Pap Tests and Pelvic Examination (up to age 79 and after 79 if unknown history or abnormal study last 10 years) Every 24 months except high risk You need no Pap smear at this time. Not warranted   Ultrasound Screening for Abdominal Aortic Aneurysm (AAA) (once) Patient must be referred through IPPE and not have had a screening for abdominal aortic aneurysm before under Medicare. Limited to patients who meet one of the following criteria:  - Men who are 73-68 years old and have smoked more than 100 cigarettes in their lifetime.  -Anyone with a FH of AAA  -Anyone recommended for screening by USPSTF Not applicable Not applicable        Eating Healthy Foods: Care Instructions  Your Care Instructions    Eating healthy foods can help lower your risk for disease. Healthy food gives you energy and keeps your heart strong, your brain active, your muscles working, and your bones strong. A healthy diet includes a variety of foods from the basic food groups: grains, vegetables, fruits, milk and milk products, and meat and beans. Some people may eat more of their favorite foods from only one food group and, as a result, miss getting the nutrients they need. So, it is important to pay attention not only to what you eat but also to what you are missing from your diet. You can eat a healthy, balanced diet by making a few small changes. Follow-up care is a key part of your treatment and safety. Be sure to make and go to all appointments, and call your doctor if you are having problems. It's also a good idea to know your test results and keep a list of the medicines you take. How can you care for yourself at home? Look at what you eat  · Keep a food diary for a week or two and record everything you eat or drink. Track the number of servings you eat from each food group.   · For a balanced diet every day, eat a variety of:  ? 6 or more ounce-equivalents of grains, such as cereals, breads, crackers, rice, or pasta, every day. An ounce-equivalent is 1 slice of bread, 1 cup of ready-to-eat cereal, or ½ cup of cooked rice, cooked pasta, or cooked cereal.  ? 2½ cups of vegetables, especially:  § Dark-green vegetables such as broccoli and spinach. § Orange vegetables such as carrots and sweet potatoes. § Dry beans (such as eubanks and kidney beans) and peas (such as lentils). ? 2 cups of fresh, frozen, or canned fruit. A small apple or 1 banana or orange equals 1 cup. ? 3 cups of nonfat or low-fat milk, yogurt, or other milk products. ? 5½ ounces of meat and beans, such as chicken, fish, lean meat, beans, nuts, and seeds. One egg, 1 tablespoon of peanut butter, ½ ounce nuts or seeds, or ¼ cup of cooked beans equals 1 ounce of meat. · Learn how to read food labels for serving sizes and ingredients. Fast-food and convenience-food meals often contain few or no fruits or vegetables. Make sure you eat some fruits and vegetables to make the meal more nutritious. · Look at your food diary. For each food group, add up what you have eaten and then divide the total by the number of days. This will give you an idea of how much you are eating from each food group. See if you can find some ways to change your diet to make it more healthy. Start small  · Do not try to make dramatic changes to your diet all at once. You might feel that you are missing out on your favorite foods and then be more likely to fail. · Start slowly, and gradually change your habits. Try some of the following:  ? Use whole wheat bread instead of white bread. ? Use nonfat or low-fat milk instead of whole milk. ? Eat brown rice instead of white rice, and eat whole wheat pasta instead of white-flour pasta. ? Try low-fat cheeses and low-fat yogurt. ? Add more fruits and vegetables to meals and have them for snacks. ? Add lettuce, tomato, cucumber, and onion to sandwiches. ? Add fruit to yogurt and cereal.  Enjoy food  · You can still eat your favorite foods.  You just may need to eat less of them. If your favorite foods are high in fat, salt, and sugar, limit how often you eat them, but do not cut them out entirely. · Eat a wide variety of foods. Make healthy choices when eating out  · The type of restaurant you choose can help you make healthy choices. Even fast-food chains are now offering more low-fat or healthier choices on the menu. · Choose smaller portions, or take half of your meal home. · When eating out, try:  ? A veggie pizza with a whole wheat crust or grilled chicken (instead of sausage or pepperoni). ? Pasta with roasted vegetables, grilled chicken, or marinara sauce instead of cream sauce. ? A vegetable wrap or grilled chicken wrap. ? Broiled or poached food instead of fried or breaded items. Make healthy choices easy  · Buy packaged, prewashed, ready-to-eat fresh vegetables and fruits, such as baby carrots, salad mixes, and chopped or shredded broccoli and cauliflower. · Buy packaged, presliced fruits, such as melon or pineapple. · Choose 100% fruit or vegetable juice instead of soda. Limit juice intake to 4 to 6 oz (½ to ¾ cup) a day. · Blend low-fat yogurt, fruit juice, and canned or frozen fruit to make a smoothie for breakfast or a snack. Where can you learn more? Go to http://michael-holland.info/. Enter T756 in the search box to learn more about \"Eating Healthy Foods: Care Instructions. \"  Current as of: November 7, 2018  Content Version: 12.2  © 4298-6636 Healthwise, Incorporated. Care instructions adapted under license by [employer] (which disclaims liability or warranty for this information). If you have questions about a medical condition or this instruction, always ask your healthcare professional. [place] [address] any warranty or liability for your use of this information.       Medicare Wellness Visit, Female     The best way to live healthy is to have a lifestyle where you eat a well-balanced diet, exercise regularly, limit alcohol use, and quit all forms of tobacco/nicotine, if applicable. Regular preventive services are another way to keep healthy. Preventive services (vaccines, screening tests, monitoring & exams) can help personalize your care plan, which helps you manage your own care. Screening tests can find health problems at the earliest stages, when they are easiest to treat. [facility] follows the current, evidence-based guidelines published by the [facility] Nino Rebecca ([facility]) when recommending preventive services for our patients. Because we follow these guidelines, sometimes recommendations change over time as research supports it. (For example, mammograms used to be recommended annually. Even though Medicare will still pay for an annual mammogram, the newer guidelines recommend a mammogram every two years for women of average risk). Of course, you and your doctor may decide to screen more often for some diseases, based on your risk and your co-morbidities (chronic disease you are already diagnosed with). Preventive services for you include:  - Medicare offers their members a free annual wellness visit, which is time for you and your primary care provider to discuss and plan for your preventive service needs. Take advantage of this benefit every year!  -All adults over the age of 72 should receive the recommended pneumonia vaccines. Current USPSTF guidelines recommend a series of two vaccines for the best pneumonia protection.   -All adults should have a flu vaccine yearly and a tetanus vaccine every 10 years.   -All adults age 48 and older should receive the shingles vaccines (series of two vaccines).       -All adults age 38-68 who are overweight should have a diabetes screening test once every three years.   -All adults born between 80 and 1965 should be screened once for Hepatitis C.  -Other screening tests and preventive services for persons with diabetes include: an eye exam to screen for diabetic retinopathy, a kidney function test, a foot exam, and stricter control over your cholesterol.   -Cardiovascular screening for adults with routine risk involves an electrocardiogram (ECG) at intervals determined by your doctor.   -Colorectal cancer screenings should be done for adults age 54-65 with no increased risk factors for colorectal cancer. There are a number of acceptable methods of screening for this type of cancer. Each test has its own benefits and drawbacks. Discuss with your doctor what is most appropriate for you during your annual wellness visit. The different tests include: colonoscopy (considered the best screening method), a fecal occult blood test, a fecal DNA test, and sigmoidoscopy.    -A bone mass density test is recommended when a woman turns 65 to screen for osteoporosis. This test is only recommended one time, as a screening. Some providers will use this same test as a disease monitoring tool if you already have osteoporosis. -Breast cancer screenings are recommended every other year for women of normal risk, age 54-69.  -Cervical cancer screenings for women over age 72 are only recommended with certain risk factors.      Here is a list of your current Health Maintenance items (your personalized list of preventive services) with a due date:  Health Maintenance Due   Topic Date Due    DTaP/Tdap/Td  (1 - Tdap) 06/03/1946    Shingles Vaccine (1 of 2) 06/03/1985    Glaucoma Screening   06/03/2000    Bone Mineral Density   06/03/2000    Annual Well Visit  09/28/2019

## 2019-12-11 NOTE — ACP (ADVANCE CARE PLANNING)
Advance Care Planning (ACP) Provider Conversation Snapshot     Date of ACP Conversation: 12/11/19  Persons included in Conversation:  patient  Length of ACP Conversation in minutes:  <16 minutes (Non-Billable)     Authorized Decision Maker (if patient is incapable of making informed decisions): This person is:   Healthcare Agent/Medical Power of  under Advance Directive                For Patients with Decision Making Capacity:   Values/Goals: Exploration of values, goals, and preferences if recovery is not expected, even with continued medical treatment in the event of:  Imminent death  Severe, permanent brain injury     Conversation Outcomes / Follow-Up Plan:   Reviewed existing Advance Directive .  She has no changes to make to her preexisting advance directive.         Dr. Lauri Mustafa  [facility] [address], [address] [place] [address] Str.  Phone: [phone]  Fax: [phone]

## 2019-12-11 NOTE — PROGRESS NOTES
INTERNISTS OF [facility]:  12/11/2019, MRN: [id number]      Kassi Joel is a 80 y.o. female and presents to clinic for HDL and  Annual Wellness Visit (ROOM 4)    Subjective: The patient is an 59-year-old female with history of sigmoid colon cancer status post surgery, prediabetes, history of strictures, osteopenia, hyponatremia, lumbar disc disease, hypertension, IBS, vitamin D deficiency, allergic rhinitis, GERD, and urinary retention. HLD: At her last appointment, pravastatin 10 mg daily was ordered. Her LDL was checked earlier this year and measured 145. Her total cholesterol was 230. Since her last appointment, she is tolerating the pravastatin without any adverse side effects. Patient Active Problem List    Diagnosis Date Noted    Impaired glucose tolerance 10/10/2019    Urinary retention 09/01/2018    Cancer of sigmoid ([facility] [address] 09/01/2018    Anxiety 09/01/2018    Irritable bowel syndrome with both constipation and diarrhea 09/01/2018    Lumbar disc disease 09/01/2018    Essential hypertension 09/01/2018    Allergic rhinitis 09/01/2018    Vitamin D deficiency 09/01/2018       Current Outpatient Medications   Medication Sig Dispense Refill    docusate sodium (COLACE) 100 mg capsule Take 100 mg by mouth two (2) times daily as needed.  fluticasone propionate (FLONASE) 50 mcg/actuation nasal spray 2 Sprays by Both Nostrils route daily as needed.  ondansetron hcl (ZOFRAN) 4 mg tablet Take 4 mg by mouth every eight (8) hours as needed for Nausea.  pravastatin (PRAVACHOL) 10 mg tablet Take 1 Tab by mouth nightly. 30 Tab 5    gabapentin (NEURONTIN) 100 mg capsule Take 1 Cap by mouth three (3) times daily. Max Daily Amount: 300 mg. 90 Cap 5    alum-mag hydroxide-simeth (MYLANTA) 200-200-20 mg/5 mL susp Take 30 mL by mouth daily as needed for Other (PRN: Gastric Reflux, in PM).  PRN: Gastric Reflux, in PM      lidocaine (LIDODERM) 5 % 1 Patch by TransDERmal route every twelve (12) hours. Apply patch to the affected area for 12 hours a day and remove for 12 hours a day. 30 Each 11    montelukast (SINGULAIR) 10 mg tablet Take 10 mg by mouth daily.  famotidine (PEPCID) 20 mg tablet Take 20 mg by mouth daily.  tamsulosin (FLOMAX) 0.4 mg capsule Take 1 Cap by mouth daily (after dinner). 90 Cap 3    melatonin tab tablet Take 5 mg by mouth nightly.  cholecalciferol (VITAMIN D3) 1,000 unit cap Take 1,000 Units by mouth daily.  multivitamin/iron/folic acid (CENTRUM WOMEN PO) Take 1 Tab by mouth daily.  acetaminophen (TYLENOL) 325 mg tablet Take 500 mg by mouth every four (4) hours as needed for Pain. 500 mg tabs      cetirizine (ZYRTEC) 10 mg tablet Take 10 mg by mouth daily.  polyethylene glycol (MIRALAX) 17 gram packet Take 17 g by mouth daily as needed for Other (PRN Constipation). PRN Constipation      Bifidobacterium Infantis (ALIGN) 4 mg cap Take 4 mg by mouth daily.  furosemide (LASIX) 20 mg tablet Take 1 Tab by mouth every other day. Indications: Edema 90 Tab 3    mirtazapine (REMERON) 15 mg tablet Take 1 Tab by mouth nightly. Indications: major depressive disorder 90 Tab 3    dicyclomine (BENTYL) 10 mg capsule Take 1 Cap by mouth four (4) times daily. (Patient taking differently: Take 1 Cap by mouth four (4) times daily as needed for Other (PRN:  IBS symptoms). PRN:  IBS symptoms) 90 Cap 3    varicella-zoster recombinant, PF, (SHINGRIX) 50 mcg/0.5 mL susr injection 0.5 mL by IntraMUSCular route every two (2) months. 0.5 mL 1       Allergies   Allergen Reactions    Macrobid [Nitrofurantoin Monohyd/M-Cryst] Nausea and Vomiting    Lexapro [Escitalopram Oxalate] Other (comments)     ? Hyponatremia per pt hx       Past Medical History:   Diagnosis Date    Arthritis     Cancer [facility]) 2002    Colon     Chronic pain     abdominal due to IBS    Depression     GERD (gastroesophageal reflux disease)     Hypertension     Migraine headache 2014    Occipital neuralgia 2014    PUD (peptic ulcer disease)     S/P colon resection 2002    clear presently        Past Surgical History:   Procedure Laterality Date    HX CHOLECYSTECTOMY  2015    HX HYSTERECTOMY  1965    HX TONSIL AND ADENOIDECTOMY      HX WISDOM TEETH EXTRACTION      x4       Family History   Problem Relation Age of Onset    Hypertension Mother     Stroke Mother     Heart Attack Father     Hypertension Father     Parkinson's Disease Sister     Hypertension Brother     Cancer Brother         prostate    No Known Problems Son     No Known Problems Daughter        Social History     Tobacco Use    Smoking status: Never Smoker    Smokeless tobacco: Never Used   Substance Use Topics    Alcohol use: Yes     Alcohol/week: 0.0 - 1.0 standard drinks       ROS   Review of Systems   Constitutional: Negative for chills and fever. HENT: Negative for ear pain and sore throat. Eyes: Negative for blurred vision and pain. Respiratory: Negative for cough and shortness of breath. Cardiovascular: Negative for chest pain. Gastrointestinal: Negative for abdominal pain, blood in stool and melena. Genitourinary: Negative for dysuria and hematuria. Musculoskeletal: Positive for back pain and neck pain. Negative for joint pain and myalgias. Chronic, unchanged   Skin: Negative for rash. Neurological: Positive for focal weakness (Ble - chronic,unchanged). Negative for headaches. Endo/Heme/Allergies: Does not bruise/bleed easily. Psychiatric/Behavioral: Negative for substance abuse. Objective     Vitals:    12/11/19 1100   BP: 113/50   Pulse: 66   Resp: 14   Temp: 98.4 °F (36.9 °C)   TempSrc: Oral   SpO2: 97%   Weight: 159 lb 12.8 oz (72.5 kg)   Height: 5' 2\" (1.575 m)   PainSc:   3   PainLoc: Hip       Physical Exam  Vitals signs and nursing note reviewed. HENT:      Head: Normocephalic and atraumatic.       Right Ear: External ear normal.      Left Ear: External ear normal. Nose: Nose normal.      Mouth/Throat:      Pharynx: No oropharyngeal exudate. Eyes:      General: No scleral icterus. Right eye: No discharge. Left eye: No discharge. Conjunctiva/sclera: Conjunctivae normal.   Neck:      Musculoskeletal: Neck supple. Cardiovascular:      Rate and Rhythm: Normal rate and regular rhythm. Heart sounds: Normal heart sounds. No murmur. No friction rub. No gallop. Pulmonary:      Effort: Pulmonary effort is normal. No respiratory distress. Breath sounds: Normal breath sounds. No wheezing or rales. Chest:      Chest wall: No tenderness. Abdominal:      General: Bowel sounds are normal. There is no distension. Palpations: Abdomen is soft. There is no mass. Tenderness: There is no tenderness. There is no guarding or rebound. Musculoskeletal:         General: No swelling (Bue) or tenderness (Bue). Lymphadenopathy:      Cervical: No cervical adenopathy. Skin:     General: Skin is warm and dry. Findings: No erythema. Neurological:      Mental Status: She is alert and oriented to person, place, and time. Motor: No abnormal muscle tone. Gait: Gait is intact.       Comments: Stable gait with walker   Psychiatric:         Mood and Affect: Mood and affect normal.         LABS   Data Review:   Lab Results   Component Value Date/Time    WBC 5.0 10/03/2019 10:36 AM    HGB 12.2 10/03/2019 10:36 AM    HCT 39.3 10/03/2019 10:36 AM    PLATELET 994 26/83/0192 10:36 AM    MCV 88.9 10/03/2019 10:36 AM       Lab Results   Component Value Date/Time    Sodium 138 10/03/2019 10:36 AM    Potassium 4.7 10/03/2019 10:36 AM    Chloride 100 10/03/2019 10:36 AM    CO2 30 10/03/2019 10:36 AM    Anion gap 8 10/03/2019 10:36 AM    Glucose 96 10/03/2019 10:36 AM    BUN 14 10/03/2019 10:36 AM    Creatinine 0.88 10/03/2019 10:36 AM    BUN/Creatinine ratio 16 10/03/2019 10:36 AM    GFR est AA >60 10/03/2019 10:36 AM    GFR est non-AA >60 10/03/2019 10:36 AM    Calcium 9.0 10/03/2019 10:36 AM       Lab Results   Component Value Date/Time    Cholesterol, total 230 (H) 10/03/2019 10:36 AM    HDL Cholesterol 61 (H) 10/03/2019 10:36 AM    LDL, calculated 145.6 (H) 10/03/2019 10:36 AM    VLDL, calculated 23.4 10/03/2019 10:36 AM    Triglyceride 117 10/03/2019 10:36 AM    CHOL/HDL Ratio 3.8 10/03/2019 10:36 AM       Lab Results   Component Value Date/Time    Hemoglobin A1c 6.0 (H) 10/03/2019 10:36 AM       Assessment/Plan:   HLD:   -Continue with Rx as prescribed. - Checking a lipid panel 1 wk before her f/u apt. Health Maintenance Due   Topic Date Due    DTaP/Tdap/Td series (1 - Tdap) 06/03/1946    Shingrix Vaccine Age 50> (1 of 2) 06/03/1985    GLAUCOMA SCREENING Q2Y  06/03/2000    Bone Densitometry (Dexa) Screening  06/03/2000    MEDICARE YEARLY EXAM  09/28/2019     Lab review: labs are reviewed in the EHR and ordered as mentioned above    I have discussed the diagnosis with the patient and the intended plan as seen in the above orders. The patient has received an after-visit summary and questions were answered concerning future plans. I have discussed medication side effects and warnings with the patient as well. I have reviewed the plan of care with the patient, accepted their input and they are in agreement with the treatment goals. All questions were answered. The patient understands the plan of care. Handouts provided today with above information. Pt instructed if symptoms worsen to call the office or report to the ED for continued care. Greater than 50% of the visit time was spent in counseling and/or coordination of care. Voice recognition was used to generate this report, which may have resulted in some phonetic based errors in grammar and contents. Even though attempts were made to correct all the mistakes, some may have been missed, and remained in the body of the document.           Gt Amato MD

## 2019-12-11 NOTE — PROGRESS NOTES
Sachi Otero presents today for   Chief Complaint   Patient presents with    Annual Wellness Visit     ROOM 4       Is someone accompanying this pt? no    Is the patient using any DME equipment during OV? no    Depression Screening:  3 most recent PHQ Screens 12/11/2019   Little interest or pleasure in doing things Not at all   Feeling down, depressed, irritable, or hopeless Not at all   Total Score PHQ 2 0       Learning Assessment:  Learning Assessment 6/11/2019   PRIMARY LEARNER Patient   HIGHEST LEVEL OF EDUCATION - PRIMARY LEARNER  GRADUATED HIGH SCHOOL OR GED   BARRIERS PRIMARY LEARNER NONE   CO-LEARNER CAREGIVER No   PRIMARY LANGUAGE ENGLISH   LEARNER PREFERENCE PRIMARY DEMONSTRATION   ANSWERED BY patient   RELATIONSHIP SELF       Abuse Screening:  Abuse Screening Questionnaire 6/11/2019   Do you ever feel afraid of your partner? N   Are you in a relationship with someone who physically or mentally threatens you? N   Is it safe for you to go home? Y       Fall Risk  Fall Risk Assessment, last 12 mths 12/11/2019   Able to walk? Yes   Fall in past 12 months? Yes   Fall with injury? No   Number of falls in past 12 months 1   Fall Risk Score 1       Health Maintenance reviewed and discussed and ordered per Provider. Health Maintenance Due   Topic Date Due    DTaP/Tdap/Td series (1 - Tdap) 06/03/1946    Shingrix Vaccine Age 50> (1 of 2) 06/03/1985    GLAUCOMA SCREENING Q2Y  06/03/2000    Bone Densitometry (Dexa) Screening  06/03/2000    MEDICARE YEARLY EXAM  09/28/2019   . Coordination of Care:  1. Have you been to the ER, urgent care clinic since your last visit? Hospitalized since your last visit? no    2. Have you seen or consulted any other health care providers outside of the [address] [place] [address] since your last visit? Include any pap smears or colon screening.  no      This is the Subsequent Medicare Annual Wellness Exam, performed 12 months or more after the Initial AWV or the last Subsequent AWV    I have reviewed the patient's medical history in detail and updated the computerized patient record. History   The patient is an 80-year-old female with history of sigmoid colon cancer status post surgery, prediabetes, history of strictures, osteopenia, hyponatremia, lumbar disc disease, hypertension, IBS, vitamin D deficiency, allergic rhinitis, GERD, and urinary retention. Health Maintenance History  Immunizations reviewed: Tdap over-due   Pneumovax: over-due but she had a \"bad\" allergic reaction to this vaccine in the past so it's contraindicated. Flu: up to date  Zoster: over-due  But she states she is up to date. Immunization History   Administered Date(s) Administered    Influenza High Dose Vaccine PF 09/27/2018    Influenza Vaccine (Tri) Adjuvanted 10/10/2019       Colonoscopy: No bleeding. Eye exam: Overdue per our records. She is up to date though    Mammo: Discussed today    Dexascan: Overdue per our records. She has had multiple DEXA scans. Pelvic/Pap: No vaginal bleeding.       Patient Active Problem List   Diagnosis Code    Urinary retention R33.9    Cancer of sigmoid ([facility] [address] C18.7    Anxiety F41.9    Irritable bowel syndrome with both constipation and diarrhea K58.2    Lumbar disc disease M51.9    Essential hypertension I10    Allergic rhinitis J30.9    Vitamin D deficiency E55.9    Impaired glucose tolerance R73.02     Past Medical History:   Diagnosis Date    Arthritis     Cancer ([facility] [address] 2002    Colon     Chronic pain     abdominal due to IBS    Depression     GERD (gastroesophageal reflux disease)     Hypertension     Migraine headache 2014    Occipital neuralgia 2014    PUD (peptic ulcer disease)     S/P colon resection 2002    clear presently       Past Surgical History:   Procedure Laterality Date    HX CHOLECYSTECTOMY  2015    HX HYSTERECTOMY  1965    HX TONSIL AND ADENOIDECTOMY      HX WISDOM TEETH EXTRACTION      x4     Current Outpatient Medications   Medication Sig Dispense Refill    docusate sodium (COLACE) 100 mg capsule Take 100 mg by mouth two (2) times daily as needed.  fluticasone propionate (FLONASE) 50 mcg/actuation nasal spray 2 Sprays by Both Nostrils route daily as needed.  ondansetron hcl (ZOFRAN) 4 mg tablet Take 4 mg by mouth every eight (8) hours as needed for Nausea.  pravastatin (PRAVACHOL) 10 mg tablet Take 1 Tab by mouth nightly. 30 Tab 5    gabapentin (NEURONTIN) 100 mg capsule Take 1 Cap by mouth three (3) times daily. Max Daily Amount: 300 mg. 90 Cap 5    alum-mag hydroxide-simeth (MYLANTA) 200-200-20 mg/5 mL susp Take 30 mL by mouth daily as needed for Other (PRN: Gastric Reflux, in PM). PRN: Gastric Reflux, in PM      lidocaine (LIDODERM) 5 % 1 Patch by TransDERmal route every twelve (12) hours. Apply patch to the affected area for 12 hours a day and remove for 12 hours a day. 30 Each 11    montelukast (SINGULAIR) 10 mg tablet Take 10 mg by mouth daily.  famotidine (PEPCID) 20 mg tablet Take 20 mg by mouth daily.  tamsulosin (FLOMAX) 0.4 mg capsule Take 1 Cap by mouth daily (after dinner). 90 Cap 3    melatonin tab tablet Take 5 mg by mouth nightly.  cholecalciferol (VITAMIN D3) 1,000 unit cap Take 1,000 Units by mouth daily.  multivitamin/iron/folic acid (CENTRUM WOMEN PO) Take 1 Tab by mouth daily.  acetaminophen (TYLENOL) 325 mg tablet Take 500 mg by mouth every four (4) hours as needed for Pain. 500 mg tabs      cetirizine (ZYRTEC) 10 mg tablet Take 10 mg by mouth daily.  polyethylene glycol (MIRALAX) 17 gram packet Take 17 g by mouth daily as needed for Other (PRN Constipation). PRN Constipation      Bifidobacterium Infantis (ALIGN) 4 mg cap Take 4 mg by mouth daily.  furosemide (LASIX) 20 mg tablet Take 1 Tab by mouth every other day. Indications: Edema 90 Tab 3    mirtazapine (REMERON) 15 mg tablet Take 1 Tab by mouth nightly.  Indications: major depressive disorder 90 Tab 3    dicyclomine (BENTYL) 10 mg capsule Take 1 Cap by mouth four (4) times daily. (Patient taking differently: Take 1 Cap by mouth four (4) times daily as needed for Other (PRN:  IBS symptoms). PRN:  IBS symptoms) 90 Cap 3    varicella-zoster recombinant, PF, (SHINGRIX) 50 mcg/0.5 mL susr injection 0.5 mL by IntraMUSCular route every two (2) months. 0.5 mL 1     Allergies   Allergen Reactions    Macrobid [Nitrofurantoin Monohyd/M-Cryst] Nausea and Vomiting    Pneumococcal Vaccine Other (comments)    Lexapro [Escitalopram Oxalate] Other (comments)     ? Hyponatremia per pt hx       Family History   Problem Relation Age of Onset    Hypertension Mother     Stroke Mother     Heart Attack Father     Hypertension Father     Parkinson's Disease Sister     Hypertension Brother     Cancer Brother         prostate    No Known Problems Son     No Known Problems Daughter      Social History     Tobacco Use    Smoking status: Never Smoker    Smokeless tobacco: Never Used   Substance Use Topics    Alcohol use: Yes     Alcohol/week: 0.0 - 1.0 standard drinks       Depression Risk Factor Screening:     3 most recent PHQ Screens 12/11/2019   Little interest or pleasure in doing things Not at all   Feeling down, depressed, irritable, or hopeless Not at all   Total Score PHQ 2 0       Alcohol Risk Factor Screening:   Do you average 1 drink per night or more than 7 drinks a week:  No    On any one occasion in the past three months have you have had more than 3 drinks containing alcohol:  No      Functional Ability and Level of Safety:   Hearing: Hearing is good. Activities of Daily Living: The home contains: grab bars in the shower, and uses a walker  Patient does total self care    Ambulation: requires walker    Fall Risk:  Fall Risk Assessment, last 12 mths 12/11/2019   Able to walk? Yes   Fall in past 12 months? Yes   Fall with injury?  No   Number of falls in past 12 months 1   Fall Risk Score 1       Abuse Screen:  Patient is not abused    ROS:  Gen: No fever/chlls  HEENT: No sore throat, eye pain, ear pain, or congestion. No HA  CV: No CP  Resp: No cough/SOB  GI: No abdominal pain  : No hematuria/dysuria  Derm: No rash  Neuro: No new paresthesias/weakness  Musc: No new myalgias/jt pain  Psych: No depression sx      Cognitive Screening   Has your family/caregiver stated any concerns about your memory: no  Cognitive Screening: Normal - Clock Drawing Test    Visit Vitals  /50 (BP 1 Location: Left arm, BP Patient Position: Sitting)   Pulse 66   Temp 98.4 °F (36.9 °C) (Oral)   Resp 14   Ht 5' 2\" (1.575 m)   Wt 159 lb 12.8 oz (72.5 kg)   SpO2 97%   BMI 29.23 kg/m²     General:  Alert, cooperative, no distress   Head:  Normocephalic, without obvious abnormality, atraumatic. Eyes:  Conjunctivae clear   Ears:  Clear external auditory canals   Nose: Nares normal. Septum midline. Mucosa normal. No drainage or sinus tenderness. Throat: Lips, mucosa, and tongue normal. Unremarkable oropharynx   Neck: Supple, symmetrical, trachea midline, no adenopathy. Lungs:   Clear to auscultation bilaterally. Heart:  Regular rate and rhythm, S1, S2 normal, no murmur, click, rub or gallop. Abdomen:   Soft, non-tender. Bowel sounds normal. No masses. Extremities: Extremities normal no edema. Pulses: +2 radial pulses b/l   Skin:  No rashes or lesions.    Lymph nodes: Cervical LN normal.   Neurologic:  Psych: Stable gait with DME  Euthymic     Patient Care Team   Patient Care Team:  Delisa Garcia MD as PCP - General (Family Practice)  Delisa Garcia MD as PCP - [facility]    Assessment/Plan   Education and counseling provided:  Are appropriate based on today's review and evaluation  End-of-Life planning (with patient's consent)  Pneumococcal Vaccine  Influenza Vaccine  Screening Mammography  Screening Pap and pelvic (covered once every 2 years)  Colorectal cancer screening tests  Cardiovascular screening blood test  Bone mass measurement (DEXA)  Screening for glaucoma  Diabetes screening test    Diagnoses and all orders for this visit:    1. Osteopenia, unspecified location  -     DEXA BONE DENSITY STUDY AXIAL; Future    2. Postmenopausal  -     DEXA BONE DENSITY STUDY AXIAL; Future    3. Mixed hyperlipidemia  -     LIPID PANEL; Future    4. Encounter for screening mammogram for breast cancer  -     ERIC MAMMO BI SCREENING INCL CAD; Future        Health Maintenance Due   Topic Date Due    DTaP/Tdap/Td series (1 - Tdap) 06[birth year]03/1946    Shingrix Vaccine Age 50> (1 of 2) 06/03/1985    GLAUCOMA SCREENING Q2Y  06/03/2000    Bone Densitometry (Dexa) Screening  06/03/2000    MEDICARE YEARLY EXAM  09/28/2019     Health Maintenance:  -Requesting her last formal eye exam and bone density study.  -I encouraged her to get all recommended vaccinations. - Fall risk reduction measures were discussed today  - I gave her the contact info to [facility] as she has some caregiver fatigue from caring for her  who has dementia.  - Ordering a bone density study        Advance Care Planning (ACP) Provider Conversation Snapshot    Date of ACP Conversation: 12/11/19  Persons included in Conversation:  patient  Length of ACP Conversation in minutes:  <16 minutes (Non-Billable)    Authorized Decision Maker (if patient is incapable of making informed decisions): This person is:   Healthcare Agent/Medical Power of  under Advance Directive     For Patients with Decision Making Capacity:   Values/Goals: Exploration of values, goals, and preferences if recovery is not expected, even with continued medical treatment in the event of:  Imminent death  Severe, permanent brain injury    Conversation Outcomes / Follow-Up Plan:   Reviewed existing Advance Directive . She has no changes to make to her preexisting advance directive.        Dr. Jeri Nagel  [facility] of [address] [facility] [address], [address] [place] [facility]  [place], [address]  Phone: [phone]  Fax: [phone]

## 2020-01-04 ENCOUNTER — HOSPITAL ENCOUNTER (OUTPATIENT)
Dept: MAMMOGRAPHY | Age: 85
Discharge: HOME | End: 2020-01-04
Attending: INTERNAL MEDICINE
Payer: MEDICARE

## 2020-01-04 DIAGNOSIS — Z12.31 ENCOUNTER FOR SCREENING MAMMOGRAM FOR BREAST CANCER: ICD-10-CM

## 2020-01-04 PROCEDURE — 77067 SCR MAMMO BI INCL CAD: CPT

## 2020-03-05 ENCOUNTER — HOME HEALTH ADMISSION (OUTPATIENT)
Dept: HOME HEALTH SERVICES | Facility: HOME HEALTH | Age: 85
End: 2020-03-05
Payer: MEDICARE

## 2020-03-06 ENCOUNTER — HOME CARE VISIT (OUTPATIENT)
Dept: SCHEDULING | Facility: HOME HEALTH | Age: 85
End: 2020-03-06
Payer: MEDICARE

## 2020-03-06 PROCEDURE — 3331090001 HH PPS REVENUE CREDIT

## 2020-03-06 PROCEDURE — G0151 HHCP-SERV OF PT,EA 15 MIN: HCPCS

## 2020-03-06 PROCEDURE — 400013 HH SOC

## 2020-03-06 PROCEDURE — 3331090002 HH PPS REVENUE DEBIT

## 2020-03-07 PROCEDURE — 3331090002 HH PPS REVENUE DEBIT

## 2020-03-07 PROCEDURE — 3331090001 HH PPS REVENUE CREDIT

## 2020-03-08 VITALS
OXYGEN SATURATION: 98 % | SYSTOLIC BLOOD PRESSURE: 110 MMHG | HEART RATE: 76 BPM | TEMPERATURE: 97.5 F | RESPIRATION RATE: 16 BRPM | DIASTOLIC BLOOD PRESSURE: 80 MMHG

## 2020-03-08 PROCEDURE — 3331090001 HH PPS REVENUE CREDIT

## 2020-03-08 PROCEDURE — 3331090002 HH PPS REVENUE DEBIT

## 2020-03-09 ENCOUNTER — HOME CARE VISIT (OUTPATIENT)
Dept: SCHEDULING | Facility: HOME HEALTH | Age: 85
End: 2020-03-09
Payer: MEDICARE

## 2020-03-09 VITALS
OXYGEN SATURATION: 98 % | TEMPERATURE: 97.6 F | HEART RATE: 61 BPM | SYSTOLIC BLOOD PRESSURE: 122 MMHG | DIASTOLIC BLOOD PRESSURE: 62 MMHG

## 2020-03-09 PROCEDURE — 3331090002 HH PPS REVENUE DEBIT

## 2020-03-09 PROCEDURE — 3331090001 HH PPS REVENUE CREDIT

## 2020-03-09 PROCEDURE — G0157 HHC PT ASSISTANT EA 15: HCPCS

## 2020-03-10 PROCEDURE — 3331090001 HH PPS REVENUE CREDIT

## 2020-03-10 PROCEDURE — 3331090002 HH PPS REVENUE DEBIT

## 2020-03-11 PROCEDURE — 3331090001 HH PPS REVENUE CREDIT

## 2020-03-11 PROCEDURE — 3331090002 HH PPS REVENUE DEBIT

## 2020-03-12 ENCOUNTER — HOME CARE VISIT (OUTPATIENT)
Dept: SCHEDULING | Facility: HOME HEALTH | Age: 85
End: 2020-03-12
Payer: MEDICARE

## 2020-03-12 PROCEDURE — 3331090002 HH PPS REVENUE DEBIT

## 2020-03-12 PROCEDURE — 3331090001 HH PPS REVENUE CREDIT

## 2020-03-12 PROCEDURE — G0157 HHC PT ASSISTANT EA 15: HCPCS

## 2020-03-13 VITALS
HEART RATE: 64 BPM | SYSTOLIC BLOOD PRESSURE: 130 MMHG | OXYGEN SATURATION: 98 % | DIASTOLIC BLOOD PRESSURE: 80 MMHG | TEMPERATURE: 98 F

## 2020-03-13 PROCEDURE — 3331090002 HH PPS REVENUE DEBIT

## 2020-03-13 PROCEDURE — 3331090001 HH PPS REVENUE CREDIT

## 2020-03-14 PROCEDURE — 3331090001 HH PPS REVENUE CREDIT

## 2020-03-14 PROCEDURE — 3331090002 HH PPS REVENUE DEBIT

## 2020-03-15 PROCEDURE — 3331090001 HH PPS REVENUE CREDIT

## 2020-03-15 PROCEDURE — 3331090002 HH PPS REVENUE DEBIT

## 2020-03-16 PROCEDURE — 3331090002 HH PPS REVENUE DEBIT

## 2020-03-16 PROCEDURE — 3331090001 HH PPS REVENUE CREDIT

## 2020-03-17 ENCOUNTER — HOME CARE VISIT (OUTPATIENT)
Dept: SCHEDULING | Facility: HOME HEALTH | Age: 85
End: 2020-03-17
Payer: MEDICARE

## 2020-03-17 VITALS
SYSTOLIC BLOOD PRESSURE: 110 MMHG | TEMPERATURE: 98.6 F | DIASTOLIC BLOOD PRESSURE: 60 MMHG | HEART RATE: 65 BPM | OXYGEN SATURATION: 97 %

## 2020-03-17 PROCEDURE — 3331090002 HH PPS REVENUE DEBIT

## 2020-03-17 PROCEDURE — 3331090001 HH PPS REVENUE CREDIT

## 2020-03-17 PROCEDURE — G0157 HHC PT ASSISTANT EA 15: HCPCS

## 2020-03-18 PROCEDURE — 3331090002 HH PPS REVENUE DEBIT

## 2020-03-18 PROCEDURE — 3331090001 HH PPS REVENUE CREDIT

## 2020-03-19 ENCOUNTER — HOME CARE VISIT (OUTPATIENT)
Dept: SCHEDULING | Facility: HOME HEALTH | Age: 85
End: 2020-03-19
Payer: MEDICARE

## 2020-03-19 PROCEDURE — 3331090001 HH PPS REVENUE CREDIT

## 2020-03-19 PROCEDURE — 3331090002 HH PPS REVENUE DEBIT

## 2020-03-19 PROCEDURE — G0157 HHC PT ASSISTANT EA 15: HCPCS

## 2020-03-20 PROCEDURE — 3331090002 HH PPS REVENUE DEBIT

## 2020-03-20 PROCEDURE — 3331090001 HH PPS REVENUE CREDIT

## 2020-03-21 PROCEDURE — 3331090001 HH PPS REVENUE CREDIT

## 2020-03-21 PROCEDURE — 3331090002 HH PPS REVENUE DEBIT

## 2020-03-22 VITALS
HEART RATE: 67 BPM | OXYGEN SATURATION: 97 % | TEMPERATURE: 98.3 F | DIASTOLIC BLOOD PRESSURE: 65 MMHG | SYSTOLIC BLOOD PRESSURE: 120 MMHG

## 2020-03-22 PROCEDURE — 3331090002 HH PPS REVENUE DEBIT

## 2020-03-22 PROCEDURE — 3331090001 HH PPS REVENUE CREDIT

## 2020-03-23 PROCEDURE — 3331090002 HH PPS REVENUE DEBIT

## 2020-03-23 PROCEDURE — 3331090001 HH PPS REVENUE CREDIT

## 2020-03-24 PROCEDURE — 3331090001 HH PPS REVENUE CREDIT

## 2020-03-24 PROCEDURE — 3331090002 HH PPS REVENUE DEBIT

## 2020-03-25 ENCOUNTER — HOME CARE VISIT (OUTPATIENT)
Dept: HOME HEALTH SERVICES | Facility: HOME HEALTH | Age: 85
End: 2020-03-25
Payer: MEDICARE

## 2020-03-25 PROCEDURE — 3331090001 HH PPS REVENUE CREDIT

## 2020-03-25 PROCEDURE — 3331090002 HH PPS REVENUE DEBIT

## 2020-03-26 PROCEDURE — 3331090001 HH PPS REVENUE CREDIT

## 2020-03-26 PROCEDURE — 3331090002 HH PPS REVENUE DEBIT

## 2020-03-27 ENCOUNTER — HOME CARE VISIT (OUTPATIENT)
Dept: SCHEDULING | Facility: HOME HEALTH | Age: 85
End: 2020-03-27
Payer: MEDICARE

## 2020-03-27 PROCEDURE — 3331090001 HH PPS REVENUE CREDIT

## 2020-03-27 PROCEDURE — G0157 HHC PT ASSISTANT EA 15: HCPCS

## 2020-03-27 PROCEDURE — 3331090002 HH PPS REVENUE DEBIT

## 2020-03-28 PROCEDURE — 3331090002 HH PPS REVENUE DEBIT

## 2020-03-28 PROCEDURE — 3331090001 HH PPS REVENUE CREDIT

## 2020-03-29 PROCEDURE — 3331090002 HH PPS REVENUE DEBIT

## 2020-03-29 PROCEDURE — 3331090001 HH PPS REVENUE CREDIT

## 2020-03-30 VITALS
SYSTOLIC BLOOD PRESSURE: 120 MMHG | HEART RATE: 70 BPM | DIASTOLIC BLOOD PRESSURE: 65 MMHG | TEMPERATURE: 97.7 F | OXYGEN SATURATION: 97 %

## 2020-03-30 PROCEDURE — 3331090002 HH PPS REVENUE DEBIT

## 2020-03-30 PROCEDURE — 3331090001 HH PPS REVENUE CREDIT

## 2020-03-31 ENCOUNTER — TELEPHONE (OUTPATIENT)
Dept: INTERNAL MEDICINE CLINIC | Age: 85
End: 2020-03-31

## 2020-03-31 ENCOUNTER — HOME CARE VISIT (OUTPATIENT)
Dept: SCHEDULING | Facility: HOME HEALTH | Age: 85
End: 2020-03-31
Payer: MEDICARE

## 2020-03-31 VITALS
RESPIRATION RATE: 12 BRPM | SYSTOLIC BLOOD PRESSURE: 120 MMHG | HEART RATE: 63 BPM | DIASTOLIC BLOOD PRESSURE: 68 MMHG | OXYGEN SATURATION: 99 % | TEMPERATURE: 97.8 F

## 2020-03-31 PROCEDURE — G0151 HHCP-SERV OF PT,EA 15 MIN: HCPCS

## 2020-03-31 PROCEDURE — 3331090001 HH PPS REVENUE CREDIT

## 2020-03-31 PROCEDURE — 3331090002 HH PPS REVENUE DEBIT

## 2020-04-01 PROCEDURE — 3331090002 HH PPS REVENUE DEBIT

## 2020-04-01 PROCEDURE — 3331090001 HH PPS REVENUE CREDIT

## 2020-04-02 ENCOUNTER — HOME CARE VISIT (OUTPATIENT)
Dept: HOME HEALTH SERVICES | Facility: HOME HEALTH | Age: 85
End: 2020-04-02
Payer: MEDICARE

## 2020-04-02 PROCEDURE — 3331090002 HH PPS REVENUE DEBIT

## 2020-04-02 PROCEDURE — 3331090001 HH PPS REVENUE CREDIT

## 2020-04-03 ENCOUNTER — HOME CARE VISIT (OUTPATIENT)
Dept: HOME HEALTH SERVICES | Facility: HOME HEALTH | Age: 85
End: 2020-04-03
Payer: MEDICARE

## 2020-04-03 PROCEDURE — 3331090002 HH PPS REVENUE DEBIT

## 2020-04-03 PROCEDURE — 3331090001 HH PPS REVENUE CREDIT

## 2020-04-04 PROCEDURE — 3331090001 HH PPS REVENUE CREDIT

## 2020-04-04 PROCEDURE — 3331090002 HH PPS REVENUE DEBIT

## 2020-04-04 NOTE — PROGRESS NOTES
Patient has been discharged from Home care. Jaime elected to discharge 2 visits early in response to Covid-19 and to prevent exposre to people outside her facility. PT recently re-assessed the patient and she did make good progress toward her goals for improved safety and independence. A discharge summary is available for your review.    Thank you for your referral.

## 2020-05-01 ENCOUNTER — VIRTUAL VISIT (OUTPATIENT)
Dept: INTERNAL MEDICINE CLINIC | Age: 85
End: 2020-05-01

## 2020-05-01 DIAGNOSIS — M51.9 LUMBAR DISC DISEASE: ICD-10-CM

## 2020-05-01 DIAGNOSIS — N30.00 ACUTE CYSTITIS WITHOUT HEMATURIA: ICD-10-CM

## 2020-05-01 DIAGNOSIS — R73.02 IMPAIRED GLUCOSE TOLERANCE: ICD-10-CM

## 2020-05-01 DIAGNOSIS — R73.9 HYPERGLYCEMIA: ICD-10-CM

## 2020-05-01 DIAGNOSIS — I10 ESSENTIAL HYPERTENSION: Primary | ICD-10-CM

## 2020-05-01 NOTE — PROGRESS NOTES
Hema Hanks is a 80 y.o. female who was seen by synchronous (real-time) audio phone only technology on 5/1/2020. Assessment & Plan:   1 UTI:   - Complete abx course. - Notify me if sx do not resolve. 2. HTN: Stable. - C/w rx as prescribed. Checking labs (urine protein screen, CMP, lipid panel, and an A1C)    3. Chronic Neuropathic Pain: +H/o lumbar disc disease  - C/w rx as prescribed. 4. Prediabetes/HLD:  - C/w rx as prescribed. - Checking labs just before her f/u apt (urine protein screen, CMP, lipid panel, and an A1C)      Lab review: labs are reviewed in the EHR and ordered as mentioned above     I have discussed the diagnosis with the patient and the intended plan as seen in the above orders. I have discussed medication side effects and warnings with the patient as well. I have reviewed the plan of care with the patient, accepted their input and they are in agreement with the treatment goals. All questions were answered. The patient understands the plan of care. Pt instructed if symptoms worsen to call the office or report to the ED for continued care. Greater than 50% of the visit time was spent in counseling and/or coordination of care. I spent 12 min with the pt for this encounter. Voice recognition was used to generate this report, which may have resulted in some phonetic based errors in grammar and contents. Even though attempts were made to correct all the mistakes, some may have been missed, and remained in the body of the document. Subjective:   Hema Hanks was seen for   Chief Complaint   Patient presents with    Follow Up Chronic Condition     The patient is an 59-year-old female with history of sigmoid colon cancer status post surgery, prediabetes, history of strictures, osteopenia, hyponatremia, lumbar disc disease, hypertension, IBS, vitamin D deficiency, allergic rhinitis, GERD, and urinary retention. 1. Neuropathic Pain: Taking gabapentin 100mg tid. She has drowsiness with taking gabapentin. She does not take it as a result tid. Sometimes she will take it bid. Back pain: none. Pain: 3/10. Drug use: none. ETOH use: none. No recent falls. She has weakness and pain in her legs. +H/o lumbar disc disease. \"The pain in my legs is coming from my back but I don't have back pain. \"    2. HTN: Home BP checks: yes. Her BP was 142/62 this morning. Taking: lasix. No adverse side effects from this rx. 3. UTI: Earlier this wk, she was diagnosed with acute cystitis and placed on keflex. 4. Prediabetes/HLD: Her last A1C was 6. She's been eating a lot of cereal. She is trying to lower her carbs though. +Statin. Prior to Admission medications    Medication Sig Start Date End Date Taking? Authorizing Provider   docusate sodium (COLACE) 100 mg capsule Take 100 mg by mouth two (2) times daily as needed. Provider, Historical   fluticasone propionate (FLONASE) 50 mcg/actuation nasal spray 2 Sprays by Both Nostrils route daily as needed. Provider, Historical   ondansetron hcl (ZOFRAN) 4 mg tablet Take 4 mg by mouth every eight (8) hours as needed for Nausea. Provider, Historical   pravastatin (PRAVACHOL) 10 mg tablet Take 1 Tab by mouth nightly. 10/10/19   Cassandra Soriano MD   gabapentin (NEURONTIN) 100 mg capsule Take 1 Cap by mouth three (3) times daily. Max Daily Amount: 300 mg. 8/12/19   Cassandra Soriano MD   alum-mag hydroxide-simeth (MYLANTA) 200-200-20 mg/5 mL susp Take 30 mL by mouth daily as needed for Other (PRN: Gastric Reflux, in PM). PRN: Gastric Reflux, in PM    Provider, Historical   lidocaine (LIDODERM) 5 % 1 Patch by TransDERmal route every twelve (12) hours. Apply patch to the affected area for 12 hours a day and remove for 12 hours a day. 6/11/19   Cassandra Soriano MD   montelukast (SINGULAIR) 10 mg tablet Take 10 mg by mouth daily. 12/27/18   Provider, Historical   famotidine (PEPCID) 20 mg tablet Take 20 mg by mouth daily.  1/10/19 Provider, Historical   tamsulosin (FLOMAX) 0.4 mg capsule Take 1 Cap by mouth daily (after dinner). 10/11/18   Courtney Gutierrez MD   varicella-zoster recombinant, PF, (SHINGRIX) 50 mcg/0.5 mL susr injection 0.5 mL by IntraMUSCular route every two (2) months. 9/27/18   Tomasa Velarde MD   melatonin tab tablet Take 5 mg by mouth nightly. Provider, Historical   cholecalciferol (VITAMIN D3) 1,000 unit cap Take 1,000 Units by mouth daily. Provider, Historical   multivitamin/iron/folic acid (CENTRUM WOMEN PO) Take 1 Tab by mouth daily. Provider, Historical   acetaminophen (TYLENOL) 325 mg tablet Take 500 mg by mouth every four (4) hours as needed for Pain. 500 mg tabs    Provider, Historical   cetirizine (ZYRTEC) 10 mg tablet Take 10 mg by mouth daily. Provider, Historical   polyethylene glycol (MIRALAX) 17 gram packet Take 17 g by mouth daily as needed for Other (PRN Constipation). PRN Constipation    Provider, Historical   Bifidobacterium Infantis (ALIGN) 4 mg cap Take 4 mg by mouth daily. Provider, Historical   furosemide (LASIX) 20 mg tablet Take 1 Tab by mouth every other day. Indications: Edema 8/28/18   Tomasa Velarde MD   mirtazapine (REMERON) 15 mg tablet Take 1 Tab by mouth nightly. Indications: major depressive disorder 8/28/18   Tomasa Velarde MD   dicyclomine (BENTYL) 10 mg capsule Take 1 Cap by mouth four (4) times daily. Patient taking differently: Take 1 Cap by mouth four (4) times daily as needed for Other (PRN:  IBS symptoms). PRN:  IBS symptoms 8/28/18   Tomasa Velarde MD     Allergies   Allergen Reactions    Macrobid [Nitrofurantoin Monohyd/M-Cryst] Nausea and Vomiting    Pneumococcal Vaccine Other (comments)    Lexapro [Escitalopram Oxalate] Other (comments)     ? Hyponatremia per pt hx     Past Medical History:   Diagnosis Date    Arthritis     Cancer [facility]) 2002    Colon     Chronic pain     abdominal due to IBS    Depression     GERD (gastroesophageal reflux disease)     Hypertension     Migraine headache 2014    Occipital neuralgia 2014    PUD (peptic ulcer disease)     S/P colon resection 2002    clear presently      Past Surgical History:   Procedure Laterality Date    HX CHOLECYSTECTOMY  2015    HX HYSTERECTOMY  1965    HX TONSIL AND ADENOIDECTOMY      HX WISDOM TEETH EXTRACTION      x4     Family History   Problem Relation Age of Onset    Hypertension Mother     Stroke Mother     Heart Attack Father     Hypertension Father     Parkinson's Disease Sister     Hypertension Brother     Cancer Brother         prostate    No Known Problems Son     No Known Problems Daughter      Social History     Socioeconomic History    Marital status:      Spouse name: Not on file    Number of children: Not on file    Years of education: Not on file    Highest education level: Not on file   Tobacco Use    Smoking status: Never Smoker    Smokeless tobacco: Never Used   Substance and Sexual Activity    Alcohol use: Yes     Alcohol/week: 0.0 - 1.0 standard drinks    Sexual activity: Never       ROS:  Gen: No fever/chills  HEENT: No sore throat, eye pain, ear pain, or congestion. No HA  CV: No CP  Resp: No cough/SOB  GI: No abdominal pain  : No hematuria/dysuria  Derm: No rash  Neuro: No new paresthesias/weakness  Musc: No new myalgias/jt pain. +Chronic BLE pain is burning in quality.   Psych: +Stress with taking care of her  who has dementia    LABS:  Lab Results   Component Value Date/Time    Sodium 138 10/03/2019 10:36 AM    Potassium 4.7 10/03/2019 10:36 AM    Chloride 100 10/03/2019 10:36 AM    CO2 30 10/03/2019 10:36 AM    Anion gap 8 10/03/2019 10:36 AM    Glucose 96 10/03/2019 10:36 AM    BUN 14 10/03/2019 10:36 AM    Creatinine 0.88 10/03/2019 10:36 AM    BUN/Creatinine ratio 16 10/03/2019 10:36 AM    GFR est AA >60 10/03/2019 10:36 AM    GFR est non-AA >60 10/03/2019 10:36 AM    Calcium 9.0 10/03/2019 10:36 AM       Lab Results Component Value Date/Time    Cholesterol, total 230 (H) 10/03/2019 10:36 AM    HDL Cholesterol 61 (H) 10/03/2019 10:36 AM    LDL, calculated 145.6 (H) 10/03/2019 10:36 AM    VLDL, calculated 23.4 10/03/2019 10:36 AM    Triglyceride 117 10/03/2019 10:36 AM    CHOL/HDL Ratio 3.8 10/03/2019 10:36 AM       Lab Results   Component Value Date/Time    WBC 5.0 10/03/2019 10:36 AM    HGB 12.2 10/03/2019 10:36 AM    HCT 39.3 10/03/2019 10:36 AM    PLATELET 609 52/68/4600 10:36 AM    MCV 88.9 10/03/2019 10:36 AM       Lab Results   Component Value Date/Time    Hemoglobin A1c 6.0 (H) 10/03/2019 10:36 AM       Lab Results   Component Value Date/Time    TSH 2.80 08/28/2018 04:12 PM           Due to this being a TeleHealth phone only evaluation, many elements of the physical examination are unable to be assessed. The pt was seen by synchronous (real-time) audio phone only technology, and/or her healthcare decision maker, is aware that this patient-initiated, Telehealth encounter is a billable service, with coverage as determined by her insurance carrier. She is aware that she may receive a bill and has provided verbal consent to proceed: Yes. The pt is being evaluated by a phone only visit encounter for concerns as above. A caregiver was present when appropriate. Due to this being a TeleHealth encounter (During QVTHG-89 public health emergency), evaluation of the following organ systems was limited: Vitals/Constitutional/EENT/Resp/CV/GI//MS/Neuro/Skin/Heme-Lymph-Imm. Pursuant to the emergency declaration under the [address] [place] [address], [address] [place] [address] authority and the [employer] and [employer] Act, this Virtual phone only Visit was conducted, with patient's (and/or legal guardian's) consent, to reduce the patient's risk of exposure to COVID-19 and provide necessary medical care.      Services were provided through a phone only synchronous discussion virtually to substitute for in-person clinic visit. Patient and provider were located at their individual homes. We discussed the expected course, resolution and complications of the diagnosis(es) in detail. Medication risks, benefits, costs, interactions, and alternatives were discussed as indicated. I advised her to contact the office if her condition worsens, changes or fails to improve as anticipated. She expressed understanding with the diagnosis(es) and plan.      Raphael Meléndez MD

## 2020-05-04 ENCOUNTER — TELEPHONE (OUTPATIENT)
Dept: INTERNAL MEDICINE CLINIC | Age: 85
End: 2020-05-04

## 2020-05-04 NOTE — TELEPHONE ENCOUNTER
Patient reached and given message per DR. Dakota Hart, patient verbalizes understanding and had no further questions.

## 2020-05-04 NOTE — TELEPHONE ENCOUNTER
Please have her stay hydrated with water. Once the laxatives wear off, her stools should solidify and return back to normal. She should hold all laxatives for now.     Dr. Ofe Zhu  [facility], [address] [place] [address], [address] [place] [address] Str.  Phone: [phone]  Fax: [phone]

## 2020-05-04 NOTE — TELEPHONE ENCOUNTER
Patient has taken Miralax and Citrate of Magnesium because of no BM. However, she has been unable to stop going and she says it's all watery now. Please advise.

## 2020-06-16 ENCOUNTER — HOSPITAL ENCOUNTER (OUTPATIENT)
Dept: LAB | Age: 85
Discharge: HOME | End: 2020-06-16

## 2020-06-16 PROCEDURE — 87635 SARS-COV-2 COVID-19 AMP PRB: CPT

## 2020-06-17 LAB — SARS-COV-2, COV2NT: NOT DETECTED

## 2020-06-20 ENCOUNTER — HOSPITAL ENCOUNTER (OUTPATIENT)
Age: 85
Discharge: HOME | End: 2020-06-20
Attending: INTERNAL MEDICINE
Payer: MEDICARE

## 2020-06-20 DIAGNOSIS — R93.89 ABNORMAL CT SCAN: ICD-10-CM

## 2020-06-20 DIAGNOSIS — K59.00 CONSTIPATION: ICD-10-CM

## 2020-06-20 DIAGNOSIS — K86.2 PANCREAS CYST: ICD-10-CM

## 2020-06-20 DIAGNOSIS — Z85.038 HISTORY OF COLON CANCER: ICD-10-CM

## 2020-06-20 DIAGNOSIS — R19.7 DIARRHEA: ICD-10-CM

## 2020-06-20 DIAGNOSIS — K86.89 MASS OF PANCREAS: ICD-10-CM

## 2020-06-20 DIAGNOSIS — R10.9 ABDOMINAL PAIN: ICD-10-CM

## 2020-06-20 LAB — CREAT UR-MCNC: 0.9 MG/DL (ref 0.6–1.3)

## 2020-06-20 PROCEDURE — 82565 ASSAY OF CREATININE: CPT

## 2020-06-20 PROCEDURE — 74183 MRI ABD W/O CNTR FLWD CNTR: CPT

## 2020-06-20 PROCEDURE — A9577 INJ MULTIHANCE: HCPCS | Performed by: INTERNAL MEDICINE

## 2020-06-20 PROCEDURE — 74011250636 HC RX REV CODE- 250/636: Performed by: INTERNAL MEDICINE

## 2020-06-20 RX ADMIN — GADOBENATE DIMEGLUMINE 20 ML: 529 INJECTION, SOLUTION INTRAVENOUS at 13:00

## 2020-06-23 ENCOUNTER — HOSPITAL ENCOUNTER (OUTPATIENT)
Dept: LAB | Age: 85
Discharge: HOME | End: 2020-06-23

## 2020-06-23 PROCEDURE — 87635 SARS-COV-2 COVID-19 AMP PRB: CPT

## 2020-06-24 LAB — SARS-COV-2, COV2NT: NOT DETECTED

## 2020-07-27 ENCOUNTER — HOSPITAL ENCOUNTER (OUTPATIENT)
Dept: LAB | Age: 85
Discharge: HOME | End: 2020-07-27

## 2020-07-27 PROCEDURE — 87635 SARS-COV-2 COVID-19 AMP PRB: CPT

## 2020-07-29 LAB — SARS-COV-2, COV2NT: NOT DETECTED

## 2020-08-03 ENCOUNTER — HOSPITAL ENCOUNTER (OUTPATIENT)
Dept: LAB | Age: 85
Discharge: HOME | End: 2020-08-03

## 2020-08-03 PROCEDURE — 87635 SARS-COV-2 COVID-19 AMP PRB: CPT

## 2020-08-06 LAB — SARS-COV-2, COV2NT: NOT DETECTED

## 2020-08-10 ENCOUNTER — HOSPITAL ENCOUNTER (OUTPATIENT)
Dept: LAB | Age: 85
Discharge: HOME | End: 2020-08-10

## 2020-08-10 PROCEDURE — 87635 SARS-COV-2 COVID-19 AMP PRB: CPT

## 2020-08-12 LAB — SARS-COV-2, COV2NT: NOT DETECTED

## 2020-08-17 ENCOUNTER — HOSPITAL ENCOUNTER (OUTPATIENT)
Dept: LAB | Age: 85
Discharge: HOME | End: 2020-08-17

## 2020-08-17 DIAGNOSIS — M51.9 LUMBAR DISC DISEASE: ICD-10-CM

## 2020-08-17 PROCEDURE — 87635 SARS-COV-2 COVID-19 AMP PRB: CPT

## 2020-08-17 NOTE — TELEPHONE ENCOUNTER
Received notification from [facility], that they will need a new printed script for the gabapentin 100 mg. Patient is out of fills. Will need to fax with cover sheet provided to [phone]  PCP: Haven Holley MD    Last appt: 5/1/2020  Future Appointments   Date Time Provider [place]   8/27/2020 10:25 AM [facility] NURSE VISIT [facility] JESSICA   9/3/2020 10:30 AM Haven Holley MD [address]       Requested Prescriptions     Pending Prescriptions Disp Refills    gabapentin (NEURONTIN) 100 mg capsule 90 Cap 5     Sig: Take 1 Cap by mouth three (3) times daily. Max Daily Amount: 300 mg.

## 2020-08-18 RX ORDER — GABAPENTIN 100 MG/1
100 CAPSULE ORAL 3 TIMES DAILY
Qty: 90 CAP | Refills: 5 | Status: SHIPPED | OUTPATIENT
Start: 2020-08-18 | End: 2021-03-11 | Stop reason: SDUPTHER

## 2020-08-19 LAB — SARS-COV-2, COV2NT: NOT DETECTED

## 2020-08-24 ENCOUNTER — TELEPHONE (OUTPATIENT)
Dept: INTERNAL MEDICINE CLINIC | Age: 85
End: 2020-08-24

## 2020-08-24 ENCOUNTER — HOSPITAL ENCOUNTER (OUTPATIENT)
Dept: LAB | Age: 85
Discharge: HOME | End: 2020-08-24

## 2020-08-24 PROCEDURE — 87635 SARS-COV-2 COVID-19 AMP PRB: CPT

## 2020-08-24 NOTE — TELEPHONE ENCOUNTER
Please mail a copy of her labs ordered in May 2020, to [facility], for the patient to get before her virtual appointment with me next month.     Dr. Campuzano Maria Esther  [facility] [address], [address] [place] [address] Str.  Phone: [phone]  Fax: [phone]

## 2020-08-24 NOTE — TELEPHONE ENCOUNTER
Pt stay at [facility], needs to get labs done prior to her vv appt  09/03  She is asking for order to be sent to [facility] for her labs to be done there .

## 2020-08-26 LAB — SARS-COV-2, COV2NT: NOT DETECTED

## 2020-08-27 ENCOUNTER — HOSPITAL ENCOUNTER (OUTPATIENT)
Dept: LAB | Age: 85
Discharge: HOME | End: 2020-08-27
Payer: COMMERCIAL

## 2020-08-27 PROCEDURE — 82043 UR ALBUMIN QUANTITATIVE: CPT

## 2020-08-27 PROCEDURE — 83036 HEMOGLOBIN GLYCOSYLATED A1C: CPT

## 2020-08-28 LAB
CREAT UR-MCNC: 128 MG/DL (ref 30–125)
EST. AVERAGE GLUCOSE BLD GHB EST-MCNC: 120 MG/DL
HBA1C MFR BLD: 5.8 % (ref 4.2–5.6)
MICROALBUMIN UR-MCNC: 4.03 MG/DL (ref 0–3)
MICROALBUMIN/CREAT UR-RTO: 31 MG/G (ref 0–30)

## 2020-08-28 NOTE — PROGRESS NOTES
I will discuss her results with her at her upcoming apt. Her A1C is 5.8 down from 6 eleven months ago. Her microalbuminuria has improved over the past year.     Dr. Lianna Boo  [facility], [address] [place] [address], [address] [place] [address] Str.  Phone: [phone]  Fax: [phone]

## 2020-08-31 ENCOUNTER — HOSPITAL ENCOUNTER (OUTPATIENT)
Dept: LAB | Age: 85
Discharge: HOME | End: 2020-08-31
Payer: COMMERCIAL

## 2020-08-31 LAB
ALBUMIN SERPL-MCNC: 3.7 G/DL (ref 3.4–5)
ALBUMIN/GLOB SERPL: 1 {RATIO} (ref 0.8–1.7)
ALP SERPL-CCNC: 88 U/L (ref 45–117)
ALT SERPL-CCNC: 25 U/L (ref 13–56)
ANION GAP SERPL CALC-SCNC: 5 MMOL/L (ref 3–18)
AST SERPL-CCNC: 28 U/L (ref 10–38)
BILIRUB SERPL-MCNC: 0.5 MG/DL (ref 0.2–1)
BUN SERPL-MCNC: 15 MG/DL (ref 7–18)
BUN/CREAT SERPL: 14 (ref 12–20)
CALCIUM SERPL-MCNC: 9 MG/DL (ref 8.5–10.1)
CHLORIDE SERPL-SCNC: 108 MMOL/L (ref 100–111)
CO2 SERPL-SCNC: 28 MMOL/L (ref 21–32)
CREAT SERPL-MCNC: 1.07 MG/DL (ref 0.6–1.3)
GLOBULIN SER CALC-MCNC: 3.6 G/DL (ref 2–4)
GLUCOSE SERPL-MCNC: 111 MG/DL (ref 74–99)
POTASSIUM SERPL-SCNC: 4.8 MMOL/L (ref 3.5–5.5)
PROT SERPL-MCNC: 7.3 G/DL (ref 6.4–8.2)
SODIUM SERPL-SCNC: 141 MMOL/L (ref 136–145)

## 2020-08-31 PROCEDURE — 80053 COMPREHEN METABOLIC PANEL: CPT

## 2020-09-03 ENCOUNTER — VIRTUAL VISIT (OUTPATIENT)
Dept: INTERNAL MEDICINE CLINIC | Age: 85
End: 2020-09-03

## 2020-09-03 DIAGNOSIS — I10 ESSENTIAL HYPERTENSION: ICD-10-CM

## 2020-09-03 DIAGNOSIS — F32.A DEPRESSION, UNSPECIFIED DEPRESSION TYPE: ICD-10-CM

## 2020-09-03 DIAGNOSIS — R73.02 IMPAIRED GLUCOSE TOLERANCE: ICD-10-CM

## 2020-09-03 DIAGNOSIS — C18.7 CANCER OF SIGMOID (HCC): ICD-10-CM

## 2020-09-03 DIAGNOSIS — E78.2 MIXED HYPERLIPIDEMIA: ICD-10-CM

## 2020-09-03 DIAGNOSIS — K59.09 CHRONIC CONSTIPATION: Primary | ICD-10-CM

## 2020-09-03 DIAGNOSIS — M79.2 NEUROPATHIC PAIN: ICD-10-CM

## 2020-09-03 NOTE — PROGRESS NOTES
Leana Bueno is a 80 y.o. female who was seen by synchronous (real-time) audio-phone only technology on 9/3/2020. Assessment & Plan:   1. Constipation: +H/o colon cancer, in remission. +H/o IBS. - Ok to c/w miralax prn.  - Referral to GI placed. 2.  HTN/HLD/Prediabetes: Stable. - C/w rx as prescribed. - I encouraged her to check her BPs at home once a day and to notify me if her sx are persistently greater than 140/90 for a wk. 3. Chronic Neuropathic Pain: +H/o lumbar disc disease  - C/w rx as prescribed. 4. Depression: Stable. - C/w rx as prescribed. 5. Health Maintenance:  - She will get her flu vaccine at the assisted living facility she lives at per our discussion      Lab review: labs are reviewed in the EHR     I have discussed the diagnosis with the patient and the intended plan as seen in the above orders. I have discussed medication side effects and warnings with the patient as well. I have reviewed the plan of care with the patient, accepted their input and they are in agreement with the treatment goals. All questions were answered. The patient understands the plan of care. Pt instructed if symptoms worsen to call the office or report to the ED for continued care. Greater than 50% of the visit time was spent in counseling and/or coordination of care. I spent 20 min with the pt for this phone only encounter. Voice recognition was used to generate this report, which may have resulted in some phonetic based errors in grammar and contents. Even though attempts were made to correct all the mistakes, some may have been missed, and remained in the body of the document.       Subjective:   Leana Bueno was seen for   Chief Complaint   Patient presents with    Follow-up     The patient is an 68-year-old female with history of sigmoid colon cancer status post surgery, prediabetes, history of strictures, osteopenia, hyponatremia, lumbar disc disease, hypertension, IBS, vitamin D deficiency, allergic rhinitis, GERD, and urinary retention. 1. Constipation:  Taking miralax prn. No urinary sx. She had a BM this morning. No abdominal pain. No bleeding. +Straining. +H/o sigmoid colon cancer s/p surgery. In remission. She has not seen a GI doctor in a year. She uses bentyl prn.     2. Chronic Neuropathic Pain:  Taking: gabapentin prn 2/2 drowsiness/dizziness she has when she takes it. She takes it on avg bid instead of tid as a result. +H/o lumbar disc disease. +Neck pain. Her neck pain is worse than her lower back pain. 3. Prediabetes: She is gaining weight. Her A1C was 5.8 last month. 4. HLD/HTN: +Mild microalbuminuria per her August labs. Creatinine is WNL. Taking pravastatin and lasix. Home BP checks: her BP was 187/80 yesterday. Her BP was 159/79 on Sept 1, 2020. BP was 132/71 this morning. BP Readings from Last 3 Encounters:   03/31/20 120/68   03/27/20 120/65   03/19/20 120/65     5. Depression: Taking mirtazapine. She believes this is working well to control her depression sx. She denies depression sx recently. Prior to Admission medications    Medication Sig Start Date End Date Taking? Authorizing Provider   gabapentin (NEURONTIN) 100 mg capsule Take 1 Cap by mouth three (3) times daily. Max Daily Amount: 300 mg. 8/18/20   Viri Flores MD   docusate sodium (COLACE) 100 mg capsule Take 100 mg by mouth two (2) times daily as needed. Provider, Historical   fluticasone propionate (FLONASE) 50 mcg/actuation nasal spray 2 Sprays by Both Nostrils route daily as needed. Provider, Historical   ondansetron hcl (ZOFRAN) 4 mg tablet Take 4 mg by mouth every eight (8) hours as needed for Nausea. Provider, Historical   pravastatin (PRAVACHOL) 10 mg tablet Take 1 Tab by mouth nightly. 10/10/19   Viri Flores MD   alum-mag hydroxide-simeth (MYLANTA) 200-200-20 mg/5 mL susp Take 30 mL by mouth daily as needed for Other (PRN: Gastric Reflux, in PM).  PRN: Gastric Reflux, in PM    Provider, Historical   lidocaine (LIDODERM) 5 % 1 Patch by TransDERmal route every twelve (12) hours. Apply patch to the affected area for 12 hours a day and remove for 12 hours a day. 6/11/19   Alejandro Gillis MD   famotidine (PEPCID) 20 mg tablet Take 20 mg by mouth daily. 1/10/19   Provider, Historical   varicella-zoster recombinant, PF, (SHINGRIX) 50 mcg/0.5 mL susr injection 0.5 mL by IntraMUSCular route every two (2) months. 9/27/18   Alejandro Gillis MD   melatonin tab tablet Take 5 mg by mouth nightly. Provider, Historical   cholecalciferol (VITAMIN D3) 1,000 unit cap Take 1,000 Units by mouth daily. Provider, Historical   multivitamin/iron/folic acid (CENTRUM WOMEN PO) Take 1 Tab by mouth daily. Provider, Historical   acetaminophen (TYLENOL) 325 mg tablet Take 500 mg by mouth every four (4) hours as needed for Pain. 500 mg tabs    Provider, Historical   cetirizine (ZYRTEC) 10 mg tablet Take 10 mg by mouth daily. Provider, Historical   polyethylene glycol (MIRALAX) 17 gram packet Take 17 g by mouth daily as needed for Other (PRN Constipation). PRN Constipation    Provider, Historical   Bifidobacterium Infantis (ALIGN) 4 mg cap Take 4 mg by mouth daily. Provider, Historical   furosemide (LASIX) 20 mg tablet Take 1 Tab by mouth every other day. Indications: Edema 8/28/18   Alejandro Gillis MD   mirtazapine (REMERON) 15 mg tablet Take 1 Tab by mouth nightly. Indications: major depressive disorder 8/28/18   Alejandro Gillis MD   dicyclomine (BENTYL) 10 mg capsule Take 1 Cap by mouth four (4) times daily. Patient taking differently: Take 1 Cap by mouth four (4) times daily as needed for Other (PRN:  IBS symptoms).  PRN:  IBS symptoms 8/28/18   Alejandro Gillis MD     Allergies   Allergen Reactions    Macrobid [Nitrofurantoin Monohyd/M-Cryst] Nausea and Vomiting    Pneumococcal Vaccine Other (comments)    Lexapro [Escitalopram Oxalate] Other (comments) ?Hyponatremia per pt hx     Past Medical History:   Diagnosis Date    Arthritis     Cancer [facility]) 2002    Colon     Chronic pain     abdominal due to IBS    Depression     GERD (gastroesophageal reflux disease)     Hypertension     Migraine headache 2014    Occipital neuralgia 2014    PUD (peptic ulcer disease)     S/P colon resection 2002    clear presently      Past Surgical History:   Procedure Laterality Date    HX CHOLECYSTECTOMY  2015    HX HYSTERECTOMY  1965    HX TONSIL AND ADENOIDECTOMY      HX WISDOM TEETH EXTRACTION      x4     Family History   Problem Relation Age of Onset    Hypertension Mother     Stroke Mother     Heart Attack Father     Hypertension Father     Parkinson's Disease Sister     Hypertension Brother     Cancer Brother         prostate    No Known Problems Son     No Known Problems Daughter      Social History     Socioeconomic History    Marital status:      Spouse name: Not on file    Number of children: Not on file    Years of education: Not on file    Highest education level: Not on file   Tobacco Use    Smoking status: Never Smoker    Smokeless tobacco: Never Used   Substance and Sexual Activity    Alcohol use: Yes     Alcohol/week: 0.0 - 1.0 standard drinks    Sexual activity: Never       ROS:  Gen: No fever/chills  HEENT: No sore throat, eye pain, ear pain, or congestion. No HA  CV: No CP  Resp: No cough/SOB  GI: No abdominal pain  : No hematuria/dysuria  Derm: No rash  Neuro: No new paresthesias  Musc: No new myalgias/jt pain except for +neck pain which is worsening.    Psych: No depression sx      LABS:  Lab Results   Component Value Date/Time    Sodium 141 08/31/2020 08:35 AM    Potassium 4.8 08/31/2020 08:35 AM    Chloride 108 08/31/2020 08:35 AM    CO2 28 08/31/2020 08:35 AM    Anion gap 5 08/31/2020 08:35 AM    Glucose 111 (H) 08/31/2020 08:35 AM    BUN 15 08/31/2020 08:35 AM    Creatinine 1.07 08/31/2020 08:35 AM    BUN/Creatinine ratio 14 08/31/2020 08:35 AM    GFR est AA 59 (L) 08/31/2020 08:35 AM    GFR est non-AA 49 (L) 08/31/2020 08:35 AM    Calcium 9.0 08/31/2020 08:35 AM       Lab Results   Component Value Date/Time    Cholesterol, total 230 (H) 10/03/2019 10:36 AM    HDL Cholesterol 61 (H) 10/03/2019 10:36 AM    LDL, calculated 145.6 (H) 10/03/2019 10:36 AM    VLDL, calculated 23.4 10/03/2019 10:36 AM    Triglyceride 117 10/03/2019 10:36 AM    CHOL/HDL Ratio 3.8 10/03/2019 10:36 AM       Lab Results   Component Value Date/Time    WBC 5.0 10/03/2019 10:36 AM    HGB 12.2 10/03/2019 10:36 AM    HCT 39.3 10/03/2019 10:36 AM    PLATELET 266 15/40/6207 10:36 AM    MCV 88.9 10/03/2019 10:36 AM       Lab Results   Component Value Date/Time    Hemoglobin A1c 5.8 (H) 08/27/2020 07:29 AM       Lab Results   Component Value Date/Time    TSH 2.80 08/28/2018 04:12 PM           Due to this being a TeleHealth phone only  evaluation, many elements of the physical examination are unable to be assessed. The pt was seen by synchronous (real-time) audio-phone only technology, and/or her healthcare decision maker, is aware that this patient-initiated, Telehealth encounter is a billable service, with coverage as determined by her insurance carrier. She is aware that she may receive a bill and has provided verbal consent to proceed: Yes. The pt is being evaluated by a phone only visit encounter for concerns as above. A caregiver was present when appropriate. Due to this being a TeleHealth encounter (During LMWAY-95 public health emergency), evaluation of the following organ systems was limited: Vitals/Constitutional/EENT/Resp/CV/GI//MS/Neuro/Skin/Heme-Lymph-Imm.    Pursuant to the emergency declaration under the [address] [place] [address], [address] [place] [address] and the [employer] and [employer] Act, this Virtual phone only Visit was conducted, with patient's (and/or legal guardian's) consent, to reduce the patient's risk of exposure to COVID-19 and provide necessary medical care. Services were provided through a phone only synchronous discussion virtually to substitute for in-person clinic visit. Patient and provider were located at their individual homes. We discussed the expected course, resolution and complications of the diagnosis(es) in detail. Medication risks, benefits, costs, interactions, and alternatives were discussed as indicated. I advised her to contact the office if her condition worsens, changes or fails to improve as anticipated. She expressed understanding with the diagnosis(es) and plan.      Zachery Dexter MD

## 2020-09-04 ENCOUNTER — TELEPHONE (OUTPATIENT)
Dept: INTERNAL MEDICINE CLINIC | Age: 85
End: 2020-09-04

## 2020-09-04 NOTE — TELEPHONE ENCOUNTER
----- Message from Jarad Arrieta MD sent at 9/3/2020 10:56 AM EDT -----  Regarding: F/u apt needed  Please schedule her for a follow up apt (30 min) in March 2021 - virtual.    Thanks,  Dr. Ben Burrows  [facility], [address] [place] [address], [address] [place] [address] Str.  Phone: [phone]  Fax: [phone]

## 2020-09-11 ENCOUNTER — TELEPHONE (OUTPATIENT)
Dept: INTERNAL MEDICINE CLINIC | Age: 85
End: 2020-09-11

## 2020-09-11 NOTE — TELEPHONE ENCOUNTER
9/1/20 169/79  9/2/20 138/80  9/3/20 132/71  9/4/20 144/77  9/5/20 131/79  9/6/20 130/60  9/7/20 127/66  9/8/20 137/71  9/11/20  134/64    She took bp about 10 am each day

## 2020-09-14 NOTE — TELEPHONE ENCOUNTER
Patient reached and given message per DR. Janell Pringle. Patient verbalizes understanding and had no further questions.

## 2020-09-14 NOTE — TELEPHONE ENCOUNTER
Please let her know that her BPs are reassuring since some of her readings are <140/90. No additional changes are warranted to her medication regimen.      Dr. Sherrill Persaud  [facility], [address] [place] [address], [address] [place] [address] Str.  Phone: [phone]  Fax: [phone]

## 2020-10-19 ENCOUNTER — TELEPHONE (OUTPATIENT)
Dept: INTERNAL MEDICINE CLINIC | Age: 85
End: 2020-10-19

## 2020-10-19 NOTE — TELEPHONE ENCOUNTER
Patient reached and given message per DR. Thomas Mohamud. She will call on Thursday if symptoms have not improved.

## 2020-10-19 NOTE — TELEPHONE ENCOUNTER
MD Cindy Low LPN  Cc: P [facility]    Caller: Unspecified (Today,  9:05 AM)               Please call her on Thursday. If her symptoms have resolved, no follow up apt with me is necessary. If her sx are still present or if they worsen, please let me know so I can have her added to my schedule on Friday. Thanks,   Dr. Martha Flower   [facility] of [address] [place] [address], [address] [place] [address] Str.   Phone: [phone]   Fax: [phone]    Previous Messages           Other (cellulitis right fulton)     Segundo Loera MD  You; [facility] 5 hours ago (9:11 AM)       Please call her on Thursday. If her symptoms have resolved, no follow up apt with me is necessary. If her sx are still present or if they worsen, please let me know so I can have her added to my schedule on Friday.      Thanks,   Dr. Martha Flower   [facility] of [address] [place] [address], [address] [place] [address] Str.   Phone: [phone]   Fax: [phone]    Message text

## 2020-10-19 NOTE — TELEPHONE ENCOUNTER
Pt calling re: [facility]/2020 for painful pimple like spot on right shin with red streak    Stated was dx as cellulitis, given antibiotic which will be finished this Friday 10/23/2020    She was advised to f/u with pcp

## 2020-11-04 ENCOUNTER — TELEPHONE (OUTPATIENT)
Dept: INTERNAL MEDICINE CLINIC | Age: 85
End: 2020-11-04

## 2020-11-04 ENCOUNTER — E-VISIT (OUTPATIENT)
Dept: INTERNAL MEDICINE CLINIC | Age: 85
End: 2020-11-04
Payer: MEDICARE

## 2020-11-04 DIAGNOSIS — L03.119 CELLULITIS OF LOWER EXTREMITY, UNSPECIFIED LATERALITY: Primary | ICD-10-CM

## 2020-11-04 PROCEDURE — 99421 OL DIG E/M SVC 5-10 MIN: CPT | Performed by: INTERNAL MEDICINE

## 2020-11-04 NOTE — TELEPHONE ENCOUNTER
Patient called asking if Dr Reagan Comment got the picture she sent on my chart ?   And if you are gong to send a Rx in could you send it to [address] [place] [address]

## 2020-11-04 NOTE — TELEPHONE ENCOUNTER
[facility] at [facility] cellulites on her shin , was given antibiotics finished them 10/23- as of today she said she is concerned because it looks scabby she is not sure if she needs to have it looked at again she said she will be sending picture over per my chart  Please advise

## 2020-11-05 RX ORDER — CEPHALEXIN 250 MG/1
250 CAPSULE ORAL 4 TIMES DAILY
Qty: 28 CAP | Refills: 0 | Status: SHIPPED | OUTPATIENT
Start: 2020-11-05 | End: 2020-11-12

## 2020-11-05 NOTE — TELEPHONE ENCOUNTER
Patient daughter called asking Dr Iris Yi  When she calls in antibiotic to make sure it is one there patient can give herself

## 2020-11-05 NOTE — TELEPHONE ENCOUNTER
Titus Mtz (1935) initiated an asynchronous digital communication through [address] [place] [address]. HPI: per patient questionnaire     Exam: not applicable    Diagnoses and all orders for this visit:  Diagnoses and all orders for this visit:    1. Cellulitis of lower extremity, unspecified laterality      Message sent via [employer]  The patient know that I will prescribe 5 additional days of antibiotics-Keflex. I reviewed the pictures that she attached to the E-visit message. It appears that the area is slightly red. There is a scab as well along the affected area. There is no necrotic areas. Per her history, the area of erythema is increasing. I instructed her my message to notify me if her symptoms do not resolve after taking the extra 5 days of Keflex. Time: EV1 - 5-10 minutes were spent on the digital evaluation and management of this patient. 5 min spent for this e visit encounter.        Ezio Nixon MD

## 2020-12-18 ENCOUNTER — HOSPITAL ENCOUNTER (OUTPATIENT)
Dept: LAB | Age: 85
Discharge: HOME | End: 2020-12-18

## 2020-12-18 PROCEDURE — 87635 SARS-COV-2 COVID-19 AMP PRB: CPT

## 2020-12-20 LAB — SARS-COV-2, COV2NT: NOT DETECTED

## 2020-12-21 ENCOUNTER — HOSPITAL ENCOUNTER (OUTPATIENT)
Dept: LAB | Age: 85
Discharge: HOME | End: 2020-12-21

## 2020-12-21 DIAGNOSIS — E78.2 MIXED HYPERLIPIDEMIA: ICD-10-CM

## 2020-12-21 PROCEDURE — 87635 SARS-COV-2 COVID-19 AMP PRB: CPT

## 2020-12-21 RX ORDER — PRAVASTATIN SODIUM 10 MG/1
10 TABLET ORAL
Qty: 30 TAB | Refills: 5 | Status: SHIPPED | OUTPATIENT
Start: 2020-12-21

## 2020-12-21 NOTE — TELEPHONE ENCOUNTER
Last Visit: 9/3/20 with MD Marcelina Farrar  Next Appointment: 3/5/21 with MD Marcelina Farrar  Previous Refill Encounter(s): 10/10/19 #30 with 5 refills    Requested Prescriptions     Pending Prescriptions Disp Refills    pravastatin (PRAVACHOL) 10 mg tablet 30 Tab 5     Sig: Take 1 Tab by mouth nightly.

## 2020-12-24 LAB — SARS-COV-2, COV2NT: NOT DETECTED

## 2020-12-28 ENCOUNTER — HOSPITAL ENCOUNTER (OUTPATIENT)
Dept: LAB | Age: 85
Discharge: HOME | End: 2020-12-28

## 2020-12-28 PROCEDURE — 87635 SARS-COV-2 COVID-19 AMP PRB: CPT

## 2021-01-01 LAB — SARS-COV-2, COV2NT: NOT DETECTED

## 2021-01-04 ENCOUNTER — HOSPITAL ENCOUNTER (OUTPATIENT)
Dept: LAB | Age: 86
Discharge: HOME | End: 2021-01-04

## 2021-01-04 PROCEDURE — 87635 SARS-COV-2 COVID-19 AMP PRB: CPT

## 2021-01-07 LAB — SARS-COV-2, COV2NT: NOT DETECTED

## 2021-01-08 ENCOUNTER — HOSPITAL ENCOUNTER (OUTPATIENT)
Dept: LAB | Age: 86
Discharge: HOME | End: 2021-01-08

## 2021-01-08 PROCEDURE — 87635 SARS-COV-2 COVID-19 AMP PRB: CPT

## 2021-01-09 LAB — SARS-COV-2, COV2NT: NOT DETECTED

## 2021-01-12 ENCOUNTER — HOSPITAL ENCOUNTER (OUTPATIENT)
Dept: LAB | Age: 86
Discharge: HOME | End: 2021-01-12

## 2021-01-12 PROCEDURE — 87635 SARS-COV-2 COVID-19 AMP PRB: CPT

## 2021-01-14 LAB — SARS-COV-2, COV2NT: NOT DETECTED

## 2021-01-18 ENCOUNTER — HOSPITAL ENCOUNTER (OUTPATIENT)
Dept: LAB | Age: 86
Discharge: HOME | End: 2021-01-18

## 2021-01-18 PROCEDURE — U0003 INFECTIOUS AGENT DETECTION BY NUCLEIC ACID (DNA OR RNA); SEVERE ACUTE RESPIRATORY SYNDROME CORONAVIRUS 2 (SARS-COV-2) (CORONAVIRUS DISEASE [COVID-19]), AMPLIFIED PROBE TECHNIQUE, MAKING USE OF HIGH THROUGHPUT TECHNOLOGIES AS DESCRIBED BY CMS-2020-01-R: HCPCS

## 2021-01-19 LAB — SARS-COV-2, COV2NT: NOT DETECTED

## 2021-01-26 ENCOUNTER — HOSPITAL ENCOUNTER (OUTPATIENT)
Dept: LAB | Age: 86
Discharge: HOME | End: 2021-01-26

## 2021-01-26 PROCEDURE — U0003 INFECTIOUS AGENT DETECTION BY NUCLEIC ACID (DNA OR RNA); SEVERE ACUTE RESPIRATORY SYNDROME CORONAVIRUS 2 (SARS-COV-2) (CORONAVIRUS DISEASE [COVID-19]), AMPLIFIED PROBE TECHNIQUE, MAKING USE OF HIGH THROUGHPUT TECHNOLOGIES AS DESCRIBED BY CMS-2020-01-R: HCPCS

## 2021-01-27 LAB — SARS-COV-2, COV2NT: NOT DETECTED

## 2021-02-02 ENCOUNTER — HOSPITAL ENCOUNTER (OUTPATIENT)
Dept: LAB | Age: 86
Discharge: HOME | End: 2021-02-02

## 2021-02-02 PROCEDURE — U0003 INFECTIOUS AGENT DETECTION BY NUCLEIC ACID (DNA OR RNA); SEVERE ACUTE RESPIRATORY SYNDROME CORONAVIRUS 2 (SARS-COV-2) (CORONAVIRUS DISEASE [COVID-19]), AMPLIFIED PROBE TECHNIQUE, MAKING USE OF HIGH THROUGHPUT TECHNOLOGIES AS DESCRIBED BY CMS-2020-01-R: HCPCS

## 2021-02-04 LAB — SARS-COV-2, COV2NT: NOT DETECTED

## 2021-02-05 ENCOUNTER — TELEPHONE (OUTPATIENT)
Dept: INTERNAL MEDICINE CLINIC | Age: 86
End: 2021-02-05

## 2021-02-05 DIAGNOSIS — M79.606 PAIN OF LOWER EXTREMITY, UNSPECIFIED LATERALITY: ICD-10-CM

## 2021-02-05 DIAGNOSIS — M51.9 LUMBAR DISC DISEASE: Primary | ICD-10-CM

## 2021-02-05 NOTE — TELEPHONE ENCOUNTER
Frieda from [facility] called said pt needs an appt before they can see her please contact pt daughter to sched a vv

## 2021-02-06 ENCOUNTER — HOME HEALTH ADMISSION (OUTPATIENT)
Dept: HOME HEALTH SERVICES | Facility: HOME HEALTH | Age: 86
End: 2021-02-06
Payer: MEDICARE

## 2021-02-08 ENCOUNTER — HOME CARE VISIT (OUTPATIENT)
Dept: SCHEDULING | Facility: HOME HEALTH | Age: 86
End: 2021-02-08
Payer: MEDICARE

## 2021-02-08 ENCOUNTER — HOME CARE VISIT (OUTPATIENT)
Dept: HOME HEALTH SERVICES | Facility: HOME HEALTH | Age: 86
End: 2021-02-08
Payer: MEDICARE

## 2021-02-08 VITALS
OXYGEN SATURATION: 96 % | RESPIRATION RATE: 16 BRPM | SYSTOLIC BLOOD PRESSURE: 166 MMHG | TEMPERATURE: 97.5 F | HEART RATE: 69 BPM | DIASTOLIC BLOOD PRESSURE: 84 MMHG

## 2021-02-08 PROCEDURE — 3331090001 HH PPS REVENUE CREDIT

## 2021-02-08 PROCEDURE — 400013 HH SOC

## 2021-02-08 PROCEDURE — 400018 HH-NO PAY CLAIM PROCEDURE

## 2021-02-08 PROCEDURE — G0151 HHCP-SERV OF PT,EA 15 MIN: HCPCS

## 2021-02-08 PROCEDURE — 3331090002 HH PPS REVENUE DEBIT

## 2021-02-09 ENCOUNTER — TRANSCRIBE ORDER (OUTPATIENT)
Dept: SCHEDULING | Age: 86
End: 2021-02-09

## 2021-02-09 ENCOUNTER — TELEPHONE (OUTPATIENT)
Dept: INTERNAL MEDICINE CLINIC | Age: 86
End: 2021-02-09

## 2021-02-09 DIAGNOSIS — Z12.31 SCREENING MAMMOGRAM, ENCOUNTER FOR: Primary | ICD-10-CM

## 2021-02-09 PROCEDURE — 3331090001 HH PPS REVENUE CREDIT

## 2021-02-09 PROCEDURE — 3331090002 HH PPS REVENUE DEBIT

## 2021-02-09 NOTE — TELEPHONE ENCOUNTER
Patient's daughter, Michela Earl, is calling stating [facility] has already set her up for therapy. She wants to make sure that is ok because she was under the impression she had to see Dr. Morales Cristina first.     She also said that they went over her chart and mentioned that she is supposed to be on BP meds. Her BP has been running high but is not taking any meds. Is she supposed to be?

## 2021-02-09 NOTE — TELEPHONE ENCOUNTER
Patients daughter reached, she states that her mom was previously on BP medication when she lived in [place] but she no longer needed it. Patient sometimes has elevated BP but she thinks its due to her worrying about the . They will continue to monitor the BP and contact us if the BP is consistently over 140/90.

## 2021-02-10 ENCOUNTER — HOME CARE VISIT (OUTPATIENT)
Dept: HOME HEALTH SERVICES | Facility: HOME HEALTH | Age: 86
End: 2021-02-10
Payer: MEDICARE

## 2021-02-10 ENCOUNTER — HOME CARE VISIT (OUTPATIENT)
Dept: SCHEDULING | Facility: HOME HEALTH | Age: 86
End: 2021-02-10
Payer: MEDICARE

## 2021-02-10 PROCEDURE — G0157 HHC PT ASSISTANT EA 15: HCPCS

## 2021-02-10 PROCEDURE — 3331090001 HH PPS REVENUE CREDIT

## 2021-02-10 PROCEDURE — 3331090002 HH PPS REVENUE DEBIT

## 2021-02-11 PROCEDURE — 3331090001 HH PPS REVENUE CREDIT

## 2021-02-11 PROCEDURE — 3331090002 HH PPS REVENUE DEBIT

## 2021-02-12 ENCOUNTER — HOME CARE VISIT (OUTPATIENT)
Dept: SCHEDULING | Facility: HOME HEALTH | Age: 86
End: 2021-02-12
Payer: MEDICARE

## 2021-02-12 VITALS
DIASTOLIC BLOOD PRESSURE: 78 MMHG | HEART RATE: 76 BPM | TEMPERATURE: 97 F | OXYGEN SATURATION: 99 % | RESPIRATION RATE: 18 BRPM | SYSTOLIC BLOOD PRESSURE: 142 MMHG

## 2021-02-12 PROCEDURE — 3331090001 HH PPS REVENUE CREDIT

## 2021-02-12 PROCEDURE — G0157 HHC PT ASSISTANT EA 15: HCPCS

## 2021-02-12 PROCEDURE — 3331090002 HH PPS REVENUE DEBIT

## 2021-02-13 PROCEDURE — 3331090002 HH PPS REVENUE DEBIT

## 2021-02-13 PROCEDURE — 3331090001 HH PPS REVENUE CREDIT

## 2021-02-14 PROCEDURE — 3331090001 HH PPS REVENUE CREDIT

## 2021-02-14 PROCEDURE — 3331090002 HH PPS REVENUE DEBIT

## 2021-02-15 ENCOUNTER — HOME CARE VISIT (OUTPATIENT)
Dept: SCHEDULING | Facility: HOME HEALTH | Age: 86
End: 2021-02-15
Payer: MEDICARE

## 2021-02-15 VITALS
TEMPERATURE: 97.8 F | SYSTOLIC BLOOD PRESSURE: 138 MMHG | RESPIRATION RATE: 17 BRPM | HEART RATE: 64 BPM | OXYGEN SATURATION: 99 % | SYSTOLIC BLOOD PRESSURE: 138 MMHG | DIASTOLIC BLOOD PRESSURE: 82 MMHG | HEART RATE: 64 BPM | TEMPERATURE: 98.1 F | RESPIRATION RATE: 18 BRPM | DIASTOLIC BLOOD PRESSURE: 80 MMHG | OXYGEN SATURATION: 100 %

## 2021-02-15 PROCEDURE — G0157 HHC PT ASSISTANT EA 15: HCPCS

## 2021-02-15 PROCEDURE — 3331090002 HH PPS REVENUE DEBIT

## 2021-02-15 PROCEDURE — 3331090001 HH PPS REVENUE CREDIT

## 2021-02-16 PROCEDURE — 3331090001 HH PPS REVENUE CREDIT

## 2021-02-16 PROCEDURE — 3331090002 HH PPS REVENUE DEBIT

## 2021-02-17 ENCOUNTER — HOME CARE VISIT (OUTPATIENT)
Dept: SCHEDULING | Facility: HOME HEALTH | Age: 86
End: 2021-02-17
Payer: MEDICARE

## 2021-02-17 VITALS
OXYGEN SATURATION: 98 % | TEMPERATURE: 98.1 F | HEART RATE: 67 BPM | SYSTOLIC BLOOD PRESSURE: 121 MMHG | DIASTOLIC BLOOD PRESSURE: 77 MMHG | RESPIRATION RATE: 17 BRPM

## 2021-02-17 PROCEDURE — 3331090001 HH PPS REVENUE CREDIT

## 2021-02-17 PROCEDURE — 3331090002 HH PPS REVENUE DEBIT

## 2021-02-17 PROCEDURE — G0157 HHC PT ASSISTANT EA 15: HCPCS

## 2021-02-18 PROCEDURE — 3331090002 HH PPS REVENUE DEBIT

## 2021-02-18 PROCEDURE — 3331090001 HH PPS REVENUE CREDIT

## 2021-02-19 ENCOUNTER — TELEPHONE (OUTPATIENT)
Dept: INTERNAL MEDICINE CLINIC | Age: 86
End: 2021-02-19

## 2021-02-19 PROCEDURE — 3331090001 HH PPS REVENUE CREDIT

## 2021-02-19 PROCEDURE — 3331090002 HH PPS REVENUE DEBIT

## 2021-02-19 NOTE — TELEPHONE ENCOUNTER
Patient calling to advise of blood pressure reading this past week.      Stated you wanted to know of any reading of over 650 systolic    Stated she has been getting her BP checked at [facility] and they were recording it in her chart    Stated the systolic readings over the past week have been in the 155's, 144;s, and 137's 1 time her reading was 129/67    This morning her BP reading was 140/57  Last night reading was 155/70

## 2021-02-20 PROCEDURE — 3331090002 HH PPS REVENUE DEBIT

## 2021-02-20 PROCEDURE — 3331090001 HH PPS REVENUE CREDIT

## 2021-02-21 PROCEDURE — 3331090002 HH PPS REVENUE DEBIT

## 2021-02-21 PROCEDURE — 3331090001 HH PPS REVENUE CREDIT

## 2021-02-22 ENCOUNTER — DOCUMENTATION ONLY (OUTPATIENT)
Dept: INTERNAL MEDICINE CLINIC | Age: 86
End: 2021-02-22

## 2021-02-22 ENCOUNTER — VIRTUAL VISIT (OUTPATIENT)
Dept: INTERNAL MEDICINE CLINIC | Age: 86
End: 2021-02-22

## 2021-02-22 ENCOUNTER — HOSPITAL ENCOUNTER (OUTPATIENT)
Dept: LAB | Age: 86
Discharge: HOME | End: 2021-02-22

## 2021-02-22 DIAGNOSIS — C18.7 CANCER OF SIGMOID (HCC): ICD-10-CM

## 2021-02-22 DIAGNOSIS — E78.2 MIXED HYPERLIPIDEMIA: ICD-10-CM

## 2021-02-22 DIAGNOSIS — R73.9 HYPERGLYCEMIA: ICD-10-CM

## 2021-02-22 DIAGNOSIS — F41.9 ANXIETY: Primary | ICD-10-CM

## 2021-02-22 DIAGNOSIS — I10 ESSENTIAL HYPERTENSION: Primary | ICD-10-CM

## 2021-02-22 DIAGNOSIS — Z51.81 ENCOUNTER FOR THERAPEUTIC DRUG LEVEL MONITORING: ICD-10-CM

## 2021-02-22 PROCEDURE — 3331090002 HH PPS REVENUE DEBIT

## 2021-02-22 PROCEDURE — U0005 INFEC AGEN DETEC AMPLI PROBE: HCPCS

## 2021-02-22 PROCEDURE — 3331090001 HH PPS REVENUE CREDIT

## 2021-02-22 NOTE — PROGRESS NOTES
She declines her apt and wants to reschedule - per pt's POA.     Dr. Ranjana Taylor  [facility], [address] [place] [address] Str.  Phone: [phone]  Fax: [phone]

## 2021-02-23 ENCOUNTER — HOME CARE VISIT (OUTPATIENT)
Dept: SCHEDULING | Facility: HOME HEALTH | Age: 86
End: 2021-02-23
Payer: MEDICARE

## 2021-02-23 VITALS
TEMPERATURE: 97.8 F | HEART RATE: 70 BPM | DIASTOLIC BLOOD PRESSURE: 78 MMHG | SYSTOLIC BLOOD PRESSURE: 140 MMHG | OXYGEN SATURATION: 99 % | RESPIRATION RATE: 19 BRPM

## 2021-02-23 LAB — SARS-COV-2, COV2NT: NOT DETECTED

## 2021-02-23 PROCEDURE — 3331090001 HH PPS REVENUE CREDIT

## 2021-02-23 PROCEDURE — G0157 HHC PT ASSISTANT EA 15: HCPCS

## 2021-02-23 PROCEDURE — 3331090002 HH PPS REVENUE DEBIT

## 2021-02-24 ENCOUNTER — HOSPITAL ENCOUNTER (OUTPATIENT)
Dept: LAB | Age: 86
Discharge: HOME | End: 2021-02-24
Payer: MEDICARE

## 2021-02-24 LAB
AMPHET UR QL SCN: NEGATIVE
BARBITURATES UR QL SCN: NEGATIVE
BENZODIAZ UR QL: NEGATIVE
CANNABINOIDS UR QL SCN: NEGATIVE
COCAINE UR QL SCN: NEGATIVE
CREAT UR-MCNC: 52 MG/DL (ref 30–125)
HDSCOM,HDSCOM: NORMAL
METHADONE UR QL: NEGATIVE
MICROALBUMIN UR-MCNC: 3.99 MG/DL (ref 0–3)
MICROALBUMIN/CREAT UR-RTO: 77 MG/G (ref 0–30)
OPIATES UR QL: NEGATIVE
PCP UR QL: NEGATIVE

## 2021-02-24 PROCEDURE — 3331090002 HH PPS REVENUE DEBIT

## 2021-02-24 PROCEDURE — 80307 DRUG TEST PRSMV CHEM ANLYZR: CPT

## 2021-02-24 PROCEDURE — 82570 ASSAY OF URINE CREATININE: CPT

## 2021-02-24 PROCEDURE — 3331090001 HH PPS REVENUE CREDIT

## 2021-02-25 ENCOUNTER — HOSPITAL ENCOUNTER (OUTPATIENT)
Dept: LAB | Age: 86
Discharge: HOME | End: 2021-02-25
Payer: MEDICARE

## 2021-02-25 ENCOUNTER — HOME CARE VISIT (OUTPATIENT)
Dept: SCHEDULING | Facility: HOME HEALTH | Age: 86
End: 2021-02-25
Payer: MEDICARE

## 2021-02-25 VITALS
HEART RATE: 69 BPM | SYSTOLIC BLOOD PRESSURE: 119 MMHG | DIASTOLIC BLOOD PRESSURE: 70 MMHG | OXYGEN SATURATION: 96 % | RESPIRATION RATE: 98 BRPM | TEMPERATURE: 97.6 F

## 2021-02-25 LAB
ALBUMIN SERPL-MCNC: 3.8 G/DL (ref 3.4–5)
ALBUMIN/GLOB SERPL: 1 {RATIO} (ref 0.8–1.7)
ALP SERPL-CCNC: 113 U/L (ref 45–117)
ALT SERPL-CCNC: 21 U/L (ref 13–56)
ANION GAP SERPL CALC-SCNC: 7 MMOL/L (ref 3–18)
AST SERPL-CCNC: 22 U/L (ref 10–38)
BASOPHILS # BLD: 0 K/UL (ref 0–0.1)
BASOPHILS NFR BLD: 0 % (ref 0–2)
BILIRUB SERPL-MCNC: 0.7 MG/DL (ref 0.2–1)
BUN SERPL-MCNC: 17 MG/DL (ref 7–18)
BUN/CREAT SERPL: 17 (ref 12–20)
CALCIUM SERPL-MCNC: 9.4 MG/DL (ref 8.5–10.1)
CHLORIDE SERPL-SCNC: 108 MMOL/L (ref 100–111)
CHOLEST SERPL-MCNC: 203 MG/DL
CO2 SERPL-SCNC: 27 MMOL/L (ref 21–32)
CREAT SERPL-MCNC: 1 MG/DL (ref 0.6–1.3)
DIFFERENTIAL METHOD BLD: ABNORMAL
EOSINOPHIL # BLD: 0.3 K/UL (ref 0–0.4)
EOSINOPHIL NFR BLD: 6 % (ref 0–5)
ERYTHROCYTE [DISTWIDTH] IN BLOOD BY AUTOMATED COUNT: 14.2 % (ref 11.6–14.5)
EST. AVERAGE GLUCOSE BLD GHB EST-MCNC: 117 MG/DL
GLOBULIN SER CALC-MCNC: 3.8 G/DL (ref 2–4)
GLUCOSE SERPL-MCNC: 97 MG/DL (ref 74–99)
HBA1C MFR BLD: 5.7 % (ref 4.2–5.6)
HCT VFR BLD AUTO: 40.8 % (ref 35–45)
HDLC SERPL-MCNC: 65 MG/DL (ref 40–60)
HDLC SERPL: 3.1 {RATIO} (ref 0–5)
HGB BLD-MCNC: 13.2 G/DL (ref 12–16)
LDLC SERPL CALC-MCNC: 111 MG/DL (ref 0–100)
LIPID PROFILE,FLP: ABNORMAL
LYMPHOCYTES # BLD: 2.6 K/UL (ref 0.9–3.6)
LYMPHOCYTES NFR BLD: 48 % (ref 21–52)
MCH RBC QN AUTO: 27.7 PG (ref 24–34)
MCHC RBC AUTO-ENTMCNC: 32.4 G/DL (ref 31–37)
MCV RBC AUTO: 85.7 FL (ref 74–97)
MONOCYTES # BLD: 0.7 K/UL (ref 0.05–1.2)
MONOCYTES NFR BLD: 12 % (ref 3–10)
NEUTS SEG # BLD: 1.8 K/UL (ref 1.8–8)
NEUTS SEG NFR BLD: 34 % (ref 40–73)
PLATELET # BLD AUTO: 251 K/UL (ref 135–420)
PMV BLD AUTO: 10.6 FL (ref 9.2–11.8)
POTASSIUM SERPL-SCNC: 4.6 MMOL/L (ref 3.5–5.5)
PROT SERPL-MCNC: 7.6 G/DL (ref 6.4–8.2)
RBC # BLD AUTO: 4.76 M/UL (ref 4.2–5.3)
SODIUM SERPL-SCNC: 142 MMOL/L (ref 136–145)
TRIGL SERPL-MCNC: 135 MG/DL (ref ?–150)
VLDLC SERPL CALC-MCNC: 27 MG/DL
WBC # BLD AUTO: 5.4 K/UL (ref 4.6–13.2)

## 2021-02-25 PROCEDURE — 3331090001 HH PPS REVENUE CREDIT

## 2021-02-25 PROCEDURE — 3331090002 HH PPS REVENUE DEBIT

## 2021-02-25 PROCEDURE — 85025 COMPLETE CBC W/AUTO DIFF WBC: CPT

## 2021-02-25 PROCEDURE — 80061 LIPID PANEL: CPT

## 2021-02-25 PROCEDURE — G0157 HHC PT ASSISTANT EA 15: HCPCS

## 2021-02-25 PROCEDURE — 83036 HEMOGLOBIN GLYCOSYLATED A1C: CPT

## 2021-02-25 PROCEDURE — 80053 COMPREHEN METABOLIC PANEL: CPT

## 2021-02-26 PROCEDURE — 3331090001 HH PPS REVENUE CREDIT

## 2021-02-26 PROCEDURE — 3331090002 HH PPS REVENUE DEBIT

## 2021-02-27 PROCEDURE — 3331090002 HH PPS REVENUE DEBIT

## 2021-02-27 PROCEDURE — 3331090001 HH PPS REVENUE CREDIT

## 2021-02-28 PROCEDURE — 3331090001 HH PPS REVENUE CREDIT

## 2021-02-28 PROCEDURE — 3331090002 HH PPS REVENUE DEBIT

## 2021-02-28 NOTE — PROGRESS NOTES
I will discuss her results with her at her upcoming apt. Her total cholesterol is 203. Triglycerides are 135. LDL is 11. HDL is 65. Her CMP is unremarkable for significant abnormalities. Her A1C is down to 5.7 from 5.8 six months ago. Her CBC shows no significant abnormalities.     Dr. Shabbir Nelson  [facility], [address] [place] [address], [address] [place], [place] [address] [place] [address]  Phone: [phone]  Fax: [phone]

## 2021-03-01 ENCOUNTER — HOSPITAL ENCOUNTER (OUTPATIENT)
Dept: LAB | Age: 86
Discharge: HOME | End: 2021-03-01

## 2021-03-01 PROCEDURE — U0003 INFECTIOUS AGENT DETECTION BY NUCLEIC ACID (DNA OR RNA); SEVERE ACUTE RESPIRATORY SYNDROME CORONAVIRUS 2 (SARS-COV-2) (CORONAVIRUS DISEASE [COVID-19]), AMPLIFIED PROBE TECHNIQUE, MAKING USE OF HIGH THROUGHPUT TECHNOLOGIES AS DESCRIBED BY CMS-2020-01-R: HCPCS

## 2021-03-01 PROCEDURE — 3331090001 HH PPS REVENUE CREDIT

## 2021-03-01 PROCEDURE — 3331090002 HH PPS REVENUE DEBIT

## 2021-03-02 ENCOUNTER — HOME CARE VISIT (OUTPATIENT)
Dept: SCHEDULING | Facility: HOME HEALTH | Age: 86
End: 2021-03-02
Payer: MEDICARE

## 2021-03-02 VITALS
RESPIRATION RATE: 18 BRPM | OXYGEN SATURATION: 98 % | SYSTOLIC BLOOD PRESSURE: 122 MMHG | DIASTOLIC BLOOD PRESSURE: 62 MMHG | TEMPERATURE: 97.8 F | HEART RATE: 63 BPM

## 2021-03-02 LAB — SARS-COV-2, COV2NT: NOT DETECTED

## 2021-03-02 PROCEDURE — 3331090002 HH PPS REVENUE DEBIT

## 2021-03-02 PROCEDURE — 3331090001 HH PPS REVENUE CREDIT

## 2021-03-02 PROCEDURE — G0157 HHC PT ASSISTANT EA 15: HCPCS

## 2021-03-03 PROCEDURE — 3331090002 HH PPS REVENUE DEBIT

## 2021-03-03 PROCEDURE — 3331090001 HH PPS REVENUE CREDIT

## 2021-03-04 ENCOUNTER — HOME CARE VISIT (OUTPATIENT)
Dept: SCHEDULING | Facility: HOME HEALTH | Age: 86
End: 2021-03-04
Payer: MEDICARE

## 2021-03-04 PROCEDURE — 3331090002 HH PPS REVENUE DEBIT

## 2021-03-04 PROCEDURE — G0157 HHC PT ASSISTANT EA 15: HCPCS

## 2021-03-04 PROCEDURE — 3331090001 HH PPS REVENUE CREDIT

## 2021-03-05 PROCEDURE — 3331090002 HH PPS REVENUE DEBIT

## 2021-03-05 PROCEDURE — 3331090001 HH PPS REVENUE CREDIT

## 2021-03-06 PROCEDURE — 3331090002 HH PPS REVENUE DEBIT

## 2021-03-06 PROCEDURE — 3331090001 HH PPS REVENUE CREDIT

## 2021-03-07 PROCEDURE — 3331090002 HH PPS REVENUE DEBIT

## 2021-03-07 PROCEDURE — 3331090001 HH PPS REVENUE CREDIT

## 2021-03-08 ENCOUNTER — HOME CARE VISIT (OUTPATIENT)
Dept: SCHEDULING | Facility: HOME HEALTH | Age: 86
End: 2021-03-08
Payer: MEDICARE

## 2021-03-08 VITALS
SYSTOLIC BLOOD PRESSURE: 148 MMHG | DIASTOLIC BLOOD PRESSURE: 82 MMHG | TEMPERATURE: 97.6 F | OXYGEN SATURATION: 98 % | RESPIRATION RATE: 16 BRPM | HEART RATE: 64 BPM

## 2021-03-08 VITALS
TEMPERATURE: 97.9 F | RESPIRATION RATE: 18 BRPM | SYSTOLIC BLOOD PRESSURE: 119 MMHG | HEART RATE: 62 BPM | DIASTOLIC BLOOD PRESSURE: 72 MMHG | OXYGEN SATURATION: 97 %

## 2021-03-08 PROCEDURE — G0151 HHCP-SERV OF PT,EA 15 MIN: HCPCS

## 2021-03-08 PROCEDURE — 3331090001 HH PPS REVENUE CREDIT

## 2021-03-08 PROCEDURE — 3331090002 HH PPS REVENUE DEBIT

## 2021-03-09 ENCOUNTER — OFFICE VISIT (OUTPATIENT)
Dept: ORTHOPEDIC SURGERY | Age: 86
End: 2021-03-09
Payer: MEDICARE

## 2021-03-09 ENCOUNTER — TELEPHONE (OUTPATIENT)
Dept: ORTHOPEDIC SURGERY | Age: 86
End: 2021-03-09

## 2021-03-09 DIAGNOSIS — M54.50 LUMBAR SPINE PAIN: Primary | ICD-10-CM

## 2021-03-09 DIAGNOSIS — M54.16 LUMBAR RADICULOPATHY: ICD-10-CM

## 2021-03-09 DIAGNOSIS — M54.50 LUMBAR SPINE PAIN: ICD-10-CM

## 2021-03-09 PROCEDURE — G9717 DOC PT DX DEP/BP F/U NT REQ: HCPCS | Performed by: NURSE PRACTITIONER

## 2021-03-09 PROCEDURE — G8756 NO BP MEASURE DOC: HCPCS | Performed by: NURSE PRACTITIONER

## 2021-03-09 PROCEDURE — 1101F PT FALLS ASSESS-DOCD LE1/YR: CPT | Performed by: NURSE PRACTITIONER

## 2021-03-09 PROCEDURE — G8421 BMI NOT CALCULATED: HCPCS | Performed by: NURSE PRACTITIONER

## 2021-03-09 PROCEDURE — G8427 DOCREV CUR MEDS BY ELIG CLIN: HCPCS | Performed by: NURSE PRACTITIONER

## 2021-03-09 PROCEDURE — G8400 PT W/DXA NO RESULTS DOC: HCPCS | Performed by: NURSE PRACTITIONER

## 2021-03-09 PROCEDURE — 99213 OFFICE O/P EST LOW 20 MIN: CPT | Performed by: NURSE PRACTITIONER

## 2021-03-09 PROCEDURE — 1090F PRES/ABSN URINE INCON ASSESS: CPT | Performed by: NURSE PRACTITIONER

## 2021-03-09 PROCEDURE — G8536 NO DOC ELDER MAL SCRN: HCPCS | Performed by: NURSE PRACTITIONER

## 2021-03-09 PROCEDURE — 72100 X-RAY EXAM L-S SPINE 2/3 VWS: CPT | Performed by: NURSE PRACTITIONER

## 2021-03-09 NOTE — TELEPHONE ENCOUNTER
Patient's daughter Bernard Lares called asking if scheduling can call her at [phone] to set up the MRI instead of calling the patient. Also, she says [facility] is the best location for her. Please advise.

## 2021-03-09 NOTE — TELEPHONE ENCOUNTER
I attempted to contact Linda Frances (on HIPAA) but reached unidentified voice mail and did not leave a message. I sent an e-mail to [facility] central scheduling with her request. Linda Frances can contact [facility] central scheduling at [phone]. Thank you.

## 2021-03-09 NOTE — PROGRESS NOTES
Chief complaint   Chief Complaint   Patient presents with    Back Pain    Neck Pain       History of Present Illness:  Cely Maurer is a  80 y.o.  female comes in today after last being seen by Dr. Caridad Lozoya on October 21, 2019. She comes back in today complaining of back pain with bilateral buttock pain that radiates down her legs to her calves. She describes a cold wet sensation in her legs. She has been doing physical therapy at [facility] where she lives in assisted living. She states it really is not helping all that much. She fell more recently due to her legs feeling heavy and giving out. She had increased right back pain after falling but she feels like that part has improved somewhat. She is on gabapentin 100 mg twice a day. She is prescribed 3 times a day but that third dose makes her groggy so she only takes it twice a day. She denies fever bowel bladder dysfunction. Physical Exam: The patient is a 26-year-old female well-developed well-nourished who is alert and oriented with a normal mood and affect. She has a full weightbearing stiff and antalgic gait utilizing a rolling walker. She has 4-5 strength of her bilateral lower extremities. The right is slightly weaker than the left. She has negative straight leg raise. She has pain with hyperextension lumbar spine. Assessment and Plan: Is a patient with back pain and bilateral lower extremity pain resulting in a fall. She is done physical therapy recently at [facility]. She is not a candidate for NSAIDs due to her age. I did a lumbar AP lateral x-ray. We will go ahead and get a MRI of her lumbar spine as she may be a candidate for blocks. We will see her back with one of the physicians following the MRI. XRAY: 03/09/21   body part: Lumbar  side (rt/lt): bilateral  number of views taken:2  Findings: no acute finding    The x-ray will be officially read by Dr. Lillie Johnson and scanned into the chart. Review of systems:    Past Medical History:   Diagnosis Date    Arthritis     Cancer ([facility] [address] 2002    Colon     Chronic pain     abdominal due to IBS    Depression     GERD (gastroesophageal reflux disease)     Hypertension     Migraine headache 2014    Mixed hyperlipidemia 9/3/2020    Occipital neuralgia 2014    PUD (peptic ulcer disease)     S/P colon resection 2002    clear presently      Past Surgical History:   Procedure Laterality Date    HX CHOLECYSTECTOMY  2015    HX HYSTERECTOMY  1965    HX TONSIL AND ADENOIDECTOMY      HX WISDOM TEETH EXTRACTION      x4     Social History     Socioeconomic History    Marital status:      Spouse name: Not on file    Number of children: Not on file    Years of education: Not on file    Highest education level: Not on file   Occupational History    Not on file   Social Needs    Financial resource strain: Not on file    Food insecurity     Worry: Not on file     Inability: Not on file    Transportation needs     Medical: Not on file     Non-medical: Not on file   Tobacco Use    Smoking status: Never Smoker    Smokeless tobacco: Never Used   Substance and Sexual Activity    Alcohol use:  Yes     Alcohol/week: 0.0 - 1.0 standard drinks    Drug use: Not on file    Sexual activity: Never   Lifestyle    Physical activity     Days per week: Not on file     Minutes per session: Not on file    Stress: Not on file   Relationships    Social connections     Talks on phone: Not on file     Gets together: Not on file     Attends Buddhist service: Not on file     Active member of club or organization: Not on file     Attends meetings of clubs or organizations: Not on file     Relationship status: Not on file    Intimate partner violence     Fear of current or ex partner: Not on file     Emotionally abused: Not on file     Physically abused: Not on file     Forced sexual activity: Not on file   Other Topics Concern    Not on file   Social History Narrative    Not on file     Family History   Problem Relation Age of Onset    Hypertension Mother     Stroke Mother     Heart Attack Father     Hypertension Father     Parkinson's Disease Sister     Hypertension Brother     Cancer Brother         prostate    No Known Problems Son     No Known Problems Daughter        Physical Exam:  There were no vitals taken for this visit. Pain Scale: /10       has been . reviewed and is appropriate          Diagnoses and all orders for this visit:    1. Lumbar spine pain  -     AMB POC XRAY, SPINE, LUMBOSACRAL; 2 O  -     MRI LUMB SPINE WO CONT; Future    2. Lumbar radiculopathy  -     MRI LUMB SPINE WO CONT; Future            Follow-up and Dispositions    · Return in about 3 weeks (around 3/30/2021) for Dr. Gaby Castillo for MRI f/u.              We have informed Petey Valdes to notify us for immediate appointment if she has any worsening neurogical symptoms or if an emergency situation presents, then call [phone]

## 2021-03-11 DIAGNOSIS — M51.9 LUMBAR DISC DISEASE: ICD-10-CM

## 2021-03-11 RX ORDER — GABAPENTIN 100 MG/1
100 CAPSULE ORAL 2 TIMES DAILY
Qty: 180 CAP | Refills: 1 | Status: SHIPPED | OUTPATIENT
Start: 2021-03-11 | End: 2021-07-28 | Stop reason: SDUPTHER

## 2021-03-11 NOTE — TELEPHONE ENCOUNTER
PCP: Yesenia Maciel MD    Last appt: 2/22/2021  Future Appointments   Date Time Provider Department Center   3/29/2021  5:00 PM [facility] MRI RM 2 HBVRMRI [facility]   3/30/2021  2:00 PM Liana Dumont, NP [facility] AMB   4/15/2021  4:00 PM [facility] ERIC RM 3 3D HBVRMAM HBV   4/23/2021  2:00 PM Yesenia Maciel MD [facility] BS AMB       Requested Prescriptions     Pending Prescriptions Disp Refills   • gabapentin (NEURONTIN) 100 mg capsule 90 Cap 5     Sig: Take 1 Cap by mouth three (3) times daily. Max Daily Amount: 300 mg.          No

## 2021-03-29 ENCOUNTER — HOSPITAL ENCOUNTER (OUTPATIENT)
Age: 86
Discharge: HOME | End: 2021-03-29
Attending: NURSE PRACTITIONER
Payer: MEDICARE

## 2021-03-29 PROCEDURE — 72148 MRI LUMBAR SPINE W/O DYE: CPT

## 2021-03-30 ENCOUNTER — HOSPITAL ENCOUNTER (OUTPATIENT)
Dept: GENERAL RADIOLOGY | Age: 86
Discharge: HOME | End: 2021-03-30
Payer: MEDICARE

## 2021-03-30 ENCOUNTER — OFFICE VISIT (OUTPATIENT)
Dept: ORTHOPEDIC SURGERY | Age: 86
End: 2021-03-30
Payer: MEDICARE

## 2021-03-30 VITALS
TEMPERATURE: 98 F | OXYGEN SATURATION: 98 % | DIASTOLIC BLOOD PRESSURE: 63 MMHG | BODY MASS INDEX: 27.49 KG/M2 | HEIGHT: 64 IN | SYSTOLIC BLOOD PRESSURE: 146 MMHG | HEART RATE: 69 BPM | WEIGHT: 161 LBS

## 2021-03-30 DIAGNOSIS — M54.50 LUMBAR SPINE PAIN: ICD-10-CM

## 2021-03-30 DIAGNOSIS — M25.551 RIGHT HIP PAIN: ICD-10-CM

## 2021-03-30 DIAGNOSIS — M54.16 LUMBAR RADICULOPATHY: ICD-10-CM

## 2021-03-30 DIAGNOSIS — M48.062 SPINAL STENOSIS OF LUMBAR REGION WITH NEUROGENIC CLAUDICATION: ICD-10-CM

## 2021-03-30 DIAGNOSIS — M25.551 RIGHT HIP PAIN: Primary | ICD-10-CM

## 2021-03-30 PROCEDURE — G8427 DOCREV CUR MEDS BY ELIG CLIN: HCPCS | Performed by: PHYSICAL MEDICINE & REHABILITATION

## 2021-03-30 PROCEDURE — 73502 X-RAY EXAM HIP UNI 2-3 VIEWS: CPT

## 2021-03-30 PROCEDURE — 1101F PT FALLS ASSESS-DOCD LE1/YR: CPT | Performed by: PHYSICAL MEDICINE & REHABILITATION

## 2021-03-30 PROCEDURE — G8400 PT W/DXA NO RESULTS DOC: HCPCS | Performed by: PHYSICAL MEDICINE & REHABILITATION

## 2021-03-30 PROCEDURE — 1090F PRES/ABSN URINE INCON ASSESS: CPT | Performed by: PHYSICAL MEDICINE & REHABILITATION

## 2021-03-30 PROCEDURE — G8536 NO DOC ELDER MAL SCRN: HCPCS | Performed by: PHYSICAL MEDICINE & REHABILITATION

## 2021-03-30 PROCEDURE — G8754 DIAS BP LESS 90: HCPCS | Performed by: PHYSICAL MEDICINE & REHABILITATION

## 2021-03-30 PROCEDURE — 99213 OFFICE O/P EST LOW 20 MIN: CPT | Performed by: PHYSICAL MEDICINE & REHABILITATION

## 2021-03-30 PROCEDURE — G9717 DOC PT DX DEP/BP F/U NT REQ: HCPCS | Performed by: PHYSICAL MEDICINE & REHABILITATION

## 2021-03-30 PROCEDURE — G8753 SYS BP > OR = 140: HCPCS | Performed by: PHYSICAL MEDICINE & REHABILITATION

## 2021-03-30 PROCEDURE — G8419 CALC BMI OUT NRM PARAM NOF/U: HCPCS | Performed by: PHYSICAL MEDICINE & REHABILITATION

## 2021-03-30 NOTE — PROGRESS NOTES
[address]  [address], [address]  [facility], [address]  Phone: [phone]  Fax: [phone]      Patient: Daya Carnes                                                                              MRN: [id number]        YOB: 1935          AGE: 80 y.o. PCP: Felipe Mathis MD  Date:  03/30/21    Reason for Consultation: Back Pain      HPI:  Daya Carnes is a 80 y.o. female with relevant PMH of HTN, sigmoid CA who presents with low back pain radiating down bilateral lower extremities R>L. She initially saw Dr. Julia Ragsdale in 2019 and more recently saw Micah Cowan NP who got an MRI of her lumbar spine. She is here to review MRI findings which demonstrate severe spinal stenosis      Neurologic symptoms: No numbness, tingling, weakness, bowel or bladder changes. No recent falls      Location: The pain is located in the low back R>L, right lateral hip into right lateral thigh, also has pain radiating down b/l posterior legs  Radiation:  Pain Score: Currently: 6/10    Quality: Pain is of a she describes a cold wet sensation down her legs and burning, stabbing pain right lateral thigh quality. Aggravating: Pain is exacerbated by lying on her right side, walking standing  Alleviating: The pain is alleviated by exercise    Prior Treatments:   Physical therapy- helps while she is doing it but after completed pain returns. She has done PT at her assisted living facility  Previous Medications:   Current Medications:gabapentin 100mg bid  Previous work-up has included:   MRI lumbar spine 3/2021  L2-3: There is a diffuse annular bulge across the midline extending into the  right left neuroforamina. There is marked intradiscal degenerative changes with  chronic Modic endplate reactive changes present. The central canal is  nonstenotic  The left neuroforamina right neuroforamina demonstrate no high-grade stenosis.   L3-4: Central canal stenosis secondary to marked ligamentous hypertrophy and  facet arthropathy noted. Thecal sac 0.76 cm centrally. The right neuroforamina and left neuroforamina are spared any significant  stenosis exiting nerve roots are not compressed. L4-5: High-grade central canal stenosis is present AP dimension maximum 0.7 cm there is marked hypertrophy of the facets and ligamentous structures.   Left neuroforamina is patent   Right neuroforamina demonstrates encroachment from bulging disc and  hypertrophied facet with mild compression of the exiting right L4 nerve root  seen on sagittal image 12 series 2. L5-S1: There is no central canal stenosis there is anterior and posterior  osteophytic formation. Posterior osteophyte formations encroaching into the  caudal aspect of the right neuroforamina with facet hypertrophy result in mild  neuroforaminal stenosis. The exiting right L5 root is not however compressed.     Left neuroforamina demonstrates less severe changes exiting nerve not  compressed. Facets demonstrate hypertrophic changes but otherwise intact    Past Medical History:   Past Medical History:   Diagnosis Date    Arthritis     Cancer ([facility] [address] 2002    Colon     Chronic pain     abdominal due to IBS    Depression     GERD (gastroesophageal reflux disease)     Hypertension     Migraine headache 2014    Mixed hyperlipidemia 9/3/2020    Occipital neuralgia 2014    PUD (peptic ulcer disease)     S/P colon resection 2002    clear presently       Past Surgical History:   Past Surgical History:   Procedure Laterality Date    HX CHOLECYSTECTOMY  2015    HX HYSTERECTOMY  1965    HX TONSIL AND ADENOIDECTOMY      HX WISDOM TEETH EXTRACTION      x4      SocHx:   Social History     Tobacco Use    Smoking status: Never Smoker    Smokeless tobacco: Never Used   Substance Use Topics    Alcohol use: Yes     Alcohol/week: 0.0 - 1.0 standard drinks      FamHx:?    Family History   Problem Relation Age of Onset    Hypertension Mother     Stroke Mother     Heart Attack Father     Hypertension Father     Parkinson's Disease Sister     Hypertension Brother     Cancer Brother         prostate    No Known Problems Son     No Known Problems Daughter        Current Medications:    Current Outpatient Medications   Medication Sig Dispense Refill    gabapentin (NEURONTIN) 100 mg capsule Take 1 Cap by mouth two (2) times a day. Max Daily Amount: 200 mg. 180 Cap 1    acetaminophen (TYLENOL) 500 mg capsule Take 500 mg by mouth every four (4) hours as needed for Pain.  pravastatin (PRAVACHOL) 10 mg tablet Take 1 Tab by mouth nightly. 30 Tab 5    ondansetron hcl (ZOFRAN) 4 mg tablet Take 4 mg by mouth every eight (8) hours as needed for Nausea.  alum-mag hydroxide-simeth (MYLANTA) 200-200-20 mg/5 mL susp Take 30 mL by mouth daily as needed for Other (PRN: Gastric Reflux, in PM). PRN: Gastric Reflux, in PM      famotidine (PEPCID) 20 mg tablet Take 20 mg by mouth daily.  melatonin tab tablet Take 10 mg by mouth nightly.  cholecalciferol (VITAMIN D3) 1,000 unit cap Take 1,000 Units by mouth daily.  multivitamin/iron/folic acid (CENTRUM WOMEN PO) Take 1 Tab by mouth daily.  cetirizine (ZYRTEC) 10 mg tablet Take 10 mg by mouth daily.  polyethylene glycol (MIRALAX) 17 gram packet Take 17 g by mouth daily as needed for Other (PRN Constipation). PRN Constipation      Bifidobacterium Infantis (ALIGN) 4 mg cap Take 4 mg by mouth daily.  furosemide (LASIX) 20 mg tablet Take 1 Tab by mouth every other day. Indications: Edema 90 Tab 3    mirtazapine (REMERON) 15 mg tablet Take 1 Tab by mouth nightly. Indications: major depressive disorder (Patient taking differently: Take 30 mg by mouth nightly. Indications: major depressive disorder) 90 Tab 3    dicyclomine (BENTYL) 10 mg capsule Take 1 Cap by mouth four (4) times daily.  (Patient taking differently: Take 1 Cap by mouth four (4) times daily as needed for Other (PRN:  IBS symptoms). PRN:  IBS symptoms) 90 Cap 3      Allergies: Allergies   Allergen Reactions    Macrobid [Nitrofurantoin Monohyd/M-Cryst] Nausea and Vomiting    Pneumococcal Vaccine Other (comments)    Lexapro [Escitalopram Oxalate] Other (comments)     ? Hyponatremia per pt hx        Review of Systems:   Gen:    Denied fevers, chills, malaise, fatigue, weight changes   Resp: Denied shortness of breath, cough, wheezing   CVS: Denied chest pain, palpitations   : Denied urinary urgency, frequency, incontinence   GI: Denied nausea, vomiting, constipation, diarrhea   Skin: Denied rashes, wounds   Psych: Denied anxiety, depression   Vasc: Denied claudication, ulcers   Hem: Denied easy bruising/bleeding   MSK: See HPI   Neuro: See HPI         Physical Exam     Vital Signs:   Visit Vitals  BP (!) 146/63 (BP 1 Location: Left upper arm, BP Patient Position: Sitting, BP Cuff Size: Large adult)   Pulse 69   Temp 98 °F (36.7 °C) (Skin)   Ht 5' 4\" (1.626 m)   Wt 161 lb (73 kg)   SpO2 98%   BMI 27.64 kg/m²      General: ??????? Well nourished and well developed female without any acute distress   Psychiatric: ?  Alert and oriented x 3 with normal mood    HEENT: ???????? Atraumatic   Respiratory:   Breathing non-labored and non dyspneic   CV: ???????????????? Peripheral pulses intact, no peripheral edema   Skin: ????????????? No rashes       Neurologic: ?? Sensation: normal and grossly intact thebilateral, lower extremity(s)    Strength: 5/5 in the bilateral, lower extremity(s)   Reflexes: reveals 2+ symmetric DTRs throughout   Gait: unsteady    Tenderness right GT bursa  Pain with right hip ROM, IR and ER  + right JUAN      Medical Decision Making:    Images:MRI lumbar spine with severe spinal stenosis L4/5 and L3/4 secondary to ligamentous hypertrophy facet arthropathy and disc bulging, L4/5 right foraminal narrowing and L5/S1 right foraminal narrowing       Assessment:   1.  Lumbar spinal stenosis L4/5, L3/4  2. Right L4 and L5 radiculitis  3. Right GT bursitis and hip pain     Plan:      -Physical therapy -  Encouraged to return to PT to work on gluteal strengthening  -Medications - continue gabapentin 100mg bid. Counseled regarding side effects and appropriate administration of medications.    -Diagnostics/Imaging - X-ray right hip eval degenerative changes  -Injections - Schedule right hip GT vs intrarticular hip injections. If pain persists radiating down b/l legs would have her try lumbar IL YOLY vs TF YOLY at right L4 and L5  -Lifestyle - Encouraged regular aerobic activity and strength training  -Education - The patient's diagnosis, prognosis and treatment options were discussed today. All questions were answered.   F/U - after x-ray for hip injections        [phone]

## 2021-03-31 ENCOUNTER — HOME HEALTH ADMISSION (OUTPATIENT)
Dept: HOME HEALTH SERVICES | Facility: HOME HEALTH | Age: 86
End: 2021-03-31
Payer: MEDICARE

## 2021-03-31 ENCOUNTER — TELEPHONE (OUTPATIENT)
Dept: ORTHOPEDIC SURGERY | Age: 86
End: 2021-03-31

## 2021-03-31 DIAGNOSIS — M25.551 RIGHT HIP PAIN: Primary | ICD-10-CM

## 2021-03-31 NOTE — TELEPHONE ENCOUNTER
Patient's daughter Rosie Guillen called stating the patient's physical therapy referral was supposed to be to [facility]. She stated if the order is rewritten, she can pick it up at the office this afternoon. Rosie Guillen can be reached at [phone].

## 2021-03-31 NOTE — TELEPHONE ENCOUNTER
I put a new order in and selected home care in drop down, let me know if I need to order it differently  Can you let her know she can pick it up?

## 2021-04-01 ENCOUNTER — HOME CARE VISIT (OUTPATIENT)
Dept: SCHEDULING | Facility: HOME HEALTH | Age: 86
End: 2021-04-01
Payer: MEDICARE

## 2021-04-01 ENCOUNTER — HOME CARE VISIT (OUTPATIENT)
Dept: HOME HEALTH SERVICES | Facility: HOME HEALTH | Age: 86
End: 2021-04-01
Payer: MEDICARE

## 2021-04-01 PROCEDURE — 400018 HH-NO PAY CLAIM PROCEDURE

## 2021-04-01 PROCEDURE — G0151 HHCP-SERV OF PT,EA 15 MIN: HCPCS

## 2021-04-01 PROCEDURE — 3331090002 HH PPS REVENUE DEBIT

## 2021-04-01 PROCEDURE — 3331090001 HH PPS REVENUE CREDIT

## 2021-04-01 PROCEDURE — 400013 HH SOC

## 2021-04-02 ENCOUNTER — HOME CARE VISIT (OUTPATIENT)
Dept: HOME HEALTH SERVICES | Facility: HOME HEALTH | Age: 86
End: 2021-04-02
Payer: MEDICARE

## 2021-04-02 PROCEDURE — 3331090002 HH PPS REVENUE DEBIT

## 2021-04-02 PROCEDURE — 3331090001 HH PPS REVENUE CREDIT

## 2021-04-02 NOTE — TELEPHONE ENCOUNTER
This was put in by [facility] Mer[facility].

## 2021-04-03 VITALS
SYSTOLIC BLOOD PRESSURE: 120 MMHG | DIASTOLIC BLOOD PRESSURE: 62 MMHG | HEART RATE: 61 BPM | RESPIRATION RATE: 16 BRPM | TEMPERATURE: 97.6 F | OXYGEN SATURATION: 97 %

## 2021-04-03 PROCEDURE — 3331090001 HH PPS REVENUE CREDIT

## 2021-04-03 PROCEDURE — 3331090002 HH PPS REVENUE DEBIT

## 2021-04-04 PROCEDURE — 3331090001 HH PPS REVENUE CREDIT

## 2021-04-04 PROCEDURE — 3331090002 HH PPS REVENUE DEBIT

## 2021-04-04 NOTE — PROGRESS NOTES
Patient has been admitted to [facility] PT to address low back and R LE pain. Freq: 1w1, 2w4, 1w1.   Thank you for your referral.

## 2021-04-05 PROCEDURE — 3331090002 HH PPS REVENUE DEBIT

## 2021-04-05 PROCEDURE — 3331090001 HH PPS REVENUE CREDIT

## 2021-04-06 ENCOUNTER — HOME CARE VISIT (OUTPATIENT)
Dept: SCHEDULING | Facility: HOME HEALTH | Age: 86
End: 2021-04-06
Payer: MEDICARE

## 2021-04-06 VITALS
RESPIRATION RATE: 17 BRPM | TEMPERATURE: 97.7 F | SYSTOLIC BLOOD PRESSURE: 122 MMHG | OXYGEN SATURATION: 98 % | DIASTOLIC BLOOD PRESSURE: 70 MMHG | HEART RATE: 77 BPM

## 2021-04-06 PROCEDURE — 3331090002 HH PPS REVENUE DEBIT

## 2021-04-06 PROCEDURE — G0157 HHC PT ASSISTANT EA 15: HCPCS

## 2021-04-06 PROCEDURE — 3331090001 HH PPS REVENUE CREDIT

## 2021-04-07 PROCEDURE — 3331090001 HH PPS REVENUE CREDIT

## 2021-04-07 PROCEDURE — 3331090002 HH PPS REVENUE DEBIT

## 2021-04-08 ENCOUNTER — OFFICE VISIT (OUTPATIENT)
Dept: ORTHOPEDIC SURGERY | Age: 86
End: 2021-04-08
Payer: MEDICARE

## 2021-04-08 ENCOUNTER — HOME CARE VISIT (OUTPATIENT)
Dept: SCHEDULING | Facility: HOME HEALTH | Age: 86
End: 2021-04-08
Payer: MEDICARE

## 2021-04-08 VITALS
HEART RATE: 70 BPM | WEIGHT: 161.4 LBS | SYSTOLIC BLOOD PRESSURE: 146 MMHG | BODY MASS INDEX: 27.55 KG/M2 | TEMPERATURE: 97.8 F | HEIGHT: 64 IN | DIASTOLIC BLOOD PRESSURE: 54 MMHG | OXYGEN SATURATION: 99 % | RESPIRATION RATE: 19 BRPM

## 2021-04-08 VITALS
HEART RATE: 70 BPM | DIASTOLIC BLOOD PRESSURE: 70 MMHG | RESPIRATION RATE: 19 BRPM | OXYGEN SATURATION: 98 % | SYSTOLIC BLOOD PRESSURE: 124 MMHG | TEMPERATURE: 97.8 F

## 2021-04-08 DIAGNOSIS — M70.61 GREATER TROCHANTERIC BURSITIS OF RIGHT HIP: Primary | ICD-10-CM

## 2021-04-08 PROCEDURE — 3331090001 HH PPS REVENUE CREDIT

## 2021-04-08 PROCEDURE — G0157 HHC PT ASSISTANT EA 15: HCPCS

## 2021-04-08 PROCEDURE — 3331090002 HH PPS REVENUE DEBIT

## 2021-04-08 PROCEDURE — 20611 DRAIN/INJ JOINT/BURSA W/US: CPT | Performed by: PHYSICAL MEDICINE & REHABILITATION

## 2021-04-08 RX ORDER — LIDOCAINE HYDROCHLORIDE 10 MG/ML
4 INJECTION, SOLUTION EPIDURAL; INFILTRATION; INTRACAUDAL; PERINEURAL ONCE
Status: COMPLETED | OUTPATIENT
Start: 2021-04-08 | End: 2021-04-08

## 2021-04-08 RX ORDER — TRIAMCINOLONE ACETONIDE 40 MG/ML
40 INJECTION, SUSPENSION INTRA-ARTICULAR; INTRAMUSCULAR ONCE
Status: COMPLETED | OUTPATIENT
Start: 2021-04-08 | End: 2021-04-08

## 2021-04-08 RX ADMIN — LIDOCAINE HYDROCHLORIDE 4 ML: 10 INJECTION, SOLUTION EPIDURAL; INFILTRATION; INTRACAUDAL; PERINEURAL at 14:37

## 2021-04-08 RX ADMIN — TRIAMCINOLONE ACETONIDE 40 MG: 40 INJECTION, SUSPENSION INTRA-ARTICULAR; INTRAMUSCULAR at 14:38

## 2021-04-08 NOTE — PROGRESS NOTES
[address]  [address]  [address]  Phone: [phone]  Fax: [phone]      Encounter Date: 4/8/2021          Diagnosis:     ICD-10-CM ICD-9-CM    1. Greater trochanteric bursitis of right hip  M70.61 726.5 lidocaine (PF) (XYLOCAINE) 10 mg/mL (1 %) injection 4 mL      triamcinolone acetonide (KENALOG-40) 40 mg/mL injection 40 mg                    Indication:right lateral hip pain         Procedure: Sonographically guided right hip         Informed Consent: Following denial of allergy and review of potential side effects and complications including, but not limited to, infection, allergic reaction, local tissue breakdown, injury to soft tissue and/or nerves and seizure, the patient indicated understanding and agreed to proceed. Procedural pause conducted to verify: correct patient identity, procedure to be performed and, as applicable, correct side and site, correct patient position, availability of any special equipment or other special requirements. Justification for use of ultrasound guidance: The use of direct sonographic visualization (rather than a non-guided injection) was required to ensure accurate injection placement for diagnostic specificity, to maximize clinical efficacy, and for safety purposes to minimize risk of bleeding or injury to nearby structures. Technique: The procedure was carried out under sterile technique utilizing a sterile ultrasound transducer cover and sterile ultrasound gel. Pre-procedural scanning was performed to determine optimal approach for the procedure. The patient was prepped and draped in the usual sterile fashion. Patient position: sidelying on left side    Approach: anterior to posterior    Needle:25 gauge 1.5 inch    Details: Live sonographic guidance with a 12 MHz transducer was used throughout the procedure.  A 25 gauge 1.5 inch needle with 4mL 1% lidocaine and1mL of (40mg)  of kenalog was injected into the right sub gluteus dean bursa. Post-Procedure Instructions: The patient tolerated the procedure well without complication and was discharged in good condition after a short observation period. The patient was instructed to avoid submerging the procedure site in water for 48-72 hours. The patient was instructed to contact me with any questions pertaining to the procedure      F/u in 2-3 weeks. Consider referral for [facility]    Key images were saved.           Impression: Technically successful sonographically guided sub gluteus dean bursa       Nikhil Peñaloza MD

## 2021-04-09 PROCEDURE — 3331090001 HH PPS REVENUE CREDIT

## 2021-04-09 PROCEDURE — 3331090002 HH PPS REVENUE DEBIT

## 2021-04-10 PROCEDURE — 3331090001 HH PPS REVENUE CREDIT

## 2021-04-10 PROCEDURE — 3331090002 HH PPS REVENUE DEBIT

## 2021-04-11 PROCEDURE — 3331090001 HH PPS REVENUE CREDIT

## 2021-04-11 PROCEDURE — 3331090002 HH PPS REVENUE DEBIT

## 2021-04-12 PROCEDURE — 3331090002 HH PPS REVENUE DEBIT

## 2021-04-12 PROCEDURE — 3331090001 HH PPS REVENUE CREDIT

## 2021-04-13 ENCOUNTER — HOME CARE VISIT (OUTPATIENT)
Dept: SCHEDULING | Facility: HOME HEALTH | Age: 86
End: 2021-04-13
Payer: MEDICARE

## 2021-04-13 PROCEDURE — 3331090001 HH PPS REVENUE CREDIT

## 2021-04-13 PROCEDURE — G0157 HHC PT ASSISTANT EA 15: HCPCS

## 2021-04-13 PROCEDURE — 3331090002 HH PPS REVENUE DEBIT

## 2021-04-14 VITALS
RESPIRATION RATE: 19 BRPM | DIASTOLIC BLOOD PRESSURE: 80 MMHG | OXYGEN SATURATION: 99 % | TEMPERATURE: 97.8 F | HEART RATE: 70 BPM | SYSTOLIC BLOOD PRESSURE: 124 MMHG

## 2021-04-14 PROCEDURE — 3331090001 HH PPS REVENUE CREDIT

## 2021-04-14 PROCEDURE — 3331090002 HH PPS REVENUE DEBIT

## 2021-04-15 ENCOUNTER — HOME CARE VISIT (OUTPATIENT)
Dept: SCHEDULING | Facility: HOME HEALTH | Age: 86
End: 2021-04-15
Payer: MEDICARE

## 2021-04-15 ENCOUNTER — HOSPITAL ENCOUNTER (OUTPATIENT)
Dept: MAMMOGRAPHY | Age: 86
Discharge: HOME | End: 2021-04-15
Attending: INTERNAL MEDICINE
Payer: MEDICARE

## 2021-04-15 DIAGNOSIS — Z12.31 SCREENING MAMMOGRAM, ENCOUNTER FOR: ICD-10-CM

## 2021-04-15 PROCEDURE — 77063 BREAST TOMOSYNTHESIS BI: CPT

## 2021-04-15 PROCEDURE — 3331090002 HH PPS REVENUE DEBIT

## 2021-04-15 PROCEDURE — G0157 HHC PT ASSISTANT EA 15: HCPCS

## 2021-04-15 PROCEDURE — 3331090001 HH PPS REVENUE CREDIT

## 2021-04-15 RX ORDER — MIRTAZAPINE 30 MG/1
30 TABLET, FILM COATED ORAL
Qty: 30 TAB | Refills: 5 | Status: SHIPPED | OUTPATIENT
Start: 2021-04-15 | End: 2022-10-19

## 2021-04-15 NOTE — TELEPHONE ENCOUNTER
Valdo with [address] [place] [address] is requesting new script for MIRTAZAPINE 30 mg. Pt takes 1 tab at bedtime for depression.   Please fax to [phone]

## 2021-04-16 PROCEDURE — 3331090001 HH PPS REVENUE CREDIT

## 2021-04-16 PROCEDURE — 3331090002 HH PPS REVENUE DEBIT

## 2021-04-17 PROCEDURE — 3331090001 HH PPS REVENUE CREDIT

## 2021-04-17 PROCEDURE — 3331090002 HH PPS REVENUE DEBIT

## 2021-04-18 PROCEDURE — 3331090001 HH PPS REVENUE CREDIT

## 2021-04-18 PROCEDURE — 3331090002 HH PPS REVENUE DEBIT

## 2021-04-19 VITALS
TEMPERATURE: 98 F | OXYGEN SATURATION: 98 % | SYSTOLIC BLOOD PRESSURE: 144 MMHG | RESPIRATION RATE: 18 BRPM | DIASTOLIC BLOOD PRESSURE: 77 MMHG | HEART RATE: 76 BPM

## 2021-04-19 PROCEDURE — 3331090002 HH PPS REVENUE DEBIT

## 2021-04-19 PROCEDURE — 3331090001 HH PPS REVENUE CREDIT

## 2021-04-20 ENCOUNTER — HOME CARE VISIT (OUTPATIENT)
Dept: SCHEDULING | Facility: HOME HEALTH | Age: 86
End: 2021-04-20
Payer: MEDICARE

## 2021-04-20 DIAGNOSIS — R92.8 ABNORMAL MAMMOGRAM OF RIGHT BREAST: ICD-10-CM

## 2021-04-20 DIAGNOSIS — R92.8 ABNORMAL MAMMOGRAM OF RIGHT BREAST: Primary | ICD-10-CM

## 2021-04-20 PROCEDURE — 3331090002 HH PPS REVENUE DEBIT

## 2021-04-20 PROCEDURE — 3331090001 HH PPS REVENUE CREDIT

## 2021-04-20 PROCEDURE — G0151 HHCP-SERV OF PT,EA 15 MIN: HCPCS

## 2021-04-20 NOTE — Clinical Note
Thank you for your response. Patient denies aditional or increased pain. She does report increase in numbness in her low back that can radiate to her legs as it worsens. It becomes worse from sitting in a hard (kitchen) chair, but is constant during the day in her low back. Also of concern is that she c/o increasing weakness in BLEs and that she is progressively having more difficulty with ambulation and balance. She reports that she is not able to walk as far as she used to be jones to and fatigues more quickly. She reports feeling weak, and that when she stands up she feels numb. She also reports that the injection done to her hip was very successful. PT plans to extend and is requesting extension 1w1, 2w3 through end of cert period to address LE impairment and balance. Thank you.      ----- Message -----  From: Hortensia Turner MD  Sent: 4/26/2021   9:46 AM EDT  To: Irene Palomares PT      Thank you for letting me know, she does have pretty severe spinal stenosis and we were considering and epidural for the pain. If she has any additional falls or pain worsens please let me know  Thanks!  ----- Message -----  From: Yanna Penaloza PT  Sent: 4/22/2021   8:02 PM EDT  To: Ramirez Cedeño MD    Post fall assessment completed:  Patient had slipped off bed to floor when sitting and reaching down to don shoes/socks. Her mattress has soft edges and the blanket is slippery. She has no apparent injury. Once sitting on floor, she reports she was unable to transition to her hands and knees, or knees alone. PT plans to address core strength to improve fall recovery. Please note that she reports new pain, that is down the front of both legs, not just the back. The intensity is the same as previous pain and in addition to the pain, 4-6/10.     She also shows a decline in B hip abduction, adduction strength (4-/5 to 3+/5)  and Tinetti balance score (16/28 to 13/28), compared with start of care values.     Thank you   Heike Beltran, PT

## 2021-04-21 ENCOUNTER — TELEPHONE (OUTPATIENT)
Dept: INTERNAL MEDICINE CLINIC | Age: 86
End: 2021-04-21

## 2021-04-21 VITALS
SYSTOLIC BLOOD PRESSURE: 110 MMHG | TEMPERATURE: 97.4 F | RESPIRATION RATE: 17 BRPM | DIASTOLIC BLOOD PRESSURE: 60 MMHG | OXYGEN SATURATION: 98 % | HEART RATE: 77 BPM

## 2021-04-21 PROCEDURE — 3331090001 HH PPS REVENUE CREDIT

## 2021-04-21 PROCEDURE — 3331090002 HH PPS REVENUE DEBIT

## 2021-04-21 NOTE — TELEPHONE ENCOUNTER
I called and notified the patient about additional mammogram views, per Dr Cindy Bean. The patient has an appointment for a follow up 4/26/21.

## 2021-04-21 NOTE — TELEPHONE ENCOUNTER
----- Message from Varsha Bonilla MD sent at 4/20/2021  9:19 PM EDT -----  Please let her know that I am ordering a follow up diagnostic mammogram and ultrasound given an area along her right breast. The Radiologist is recommending additional pictures in order to rule out breast cancer.      Dr. Sarah Maddox  [facility] [address], [address] [place] [address] Str[address]  Phone: [phone]  Fax: [phone]

## 2021-04-22 ENCOUNTER — HOME CARE VISIT (OUTPATIENT)
Dept: SCHEDULING | Facility: HOME HEALTH | Age: 86
End: 2021-04-22
Payer: MEDICARE

## 2021-04-22 PROCEDURE — G0157 HHC PT ASSISTANT EA 15: HCPCS

## 2021-04-22 PROCEDURE — 3331090001 HH PPS REVENUE CREDIT

## 2021-04-22 PROCEDURE — 3331090002 HH PPS REVENUE DEBIT

## 2021-04-23 PROCEDURE — 3331090001 HH PPS REVENUE CREDIT

## 2021-04-23 PROCEDURE — 3331090002 HH PPS REVENUE DEBIT

## 2021-04-23 NOTE — PROGRESS NOTES
Post fall assessment completed:  Patient had slipped off bed to floor when sitting and reaching down to don shoes/socks. Her mattress has soft edges and the blanket is slippery. She has no apparent injury. Once sitting on floor, she reports she was unable to transition to her hands and knees, or knees alone. PT plans to address core strength to improve fall recovery. Please note that she reports new pain, that is down the front of both legs, not just the back. The intensity is the same as previous pain and in addition to the pain, 4-6/10. She also shows a decline in B hip abduction, adduction strength (4-/5 to 3+/5)  and Tinetti balance score (16/28 to 13/28), compared with start of care values.     Thank you   Douglas Yip, PT

## 2021-04-24 PROCEDURE — 3331090002 HH PPS REVENUE DEBIT

## 2021-04-24 PROCEDURE — 3331090001 HH PPS REVENUE CREDIT

## 2021-04-25 PROCEDURE — 3331090001 HH PPS REVENUE CREDIT

## 2021-04-25 PROCEDURE — 3331090002 HH PPS REVENUE DEBIT

## 2021-04-26 VITALS
TEMPERATURE: 97.8 F | RESPIRATION RATE: 19 BRPM | OXYGEN SATURATION: 99 % | DIASTOLIC BLOOD PRESSURE: 78 MMHG | HEART RATE: 70 BPM | SYSTOLIC BLOOD PRESSURE: 124 MMHG

## 2021-04-26 PROCEDURE — 3331090002 HH PPS REVENUE DEBIT

## 2021-04-26 PROCEDURE — 3331090001 HH PPS REVENUE CREDIT

## 2021-04-27 ENCOUNTER — HOME CARE VISIT (OUTPATIENT)
Dept: SCHEDULING | Facility: HOME HEALTH | Age: 86
End: 2021-04-27
Payer: MEDICARE

## 2021-04-27 PROCEDURE — 3331090001 HH PPS REVENUE CREDIT

## 2021-04-27 PROCEDURE — 3331090002 HH PPS REVENUE DEBIT

## 2021-04-27 PROCEDURE — G0157 HHC PT ASSISTANT EA 15: HCPCS

## 2021-04-28 PROCEDURE — 3331090001 HH PPS REVENUE CREDIT

## 2021-04-28 PROCEDURE — 3331090002 HH PPS REVENUE DEBIT

## 2021-04-29 ENCOUNTER — HOME CARE VISIT (OUTPATIENT)
Dept: SCHEDULING | Facility: HOME HEALTH | Age: 86
End: 2021-04-29
Payer: MEDICARE

## 2021-04-29 VITALS
HEART RATE: 74 BPM | DIASTOLIC BLOOD PRESSURE: 77 MMHG | HEART RATE: 74 BPM | OXYGEN SATURATION: 97 % | SYSTOLIC BLOOD PRESSURE: 142 MMHG | RESPIRATION RATE: 17 BRPM | TEMPERATURE: 98 F | TEMPERATURE: 98.1 F | RESPIRATION RATE: 18 BRPM | OXYGEN SATURATION: 99 % | SYSTOLIC BLOOD PRESSURE: 140 MMHG | DIASTOLIC BLOOD PRESSURE: 82 MMHG

## 2021-04-29 PROCEDURE — 3331090001 HH PPS REVENUE CREDIT

## 2021-04-29 PROCEDURE — 3331090002 HH PPS REVENUE DEBIT

## 2021-04-29 PROCEDURE — G0157 HHC PT ASSISTANT EA 15: HCPCS

## 2021-04-30 PROCEDURE — 3331090001 HH PPS REVENUE CREDIT

## 2021-04-30 PROCEDURE — 3331090002 HH PPS REVENUE DEBIT

## 2021-05-01 PROCEDURE — 3331090002 HH PPS REVENUE DEBIT

## 2021-05-01 PROCEDURE — 400018 HH-NO PAY CLAIM PROCEDURE

## 2021-05-01 PROCEDURE — 3331090001 HH PPS REVENUE CREDIT

## 2021-05-02 PROCEDURE — 3331090001 HH PPS REVENUE CREDIT

## 2021-05-02 PROCEDURE — 3331090002 HH PPS REVENUE DEBIT

## 2021-05-03 PROCEDURE — 3331090002 HH PPS REVENUE DEBIT

## 2021-05-03 PROCEDURE — 3331090001 HH PPS REVENUE CREDIT

## 2021-05-04 ENCOUNTER — HOSPITAL ENCOUNTER (OUTPATIENT)
Dept: ULTRASOUND IMAGING | Age: 86
End: 2021-05-04
Attending: INTERNAL MEDICINE
Payer: MEDICARE

## 2021-05-04 ENCOUNTER — HOME CARE VISIT (OUTPATIENT)
Dept: HOME HEALTH SERVICES | Facility: HOME HEALTH | Age: 86
End: 2021-05-04
Payer: MEDICARE

## 2021-05-04 ENCOUNTER — HOSPITAL ENCOUNTER (OUTPATIENT)
Dept: MAMMOGRAPHY | Age: 86
Discharge: HOME | End: 2021-05-04
Attending: INTERNAL MEDICINE
Payer: MEDICARE

## 2021-05-04 DIAGNOSIS — R92.8 ABNORMAL MAMMOGRAM OF RIGHT BREAST: ICD-10-CM

## 2021-05-04 PROCEDURE — 3331090002 HH PPS REVENUE DEBIT

## 2021-05-04 PROCEDURE — 77065 DX MAMMO INCL CAD UNI: CPT

## 2021-05-04 PROCEDURE — 3331090001 HH PPS REVENUE CREDIT

## 2021-05-05 ENCOUNTER — HOME CARE VISIT (OUTPATIENT)
Dept: SCHEDULING | Facility: HOME HEALTH | Age: 86
End: 2021-05-05
Payer: MEDICARE

## 2021-05-05 ENCOUNTER — TELEPHONE (OUTPATIENT)
Dept: ORTHOPEDIC SURGERY | Age: 86
End: 2021-05-05

## 2021-05-05 VITALS
SYSTOLIC BLOOD PRESSURE: 118 MMHG | HEART RATE: 62 BPM | DIASTOLIC BLOOD PRESSURE: 61 MMHG | OXYGEN SATURATION: 97 % | TEMPERATURE: 98.1 F

## 2021-05-05 DIAGNOSIS — M48.062 SPINAL STENOSIS OF LUMBAR REGION WITH NEUROGENIC CLAUDICATION: ICD-10-CM

## 2021-05-05 DIAGNOSIS — M54.50 LUMBAR SPINE PAIN: Primary | ICD-10-CM

## 2021-05-05 DIAGNOSIS — M25.551 RIGHT HIP PAIN: ICD-10-CM

## 2021-05-05 PROCEDURE — G0151 HHCP-SERV OF PT,EA 15 MIN: HCPCS

## 2021-05-05 PROCEDURE — 3331090002 HH PPS REVENUE DEBIT

## 2021-05-05 PROCEDURE — 3331090001 HH PPS REVENUE CREDIT

## 2021-05-05 PROCEDURE — 400013 HH SOC

## 2021-05-05 NOTE — TELEPHONE ENCOUNTER
Edy Morris, home health physical therapy, called to request a verbal to extend patient's therapy for another 7 visits, 1 for 1 this week and then 2 times per week x 3 weeks.   Please advise at [phone]

## 2021-05-06 PROCEDURE — 3331090001 HH PPS REVENUE CREDIT

## 2021-05-06 PROCEDURE — 3331090002 HH PPS REVENUE DEBIT

## 2021-05-07 ENCOUNTER — HOME CARE VISIT (OUTPATIENT)
Dept: HOME HEALTH SERVICES | Facility: HOME HEALTH | Age: 86
End: 2021-05-07
Payer: MEDICARE

## 2021-05-07 PROCEDURE — 3331090002 HH PPS REVENUE DEBIT

## 2021-05-07 PROCEDURE — G0157 HHC PT ASSISTANT EA 15: HCPCS

## 2021-05-07 PROCEDURE — 3331090001 HH PPS REVENUE CREDIT

## 2021-05-08 PROCEDURE — 3331090001 HH PPS REVENUE CREDIT

## 2021-05-08 PROCEDURE — 3331090002 HH PPS REVENUE DEBIT

## 2021-05-09 PROCEDURE — 3331090001 HH PPS REVENUE CREDIT

## 2021-05-09 PROCEDURE — 3331090002 HH PPS REVENUE DEBIT

## 2021-05-10 PROCEDURE — 3331090002 HH PPS REVENUE DEBIT

## 2021-05-10 PROCEDURE — 3331090001 HH PPS REVENUE CREDIT

## 2021-05-11 ENCOUNTER — HOME CARE VISIT (OUTPATIENT)
Dept: SCHEDULING | Facility: HOME HEALTH | Age: 86
End: 2021-05-11
Payer: MEDICARE

## 2021-05-11 VITALS
DIASTOLIC BLOOD PRESSURE: 78 MMHG | RESPIRATION RATE: 18 BRPM | HEART RATE: 65 BPM | OXYGEN SATURATION: 98 % | TEMPERATURE: 97.5 F | SYSTOLIC BLOOD PRESSURE: 120 MMHG

## 2021-05-11 PROCEDURE — 3331090001 HH PPS REVENUE CREDIT

## 2021-05-11 PROCEDURE — G0157 HHC PT ASSISTANT EA 15: HCPCS

## 2021-05-11 PROCEDURE — 3331090002 HH PPS REVENUE DEBIT

## 2021-05-12 PROCEDURE — 3331090002 HH PPS REVENUE DEBIT

## 2021-05-12 PROCEDURE — 3331090001 HH PPS REVENUE CREDIT

## 2021-05-13 ENCOUNTER — HOME CARE VISIT (OUTPATIENT)
Dept: SCHEDULING | Facility: HOME HEALTH | Age: 86
End: 2021-05-13
Payer: MEDICARE

## 2021-05-13 VITALS
DIASTOLIC BLOOD PRESSURE: 83 MMHG | OXYGEN SATURATION: 98 % | TEMPERATURE: 97.8 F | HEART RATE: 70 BPM | RESPIRATION RATE: 18 BRPM | SYSTOLIC BLOOD PRESSURE: 160 MMHG

## 2021-05-13 PROCEDURE — 3331090001 HH PPS REVENUE CREDIT

## 2021-05-13 PROCEDURE — G0157 HHC PT ASSISTANT EA 15: HCPCS

## 2021-05-13 PROCEDURE — 3331090002 HH PPS REVENUE DEBIT

## 2021-05-14 PROCEDURE — 3331090002 HH PPS REVENUE DEBIT

## 2021-05-14 PROCEDURE — 3331090001 HH PPS REVENUE CREDIT

## 2021-05-15 ENCOUNTER — HOSPITAL ENCOUNTER (INPATIENT)
Age: 86
LOS: 5 days | Discharge: NURSING, REHAB OR LONG-TERM CARE | DRG: 517 | End: 2021-05-20
Attending: EMERGENCY MEDICINE | Admitting: FAMILY MEDICINE
Payer: MEDICARE

## 2021-05-15 ENCOUNTER — APPOINTMENT (OUTPATIENT)
Dept: CT IMAGING | Age: 86
DRG: 517 | End: 2021-05-15
Attending: STUDENT IN AN ORGANIZED HEALTH CARE EDUCATION/TRAINING PROGRAM
Payer: MEDICARE

## 2021-05-15 DIAGNOSIS — S22.080A COMPRESSION FRACTURE OF T12 VERTEBRA, INITIAL ENCOUNTER (HCC): Primary | ICD-10-CM

## 2021-05-15 PROBLEM — S22.000A THORACIC COMPRESSION FRACTURE (HCC): Status: ACTIVE | Noted: 2021-05-15

## 2021-05-15 LAB
ANION GAP SERPL CALC-SCNC: 5 MMOL/L (ref 3–18)
BASOPHILS # BLD: 0 K/UL (ref 0–0.1)
BASOPHILS NFR BLD: 1 % (ref 0–2)
BUN SERPL-MCNC: 16 MG/DL (ref 7–18)
BUN/CREAT SERPL: 18 (ref 12–20)
CALCIUM SERPL-MCNC: 9.4 MG/DL (ref 8.5–10.1)
CHLORIDE SERPL-SCNC: 97 MMOL/L (ref 100–111)
CO2 SERPL-SCNC: 31 MMOL/L (ref 21–32)
CREAT SERPL-MCNC: 0.9 MG/DL (ref 0.6–1.3)
DIFFERENTIAL METHOD BLD: ABNORMAL
EOSINOPHIL # BLD: 0.1 K/UL (ref 0–0.4)
EOSINOPHIL NFR BLD: 1 % (ref 0–5)
ERYTHROCYTE [DISTWIDTH] IN BLOOD BY AUTOMATED COUNT: 13.7 % (ref 11.6–14.5)
GLUCOSE SERPL-MCNC: 99 MG/DL (ref 74–99)
HCT VFR BLD AUTO: 41.3 % (ref 35–45)
HGB BLD-MCNC: 13 G/DL (ref 12–16)
LYMPHOCYTES # BLD: 1.3 K/UL (ref 0.9–3.6)
LYMPHOCYTES NFR BLD: 18 % (ref 21–52)
MCH RBC QN AUTO: 27.3 PG (ref 24–34)
MCHC RBC AUTO-ENTMCNC: 31.5 G/DL (ref 31–37)
MCV RBC AUTO: 86.8 FL (ref 74–97)
MONOCYTES # BLD: 0.5 K/UL (ref 0.05–1.2)
MONOCYTES NFR BLD: 8 % (ref 3–10)
NEUTS SEG # BLD: 5 K/UL (ref 1.8–8)
NEUTS SEG NFR BLD: 72 % (ref 40–73)
PLATELET # BLD AUTO: 276 K/UL (ref 135–420)
PMV BLD AUTO: 10.1 FL (ref 9.2–11.8)
POTASSIUM SERPL-SCNC: 4.9 MMOL/L (ref 3.5–5.5)
RBC # BLD AUTO: 4.76 M/UL (ref 4.2–5.3)
SODIUM SERPL-SCNC: 133 MMOL/L (ref 136–145)
WBC # BLD AUTO: 7 K/UL (ref 4.6–13.2)

## 2021-05-15 PROCEDURE — 99218 HC RM OBSERVATION: CPT

## 2021-05-15 PROCEDURE — 99219 PR INITIAL OBSERVATION CARE/DAY 50 MINUTES: CPT | Performed by: FAMILY MEDICINE

## 2021-05-15 PROCEDURE — 80048 BASIC METABOLIC PNL TOTAL CA: CPT

## 2021-05-15 PROCEDURE — 72131 CT LUMBAR SPINE W/O DYE: CPT

## 2021-05-15 PROCEDURE — 74011250636 HC RX REV CODE- 250/636: Performed by: FAMILY MEDICINE

## 2021-05-15 PROCEDURE — 74011250637 HC RX REV CODE- 250/637: Performed by: FAMILY MEDICINE

## 2021-05-15 PROCEDURE — 93005 ELECTROCARDIOGRAM TRACING: CPT

## 2021-05-15 PROCEDURE — 65270000029 HC RM PRIVATE

## 2021-05-15 PROCEDURE — 99284 EMERGENCY DEPT VISIT MOD MDM: CPT

## 2021-05-15 PROCEDURE — 74011000258 HC RX REV CODE- 258: Performed by: STUDENT IN AN ORGANIZED HEALTH CARE EDUCATION/TRAINING PROGRAM

## 2021-05-15 PROCEDURE — 3331090002 HH PPS REVENUE DEBIT

## 2021-05-15 PROCEDURE — 85025 COMPLETE CBC W/AUTO DIFF WBC: CPT

## 2021-05-15 PROCEDURE — 96374 THER/PROPH/DIAG INJ IV PUSH: CPT

## 2021-05-15 PROCEDURE — 3331090001 HH PPS REVENUE CREDIT

## 2021-05-15 PROCEDURE — 2709999900 HC NON-CHARGEABLE SUPPLY

## 2021-05-15 PROCEDURE — 74011250636 HC RX REV CODE- 250/636: Performed by: STUDENT IN AN ORGANIZED HEALTH CARE EDUCATION/TRAINING PROGRAM

## 2021-05-15 RX ORDER — DICYCLOMINE HYDROCHLORIDE 10 MG/1
10 CAPSULE ORAL
Status: DISCONTINUED | OUTPATIENT
Start: 2021-05-15 | End: 2021-05-20 | Stop reason: HOSPADM

## 2021-05-15 RX ORDER — HYDROCODONE BITARTRATE AND ACETAMINOPHEN 5; 325 MG/1; MG/1
1 TABLET ORAL
Status: DISCONTINUED | OUTPATIENT
Start: 2021-05-15 | End: 2021-05-20 | Stop reason: HOSPADM

## 2021-05-15 RX ORDER — FUROSEMIDE 20 MG/1
20 TABLET ORAL EVERY OTHER DAY
Status: DISCONTINUED | OUTPATIENT
Start: 2021-05-16 | End: 2021-05-20 | Stop reason: HOSPADM

## 2021-05-15 RX ORDER — HYDROMORPHONE HYDROCHLORIDE 1 MG/ML
1 INJECTION, SOLUTION INTRAMUSCULAR; INTRAVENOUS; SUBCUTANEOUS
Status: DISCONTINUED | OUTPATIENT
Start: 2021-05-15 | End: 2021-05-16

## 2021-05-15 RX ORDER — ONDANSETRON 2 MG/ML
4 INJECTION INTRAMUSCULAR; INTRAVENOUS
Status: DISCONTINUED | OUTPATIENT
Start: 2021-05-15 | End: 2021-05-20

## 2021-05-15 RX ORDER — ACETAMINOPHEN 325 MG/1
650 TABLET ORAL
Status: DISCONTINUED | OUTPATIENT
Start: 2021-05-15 | End: 2021-05-20 | Stop reason: HOSPADM

## 2021-05-15 RX ORDER — PROMETHAZINE HYDROCHLORIDE 12.5 MG/1
12.5 TABLET ORAL
Status: DISCONTINUED | OUTPATIENT
Start: 2021-05-15 | End: 2021-05-20

## 2021-05-15 RX ORDER — SODIUM CHLORIDE 0.9 % (FLUSH) 0.9 %
5-40 SYRINGE (ML) INJECTION EVERY 8 HOURS
Status: DISCONTINUED | OUTPATIENT
Start: 2021-05-15 | End: 2021-05-20 | Stop reason: HOSPADM

## 2021-05-15 RX ORDER — CHOLECALCIFEROL (VITAMIN D3) 125 MCG
10 CAPSULE ORAL
Status: DISCONTINUED | OUTPATIENT
Start: 2021-05-15 | End: 2021-05-20 | Stop reason: HOSPADM

## 2021-05-15 RX ORDER — MIRTAZAPINE 15 MG/1
30 TABLET, FILM COATED ORAL
Status: DISCONTINUED | OUTPATIENT
Start: 2021-05-15 | End: 2021-05-20 | Stop reason: HOSPADM

## 2021-05-15 RX ORDER — ACETAMINOPHEN 650 MG/1
650 SUPPOSITORY RECTAL
Status: DISCONTINUED | OUTPATIENT
Start: 2021-05-15 | End: 2021-05-20 | Stop reason: HOSPADM

## 2021-05-15 RX ORDER — CYCLOBENZAPRINE HCL 10 MG
5 TABLET ORAL
Status: DISCONTINUED | OUTPATIENT
Start: 2021-05-15 | End: 2021-05-20 | Stop reason: HOSPADM

## 2021-05-15 RX ORDER — LORATADINE 10 MG/1
10 TABLET ORAL DAILY
Status: DISCONTINUED | OUTPATIENT
Start: 2021-05-16 | End: 2021-05-20 | Stop reason: HOSPADM

## 2021-05-15 RX ORDER — POLYETHYLENE GLYCOL 3350 17 G/17G
17 POWDER, FOR SOLUTION ORAL DAILY PRN
Status: DISCONTINUED | OUTPATIENT
Start: 2021-05-15 | End: 2021-05-18

## 2021-05-15 RX ORDER — PRAVASTATIN SODIUM 20 MG/1
10 TABLET ORAL
Status: DISCONTINUED | OUTPATIENT
Start: 2021-05-15 | End: 2021-05-20 | Stop reason: HOSPADM

## 2021-05-15 RX ORDER — THERA TABS 400 MCG
1 TAB ORAL DAILY
Status: DISCONTINUED | OUTPATIENT
Start: 2021-05-16 | End: 2021-05-20 | Stop reason: HOSPADM

## 2021-05-15 RX ORDER — GABAPENTIN 100 MG/1
100 CAPSULE ORAL 2 TIMES DAILY
Status: DISCONTINUED | OUTPATIENT
Start: 2021-05-15 | End: 2021-05-20 | Stop reason: HOSPADM

## 2021-05-15 RX ORDER — FAMOTIDINE 20 MG/1
20 TABLET, FILM COATED ORAL DAILY
Status: DISCONTINUED | OUTPATIENT
Start: 2021-05-16 | End: 2021-05-20 | Stop reason: HOSPADM

## 2021-05-15 RX ORDER — SODIUM CHLORIDE 0.9 % (FLUSH) 0.9 %
5-40 SYRINGE (ML) INJECTION AS NEEDED
Status: DISCONTINUED | OUTPATIENT
Start: 2021-05-15 | End: 2021-05-20 | Stop reason: HOSPADM

## 2021-05-15 RX ORDER — ENOXAPARIN SODIUM 100 MG/ML
40 INJECTION SUBCUTANEOUS DAILY
Status: DISCONTINUED | OUTPATIENT
Start: 2021-05-16 | End: 2021-05-20 | Stop reason: HOSPADM

## 2021-05-15 RX ORDER — CETIRIZINE HCL 10 MG
10 TABLET ORAL DAILY
Status: DISCONTINUED | OUTPATIENT
Start: 2021-05-16 | End: 2021-05-15

## 2021-05-15 RX ORDER — METHOCARBAMOL 500 MG/1
500 TABLET, FILM COATED ORAL 4 TIMES DAILY
Status: DISCONTINUED | OUTPATIENT
Start: 2021-05-15 | End: 2021-05-15

## 2021-05-15 RX ADMIN — HYDROCODONE BITARTRATE AND ACETAMINOPHEN 1 TABLET: 5; 325 TABLET ORAL at 21:02

## 2021-05-15 RX ADMIN — PRAVASTATIN SODIUM 10 MG: 20 TABLET ORAL at 22:56

## 2021-05-15 RX ADMIN — Medication 10 ML: at 22:55

## 2021-05-15 RX ADMIN — METHOCARBAMOL 1000 MG: 100 INJECTION INTRAMUSCULAR; INTRAVENOUS at 17:21

## 2021-05-15 RX ADMIN — Medication 10 MG: at 22:56

## 2021-05-15 RX ADMIN — HYDROMORPHONE HYDROCHLORIDE 1 MG: 1 INJECTION, SOLUTION INTRAMUSCULAR; INTRAVENOUS; SUBCUTANEOUS at 22:33

## 2021-05-15 RX ADMIN — GABAPENTIN 100 MG: 100 CAPSULE ORAL at 20:22

## 2021-05-15 RX ADMIN — MIRTAZAPINE 30 MG: 15 TABLET, FILM COATED ORAL at 22:56

## 2021-05-15 NOTE — H&P
History & Physical      Patient: Tatyana Bourne MRN: [id number]  CSN: [id number]    YOB: 1935  Age: 80 y.o. Sex: female      DOA: 5/15/2021    Chief Complaint:   Chief Complaint   Patient presents with    Fall    Back Pain          HPI:     Tatyana Bourne is a 80 y.o. female with PMHx of severe spinal stenosis, colon cancer status post partial resection, HTN, HLD, IBS, GERD, and depression who presented to the ED with complaints of intractable and severe back pain. Patient has a known history of severe spinal stenosis at L3-L4 and L4-L5. While in her assisted living facility earlier this morning, she reports moving a heavy safe across the floor. She felt fine afterwards but not too long after that she began having back pain, her legs gave out, she spun around fell into the wall and then fell to the ground. She was then helped up into a chair and EMS was called. When seen in the ED earlier this evening, she is alert and oriented x3. She complains of intractable and severe mid-lower back pain. In the ED, her blood pressure was elevated and her other vital signs were reassuring. Labs were overall reassuring and notable only for sodium 133. EKG showed sinus rhythm at 64 bpm's with nonspecific ST abnormality. CT L-spine showed an acute compression fracture of T12 with 20% vertebral body height loss, along with multilevel DDD throughout the lumbar spine causing moderate/severe foraminal stenosis. Ms. Gigi Chao was then treated with IV Robaxin. The ED contacted  orthotics and a TLSO brace was ordered. Ms. Gigi Chao is now admitted to observation for pain control and so that her orthotic brace can be delivered for an acute T12 compression fracture.       Past Medical History:   Diagnosis Date    Arthritis     Cancer [facility]) 2002    Colon     Chronic pain     abdominal due to IBS    Depression     GERD (gastroesophageal reflux disease)     Hypertension     Migraine headache 2014    Mixed hyperlipidemia 9/3/2020    Occipital neuralgia 2014    PUD (peptic ulcer disease)     S/P colon resection 2002    clear presently        Past Surgical History:   Procedure Laterality Date    HX CHOLECYSTECTOMY  2015    HX HYSTERECTOMY  1965    HX TONSIL AND ADENOIDECTOMY      HX WISDOM TEETH EXTRACTION      x4       Family History   Problem Relation Age of Onset    Hypertension Mother     Stroke Mother     Heart Attack Father     Hypertension Father     Parkinson's Disease Sister     Hypertension Brother     Cancer Brother         prostate    No Known Problems Son     No Known Problems Daughter        Social History     Socioeconomic History    Marital status:      Spouse name: Not on file    Number of children: Not on file    Years of education: Not on file    Highest education level: Not on file   Tobacco Use    Smoking status: Never Smoker    Smokeless tobacco: Never Used   Substance and Sexual Activity    Alcohol use: Yes     Alcohol/week: 0.0 - 1.0 standard drinks    Sexual activity: Never       Prior to Admission medications    Medication Sig Start Date End Date Taking? Authorizing Provider   mirtazapine (REMERON) 30 mg tablet Take 1 Tab by mouth nightly. Patient taking differently: Take 1 Tab by mouth nightly. Missed 5 days per patient, due to slow renewal 4/15/21   Bridgette Basilio MD   fexofenadine (Allergy Relief, fexofenadine,) 180 mg tablet Take 180 mg by mouth daily. Provider, Historical   gabapentin (NEURONTIN) 100 mg capsule Take 1 Cap by mouth two (2) times a day. Max Daily Amount: 200 mg. 3/11/21   Bridgette Basilio MD   acetaminophen (TYLENOL) 500 mg capsule Take 500 mg by mouth every four (4) hours as needed for Pain. Provider, Historical   pravastatin (PRAVACHOL) 10 mg tablet Take 1 Tab by mouth nightly.  12/21/20   Bridgette Basilio MD   ondansetron hcl (ZOFRAN) 4 mg tablet Take 4 mg by mouth every eight (8) hours as needed for Nausea. Provider, Historical   alum-mag hydroxide-simeth (MYLANTA) 200-200-20 mg/5 mL susp Take 30 mL by mouth daily as needed for Other (PRN: Gastric Reflux, in PM). PRN: Gastric Reflux, in PM    Provider, Historical   famotidine (PEPCID) 20 mg tablet Take 20 mg by mouth daily. 1/10/19   Provider, Historical   melatonin tab tablet Take 10 mg by mouth nightly. Provider, Historical   cholecalciferol (VITAMIN D3) 1,000 unit cap Take 1,000 Units by mouth daily. Provider, Historical   multivitamin/iron/folic acid (CENTRUM WOMEN PO) Take 1 Tab by mouth daily. Provider, Historical   cetirizine (ZYRTEC) 10 mg tablet Take 10 mg by mouth daily. Provider, Historical   polyethylene glycol (MIRALAX) 17 gram packet Take 17 g by mouth daily as needed for Other (PRN Constipation). PRN Constipation. Mix with 8 oz of liquid of choice, such as water. Provider, Historical   Bifidobacterium Infantis (ALIGN) 4 mg cap Take 4 mg by mouth daily. Provider, Historical   furosemide (LASIX) 20 mg tablet Take 1 Tab by mouth every other day. Indications: Edema 8/28/18   Natali Mathias MD   dicyclomine (BENTYL) 10 mg capsule Take 1 Cap by mouth four (4) times daily. Patient taking differently: Take 1 Cap by mouth four (4) times daily as needed for Other (PRN:  IBS symptoms). PRN:  IBS symptoms 8/28/18   Natali Mathias MD       Allergies   Allergen Reactions    Macrobid [Nitrofurantoin Monohyd/M-Cryst] Nausea and Vomiting    Pneumococcal Vaccine Other (comments)    Lexapro [Escitalopram Oxalate] Other (comments)     ? Hyponatremia per pt hx         Review of Systems  GENERAL: Patient alert, awake and oriented times 3, able to communicate full sentences and not in distress. HEENT: No change in vision, sore throat or sinus congestion. NECK: No pain or stiffness. PULMONARY: No shortness of breath, cough or wheeze.    Cardiovascular: no pnd or orthopnea, no CP  GASTROINTESTINAL: No abdominal pain, nausea, vomiting or diarrhea, or blood per rectum. GENITOURINARY: No urinary frequency, urgency, hesitancy or dysuria. MUSCULOSKELETAL: Positive intractable and severe back pain. DERMATOLOGIC: No rash, no itching, no lesions. ENDOCRINE: No polyuria, polydipsia, no heat or cold intolerance. No recent change in weight. HEMATOLOGICAL: No anemia or easy bruising or bleeding. NEUROLOGIC: No headache, seizures, numbness, tingling or weakness. Physical Exam:     Physical Exam:  Visit Vitals  BP (!) 177/72 (BP 1 Location: Left upper arm, BP Patient Position: At rest)   Pulse 63   Temp 98.2 °F (36.8 °C)   Resp 16   Ht 5' 5\" (1.651 m)   Wt 74.8 kg (165 lb)   SpO2 98%   BMI 27.46 kg/m²      O2 Device: None (Room air)    Temp (24hrs), Av.2 °F (36.8 °C), Min:98.2 °F (36.8 °C), Max:98.2 °F (36.8 °C)    No intake/output data recorded. No intake/output data recorded. General:  Alert, cooperative, no distress, appears stated age. Head: Normocephalic, without obvious abnormality, atraumatic. Eyes:  Conjunctivae/corneas clear. PERRL, EOMs intact. Nose: Nares normal. No drainage or sinus tenderness. Neck: Supple, symmetrical, trachea midline, no JVD. Lungs:   Clear to auscultation bilaterally. Heart:  Regular rate and rhythm, S1, S2 normal.     Abdomen: Soft, non-tender. Bowel sounds normal.    Extremities: Extremities normal, atraumatic, no cyanosis or edema. Pulses: 2+ and symmetric all extremities. Skin:  No rashes or lesions   Neurologic: AAOx3, No focal motor or sensory deficit.   Back: Mid-lower paraspinal muscle tenderness and tightness       Labs Reviewed:    BMP:   Lab Results   Component Value Date/Time     (L) 05/15/2021 03:17 PM    K 4.9 05/15/2021 03:17 PM    CL 97 (L) 05/15/2021 03:17 PM    CO2 31 05/15/2021 03:17 PM    AGAP 5 05/15/2021 03:17 PM    GLU 99 05/15/2021 03:17 PM    BUN 16 05/15/2021 03:17 PM    CREA 0.90 05/15/2021 03:17 PM    GFRAA >60 05/15/2021 03:17 PM    GFRNA 60 (L) 05/15/2021 03:17 PM     CMP:   Lab Results   Component Value Date/Time     (L) 05/15/2021 03:17 PM    K 4.9 05/15/2021 03:17 PM    CL 97 (L) 05/15/2021 03:17 PM    CO2 31 05/15/2021 03:17 PM    AGAP 5 05/15/2021 03:17 PM    GLU 99 05/15/2021 03:17 PM    BUN 16 05/15/2021 03:17 PM    CREA 0.90 05/15/2021 03:17 PM    GFRAA >60 05/15/2021 03:17 PM    GFRNA 60 (L) 05/15/2021 03:17 PM    CA 9.4 05/15/2021 03:17 PM     CBC:   Lab Results   Component Value Date/Time    WBC 7.0 05/15/2021 03:17 PM    HGB 13.0 05/15/2021 03:17 PM    HCT 41.3 05/15/2021 03:17 PM     05/15/2021 03:17 PM     Recent Glucose Results:   Lab Results   Component Value Date/Time    GLU 99 05/15/2021 03:17 PM     COAGS: No results found for: APTT, PTP, INR, INREXT, INREXT      Procedures/imaging: see electronic medical records for all procedures/Xrays and details which were not copied into this note but were reviewed prior to creation of [facility] is a 80 y.o. female with PMHx of severe spinal stenosis, colon cancer status post partial resection, HTN, HLD, IBS, GERD, and depression who is now admitted to observation for pain control and so that her orthotic brace can be delivered for an acute T12 compression fracture. 1. Acute T12 compression fracture  2. Severe spinal stenosis  3. HTN  4. HLD  5. IBS  6. GERD  7. Depression  8. Hx colon cancer status post partial resection    Consider Ortho-spine consult in a.m.   Orthotics consulted, TLSO brace has been ordered  Pain control-Tylenol, Norco, or IV Dilaudid as needed  Start Flexeril 5 mg 3 times daily as needed  Continue home meds  Follow-up CBC, BMP, mag, UA  Neurochecks  PT, OT      DVT prophylaxis: Lovenox   Diet: Cardiac    Contact: Leidy Cleary (daughter)    [phone]    Code Status: DNR    Disposition: Admit to observation, likely home in 1-2 days      Discussed with patient and her daughter at bedside about hospital admission and my plan of care, who understood and agreed with my plan of care. Rogelio Pandey DO   5/15/2021       Dragon medical dictation software was used for portions of this report. Unintended errors may occur.

## 2021-05-15 NOTE — ED PROVIDER NOTES
61-year-old female presenting to the emergency department after ground-level mechanical fall this morning. Patient states she was ambulating with her walker as she usually does she reached to open the door and her legs gave out she spun around and hit her back against the door and fell to the ground. Patient had to be helped up and had a chief complaint of significant pain in the back. Patient states after being helped into a chair after the fall she was unable to stand up from the chair secondary to pain in her back. Notably patient has a history of severe spinal stenosis at L3-L4 and L4-L5. Patient did not hit her head or lose consciousness.            Past Medical History:   Diagnosis Date    Arthritis     Cancer [facility]) 2002    Colon     Chronic pain     abdominal due to IBS    Depression     GERD (gastroesophageal reflux disease)     Hypertension     Migraine headache 2014    Mixed hyperlipidemia 9/3/2020    Occipital neuralgia 2014    PUD (peptic ulcer disease)     S/P colon resection 2002    clear presently        Past Surgical History:   Procedure Laterality Date    HX CHOLECYSTECTOMY  2015    HX HYSTERECTOMY  1965    HX TONSIL AND ADENOIDECTOMY      HX WISDOM TEETH EXTRACTION      x4         Family History:   Problem Relation Age of Onset    Hypertension Mother     Stroke Mother     Heart Attack Father     Hypertension Father     Parkinson's Disease Sister     Hypertension Brother     Cancer Brother         prostate    No Known Problems Son     No Known Problems Daughter        Social History     Socioeconomic History    Marital status:      Spouse name: Not on file    Number of children: Not on file    Years of education: Not on file    Highest education level: Not on file   Occupational History    Not on file   Social Needs    Financial resource strain: Not on file    Food insecurity     Worry: Not on file     Inability: Not on file   [employer] needs Medical: Not on file     Non-medical: Not on file   Tobacco Use    Smoking status: Never Smoker    Smokeless tobacco: Never Used   Substance and Sexual Activity    Alcohol use: Yes     Alcohol/week: 0.0 - 1.0 standard drinks    Drug use: Not on file    Sexual activity: Never   Lifestyle    Physical activity     Days per week: Not on file     Minutes per session: Not on file    Stress: Not on file   Relationships    Social connections     Talks on phone: Not on file     Gets together: Not on file     Attends [employer] service: Not on file     Active member of club or organization: Not on file     Attends meetings of clubs or organizations: Not on file     Relationship status: Not on file    Intimate partner violence     Fear of current or ex partner: Not on file     Emotionally abused: Not on file     Physically abused: Not on file     Forced sexual activity: Not on file   Other Topics Concern    Not on file   Social History Narrative    Not on file         ALLERGIES: Macrobid [nitrofurantoin monohyd/m-cryst], Pneumococcal vaccine, and Lexapro [escitalopram oxalate]    Review of Systems   Constitutional: Negative for chills and fever. HENT: Negative for nosebleeds and sinus pressure. Eyes: Negative for photophobia and visual disturbance. Respiratory: Negative for cough, chest tightness and shortness of breath. Cardiovascular: Negative for chest pain, palpitations and leg swelling. Gastrointestinal: Negative for abdominal pain, diarrhea, nausea and vomiting. Genitourinary: Negative for dysuria and hematuria. Musculoskeletal: Positive for back pain. Negative for joint swelling, neck pain and neck stiffness. Skin: Negative for rash and wound. Neurological: Negative for dizziness, syncope, weakness, light-headedness and numbness. Psychiatric/Behavioral: Negative for confusion.        Vitals:    05/15/21 1346   BP: (!) 177/72   Pulse: 63   Resp: 16   Temp: 98.2 °F (36.8 °C)   SpO2: 98% Weight: 74.8 kg (165 lb)   Height: 5' 5\" (1.651 m)            Physical Exam  Vitals signs reviewed. Constitutional:       General: She is not in acute distress. Appearance: Normal appearance. She is normal weight. She is not ill-appearing or toxic-appearing. HENT:      Head: Normocephalic and atraumatic. Right Ear: External ear normal.      Left Ear: External ear normal.      Nose: Nose normal.      Mouth/Throat:      Mouth: Mucous membranes are moist.      Pharynx: Oropharynx is clear. Eyes:      Extraocular Movements: Extraocular movements intact. Conjunctiva/sclera: Conjunctivae normal.      Pupils: Pupils are equal, round, and reactive to light. Neck:      Musculoskeletal: Normal range of motion. Cardiovascular:      Rate and Rhythm: Normal rate and regular rhythm. Pulses: Normal pulses. Heart sounds: Normal heart sounds. No murmur. No friction rub. No gallop. Pulmonary:      Effort: Pulmonary effort is normal.      Breath sounds: Normal breath sounds. No wheezing, rhonchi or rales. Abdominal:      General: Abdomen is flat. Bowel sounds are normal.      Palpations: Abdomen is soft. Tenderness: There is no abdominal tenderness. There is no guarding or rebound. Musculoskeletal: Normal range of motion. General: No swelling, deformity or signs of injury. Lumbar back: She exhibits tenderness and spasm. Right lower leg: No edema. Left lower leg: No edema. Comments: Right greater than left paraspinal muscle tenderness and tightness. Skin:     General: Skin is warm and dry. Neurological:      General: No focal deficit present. Mental Status: She is alert and oriented to person, place, and time. Cranial Nerves: No cranial nerve deficit. Sensory: No sensory deficit. Motor: No weakness.    Psychiatric:         Mood and Affect: Mood normal.          MDM  Number of Diagnoses or Management Options  Compression fracture of T12 vertebra, initial encounter [facility])  Diagnosis management comments: 25-year-old female with past medical history of severe spinal stenosis in the lumbar spine presenting to the emergency department with chief complaint of lumbar back pain after mechanical fall at home and hitting her back against a door frame. Vital signs on presentation were notable for hypertension but were otherwise unremarkable. Physical exam as documented elsewhere. Patient's pain was treated with Robaxin in the emergency department without improvement in her pain. CT lumbar spine was obtained which showed an acute T12 vertebral compression fracture. Orthopedics was consulted for recommendations could not be reached. TLSO brace was ordered for the patient. Patient was admitted to hospitalist for pain control and to acquire the TLSO brace           Amount and/or Complexity of Data Reviewed  Clinical lab tests: ordered and reviewed  Tests in the radiology section of CPT®: ordered and reviewed  Tests in the medicine section of CPT®: ordered and reviewed  Obtain history from someone other than the patient: yes  Review and summarize past medical records: yes  Discuss the patient with other providers: yes  Independent visualization of images, tracings, or specimens: yes      ED Course as of May 15 1814   Sat May 15, 2021   1639 CT independently viewed and interpreted. Agree with radiologist read of T12 fracture. Will consult orthopedics for recommendations and evaluation. Will consult inpatient team for admission. CT SPINE LUMB WO CONT [CM]   1449  orthotics was called and TLSO brace was ordered.     [CM]      ED Course User Index  [CM] Kristin Young MD       Procedures

## 2021-05-15 NOTE — ED NOTES
Purposeful rounding completed:    Side rails up x 2:  YES  Bed in low position and wheels locked: YES  Call bell within reach: YES  Comfort addressed: YES    Toileting needs addressed: YES  Plan of care reviewed/updated with patient and or family members: YES  IV site assessed: YES  Pain assessed and addressed: YES, 1

## 2021-05-15 NOTE — ED NOTES
Purposeful rounding completed:    Side rails up x 2:  YES  Bed in low position and wheels locked: YES  Call bell within reach: YES  Comfort addressed: YES    Toileting needs addressed: YES  Plan of care reviewed/updated with patient and or family members: YES  IV site assessed: YES  Pain assessed and addressed: YES, 2  Patient talking to daughter,

## 2021-05-15 NOTE — ED NOTES
Patient returned from CT, placed patient in [facility] to go to bathroom, patient tolerated well.   Purposeful rounding completed:    Side rails up x 2:  YES  Bed in low position and wheels locked: YES  Call bell within reach: YES  Comfort addressed: YES    Toileting needs addressed: YES  Plan of care reviewed/updated with patient and or family members: YES  IV site assessed: YES  Pain assessed and addressed: YES, 2

## 2021-05-15 NOTE — ED TRIAGE NOTES
Assume care of patient, patana lilian alert and oriented x 4, patient with C/O back pain after fall, no LOC

## 2021-05-15 NOTE — ED NOTES
Dr. Edy Yu here to see patient, patient resting at this time.  Patient alert and oriented x 4, BRYCE

## 2021-05-16 ENCOUNTER — HOME CARE VISIT (OUTPATIENT)
Dept: HOME HEALTH SERVICES | Facility: HOME HEALTH | Age: 86
End: 2021-05-16
Payer: MEDICARE

## 2021-05-16 LAB
ANION GAP SERPL CALC-SCNC: 5 MMOL/L (ref 3–18)
APPEARANCE UR: CLEAR
ATRIAL RATE: 64 BPM
BASOPHILS # BLD: 0 K/UL (ref 0–0.1)
BASOPHILS NFR BLD: 0 % (ref 0–2)
BILIRUB UR QL: NEGATIVE
BUN SERPL-MCNC: 15 MG/DL (ref 7–18)
BUN/CREAT SERPL: 19 (ref 12–20)
CALCIUM SERPL-MCNC: 8.9 MG/DL (ref 8.5–10.1)
CALCULATED P AXIS, ECG09: 71 DEGREES
CALCULATED R AXIS, ECG10: 25 DEGREES
CALCULATED T AXIS, ECG11: 26 DEGREES
CHLORIDE SERPL-SCNC: 98 MMOL/L (ref 100–111)
CO2 SERPL-SCNC: 30 MMOL/L (ref 21–32)
COLOR UR: YELLOW
CREAT SERPL-MCNC: 0.78 MG/DL (ref 0.6–1.3)
DIAGNOSIS, 93000: NORMAL
DIFFERENTIAL METHOD BLD: ABNORMAL
EOSINOPHIL # BLD: 0.1 K/UL (ref 0–0.4)
EOSINOPHIL NFR BLD: 2 % (ref 0–5)
ERYTHROCYTE [DISTWIDTH] IN BLOOD BY AUTOMATED COUNT: 14.1 % (ref 11.6–14.5)
GLUCOSE SERPL-MCNC: 96 MG/DL (ref 74–99)
GLUCOSE UR STRIP.AUTO-MCNC: NEGATIVE MG/DL
HCT VFR BLD AUTO: 41.6 % (ref 35–45)
HGB BLD-MCNC: 13.2 G/DL (ref 12–16)
HGB UR QL STRIP: NEGATIVE
KETONES UR QL STRIP.AUTO: NEGATIVE MG/DL
LEUKOCYTE ESTERASE UR QL STRIP.AUTO: NEGATIVE
LYMPHOCYTES # BLD: 1 K/UL (ref 0.9–3.6)
LYMPHOCYTES NFR BLD: 18 % (ref 21–52)
MAGNESIUM SERPL-MCNC: 2.4 MG/DL (ref 1.6–2.6)
MCH RBC QN AUTO: 27.6 PG (ref 24–34)
MCHC RBC AUTO-ENTMCNC: 31.7 G/DL (ref 31–37)
MCV RBC AUTO: 86.8 FL (ref 74–97)
MONOCYTES # BLD: 0.4 K/UL (ref 0.05–1.2)
MONOCYTES NFR BLD: 7 % (ref 3–10)
NEUTS SEG # BLD: 3.9 K/UL (ref 1.8–8)
NEUTS SEG NFR BLD: 72 % (ref 40–73)
NITRITE UR QL STRIP.AUTO: NEGATIVE
P-R INTERVAL, ECG05: 138 MS
PH UR STRIP: 7 [PH] (ref 5–8)
PLATELET # BLD AUTO: 268 K/UL (ref 135–420)
PMV BLD AUTO: 9.8 FL (ref 9.2–11.8)
POTASSIUM SERPL-SCNC: 5 MMOL/L (ref 3.5–5.5)
PROT UR STRIP-MCNC: NEGATIVE MG/DL
Q-T INTERVAL, ECG07: 404 MS
QRS DURATION, ECG06: 82 MS
QTC CALCULATION (BEZET), ECG08: 416 MS
RBC # BLD AUTO: 4.79 M/UL (ref 4.2–5.3)
SODIUM SERPL-SCNC: 133 MMOL/L (ref 136–145)
SP GR UR REFRACTOMETRY: 1.01 (ref 1–1.03)
UROBILINOGEN UR QL STRIP.AUTO: 0.2 EU/DL (ref 0.2–1)
VENTRICULAR RATE, ECG03: 64 BPM
WBC # BLD AUTO: 5.5 K/UL (ref 4.6–13.2)

## 2021-05-16 PROCEDURE — 87086 URINE CULTURE/COLONY COUNT: CPT

## 2021-05-16 PROCEDURE — 65270000029 HC RM PRIVATE

## 2021-05-16 PROCEDURE — 87186 SC STD MICRODIL/AGAR DIL: CPT

## 2021-05-16 PROCEDURE — 3331090002 HH PPS REVENUE DEBIT

## 2021-05-16 PROCEDURE — 77030038269 HC DRN EXT URIN PURWCK BARD -A

## 2021-05-16 PROCEDURE — 51701 INSERT BLADDER CATHETER: CPT

## 2021-05-16 PROCEDURE — 77030019905 HC CATH URETH INTMIT MDII -A

## 2021-05-16 PROCEDURE — 51798 US URINE CAPACITY MEASURE: CPT

## 2021-05-16 PROCEDURE — 74011250636 HC RX REV CODE- 250/636: Performed by: FAMILY MEDICINE

## 2021-05-16 PROCEDURE — 80048 BASIC METABOLIC PNL TOTAL CA: CPT

## 2021-05-16 PROCEDURE — 85025 COMPLETE CBC W/AUTO DIFF WBC: CPT

## 2021-05-16 PROCEDURE — 99232 SBSQ HOSP IP/OBS MODERATE 35: CPT | Performed by: INTERNAL MEDICINE

## 2021-05-16 PROCEDURE — 3331090001 HH PPS REVENUE CREDIT

## 2021-05-16 PROCEDURE — 83735 ASSAY OF MAGNESIUM: CPT

## 2021-05-16 PROCEDURE — 74011250637 HC RX REV CODE- 250/637: Performed by: FAMILY MEDICINE

## 2021-05-16 PROCEDURE — 99218 HC RM OBSERVATION: CPT

## 2021-05-16 PROCEDURE — 36415 COLL VENOUS BLD VENIPUNCTURE: CPT

## 2021-05-16 PROCEDURE — 87077 CULTURE AEROBIC IDENTIFY: CPT

## 2021-05-16 PROCEDURE — 2709999900 HC NON-CHARGEABLE SUPPLY

## 2021-05-16 PROCEDURE — 81003 URINALYSIS AUTO W/O SCOPE: CPT

## 2021-05-16 RX ADMIN — MIRTAZAPINE 30 MG: 15 TABLET, FILM COATED ORAL at 21:41

## 2021-05-16 RX ADMIN — ENOXAPARIN SODIUM 40 MG: 40 INJECTION SUBCUTANEOUS at 09:00

## 2021-05-16 RX ADMIN — HYDROCODONE BITARTRATE AND ACETAMINOPHEN 1 TABLET: 5; 325 TABLET ORAL at 06:17

## 2021-05-16 RX ADMIN — THERA TABS 1 TABLET: TAB at 09:00

## 2021-05-16 RX ADMIN — FAMOTIDINE 20 MG: 20 TABLET, FILM COATED ORAL at 09:00

## 2021-05-16 RX ADMIN — GABAPENTIN 100 MG: 100 CAPSULE ORAL at 09:00

## 2021-05-16 RX ADMIN — PRAVASTATIN SODIUM 10 MG: 20 TABLET ORAL at 21:41

## 2021-05-16 RX ADMIN — Medication 10 MG: at 21:40

## 2021-05-16 RX ADMIN — HYDROMORPHONE HYDROCHLORIDE 1 MG: 1 INJECTION, SOLUTION INTRAMUSCULAR; INTRAVENOUS; SUBCUTANEOUS at 09:00

## 2021-05-16 RX ADMIN — Medication 10 ML: at 17:17

## 2021-05-16 RX ADMIN — LORATADINE 10 MG: 10 TABLET ORAL at 09:00

## 2021-05-16 RX ADMIN — Medication 10 ML: at 21:43

## 2021-05-16 RX ADMIN — Medication 10 ML: at 06:17

## 2021-05-16 NOTE — PROGRESS NOTES
Problem: Falls - Risk of  Goal: *Absence of Falls  Description: Document Armando Mahajan Fall Risk and appropriate interventions in the flowsheet.   Outcome: Progressing Towards Goal  Note: Fall Risk Interventions:  Mobility Interventions: Communicate number of staff needed for ambulation/transfer         Medication Interventions: Teach patient to arise slowly, Patient to call before getting OOB    Elimination Interventions: Call light in reach    History of Falls Interventions: Door open when patient unattended         Problem: General Medical Care Plan  Goal: *Vital signs within specified parameters  Outcome: Progressing Towards Goal  Goal: *Labs within defined limits  Outcome: Progressing Towards Goal  Goal: *Absence of infection signs and symptoms  Outcome: Progressing Towards Goal  Goal: *Optimal pain control at patient's stated goal  Outcome: Progressing Towards Goal  Goal: *Skin integrity maintained  Outcome: Progressing Towards Goal  Goal: *Fluid volume balance  Outcome: Progressing Towards Goal  Goal: *Optimize nutritional status  Outcome: Progressing Towards Goal  Goal: *Anxiety reduced or absent  Outcome: Progressing Towards Goal  Goal: *Progressive mobility and function (eg: ADL's)  Outcome: Progressing Towards Goal

## 2021-05-16 NOTE — ED NOTES
TRANSFER - OUT REPORT:    Verbal report given to Jagruti Michael RN (name) on Colton Sabillon  being transferred to [address] [facility] (unit) for routine progression of care       Report consisted of patients Situation, Background, Assessment and   Recommendations(SBAR). Information from the following report(s) SBAR, Kardex, ED Summary, [facility] and Recent Results was reviewed with the receiving nurse. Lines:   Peripheral IV 05/15/21 Left Antecubital (Active)   Site Assessment Clean, dry, & intact 05/15/21 1752   Phlebitis Assessment 0 05/15/21 1752   Infiltration Assessment 0 05/15/21 1752   Dressing Status Clean, dry, & intact 05/15/21 1752   Dressing Type Tape;Transparent 05/15/21 1752   Hub Color/Line Status Pink;Flushed;Patent 05/15/21 1752   Action Taken Blood drawn 05/15/21 1752        Opportunity for questions and clarification was provided.       Patient transported with:   Registered Nurse

## 2021-05-16 NOTE — PROGRESS NOTES
[facility] Hospitalist Group  Progress Note    Patient: Katey Castillo Age: 80 y.o. : 1935 MR#: [id number] SSN: [id number]  Date/Time: 2021    Subjective:     Pt is somnolent.     Assessment/Plan:   80year old female with severe spinal stenosis among other medical conditions admitted after she presented with reports of fall and back pain.    -Acute compression fracture of T12 with 20% vertebral body height loss       HISTORY OF:  -Severe spinal stenosis  -HTN  -HLD  -IBS  -GERD  -Depression  -Hx colon cancer status post partial resection    PLAN:  -PT/OT  -Pain control, dc dilaudid due to increased somnolence and reports of confusion by nursing      Additional Notes:      Case discussed with:  [x]Patient  [x]Family  []Nursing  []Case Management  DVT Prophylaxis:  [x]Lovenox  []Hep SQ  []SCDs  []Coumadin   []On Heparin gtt    Objective:   VS:   Visit Vitals  BP (P) 132/64   Pulse (P) 69   Temp (P) 97.2 °F (36.2 °C)   Resp (P) 16   Ht 5' 5\" (1.651 m)   Wt 74.8 kg (165 lb)   SpO2 95%   BMI 27.46 kg/m²      Tmax/24hrs: Temp (24hrs), Av.4 °F (36.3 °C), Min:96.2 °F (35.7 °C), Max:98.6 °F (37 °C)    Input/Output:     Intake/Output Summary (Last 24 hours) at 2021 1456  Last data filed at 2021 0530  Gross per 24 hour   Intake --   Output 600 ml   Net -600 ml       General: somnolent, easily wakes up   Cardiovascular:  RRR, no murmur  Pulmonary:  CTAB  GI:  Abd soft, nt, nd  Extremities:No edema    Additional:  Neuro: no gross abnormalities    Labs:    Recent Results (from the past 24 hour(s))   CBC WITH AUTOMATED DIFF    Collection Time: 05/15/21  3:17 PM   Result Value Ref Range    WBC 7.0 4.6 - 13.2 K/uL    RBC 4.76 4.20 - 5.30 M/uL    HGB 13.0 12.0 - 16.0 g/dL    HCT 41.3 35.0 - 45.0 %    MCV 86.8 74.0 - 97.0 FL    MCH 27.3 24.0 - 34.0 PG    MCHC 31.5 31.0 - 37.0 g/dL    RDW 13.7 11.6 - 14.5 %    PLATELET 875 492 - 508 K/uL    MPV 10.1 9.2 - 11.8 FL    NEUTROPHILS 72 40 - 73 %    LYMPHOCYTES 18 (L) 21 - 52 %    MONOCYTES 8 3 - 10 %    EOSINOPHILS 1 0 - 5 %    BASOPHILS 1 0 - 2 %    ABS. NEUTROPHILS 5.0 1.8 - 8.0 K/UL    ABS. LYMPHOCYTES 1.3 0.9 - 3.6 K/UL    ABS. MONOCYTES 0.5 0.05 - 1.2 K/UL    ABS. EOSINOPHILS 0.1 0.0 - 0.4 K/UL    ABS.  BASOPHILS 0.0 0.0 - 0.1 K/UL    DF AUTOMATED     METABOLIC PANEL, BASIC    Collection Time: 05/15/21  3:17 PM   Result Value Ref Range    Sodium 133 (L) 136 - 145 mmol/L    Potassium 4.9 3.5 - 5.5 mmol/L    Chloride 97 (L) 100 - 111 mmol/L    CO2 31 21 - 32 mmol/L    Anion gap 5 3.0 - 18 mmol/L    Glucose 99 74 - 99 mg/dL    BUN 16 7.0 - 18 MG/DL    Creatinine 0.90 0.6 - 1.3 MG/DL    BUN/Creatinine ratio 18 12 - 20      GFR est AA >60 >60 ml/min/1.73m2    GFR est non-AA 60 (L) >60 ml/min/1.73m2    Calcium 9.4 8.5 - 10.1 MG/DL   EKG, 12 LEAD, INITIAL    Collection Time: 05/15/21  6:15 PM   Result Value Ref Range    Ventricular Rate 64 BPM    Atrial Rate 64 BPM    P-R Interval 138 ms    QRS Duration 82 ms    Q-T Interval 404 ms    QTC Calculation (Bezet) 416 ms    Calculated P Axis 71 degrees    Calculated R Axis 25 degrees    Calculated T Axis 26 degrees    Diagnosis       Normal sinus rhythm  Nonspecific ST abnormality  Abnormal ECG  When compared with ECG of 27-JUL-2019 13:22,  No significant change was found     URINALYSIS W/ RFLX MICROSCOPIC    Collection Time: 05/16/21  6:00 AM   Result Value Ref Range    Color YELLOW      Appearance CLEAR      Specific gravity 1.013 1.005 - 1.030      pH (UA) 7.0 5.0 - 8.0      Protein Negative NEG mg/dL    Glucose Negative NEG mg/dL    Ketone Negative NEG mg/dL    Bilirubin Negative NEG      Blood Negative NEG      Urobilinogen 0.2 0.2 - 1.0 EU/dL    Nitrites Negative NEG      Leukocyte Esterase Negative NEG     METABOLIC PANEL, BASIC    Collection Time: 05/16/21  6:10 AM   Result Value Ref Range    Sodium 133 (L) 136 - 145 mmol/L    Potassium 5.0 3.5 - 5.5 mmol/L    Chloride 98 (L) 100 - 111 mmol/L CO2 30 21 - 32 mmol/L    Anion gap 5 3.0 - 18 mmol/L    Glucose 96 74 - 99 mg/dL    BUN 15 7.0 - 18 MG/DL    Creatinine 0.78 0.6 - 1.3 MG/DL    BUN/Creatinine ratio 19 12 - 20      GFR est AA >60 >60 ml/min/1.73m2    GFR est non-AA >60 >60 ml/min/1.73m2    Calcium 8.9 8.5 - 10.1 MG/DL   MAGNESIUM    Collection Time: 05/16/21  6:10 AM   Result Value Ref Range    Magnesium 2.4 1.6 - 2.6 mg/dL   CBC WITH AUTOMATED DIFF    Collection Time: 05/16/21  6:10 AM   Result Value Ref Range    WBC 5.5 4.6 - 13.2 K/uL    RBC 4.79 4.20 - 5.30 M/uL    HGB 13.2 12.0 - 16.0 g/dL    HCT 41.6 35.0 - 45.0 %    MCV 86.8 74.0 - 97.0 FL    MCH 27.6 24.0 - 34.0 PG    MCHC 31.7 31.0 - 37.0 g/dL    RDW 14.1 11.6 - 14.5 %    PLATELET 604 797 - 093 K/uL    MPV 9.8 9.2 - 11.8 FL    NEUTROPHILS 72 40 - 73 %    LYMPHOCYTES 18 (L) 21 - 52 %    MONOCYTES 7 3 - 10 %    EOSINOPHILS 2 0 - 5 %    BASOPHILS 0 0 - 2 %    ABS. NEUTROPHILS 3.9 1.8 - 8.0 K/UL    ABS. LYMPHOCYTES 1.0 0.9 - 3.6 K/UL    ABS. MONOCYTES 0.4 0.05 - 1.2 K/UL    ABS. EOSINOPHILS 0.1 0.0 - 0.4 K/UL    ABS.  BASOPHILS 0.0 0.0 - 0.1 K/UL    DF AUTOMATED       Additional Data Reviewed:      Signed By: Kaisa Khan MD     May 16, 2021 2:56 PM

## 2021-05-16 NOTE — ROUTINE PROCESS
Bedside shift change report given to Darrick (oncoming nurse) by Raul Dickson (offgoing nurse). Report included the following information SBAR, Kardex, Intake/Output, MAR and Recent Results.

## 2021-05-16 NOTE — ROUTINE PROCESS
MD notified that pt became confused after dose of dilaudid. Unaware of location but otherwise alert and oriented. Ordered to discontinue. Pt following commands. No arm drift.  equal and strong. Pt had not voided. Bladder scan at 1400 showed 25 ml of urine. MD notified that PT has not voided yet for shift. No new orders received. 1730- Bladder scan performed and showed 204 mL. MD notified. Prn straight cath order modified. See orders.

## 2021-05-16 NOTE — PROGRESS NOTES
2200: Telephone report received from [facility]. Information included SBAR, MAR, ED summary, recent results, and Kardex. 2215: patient arrived on unit via stretcher. Alert and oriented, c/o pain 8/10 in her back. Transferred from stretcher to bed without difficulty. Assessment completed. Dilaudid given per PRN order, patient resting in no apparent distress. 2320: patient states her pain is much better, rates it 4/10. Resting comfortably in bed, no apparent distress. 0130: Purewick applied for patient's comfort, after patient stated she had difficulty with the bedside commode in the ED. Patient still rates pain 4/10 when resting quietly, but significantly worse with movement. 0240: While rounding on patient, noted that her respirations were low (10). Patient was easily awakened when spoken to and oriented x4, and her respirations increased to 14 while awake. Vitals within normal limits, except temperature which was slightly low at 96.2 axillary. Extra blankets were given, will monitor temperature. 0315: Temperature increased to 97.3.     0530: Patient has not voided. She states she feels like she needs to urinate, and was given a few minutes to attempt. She was able to void 100mL, with a PVR of 564mL. Straight cath performed per order under sterile conditions per protocol, 600mL clear urine obtained. Patient tolerated procedure well. Specimen sent to lab for urinalysis per protocol. 0620: Norco given for c/o pain 4/10.     0650: received call from representative from orthopedics regarding patient's brace, stated he will be here betweek 0845 and 0900. Will pass on to day shift to pre-medicate for pain. 0840: Report given to VITALIY Winn, including SBAR, MAR, and Kardex. Patient alert and oriented, vitals within normal limits, no apparent distress.

## 2021-05-17 VITALS
OXYGEN SATURATION: 98 % | DIASTOLIC BLOOD PRESSURE: 80 MMHG | RESPIRATION RATE: 18 BRPM | HEART RATE: 70 BPM | SYSTOLIC BLOOD PRESSURE: 141 MMHG | TEMPERATURE: 98.2 F

## 2021-05-17 PROCEDURE — 65270000029 HC RM PRIVATE

## 2021-05-17 PROCEDURE — 74011250636 HC RX REV CODE- 250/636: Performed by: FAMILY MEDICINE

## 2021-05-17 PROCEDURE — 77030019905 HC CATH URETH INTMIT MDII -A

## 2021-05-17 PROCEDURE — 97535 SELF CARE MNGMENT TRAINING: CPT

## 2021-05-17 PROCEDURE — 3331090002 HH PPS REVENUE DEBIT

## 2021-05-17 PROCEDURE — 2709999900 HC NON-CHARGEABLE SUPPLY

## 2021-05-17 PROCEDURE — 97166 OT EVAL MOD COMPLEX 45 MIN: CPT

## 2021-05-17 PROCEDURE — 99232 SBSQ HOSP IP/OBS MODERATE 35: CPT | Performed by: INTERNAL MEDICINE

## 2021-05-17 PROCEDURE — 77030038269 HC DRN EXT URIN PURWCK BARD -A

## 2021-05-17 PROCEDURE — 97116 GAIT TRAINING THERAPY: CPT

## 2021-05-17 PROCEDURE — 74011250637 HC RX REV CODE- 250/637: Performed by: FAMILY MEDICINE

## 2021-05-17 PROCEDURE — 3331090001 HH PPS REVENUE CREDIT

## 2021-05-17 PROCEDURE — 97162 PT EVAL MOD COMPLEX 30 MIN: CPT

## 2021-05-17 RX ADMIN — GABAPENTIN 100 MG: 100 CAPSULE ORAL at 18:32

## 2021-05-17 RX ADMIN — MIRTAZAPINE 30 MG: 15 TABLET, FILM COATED ORAL at 21:49

## 2021-05-17 RX ADMIN — THERA TABS 1 TABLET: TAB at 09:02

## 2021-05-17 RX ADMIN — ENOXAPARIN SODIUM 40 MG: 40 INJECTION SUBCUTANEOUS at 09:03

## 2021-05-17 RX ADMIN — PRAVASTATIN SODIUM 10 MG: 20 TABLET ORAL at 21:50

## 2021-05-17 RX ADMIN — Medication 10 MG: at 21:49

## 2021-05-17 RX ADMIN — Medication 10 ML: at 15:48

## 2021-05-17 RX ADMIN — LORATADINE 10 MG: 10 TABLET ORAL at 09:03

## 2021-05-17 RX ADMIN — FAMOTIDINE 20 MG: 20 TABLET, FILM COATED ORAL at 09:03

## 2021-05-17 RX ADMIN — ACETAMINOPHEN 650 MG: 325 TABLET ORAL at 01:50

## 2021-05-17 RX ADMIN — HYDROCODONE BITARTRATE AND ACETAMINOPHEN 1 TABLET: 5; 325 TABLET ORAL at 15:48

## 2021-05-17 RX ADMIN — ACETAMINOPHEN 650 MG: 325 TABLET ORAL at 21:49

## 2021-05-17 RX ADMIN — GABAPENTIN 100 MG: 100 CAPSULE ORAL at 09:03

## 2021-05-17 RX ADMIN — CYCLOBENZAPRINE 5 MG: 10 TABLET, FILM COATED ORAL at 09:02

## 2021-05-17 RX ADMIN — Medication 10 ML: at 05:31

## 2021-05-17 NOTE — ROUTINE PROCESS
Bedside shift change report given to Tatum rn (oncoming nurse) by Damian Duarte RN (offgoing nurse). Report included the following information SBAR.

## 2021-05-17 NOTE — PROGRESS NOTES
Problem: Mobility Impaired (Adult and Pediatric)  Goal: *Acute Goals and Plan of Care (Insert Text)  Description: Physical Therapy Goals  Initiated 5/17/2021 and to be accomplished within 7 day(s)  1. Patient will move from supine to sit and sit to supine , scoot up and down, and roll side to side in bed with supervision/set-up. 2.  Patient will transfer from bed to chair and chair to bed with supervision/set-up using the least restrictive device. 3.  Patient will perform sit to stand with supervision/set-up. 4.  Patient will ambulate with supervision/set-up for 50 feet with the least restrictive device. 5.  Patient will perform spinal precautions 80% to protect spinal fracture and allow for proper healing. PLOF: pt lives in an skilled nursing and used a rollator PTA, supervsion with mobility for safety     Outcome: Progressing Towards Goal     PHYSICAL THERAPY EVALUATION    Patient: Hi Shirley (62 y.o. female)  Date: 5/17/2021  Primary Diagnosis: Thoracic compression fracture ([address] [S22.000A]  T12 compression fracture ([address] [S22.080A]        Precautions:   Fall(Spinal Protective and TLSO brace donned)  WBAT, spinal  PLOF: see above    ASSESSMENT :  Based on the objective data described below, the patient presents with decreased functional mobility, impaired balance, impaired gait, decreased activity tolerance from baseline. Pt has an acute T12 fx and given education on spinal precautions and log roll. Pt is able to stand with CGA and amb approx 10 ft next to bed with RW for increased support and stability (vs rollator she uses at home). Pt noted to have decreased R step clearance and stance 2/2 chronic L hip pain. Pt also ambulates with a hunched, kyphotic posture and given cues for increased trunk extension and keeping head up. TLSO was donned throughout session and fit for proper position as it had slid up while she was in bed.  Pt will continue to be followed in the acute setting and recommend return home with [facility] and 24/7 assist pending progress. Pt left up in chair with all needs met and call bell with in reach, nurse updated on pt progress. Patient will benefit from skilled intervention to address the above impairments. Patient's rehabilitation potential is considered to be Good  Factors which may influence rehabilitation potential include:   []         None noted  []         Mental ability/status  [x]         Medical condition  []         Home/family situation and support systems  []         Safety awareness  []         Pain tolerance/management  []         Other:      PLAN :  Recommendations and Planned Interventions:   [x]           Bed Mobility Training             [x]    Neuromuscular Re-Education  [x]           Transfer Training                   []    Orthotic/Prosthetic Training  [x]           Gait Training                          []    Modalities  [x]           Therapeutic Exercises           []    Edema Management/Control  [x]           Therapeutic Activities            [x]    Family Training/Education  [x]           Patient Education  []           Other (comment):    Frequency/Duration: Patient will be followed by physical therapy daily to address goals. Discharge Recommendations: Home Health pending progress  Further Equipment Recommendations for Discharge: rolling walker (pt owns)     SUBJECTIVE:   Patient stated Tato Jaeger will try.     OBJECTIVE DATA SUMMARY:     Past Medical History:   Diagnosis Date    Arthritis     Cancer ([facility] [address] 2002    Colon     Chronic pain     abdominal due to IBS    Depression     GERD (gastroesophageal reflux disease)     Hypertension     Migraine headache 2014    Mixed hyperlipidemia 9/3/2020    Occipital neuralgia 2014    PUD (peptic ulcer disease)     S/P colon resection 2002    clear presently      Past Surgical History:   Procedure Laterality Date    HX CHOLECYSTECTOMY  2015    HX HYSTERECTOMY  1965    HX TONSIL AND ADENOIDECTOMY      HX WISDOM TEETH EXTRACTION      x4 Barriers to Learning/Limitations: yes;  physical  Compensate with: Verbal Cues and Tactile Cues  Home Situation:  Home Situation  Home Environment: Assisted living  [address] [facility] [address] Name: [address]  One/Two Story Residence: Two story  # of Interior Steps: (elevator)  Living Alone: No  Support Systems: Family member(s)  Patient Expects to be Discharged to[de-identified] Assisted living  Current DME Used/Available at Home: [address], rollator, Walker, rolling  Tub or Shower Type: Shower  Critical Behavior:  Neurologic State: Alert  Orientation Level: Oriented X4  Cognition: Follows commands  Safety/Judgement: Fall prevention  Psychosocial  Patient Behaviors: Calm; Cooperative  Family  Behaviors: Calm;Supportive     Family  Behaviors: Calm;Supportive           Strength:    Strength: Generally decreased, functional    Tone & Sensation:   Tone: Normal    Sensation: Intact        Range Of Motion:  AROM: Within functional limits       Posture:  Posture (WDL): Exceptions to WDL  Posture Assessment: Kyphosis; Forward head  Functional Mobility:  Bed Mobility:  Rolling: Additional time;Minimum assistance  Supine to Sit: Minimum assistance     Scooting: Contact guard assistance  Transfers:  Sit to Stand: Contact guard assistance  Stand to Sit: Contact guard assistance    Balance:   Sitting: Intact  Standing: Impaired; With support  Standing - Static: Fair(+)  Standing - Dynamic : Fair    Ambulation/Gait Training:  Distance (ft): 10 Feet (ft)  Assistive Device: Walker, rolling  Ambulation - Level of Assistance: Contact guard assistance        Gait Abnormalities: Decreased step clearance           Stance: Right decreased  Speed/Vijaya: Slow;Shuffled  Step Length: Left shortened       Pain:  Pain level pre-treatment: not rated, R hip and back pain /10   Pain level post-treatment: \"   \" /10   Pain Intervention(s) : Medication (see MAR);  Rest, Ice, Repositioning  Response to intervention: Nurse notified, See doc flow    Activity Tolerance: Good    Please refer to the flowsheet for vital signs taken during this treatment. After treatment:   [x]         Patient left in no apparent distress sitting up in chair  []         Patient left in no apparent distress in bed  [x]         Call bell left within reach  [x]         Nursing notified  []         Caregiver present  []         Bed alarm activated  []         SCDs applied    COMMUNICATION/EDUCATION:   [x]         Role of Physical Therapy in the acute care setting. [x]         Fall prevention education was provided and the patient/caregiver indicated understanding. [x]         Patient/family have participated as able in goal setting and plan of care. [x]         Patient/family agree to work toward stated goals and plan of care. []         Patient understands intent and goals of therapy, but is neutral about his/her participation. []         Patient is unable to participate in goal setting/plan of care: ongoing with therapy staff.  []         Other:     Thank you for this referral.  Yaneth Bennett   Time Calculation: 32 mins      Eval Complexity: History: MEDIUM  Complexity : 1-2 comorbidities / personal factors will impact the outcome/ POC Exam:MEDIUM Complexity : 3 Standardized tests and measures addressing body structure, function, activity limitation and / or participation in recreation  Presentation: MEDIUM Complexity : Evolving with changing characteristics  Clinical Decision Making:Medium Complexity    Overall Complexity:MEDIUM

## 2021-05-17 NOTE — CONSULTS
Interventional Radiology    Consulted to evaluate acute compression fracture of T12 with significant pain. The fractured area appears amenable to kyphoplasty per review with Dr. Gary So  Will tentatively plan on T12 kyphoplasty 5/18/21 PENDING anesthesia availability    PLAN:  1.  N.p.o. after midnight tonight except meds with sips of water and ice chips  2. Hold morning blood thinning medications  3. Possible T12 kyphoplasty 5/18/2021 pending anesthesia availability  4.   Full consult note to follow    Thank you,  Adal Combs, [address]

## 2021-05-17 NOTE — PROGRESS NOTES
Per Dr. Cindy Poon, pt can take off her TLSO while in bed. Nurse, Tatum, informed.  Gely James, PT, DPT

## 2021-05-17 NOTE — PROGRESS NOTES
[facility] Hospitalist Group  Progress Note    Patient: Niki Pineda Age: 80 y.o. : 1935 MR#: [id number] SSN: [id number]  Date/Time: 2021    Subjective:     Pt patient alert and awake. Assessment/Plan:   80year old female with severe spinal stenosis among other medical conditions admitted after she presented with reports of fall and back pain.    -Acute compression fracture of T12 with 20% vertebral body height loss   -Urinary retention, has had reaction to flomax      HISTORY OF:  -Severe spinal stenosis  -HTN  -HLD  -IBS  -GERD  -Depression  -Hx colon cancer status post partial resection    PLAN:  -Cont PT/OT  -Possible kyphoplasty      Additional Notes:      Case discussed with:  [x]Patient  [x]Family  []Nursing  []Case Management  DVT Prophylaxis:  [x]Lovenox  []Hep SQ  []SCDs  []Coumadin   []On Heparin gtt    Objective:   VS:   Visit Vitals  /83 (BP 1 Location: Left upper arm, BP Patient Position: At rest)   Pulse 83   Temp 98 °F (36.7 °C)   Resp 16   Ht 5' 5\" (1.651 m)   Wt 74.8 kg (165 lb)   SpO2 93%   BMI 27.46 kg/m²      Tmax/24hrs: Temp (24hrs), Av °F (36.7 °C), Min:97.7 °F (36.5 °C), Max:98.5 °F (36.9 °C)    Input/Output:     Intake/Output Summary (Last 24 hours) at 2021 1846  Last data filed at 2021 1735  Gross per 24 hour   Intake 1560 ml   Output 550 ml   Net 1010 ml       General: alert, awake, in NAD  Cardiovascular:  RRR, no murmur  Pulmonary:  CTAB  GI:  Abd soft, nt, nd  Extremities:No edema    Additional:  Neuro: no gross abnormalities    Labs:    No results found for this or any previous visit (from the past 24 hour(s)).   Additional Data Reviewed:      Signed By: Ghazala Suresh MD     May 17, 2021 2:56 PM

## 2021-05-17 NOTE — ROUTINE PROCESS
Patient alert and oriented times three with no signs or symptoms of distress other than not urinating. Checked patient with bladder scanner.  Patient has a brace around her torso

## 2021-05-17 NOTE — PROGRESS NOTES
conducted an initial consultation and Spiritual Assessment for Haleigh Moses, who is a 80 y.o.,female. Patients Primary Language is: [place]. According to the patients EMR Zoroastrian Affiliation is: Anabaptist. The reason the Patient came to the hospital is:   Patient Active Problem List    Diagnosis Date Noted    Thoracic compression fracture ([facility] [address] 05/15/2021    T12 compression fracture ([facility] [address] 05/15/2021    Mixed hyperlipidemia 09/03/2020    Depression 09/03/2020    Impaired glucose tolerance 10/10/2019    Urinary retention 09/01/2018    Cancer of sigmoid ([facility] [address] 09/01/2018    Anxiety 09/01/2018    Irritable bowel syndrome with both constipation and diarrhea 09/01/2018    Lumbar disc disease 09/01/2018    Essential hypertension 09/01/2018    Allergic rhinitis 09/01/2018    Vitamin D deficiency 09/01/2018        The  provided the following Interventions:  Initiated a relationship of care and support. Explored issues of jerrod, belief, spirituality and Mormon/ritual needs while hospitalized. Listened empathically. Provided chaplaincy education. Provided information about Spiritual Care Services. Offered prayer and assurance of continued prayers on patient's behalf. Patient received holy communion and said, \"That is the best medicine I've ever taken today so far. \"  Chart reviewed. The following outcomes where achieved:  Patient shared limited information about both her medical narrative and spiritual journey/beliefs.  confirmed Patient's Zoroastrian Affiliation. Patient is not a member of any of the parishes but she knows Erlene Starling from previous visits and giving her holy communion. Patient processed feeling about current hospitalization. Patient expressed gratitude for 's visit. Assessment:  Patient does not have any Mormon/cultural needs that will affect patients preferences in health care.   There are no spiritual or Mormon issues which require intervention at this time. Plan:  Chaplains will continue to follow and will provide pastoral care on an as needed/requested basis.  recommends bedside caregivers page  on duty if patient shows signs of acute spiritual or emotional distress.     [address] [facility]   [phone]

## 2021-05-17 NOTE — ROUTINE PROCESS
Straight cathed patient per her request. She said she felt full and bladder scanner put her at 225 cc in the bladder this time.  Straight cathed patient

## 2021-05-17 NOTE — PROGRESS NOTES
Problem: Self Care Deficits Care Plan (Adult)  Goal: *Acute Goals and Plan of Care (Insert Text)  Description: Occupational Therapy Goals  Initiated 5/17/2021 within 7 day(s). 1.  Patient will perform bed mobility in preparation for selfcare with supervision/set-up maintaining spinal protective precautions with 100% accuracy. 2.  Patient will perform upper body dressing with supervision/set-up. 3.  Patient will perform lower body dressing with supervision/set-up using AE. 4.  Patient will perform toilet transfers with supervision/set-up. 5.  Patient will perform all aspects of toileting with supervision/set-up. 6.  Patient will perform standing functional activity with supervision/ set-up for 4-7 minutes, F+ balance  7. Patient will utilize energy conservation techniques during functional activities with verbal cues. Prior Level of Function: Patient was independent with self-care and used a rolling walker for functional mobility PTA. Outcome: Progressing Towards Goal     OCCUPATIONAL THERAPY EVALUATION    Patient: Crista Reid (89 y.o. female)  Date: 5/17/2021  Primary Diagnosis: Thoracic compression fracture ([address] [S22.000A]  T12 compression fracture ([address] [S22.080A]  Precautions:  Fall(Spinal Protective and TLSO brace donned)    ASSESSMENT :  Patient cleared to participate in OT evaluation by RN. Upon entering the room, the patient was supine in bed, alert, and agreeable to participate in OT evaluation. Patient (+) compression fracture and educated on spinal protective precautions with patient  verbalizing understanding. Back brace donned and visual demonstration performed EOB once more for donning as pt with c/o it pushing into breasts. Patient declining gown under brace at this time as daughter is supposed to be coming to hospital with clothing items.  Patient min assist for upper body dressing, moderate assist for lower body dressing, min assist for bed mobility, minimal assistance - contact guard assistance for sit <> stand x 2 trials and minimal assistance for stand pivot transfer to chair with approx. 5 steps taken. Patient with difficulty lifting RLE while standing. Patient reported feeling dizzy EOB and educated on energy conservation techniques such as pursed lip breathing and keeping eyes open with focus on steady object. Patient reported dizziness resolved prior to stand. Patient performed 2 stands this session as upon initial stand, patient able to stand < 30 seconds to weight shift before requesting to sit. Patient encouraged to get OOB into recliner or to [facility] with nursing throughout the day to decrease discomfort in back, patient verbalized understanding. Based on the objective data described below, the patient presents with decreased strength, decreased independence, decreased activity tolerance, decreased functional balance, and decreased functional mobility, which impedes pt performance in basic self-care/ADL tasks. Patient would benefit from skilled OT to restore PLOF and maximize function. Patient will benefit from skilled intervention to address the above impairments.   Patient's rehabilitation potential is considered to be Fair  Factors which may influence rehabilitation potential include:   []             None noted  [x]             Mental ability/status  [x]             Medical condition  [x]             Home/family situation and support systems  [x]             Safety awareness  [x]             Pain tolerance/management  []             Other:      PLAN :  Recommendations and Planned Interventions:  [x]               Self Care Training                  [x]      Therapeutic Activities  [x]               Functional Mobility Training   []      Cognitive Retraining  [x]               Therapeutic Exercises           [x]      Endurance Activities  [x]               Balance Training                    [x]      Neuromuscular Re-Education  []               Visual/Perceptual Training     [x]      Home Safety Training  [x]               Patient Education                   [x]      Family Training/Education  []               Other (comment):    Frequency/Duration: Patient will be followed by occupational therapy 1-2 times per day/4-7 days per week to address goals. Discharge Recommendations: [address] [place] [address] [place] with 24/7 supervision at [facility] pending progress  Further Equipment Recommendations for Discharge: bedside commode, shower chair, and rolling walker     SUBJECTIVE:   Patient stated I was on dilaudid yesterday and my mind still feels funny.  I told them I couldn't take anymore of that    OBJECTIVE DATA SUMMARY:     Past Medical History:   Diagnosis Date    Arthritis     Cancer ([facility] [address] 2002    Colon     Chronic pain     abdominal due to IBS    Depression     GERD (gastroesophageal reflux disease)     Hypertension     Migraine headache 2014    Mixed hyperlipidemia 9/3/2020    Occipital neuralgia 2014    PUD (peptic ulcer disease)     S/P colon resection 2002    clear presently      Past Surgical History:   Procedure Laterality Date    HX CHOLECYSTECTOMY  2015    HX HYSTERECTOMY  1965    HX TONSIL AND ADENOIDECTOMY      HX WISDOM TEETH EXTRACTION      x4     Barriers to Learning/Limitations: None  Compensate with: visual, verbal, tactile, kinesthetic cues/model    Home Situation:   Home Situation  Home Environment: Assisted living  [address] [place] [address] Name: [address] [place] [address]  One/Two Story Residence: Two story  # of Interior Steps: (elevator)  Living Alone: No  Support Systems: Family member(s), Assisted living  Patient Expects to be Discharged to[de-identified] Assisted living  Current DME Used/Available at Home: Marta Adolph, rollator, Grab bars, Shower chair  Tub or Shower Type: Shower  [x]  Right hand dominant   []  Left hand dominant    Cognitive/Behavioral Status:  Neurologic State: Alert  Orientation Level: Oriented X4  Cognition: Follows commands  Safety/Judgement: Fall prevention    Skin: Intact  Edema: None noted    Vision/Perceptual:    Acuity: Within Defined Limits    Corrective Lenses: Glasses    Coordination: BUE  Fine Motor Skills-Upper: Left Intact; Right Intact    Gross Motor Skills-Upper: Left Intact; Right Intact    Balance:  Sitting: Intact  Standing: Impaired; With support  Standing - Static: Fair(+)  Standing - Dynamic : Fair    Strength: BUE  Strength: Generally decreased, functional       Tone & Sensation: BUE  Tone: Normal  Sensation: Intact                      Range of Motion: BUE  AROM: Within functional limits       Functional Mobility and Transfers for ADLs:  Bed Mobility:  Rolling: Bed Modified; Additional time(logroll; Pastor reports having rail at halfway to use)  Supine to Sit: Minimum assistance  Scooting: Minimum assistance(towards EOB after approx. 4 self attempts)    Transfers:  Sit to Stand: Minimum assistance;Contact guard assistance(min initially, contact guard assist on 2nd trial)  Stand to Sit: Contact guard assistance   Toilet Transfer : Minimum assistance(simulation w recliner; pt w difficulty initially lifting RLE)       ADL Assessment:   Feeding: Setup    Oral Facial Hygiene/Grooming: Setup    Bathing: Moderate assistance    Upper Body Dressing: Stand-by assistance    Lower Body Dressing: Moderate assistance    Toileting: Moderate assistance      ADL Intervention:  Upper Body Dressing Assistance  Dressing Assistance: Minimum assistance  Orthotics(Brace): Minimum assistance(TLSO brace)    Lower Body Dressing Assistance  Dressing Assistance: Moderate assistance  Socks: Moderate assistance  Leg Crossed Method Used: Yes  Position Performed: Seated edge of bed    Cognitive Retraining  Safety/Judgement: Fall prevention    Pain:  Pain level pre-treatment: 8/10   Pain level post-treatment: 8-9/10 , back  Pain Intervention(s): Medication (see MAR);    Response to intervention: Nurse notified, See doc flow    Activity Tolerance:   Good    Please refer to the flowsheet for vital signs taken during this treatment. After treatment:   [x] Patient left in no apparent distress sitting up in chair  [] Patient left in no apparent distress in bed  [x] Call bell left within reach  [x] Nursing notified  [] Caregiver present  [x]  alarm activated    COMMUNICATION/EDUCATION:   [x] Role of Occupational Therapy in the acute care setting  [x] Home safety education was provided and the patient/caregiver indicated understanding. [x] Patient/family have participated as able in goal setting and plan of care. [x] Patient/family agree to work toward stated goals and plan of care. [] Patient understands intent and goals of therapy, but is neutral about his/her participation. [] Patient is unable to participate in goal setting and plan of care. Thank you for this referral.  Jennifer Fontenot OTR/L  Time Calculation: 42 mins    Eval Complexity: History: MEDIUM Complexity : Expanded review of history including physical, cognitive and psychosocial  history ; Examination: MEDIUM Complexity : 3-5 performance deficits relating to physical, cognitive , or psychosocial skils that result in activity limitations and / or participation restrictions; Decision Making:MEDIUM Complexity : Patient may present with comorbidities that affect occupational performnce.  Miniml to moderate modification of tasks or assistance (eg, physical or verbal ) with assesment(s) is necessary to enable patient to complete evaluation

## 2021-05-17 NOTE — PROGRESS NOTES
Problem: Falls - Risk of  Goal: *Absence of Falls  Description: Document Mary Gordon Fall Risk and appropriate interventions in the flowsheet.   Outcome: Progressing Towards Goal  Note: Fall Risk Interventions:  Mobility Interventions: Assess mobility with egress test, Communicate number of staff needed for ambulation/transfer, Patient to call before getting OOB, PT Consult for mobility concerns         Medication Interventions: Assess postural VS orthostatic hypotension, Evaluate medications/consider consulting pharmacy, Patient to call before getting OOB, Teach patient to arise slowly    Elimination Interventions: Call light in reach, Elevated toilet seat, Stay With Me (per policy), Toilet paper/wipes in reach, Toileting schedule/hourly rounds    History of Falls Interventions: Consult care management for discharge planning, Door open when patient unattended, Room close to nurse's station         Problem: Patient Education: Go to Patient Education Activity  Goal: Patient/Family Education  Outcome: Progressing Towards Goal     Problem: General Medical Care Plan  Goal: *Vital signs within specified parameters  Outcome: Progressing Towards Goal  Goal: *Labs within defined limits  Outcome: Progressing Towards Goal  Goal: *Absence of infection signs and symptoms  Outcome: Progressing Towards Goal  Goal: *Optimal pain control at patient's stated goal  Outcome: Progressing Towards Goal  Goal: *Skin integrity maintained  Outcome: Progressing Towards Goal  Goal: *Fluid volume balance  Outcome: Progressing Towards Goal  Goal: *Optimize nutritional status  Outcome: Progressing Towards Goal  Goal: *Anxiety reduced or absent  Outcome: Progressing Towards Goal  Goal: *Progressive mobility and function (eg: ADL's)  Outcome: Progressing Towards Goal     Problem: Patient Education: Go to Patient Education Activity  Goal: Patient/Family Education  Outcome: Progressing Towards Goal     Problem: Pain  Goal: *Control of Pain  Outcome: Progressing Towards Goal  Goal: *PALLIATIVE CARE:  Alleviation of Pain  Outcome: Progressing Towards Goal     Problem: Patient Education: Go to Patient Education Activity  Goal: Patient/Family Education  Outcome: Progressing Towards Goal     Problem: Patient Education: Go to Patient Education Activity  Goal: Patient/Family Education  Outcome: Progressing Towards Goal

## 2021-05-18 ENCOUNTER — ANESTHESIA EVENT (OUTPATIENT)
Dept: INTERVENTIONAL RADIOLOGY/VASCULAR | Age: 86
DRG: 517 | End: 2021-05-18
Payer: MEDICARE

## 2021-05-18 LAB
APTT PPP: 40.1 SEC (ref 23–36.4)
COVID-19 RAPID TEST, COVR: NOT DETECTED
INR PPP: 1 (ref 0.8–1.2)
PROTHROMBIN TIME: 13 SEC (ref 11.5–15.2)
SOURCE, COVRS: NORMAL

## 2021-05-18 PROCEDURE — 99232 SBSQ HOSP IP/OBS MODERATE 35: CPT | Performed by: INTERNAL MEDICINE

## 2021-05-18 PROCEDURE — 97110 THERAPEUTIC EXERCISES: CPT

## 2021-05-18 PROCEDURE — 87635 SARS-COV-2 COVID-19 AMP PRB: CPT

## 2021-05-18 PROCEDURE — 74011250637 HC RX REV CODE- 250/637: Performed by: FAMILY MEDICINE

## 2021-05-18 PROCEDURE — 74011250637 HC RX REV CODE- 250/637: Performed by: INTERNAL MEDICINE

## 2021-05-18 PROCEDURE — 3331090002 HH PPS REVENUE DEBIT

## 2021-05-18 PROCEDURE — 3331090001 HH PPS REVENUE CREDIT

## 2021-05-18 PROCEDURE — 97530 THERAPEUTIC ACTIVITIES: CPT

## 2021-05-18 PROCEDURE — 85730 THROMBOPLASTIN TIME PARTIAL: CPT

## 2021-05-18 PROCEDURE — 51798 US URINE CAPACITY MEASURE: CPT

## 2021-05-18 PROCEDURE — 36415 COLL VENOUS BLD VENIPUNCTURE: CPT

## 2021-05-18 PROCEDURE — 97116 GAIT TRAINING THERAPY: CPT

## 2021-05-18 PROCEDURE — 65270000029 HC RM PRIVATE

## 2021-05-18 PROCEDURE — 85610 PROTHROMBIN TIME: CPT

## 2021-05-18 RX ORDER — POLYETHYLENE GLYCOL 3350 17 G/17G
17 POWDER, FOR SOLUTION ORAL DAILY
Status: DISCONTINUED | OUTPATIENT
Start: 2021-05-18 | End: 2021-05-20 | Stop reason: HOSPADM

## 2021-05-18 RX ORDER — POLYETHYLENE GLYCOL 3350 17 G/17G
17 POWDER, FOR SOLUTION ORAL DAILY
Status: DISCONTINUED | OUTPATIENT
Start: 2021-05-19 | End: 2021-05-18

## 2021-05-18 RX ADMIN — HYDROCODONE BITARTRATE AND ACETAMINOPHEN 1 TABLET: 5; 325 TABLET ORAL at 10:29

## 2021-05-18 RX ADMIN — PRAVASTATIN SODIUM 10 MG: 20 TABLET ORAL at 22:34

## 2021-05-18 RX ADMIN — GABAPENTIN 100 MG: 100 CAPSULE ORAL at 10:29

## 2021-05-18 RX ADMIN — THERA TABS 1 TABLET: TAB at 10:29

## 2021-05-18 RX ADMIN — GABAPENTIN 100 MG: 100 CAPSULE ORAL at 17:12

## 2021-05-18 RX ADMIN — Medication 10 ML: at 16:03

## 2021-05-18 RX ADMIN — LORATADINE 10 MG: 10 TABLET ORAL at 10:29

## 2021-05-18 RX ADMIN — MIRTAZAPINE 30 MG: 15 TABLET, FILM COATED ORAL at 22:34

## 2021-05-18 RX ADMIN — FAMOTIDINE 20 MG: 20 TABLET, FILM COATED ORAL at 10:29

## 2021-05-18 RX ADMIN — Medication 10 MG: at 22:34

## 2021-05-18 RX ADMIN — POLYETHYLENE GLYCOL 3350 17 G: 17 POWDER, FOR SOLUTION ORAL at 16:02

## 2021-05-18 NOTE — PROGRESS NOTES
Problem: Mobility Impaired (Adult and Pediatric)  Goal: *Acute Goals and Plan of Care (Insert Text)  Description: Physical Therapy Goals  Initiated 5/17/2021 and to be accomplished within 7 day(s)  1. Patient will move from supine to sit and sit to supine , scoot up and down, and roll side to side in bed with supervision/set-up. 2.  Patient will transfer from bed to chair and chair to bed with supervision/set-up using the least restrictive device. 3.  Patient will perform sit to stand with supervision/set-up. 4.  Patient will ambulate with supervision/set-up for 50 feet with the least restrictive device. 5.  Patient will perform spinal precautions 80% to protect spinal fracture and allow for proper healing. PLOF: pt lives in an [facility] and used a rollator PTA, supervsion with mobility for safety     Outcome: Not Progressing Towards Goal   physical Therapy TREATMENT    Patient: Ish Askew (14 y.o. female)  Date: 5/18/2021  Diagnosis: Thoracic compression fracture ([address] [S22.000A]  T12 compression fracture ([address] [G79.960C] Thoracic compression fracture ([address]       Precautions: Fall(Spinal Protective and TLSO brace donned)   Chart, physical therapy assessment, plan of care and goals were reviewed. ASSESSMENT:  Pt with decrease of general function, requiring more assist for all activities. Pt's strength deficits L>R. Poor ankle and toe control for L Foot, leads to poor standing (Posterior push). Sitting posture is also altered to posterior deviation. Unable to increase amb distance, due to posture, push and rapid LE fatigue. 20 min of sitting accomplished , before max assist back to bed. Progression toward goals:  []      Improving appropriately and progressing toward goals  []      Improving slowly and progressing toward goals  [x]      Not making progress toward goals      PLAN:  Patient continues to benefit from skilled intervention to address the above impairments.   Continue treatment per established plan of care. Discharge Recommendations:  [place] or Rehab  Further Equipment Recommendations for Discharge:  gait belt and rolling walker     SUBJECTIVE:   Patient stated I'm so happy you are going to help me. Thank you.     OBJECTIVE DATA SUMMARY:   Critical Behavior:  Neurologic State: (P) Alert  Orientation Level: (P) Oriented X4  Cognition: (P) Follows commands  Safety/Judgement: Fall prevention  Functional Mobility Training:  Bed Mobility:  Rolling: Minimum assistance  Supine to Sit: Moderate assistance  Sit to Supine: Maximum assistance  Scooting: Minimum assistance   Transfers:  Sit to Stand: Moderate assistance;Maximum assistance  Stand to Sit: Maximum assistance  Balance:  Sitting: Impaired; With support  Sitting - Static: Poor (constant support)  Sitting - Dynamic: Poor (constant support)  Standing: Impaired; With support  Standing - Static: Poor  Ambulation/Gait Training:  Distance (ft): 6 Feet (ft)  Ambulation - Level of Assistance: Contact guard assistance  Gait Abnormalities: Decreased step clearance  Base of Support: Center of gravity altered(Posterior)  Stance: Weight shift  Speed/Vijaya: Slow;Shuffled  Step Length: Left shortened;Right shortened  Therapeutic Exercises:       EXERCISE   Sets   Reps   Active Active Assist   Passive Self ROM   Comments   Ankle Pumps 1 20  [x] [] [] []    Quad Sets/Glut Sets 1 10 [x] [] [] []    Hamstring Sets 1 10 [x] [] [] []    Short Arc Quads 1 10 [x] [] [] []    Heel Slides 1 10 [] [x] [] []    Straight Leg Raises   [] [] [] []    Hip Abd/Add 1 10 [x] [] [] []    Long Arc Quads 1 10 [x] [] [] []    Seated Marching 1 10 [x] [] [] []    Standing Marching   [] [] [] []       [] [] [] []        Pain:  Pain Scale 1: Numeric (0 - 10)  Pain Intensity 1: 4  Pain out: 4  Pain Location 1: Back  Pain Orientation 1: Lower  Pain Description 1: Aching  Pain Intervention(s) 1: Medication (see MAR)  Activity Tolerance:   Fair-  Please refer to the flowsheet for vital signs taken during this treatment.   After treatment:   [] Patient left in no apparent distress sitting up in chair  [x] Patient left in no apparent distress in bed  [x] Call bell left within reach  [x] Nursing notified  [x] Caregiver present  [] Bed alarm activated      Genesee Lindsay, PTA   Time Calculation: 43 mins

## 2021-05-18 NOTE — ACP (ADVANCE CARE PLANNING)
Advance Care Planning     General Advance Care Planning (ACP) Conversation      Date of Conversation: 5/15/2021  Conducted with: Patient with Decision Making Capacity    Healthcare Decision Maker:     Primary Decision Maker: Saleem Medina - [phone]    Secondary Decision Maker: Yovany Or - Lalitha - [phone]  Click here to complete [address] including selection of the Healthcare Decision Maker Relationship (ie \"Primary\")  Today we documented Decision Maker(s) consistent with ACP documents on file. Content/Action Overview:    Has ACP document(s) on file - reflects the patient's care preferences    James Whitlock RN - Outcomes Manager  [phone]

## 2021-05-18 NOTE — ROUTINE PROCESS
Bedside shift change report given to [place]ida (oncoming nurse) by John Tapia (offgoing nurse). Report included the following information SBAR, Kardex, Intake/Output, MAR and Recent Results.

## 2021-05-18 NOTE — PROGRESS NOTES
[facility] Hospitalist Group  Progress Note    Patient: Omar Brizuela Age: 80 y.o. : 1935 MR#: [id number] SSN: [id number]  Date/Time: 2021    Subjective:     Pt patient alert and awake. Per nursing pt complaining of constipation.     Assessment/Plan:   80year old female with severe spinal stenosis among other medical conditions admitted after she presented with reports of fall and back pain.    -Acute compression fracture of T12 with 20% vertebral body height loss   -Urinary retention, has had reaction to flomax      HISTORY OF:  -Severe spinal stenosis  -HTN  -HLD  -IBS  -GERD  -Depression  -Hx colon cancer status post partial resection    PLAN:  -Cont PT/OT  -Kyphoplasty planned for tomorrow    Updated daughter over the phone  Additional Notes:      Case discussed with:  [x]Patient  [x]Family  []Nursing  []Case Management  DVT Prophylaxis:  [x]Lovenox  []Hep SQ  []SCDs  []Coumadin   []On Heparin gtt    Objective:   VS:   Visit Vitals  /68 (BP 1 Location: Right arm, BP Patient Position: Supine)   Pulse 75   Temp 97.4 °F (36.3 °C)   Resp 18   Ht 5' 5\" (1.651 m)   Wt 74.8 kg (165 lb)   SpO2 94%   BMI 27.46 kg/m²      Tmax/24hrs: Temp (24hrs), Av.8 °F (36.6 °C), Min:97 °F (36.1 °C), Max:98.7 °F (37.1 °C)    Input/Output:     Intake/Output Summary (Last 24 hours) at 20219  Last data filed at 2021 1712  Gross per 24 hour   Intake 1060 ml   Output 750 ml   Net 310 ml       General: alert, awake, in NAD  Cardiovascular:  RRR, no murmur  Pulmonary:  CTAB  GI:  Abd soft, nt, nd  Extremities:No edema    Additional:  Neuro: no gross abnormalities    Labs:    Recent Results (from the past 24 hour(s))   PROTHROMBIN TIME + INR    Collection Time: 21  3:45 AM   Result Value Ref Range    Prothrombin time 13.0 11.5 - 15.2 sec    INR 1.0 0.8 - 1.2     PTT    Collection Time: 21  3:45 AM   Result Value Ref Range    aPTT 40.1 (H) 23.0 - 36.4 SEC   COVID-19 RAPID TEST    Collection Time: 05/18/21  4:00 PM   Result Value Ref Range    Specimen source Nasopharyngeal      COVID-19 rapid test Not detected NOTD       Additional Data Reviewed:      Signed By: Alaina Mallory MD     May 18, 2021 2:56 PM

## 2021-05-18 NOTE — PROGRESS NOTES
Problem: Falls - Risk of  Goal: *Absence of Falls  Description: Document Jackie Arellano Fall Risk and appropriate interventions in the flowsheet.   Outcome: Progressing Towards Goal  Note: Fall Risk Interventions:  Mobility Interventions: Patient to call before getting OOB, Utilize walker, cane, or other assistive device         Medication Interventions: Patient to call before getting OOB, Teach patient to arise slowly    Elimination Interventions: Call light in reach, Stay With Me (per policy)    History of Falls Interventions: Bed/chair exit alarm, Door open when patient unattended         Problem: General Medical Care Plan  Goal: *Vital signs within specified parameters  Outcome: Progressing Towards Goal  Goal: *Labs within defined limits  Outcome: Progressing Towards Goal  Goal: *Absence of infection signs and symptoms  Outcome: Progressing Towards Goal  Goal: *Optimal pain control at patient's stated goal  Outcome: Progressing Towards Goal  Goal: *Skin integrity maintained  Outcome: Progressing Towards Goal     Problem: General Medical Care Plan  Goal: *Vital signs within specified parameters  Outcome: Progressing Towards Goal  Goal: *Labs within defined limits  Outcome: Progressing Towards Goal  Goal: *Absence of infection signs and symptoms  Outcome: Progressing Towards Goal  Goal: *Optimal pain control at patient's stated goal  Outcome: Progressing Towards Goal  Goal: *Skin integrity maintained  Outcome: Progressing Towards Goal     Problem: General Medical Care Plan  Goal: *Vital signs within specified parameters  Outcome: Progressing Towards Goal  Goal: *Labs within defined limits  Outcome: Progressing Towards Goal  Goal: *Absence of infection signs and symptoms  Outcome: Progressing Towards Goal  Goal: *Optimal pain control at patient's stated goal  Outcome: Progressing Towards Goal  Goal: *Skin integrity maintained  Outcome: Progressing Towards Goal

## 2021-05-18 NOTE — ROUTINE PROCESS
MD notified that pt needed 3rd straight cath this admission. Clarified prn straight cath order. Order to straight cath again. 800 ml of yellow urine from catheter retrieved.

## 2021-05-18 NOTE — PROGRESS NOTES
Problem: Falls - Risk of  Goal: *Absence of Falls  Description: Document Aidan Rouse Fall Risk and appropriate interventions in the flowsheet.   Outcome: Progressing Towards Goal  Note: Fall Risk Interventions:  Mobility Interventions: Patient to call before getting OOB, Utilize walker, cane, or other assistive device         Medication Interventions: Patient to call before getting OOB, Teach patient to arise slowly    Elimination Interventions: Call light in reach, Stay With Me (per policy)    History of Falls Interventions: Bed/chair exit alarm, Door open when patient unattended         Problem: General Medical Care Plan  Goal: *Vital signs within specified parameters  Outcome: Progressing Towards Goal  Goal: *Labs within defined limits  Outcome: Progressing Towards Goal  Goal: *Absence of infection signs and symptoms  Outcome: Progressing Towards Goal  Goal: *Optimal pain control at patient's stated goal  Outcome: Progressing Towards Goal  Goal: *Skin integrity maintained  Outcome: Progressing Towards Goal  Goal: *Fluid volume balance  Outcome: Progressing Towards Goal  Goal: *Optimize nutritional status  Outcome: Progressing Towards Goal  Goal: *Anxiety reduced or absent  Outcome: Progressing Towards Goal  Goal: *Progressive mobility and function (eg: ADL's)  Outcome: Progressing Towards Goal     Problem: Pain  Goal: *Control of Pain  Outcome: Progressing Towards Goal  Goal: *PALLIATIVE CARE:  Alleviation of Pain  Outcome: Progressing Towards Goal

## 2021-05-18 NOTE — PROGRESS NOTES
Reason for Admission:  Thoracic compression fracture ([facility] [address] [S22.000A]  T12 compression fracture ([facility] [address] [S22.080A]                 RUR Score:    13%            Plan for utilizing home health:    TARIQ MCALLISTER signed for [address] [place] [address] and [facility] health                      Likelihood of Readmission:   LOW                         Transition of Care Plan:              Initial assessment completed with patient and daughter. Cognitive status of patient: oriented to time, place, person and situation. Face sheet information confirmed:  yes. The patient designates daughterZeina Said to participate in her discharge plan and to receive any needed information. This patient lives at [facility]. Patient is not able to navigate steps as needed. Prior to hospitalization, patient was considered to be independent with ADLs/IADLS : no . If not independent,  patient needs assist with : dressing, bathing, food preparation, cooking and toileting    Patient has a current ACP document on file: yes      Healthcare Decision Maker:     Click here to complete [address] including selection of the Healthcare Decision Maker Relationship (ie \"Primary\")    The daughter will be available to transport patient home upon discharge. The patient already has Kathey [place], and rollator available in the home. Patient is not currently active with home health. Patient has stayed in a skilled nursing facility or rehab. Was  stay within last 60 days : no. This patient is on dialysis :no    List of available SNF agencies were provided and reviewed with the patient prior to discharge. Freedom of choice signed: yes, for [facility] and [address] Currently, the discharge plan is SNF vs residential with Home health. The patient states that she can obtain her medications from the pharmacy, and take her medications as directed. Patient's current insurance is Medicare and BC.        Care Management Interventions  PCP Verified by CM:  Yes  Mode of Transport at Discharge: Self  Transition of Care Consult (CM Consult): Discharge Planning, SNF, Assisted Living  Current Support Network: Assisted Living  Confirm Follow Up Transport: Family  The Patient and/or Patient Representative was Provided with a Choice of Provider and Agrees with the Discharge Plan?: Yes  Freedom of Choice List was Provided with Basic Dialogue that Supports the Patient's Individualized Plan of Care/Goals, Treatment Preferences and Shares the Quality Data Associated with the Providers?: Yes  Discharge Location  Discharge Placement: Skilled nursing facility        Juliane Dumont RN - Outcomes Manager  [phone]

## 2021-05-18 NOTE — PROGRESS NOTES
Interventional Radiology    Patient seen in follow up for kyphoplasty    T12 kyphoplasty with anesthesia planned for 5/19/21 at 11 am    OK for diet until midnight from IR perspective, please hold lovenox at midnight.     Thank you,  Hermelindo Serrano, [facility] [address]

## 2021-05-19 ENCOUNTER — ANESTHESIA (OUTPATIENT)
Dept: INTERVENTIONAL RADIOLOGY/VASCULAR | Age: 86
DRG: 517 | End: 2021-05-19
Payer: MEDICARE

## 2021-05-19 ENCOUNTER — HOSPITAL ENCOUNTER (OUTPATIENT)
Dept: INTERVENTIONAL RADIOLOGY/VASCULAR | Age: 86
Discharge: HOME | DRG: 517 | End: 2021-05-19
Attending: PHYSICIAN ASSISTANT
Payer: MEDICARE

## 2021-05-19 VITALS
OXYGEN SATURATION: 100 % | HEART RATE: 56 BPM | RESPIRATION RATE: 14 BRPM | SYSTOLIC BLOOD PRESSURE: 115 MMHG | DIASTOLIC BLOOD PRESSURE: 42 MMHG | TEMPERATURE: 97.1 F

## 2021-05-19 LAB
APPEARANCE UR: ABNORMAL
BACTERIA URNS QL MICRO: NEGATIVE /HPF
BILIRUB UR QL: NEGATIVE
COLOR UR: YELLOW
EPITH CASTS URNS QL MICRO: NORMAL /LPF (ref 0–5)
GLUCOSE UR STRIP.AUTO-MCNC: NEGATIVE MG/DL
HGB UR QL STRIP: ABNORMAL
KETONES UR QL STRIP.AUTO: NEGATIVE MG/DL
LEUKOCYTE ESTERASE UR QL STRIP.AUTO: ABNORMAL
NITRITE UR QL STRIP.AUTO: NEGATIVE
PH UR STRIP: 6 [PH] (ref 5–8)
PROT UR STRIP-MCNC: NEGATIVE MG/DL
RBC #/AREA URNS HPF: NEGATIVE /HPF (ref 0–5)
SP GR UR REFRACTOMETRY: 1.02 (ref 1–1.03)
UROBILINOGEN UR QL STRIP.AUTO: 0.2 EU/DL (ref 0.2–1)
WBC URNS QL MICRO: NORMAL /HPF (ref 0–5)

## 2021-05-19 PROCEDURE — 74011250637 HC RX REV CODE- 250/637: Performed by: FAMILY MEDICINE

## 2021-05-19 PROCEDURE — 76060000033 HC ANESTHESIA 1 TO 1.5 HR

## 2021-05-19 PROCEDURE — 74011000636 HC RX REV CODE- 636: Performed by: RADIOLOGY

## 2021-05-19 PROCEDURE — 01936 PR ANESTH,PERC IMAGE GUIDE SPINAL PROC, THER: CPT | Performed by: ANESTHESIOLOGY

## 2021-05-19 PROCEDURE — 74011000250 HC RX REV CODE- 250: Performed by: NURSE ANESTHETIST, CERTIFIED REGISTERED

## 2021-05-19 PROCEDURE — 74011250636 HC RX REV CODE- 250/636: Performed by: NURSE ANESTHETIST, CERTIFIED REGISTERED

## 2021-05-19 PROCEDURE — 74011250637 HC RX REV CODE- 250/637: Performed by: INTERNAL MEDICINE

## 2021-05-19 PROCEDURE — 0PU43JZ SUPPLEMENT THORACIC VERTEBRA WITH SYNTHETIC SUBSTITUTE, PERCUTANEOUS APPROACH: ICD-10-PCS | Performed by: RADIOLOGY

## 2021-05-19 PROCEDURE — 97110 THERAPEUTIC EXERCISES: CPT

## 2021-05-19 PROCEDURE — 77030007885 IR KYPHOPLASTY THORACIC

## 2021-05-19 PROCEDURE — 81001 URINALYSIS AUTO W/SCOPE: CPT

## 2021-05-19 PROCEDURE — 74011250636 HC RX REV CODE- 250/636: Performed by: RADIOLOGY

## 2021-05-19 PROCEDURE — 99100 ANES PT EXTEME AGE<1 YR&>70: CPT | Performed by: NURSE ANESTHETIST, CERTIFIED REGISTERED

## 2021-05-19 PROCEDURE — 2709999900 HC NON-CHARGEABLE SUPPLY

## 2021-05-19 PROCEDURE — 77030038269 HC DRN EXT URIN PURWCK BARD -A

## 2021-05-19 PROCEDURE — 3331090002 HH PPS REVENUE DEBIT

## 2021-05-19 PROCEDURE — 74011000258 HC RX REV CODE- 258: Performed by: NURSE ANESTHETIST, CERTIFIED REGISTERED

## 2021-05-19 PROCEDURE — 74011000250 HC RX REV CODE- 250: Performed by: RADIOLOGY

## 2021-05-19 PROCEDURE — 99232 SBSQ HOSP IP/OBS MODERATE 35: CPT | Performed by: INTERNAL MEDICINE

## 2021-05-19 PROCEDURE — 97530 THERAPEUTIC ACTIVITIES: CPT

## 2021-05-19 PROCEDURE — 65270000029 HC RM PRIVATE

## 2021-05-19 PROCEDURE — 01936 PR ANESTH,PERC IMAGE GUIDE SPINAL PROC, THER: CPT | Performed by: NURSE ANESTHETIST, CERTIFIED REGISTERED

## 2021-05-19 PROCEDURE — 99100 ANES PT EXTEME AGE<1 YR&>70: CPT | Performed by: ANESTHESIOLOGY

## 2021-05-19 PROCEDURE — 3331090001 HH PPS REVENUE CREDIT

## 2021-05-19 PROCEDURE — 0PS43ZZ REPOSITION THORACIC VERTEBRA, PERCUTANEOUS APPROACH: ICD-10-PCS | Performed by: RADIOLOGY

## 2021-05-19 RX ORDER — SODIUM CHLORIDE 9 MG/ML
75 INJECTION, SOLUTION INTRAVENOUS CONTINUOUS
Status: DISCONTINUED | OUTPATIENT
Start: 2021-05-19 | End: 2021-05-23 | Stop reason: HOSPADM

## 2021-05-19 RX ORDER — LIDOCAINE HYDROCHLORIDE 20 MG/ML
INJECTION, SOLUTION EPIDURAL; INFILTRATION; INTRACAUDAL; PERINEURAL AS NEEDED
Status: DISCONTINUED | OUTPATIENT
Start: 2021-05-19 | End: 2021-05-19 | Stop reason: HOSPADM

## 2021-05-19 RX ORDER — PROPOFOL 10 MG/ML
VIAL (ML) INTRAVENOUS
Status: DISCONTINUED | OUTPATIENT
Start: 2021-05-19 | End: 2021-05-19 | Stop reason: HOSPADM

## 2021-05-19 RX ORDER — HYDROMORPHONE HYDROCHLORIDE 2 MG/ML
0.5 INJECTION, SOLUTION INTRAMUSCULAR; INTRAVENOUS; SUBCUTANEOUS
Status: ACTIVE | OUTPATIENT
Start: 2021-05-19 | End: 2021-05-19

## 2021-05-19 RX ORDER — LIDOCAINE HYDROCHLORIDE 10 MG/ML
30 INJECTION, SOLUTION EPIDURAL; INFILTRATION; INTRACAUDAL; PERINEURAL ONCE
Status: COMPLETED | OUTPATIENT
Start: 2021-05-19 | End: 2021-05-19

## 2021-05-19 RX ORDER — ONDANSETRON 2 MG/ML
4 INJECTION INTRAMUSCULAR; INTRAVENOUS ONCE
Status: DISCONTINUED | OUTPATIENT
Start: 2021-05-19 | End: 2021-05-20 | Stop reason: HOSPADM

## 2021-05-19 RX ORDER — FENTANYL CITRATE 50 UG/ML
50 INJECTION, SOLUTION INTRAMUSCULAR; INTRAVENOUS
Status: DISCONTINUED | OUTPATIENT
Start: 2021-05-19 | End: 2021-05-20 | Stop reason: HOSPADM

## 2021-05-19 RX ORDER — SODIUM CHLORIDE 0.9 % (FLUSH) 0.9 %
5-40 SYRINGE (ML) INJECTION EVERY 8 HOURS
Status: DISCONTINUED | OUTPATIENT
Start: 2021-05-19 | End: 2021-05-23 | Stop reason: HOSPADM

## 2021-05-19 RX ORDER — SODIUM CHLORIDE 0.9 % (FLUSH) 0.9 %
5-40 SYRINGE (ML) INJECTION AS NEEDED
Status: DISCONTINUED | OUTPATIENT
Start: 2021-05-19 | End: 2021-05-23 | Stop reason: HOSPADM

## 2021-05-19 RX ORDER — CEFAZOLIN SODIUM 2 G/50ML
2 SOLUTION INTRAVENOUS ONCE
Status: COMPLETED | OUTPATIENT
Start: 2021-05-19 | End: 2021-05-19

## 2021-05-19 RX ORDER — SODIUM CHLORIDE 9 MG/ML
INJECTION, SOLUTION INTRAVENOUS
Status: DISCONTINUED | OUTPATIENT
Start: 2021-05-19 | End: 2021-05-19 | Stop reason: HOSPADM

## 2021-05-19 RX ADMIN — LORATADINE 10 MG: 10 TABLET ORAL at 09:51

## 2021-05-19 RX ADMIN — POLYETHYLENE GLYCOL 3350 17 G: 17 POWDER, FOR SOLUTION ORAL at 09:51

## 2021-05-19 RX ADMIN — LIDOCAINE HYDROCHLORIDE 30 ML: 10 INJECTION, SOLUTION EPIDURAL; INFILTRATION; INTRACAUDAL; PERINEURAL at 13:53

## 2021-05-19 RX ADMIN — FAMOTIDINE 20 MG: 20 TABLET, FILM COATED ORAL at 09:51

## 2021-05-19 RX ADMIN — HYDROCODONE BITARTRATE AND ACETAMINOPHEN 1 TABLET: 5; 325 TABLET ORAL at 17:04

## 2021-05-19 RX ADMIN — ACETAMINOPHEN 650 MG: 325 TABLET ORAL at 09:51

## 2021-05-19 RX ADMIN — IOHEXOL 50 ML: 300 INJECTION, SOLUTION INTRAVENOUS at 13:53

## 2021-05-19 RX ADMIN — Medication 10 ML: at 22:06

## 2021-05-19 RX ADMIN — DEXMEDETOMIDINE HYDROCHLORIDE 4 MCG: 100 INJECTION, SOLUTION, CONCENTRATE INTRAVENOUS at 13:12

## 2021-05-19 RX ADMIN — MIRTAZAPINE 30 MG: 15 TABLET, FILM COATED ORAL at 22:05

## 2021-05-19 RX ADMIN — THERA TABS 1 TABLET: TAB at 09:51

## 2021-05-19 RX ADMIN — CEFAZOLIN 2 G: 10 INJECTION, POWDER, FOR SOLUTION INTRAVENOUS at 13:25

## 2021-05-19 RX ADMIN — Medication 10 ML: at 18:17

## 2021-05-19 RX ADMIN — SODIUM CHLORIDE: 900 INJECTION, SOLUTION INTRAVENOUS at 13:11

## 2021-05-19 RX ADMIN — PRAVASTATIN SODIUM 10 MG: 20 TABLET ORAL at 22:05

## 2021-05-19 RX ADMIN — LIDOCAINE HYDROCHLORIDE 40 MG: 20 INJECTION, SOLUTION EPIDURAL; INFILTRATION; INTRACAUDAL; PERINEURAL at 13:24

## 2021-05-19 RX ADMIN — DEXMEDETOMIDINE HYDROCHLORIDE 6 MCG: 100 INJECTION, SOLUTION, CONCENTRATE INTRAVENOUS at 13:33

## 2021-05-19 RX ADMIN — GABAPENTIN 100 MG: 100 CAPSULE ORAL at 09:51

## 2021-05-19 RX ADMIN — GABAPENTIN 100 MG: 100 CAPSULE ORAL at 18:17

## 2021-05-19 RX ADMIN — HYDROCODONE BITARTRATE AND ACETAMINOPHEN 1 TABLET: 5; 325 TABLET ORAL at 05:20

## 2021-05-19 RX ADMIN — PROPOFOL 25 MCG/KG/MIN: 10 INJECTION, EMULSION INTRAVENOUS at 13:26

## 2021-05-19 RX ADMIN — Medication 10 MG: at 22:05

## 2021-05-19 NOTE — PROGRESS NOTES
conducted a Follow up consultation and Spiritual Assessment for Karthikeyan Howell, who is a 80 y.o.,female. The  provided the following Interventions:  Continued the relationship of care and support. Listened empathically. Patient shared that Brice Dodie visited her yesterday. Patient is on NPO today and so was offered An Act of Spiritual Communion prayer instead and assurance of continued prayer on patient's behalf. Chart reviewed. The following outcomes were achieved:  Patient expressed gratitude for 's visit. Assessment:  There are no further spiritual or Anglican issues which require Spiritual Care Services interventions at this time. Plan:  Chaplains will continue to follow and will provide pastoral care on an as needed/requested basis.  recommends bedside caregivers page  on duty if patient shows signs of acute spiritual or emotional distress.      [address], [address]   [phone]

## 2021-05-19 NOTE — PROGRESS NOTES
Problem: Mobility Impaired (Adult and Pediatric)  Goal: *Acute Goals and Plan of Care (Insert Text)  Description: Physical Therapy Goals  Initiated 5/17/2021 and to be accomplished within 7 day(s)  1. Patient will move from supine to sit and sit to supine , scoot up and down, and roll side to side in bed with supervision/set-up. 2.  Patient will transfer from bed to chair and chair to bed with supervision/set-up using the least restrictive device. 3.  Patient will perform sit to stand with supervision/set-up. 4.  Patient will ambulate with supervision/set-up for 50 feet with the least restrictive device. 5.  Patient will perform spinal precautions 80% to protect spinal fracture and allow for proper healing. PLOF: pt lives in an retirement and used a rollator PTA, supervsion with mobility for safety     Outcome: Progressing Towards Goal    PHYSICAL THERAPY TREATMENT    Patient: Raj Hernandez (89 y.o. female)  Date: 5/19/2021  Diagnosis: Thoracic compression fracture ([address] [S22.000A]  T12 compression fracture ([address] [Y84.806V] Thoracic compression fracture ([address]       Precautions: Fall, spinal precautions, TSLO brace      ASSESSMENT:  Patient received supine in bed, willing to work with PT, and cleared by nursing. Reviewed spinal precautions and log roll technique. Min A to roll to the left and moderate assistance at trunk to push up into sitting. Assisted to last TSLO brace. Patient with posterior lean sitting EOB holding onto left bed rail for support. She is able to correct posture with verbal cues for anterior weight shift. Mod/max A to scoot forward in order to have feet supported. She sat EOB for about 8 minutes with CG/min A performing LE there-ex per flow sheet. Sit <>stand to RW completed with min A. She ambulates 5 ft laterally along side EOB with CGA and decreased step clearance, limited by fatigue/weakness. Deferred transfer to the chair as pt going for procedure this afternoon.  Mod/max A to return to supine. Pt reports decreased back pain following therapy session. Call bell in reach and bed alarm activated. Progression toward goals:   []      Improving appropriately and progressing toward goals  [x]      Improving slowly and progressing toward goals  []      Not making progress toward goals and plan of care will be adjusted     PLAN:  Patient continues to benefit from skilled intervention to address the above impairments. Continue treatment per established plan of care. Discharge Recommendations:  Rehab  Further Equipment Recommendations for Discharge:  shower chair and rolling walker     SUBJECTIVE:   Patient stated I feel better[ after moving around].     OBJECTIVE DATA SUMMARY:   Critical Behavior:  Neurologic State: Alert  Orientation Level: Oriented X4  Cognition: Appropriate decision making, Appropriate for age attention/concentration, Appropriate safety awareness, Follows commands  Safety/Judgement: Fall prevention  Functional Mobility Training:  Bed Mobility:  Rolling: Minimum assistance  Supine to Sit: Moderate assistance  Sit to Supine: Moderate assistance       Transfers:  Sit to Stand: Minimum assistance  Stand to Sit: Minimum assistance             Balance:  Sitting: Impaired; With support  Sitting - Static: Fair (occasional)  Sitting - Dynamic: Fair (occasional)  Standing: Impaired; With support  Standing - Static: Fair  Standing - Dynamic : Fair;Poor     Ambulation/Gait Training:  Distance (ft): 5 Feet (ft)  Ambulation - Level of Assistance: Contact guard assistance  Gait Abnormalities: Decreased step clearance  Base of Support: Center of gravity altered;Narrowed  Speed/Vijaya: Slow;Shuffled  Step Length: Left shortened;Right shortened       Therapeutic Exercises:         EXERCISE   Sets   Reps   Active Active Assist   Passive Self ROM   Comments   Ankle Pumps 1 20  [x] [] [] []    Quad Sets/Glut Sets    [] [] [] [] Hold for 5 secs   Hamstring Sets   [] [] [] []    Short Arc Quads [] [] [] []    Heel Slides   [] [] [] []    Straight Leg Raises   [] [] [] []    Hip Add   [] [] [] [] Hold for 5 secs, w/ pillow squeeze   Long Arc Quads 2 10 [x] [] [] []    Seated Marching 1 10 [x] [] [] []    Standing Marching 2 5 [x] [] [] []       [] [] [] []        Pain:  Pain level pre-treatment: 9/10 back pain  Pain level post-treatment: 5/10   Pain Intervention(s): Medication (see MAR); Rest, Ice, Repositioning   Response to intervention: Nurse notified, See doc flow    Activity Tolerance:   Fair  Please refer to the flowsheet for vital signs taken during this treatment. After treatment:   [] Patient left in no apparent distress sitting up in chair  [x] Patient left in no apparent distress in bed  [x] Call bell left within reach  [x] Nursing notified  [] Caregiver present  [x] Bed alarm activated  [] SCDs applied      COMMUNICATION/EDUCATION:   [x]         Role of Physical Therapy in the acute care setting. [x]         Fall prevention education was provided and the patient/caregiver indicated understanding. [x]         Patient/family have participated as able in working toward goals and plan of care. [x]         Patient/family agree to work toward stated goals and plan of care. []         Patient understands intent and goals of therapy, but is neutral about his/her participation.   []         Patient is unable to participate in stated goals/plan of care: ongoing with therapy staff.  []         Other:        Alberto Foster PT   Time Calculation: 25 mins

## 2021-05-19 NOTE — PERIOP NOTES
TRANSFER - OUT REPORT:    Verbal report given to Isi Dunlap RN(name) on Wong Vides  being transferred to (unit) for routine post - op       Report consisted of patients Situation, Background, Assessment and   Recommendations(SBAR). Information from the following report(s) SBAR, Kardex, Procedure Summary, Intake/Output, MAR and Recent Results was reviewed with the receiving nurse. Lines:   Peripheral IV 05/15/21 Left Antecubital (Active)   Site Assessment Clean, dry, & intact 05/19/21 1425   Phlebitis Assessment 0 05/19/21 1425   Infiltration Assessment 0 05/19/21 1425   Dressing Status Clean, dry, & intact 05/19/21 1425   Dressing Type Tape;Transparent 05/19/21 1425   Hub Color/Line Status Pink; Infusing 05/19/21 1425   Action Taken Open ports on tubing capped 05/19/21 0520   Alcohol Cap Used Yes 05/19/21 0520      Visit Vitals  BP (!) 115/49   Pulse (!) 55   Temp 97.2 °F (36.2 °C)   Resp 13   SpO2 100%       Opportunity for questions and clarification was provided.       Patient transported with:   O2 @ 3 liters  Registered Nurse  [facility]

## 2021-05-19 NOTE — PROGRESS NOTES
Problem: Falls - Risk of  Goal: *Absence of Falls  Description: Document Dami James Fall Risk and appropriate interventions in the flowsheet.   Outcome: Progressing Towards Goal  Note: Fall Risk Interventions:  Mobility Interventions: Bed/chair exit alarm, Utilize walker, cane, or other assistive device         Medication Interventions: Bed/chair exit alarm, Patient to call before getting OOB, Teach patient to arise slowly    Elimination Interventions: Bed/chair exit alarm, Call light in reach, Patient to call for help with toileting needs    History of Falls Interventions: Door open when patient unattended         Problem: Patient Education: Go to Patient Education Activity  Goal: Patient/Family Education  Outcome: Progressing Towards Goal     Problem: Pain  Goal: *Control of Pain  Outcome: Progressing Towards Goal

## 2021-05-19 NOTE — PROGRESS NOTES
Preprocedure Assessment      Today 5/19/2021     Indication/Symptoms:   Katey Castillo is a 80 y.o. female with an acute T12 compression fracture, here for kyphoplasty due to pain refractory to conservative management. The H & P and/or progress notes and any available imaging were reviewed. The risks, indications and possible alternatives to the procedure, including doing nothing, were discussed and informed consent was obtained.     Physical Exam:      Heart:   RRR   Lungs:   Normal work of breathing on room air    The patient is an appropriate candidate to undergo the planned procedure     KALYAN Ross

## 2021-05-19 NOTE — ROUTINE PROCESS
Called Anesthesia and spoke to Dr. Shalom Ott to notify patient would be in Cath Holding. Was told anesthesia wouldn't be available until after a provider is freed up from the OR.

## 2021-05-19 NOTE — ROUTINE PROCESS
TRANSFER - OUT REPORT:    Verbal report given to [facility] on Moises Morales  being transferred to [facility] from [address] for routine progression of care       Report consisted of patients Situation, Background, Assessment and   Recommendations(SBAR). Information from the following report(s) SBAR, Kardex, Intake/Output, MAR and Recent Results was reviewed with the receiving nurse. Lines:   Peripheral IV 05/15/21 Left Antecubital (Active)   Site Assessment Clean, dry, & intact 05/19/21 0520   Phlebitis Assessment 0 05/19/21 0520   Infiltration Assessment 0 05/19/21 0520   Dressing Status Clean, dry, & intact 05/19/21 0520   Dressing Type Tape;Transparent 05/19/21 0520   Hub Color/Line Status Pink 05/19/21 0520   Action Taken Open ports on tubing capped 05/19/21 0520   Alcohol Cap Used Yes 05/19/21 0520        Opportunity for questions and clarification was provided.       Patient transported with:   [employer]

## 2021-05-19 NOTE — PROGRESS NOTES
[facility] Hospitalist Group  Progress Note    Patient: Haleigh Moses Age: 80 y.o. : 1935 MR#: [id number] SSN: [id number]  Date/Time: 2021    Subjective:     Pt c/o dysuria.     Assessment/Plan:   80year old female with severe spinal stenosis among other medical conditions admitted after she presented with reports of fall and back pain.    -Acute compression fracture of T12 with 20% vertebral body height loss, s/p kyphoplasty   -Urinary retention, has had reaction to flomax  -Dysuria      HISTORY OF:  -Severe spinal stenosis  -HTN  -HLD  -IBS  -GERD  -Depression  -Hx colon cancer status post partial resection    PLAN:  -Cont PT/OT  -Check UA    Dispo: pt states she is too weak, PT/OT recommends rehab, discussed w/ daughter, would like rehab discharge    Updated daughter over the phone  Additional Notes:      Case discussed with:  [x]Patient  [x]Family  []Nursing  []Case Management  DVT Prophylaxis:  [x]Lovenox  []Hep SQ  []SCDs  []Coumadin   []On Heparin gtt    Objective:   VS:   Visit Vitals  /69 (BP 1 Location: Left upper arm, BP Patient Position: At rest)   Pulse 70   Temp 97.2 °F (36.2 °C)   Resp 16   Ht 5' 5\" (1.651 m)   Wt 74.8 kg (165 lb)   SpO2 96%   BMI 27.46 kg/m²      Tmax/24hrs: Temp (24hrs), Av.4 °F (36.3 °C), Min:97.1 °F (36.2 °C), Max:97.9 °F (36.6 °C)    Input/Output:     Intake/Output Summary (Last 24 hours) at 2021 1523  Last data filed at 2021 1412  Gross per 24 hour   Intake 490 ml   Output 805 ml   Net -315 ml       General: alert, awake, in NAD  Cardiovascular:  RRR, no murmur  Pulmonary:  CTAB  GI:  Abd soft, nt, nd  Extremities:No edema    Additional:  Neuro: no gross abnormalities    Labs:    Recent Results (from the past 24 hour(s))   COVID-19 RAPID TEST    Collection Time: 21  4:00 PM   Result Value Ref Range    Specimen source Nasopharyngeal      COVID-19 rapid test Not detected NOTD       Additional Data Reviewed: Signed By: Vasyl Hatfield MD     May 19, 2021 2:56 PM

## 2021-05-19 NOTE — PROGRESS NOTES
1220 Patient received in cath holding and placed on monitor at this time. Patient has no complaints of pain. Purewick placed per patient's request. Call light is within reach. Consents are at bedside awaiting IR.

## 2021-05-19 NOTE — PROGRESS NOTES
Shift Summary Note:  Assumed care of patient in bed awake and alert. Medicated x 2 for pain through out the night NPO since midnight, awaiting procedure, call bell within reach.   Patient Vitals for the past 12 hrs:   Temp Pulse Resp BP SpO2   05/19/21 0330 97.1 °F (36.2 °C) 95 20 135/75 96 %

## 2021-05-19 NOTE — PROGRESS NOTES
Problem: Falls - Risk of  Goal: *Absence of Falls  Description: Document Fern Dover Fall Risk and appropriate interventions in the flowsheet.   Outcome: Progressing Towards Goal  Note: Fall Risk Interventions:  Mobility Interventions: Bed/chair exit alarm, Utilize walker, cane, or other assistive device         Medication Interventions: Bed/chair exit alarm, Patient to call before getting OOB, Teach patient to arise slowly    Elimination Interventions: Bed/chair exit alarm, Call light in reach, Patient to call for help with toileting needs    History of Falls Interventions: Door open when patient unattended         Problem: General Medical Care Plan  Goal: *Vital signs within specified parameters  Outcome: Progressing Towards Goal  Goal: *Labs within defined limits  Outcome: Progressing Towards Goal  Goal: *Absence of infection signs and symptoms  Outcome: Progressing Towards Goal  Goal: *Optimal pain control at patient's stated goal  Outcome: Progressing Towards Goal  Goal: *Skin integrity maintained  Outcome: Progressing Towards Goal  Goal: *Fluid volume balance  Outcome: Progressing Towards Goal  Goal: *Optimize nutritional status  Outcome: Progressing Towards Goal  Goal: *Anxiety reduced or absent  Outcome: Progressing Towards Goal  Goal: *Progressive mobility and function (eg: ADL's)  Outcome: Progressing Towards Goal     Problem: Pain  Goal: *Control of Pain  Outcome: Progressing Towards Goal  Goal: *PALLIATIVE CARE:  Alleviation of Pain  Outcome: Progressing Towards Goal

## 2021-05-19 NOTE — PROGRESS NOTES
End of Shift Note   Bedside and verbal shift change report given to [place]ida (On coming nurse) by Joanne Blanc (Off going nurse).   Report included the following information:      --Procedure Summary     --MAR,     --Recent Results     --Med Rec Status

## 2021-05-19 NOTE — ANESTHESIA PREPROCEDURE EVALUATION
Relevant Problems   No relevant active problems       Anesthetic History   No history of anesthetic complications            Review of Systems / Medical History  Patient summary reviewed, nursing notes reviewed and pertinent labs reviewed    Pulmonary  Within defined limits                 Neuro/Psych         Psychiatric history     Cardiovascular    Hypertension: well controlled              Exercise tolerance: >4 METS     GI/Hepatic/Renal     GERD: well controlled      PUD     Endo/Other        Arthritis and cancer     Other Findings   Comments: Colon cancer, resection only.            Physical Exam    Airway  Mallampati: III  TM Distance: 4 - 6 cm  Neck ROM: normal range of motion   Mouth opening: Normal     Cardiovascular    Rhythm: regular  Rate: normal         Dental  No notable dental hx       Pulmonary  Breath sounds clear to auscultation               Abdominal         Other Findings            Anesthetic Plan    ASA: 3  Anesthesia type: MAC          Induction: Intravenous  Anesthetic plan and risks discussed with: Patient

## 2021-05-19 NOTE — ROUTINE PROCESS
TRANSFER - OUT REPORT:    Verbal report given to Sepideh BURKS on Chhaya Elena  being transferred to PACU for routine progression of care       Report consisted of patients Situation, Background, Assessment and   Recommendations(SBAR). Information from the following report(s) SBAR, Kardex, Procedure Summary, Intake/Output, MAR and Recent Results was reviewed with the receiving nurse. Lines:   Peripheral IV 05/15/21 Left Antecubital (Active)   Site Assessment Clean, dry, & intact 05/19/21 0520   Phlebitis Assessment 0 05/19/21 0520   Infiltration Assessment 0 05/19/21 0520   Dressing Status Clean, dry, & intact 05/19/21 0520   Dressing Type Tape;Transparent 05/19/21 0520   Hub Color/Line Status Pink 05/19/21 0520   Action Taken Open ports on tubing capped 05/19/21 0520   Alcohol Cap Used Yes 05/19/21 0520        Opportunity for questions and clarification was provided.       Patient transported with:   Monitor  O2 @ 6 liters  Registered Nurse   Anesthesia

## 2021-05-20 VITALS
OXYGEN SATURATION: 95 % | SYSTOLIC BLOOD PRESSURE: 105 MMHG | RESPIRATION RATE: 16 BRPM | TEMPERATURE: 98.2 F | HEART RATE: 92 BPM | HEIGHT: 65 IN | DIASTOLIC BLOOD PRESSURE: 63 MMHG | BODY MASS INDEX: 27.49 KG/M2 | WEIGHT: 165 LBS

## 2021-05-20 LAB
BACTERIA SPEC CULT: ABNORMAL
BACTERIA SPEC CULT: ABNORMAL
CC UR VC: ABNORMAL
SERVICE CMNT-IMP: ABNORMAL

## 2021-05-20 PROCEDURE — 97535 SELF CARE MNGMENT TRAINING: CPT

## 2021-05-20 PROCEDURE — 3331090001 HH PPS REVENUE CREDIT

## 2021-05-20 PROCEDURE — 3331090002 HH PPS REVENUE DEBIT

## 2021-05-20 PROCEDURE — 74011250637 HC RX REV CODE- 250/637: Performed by: INTERNAL MEDICINE

## 2021-05-20 PROCEDURE — 2709999900 HC NON-CHARGEABLE SUPPLY

## 2021-05-20 PROCEDURE — 74011250637 HC RX REV CODE- 250/637: Performed by: FAMILY MEDICINE

## 2021-05-20 PROCEDURE — 97530 THERAPEUTIC ACTIVITIES: CPT

## 2021-05-20 PROCEDURE — 99239 HOSP IP/OBS DSCHRG MGMT >30: CPT | Performed by: INTERNAL MEDICINE

## 2021-05-20 RX ORDER — CYCLOBENZAPRINE HCL 10 MG
5 TABLET ORAL
Qty: 21 TABLET | Refills: 0 | Status: SHIPPED | OUTPATIENT
Start: 2021-05-20 | End: 2021-05-25

## 2021-05-20 RX ORDER — AMOXICILLIN AND CLAVULANATE POTASSIUM 875; 125 MG/1; MG/1
1 TABLET, FILM COATED ORAL EVERY 12 HOURS
Status: DISCONTINUED | OUTPATIENT
Start: 2021-05-20 | End: 2021-05-20 | Stop reason: HOSPADM

## 2021-05-20 RX ORDER — HYDROCODONE BITARTRATE AND ACETAMINOPHEN 5; 325 MG/1; MG/1
1 TABLET ORAL
Qty: 12 TABLET | Refills: 0 | Status: SHIPPED | OUTPATIENT
Start: 2021-05-20 | End: 2021-05-23

## 2021-05-20 RX ORDER — AMOXICILLIN AND CLAVULANATE POTASSIUM 875; 125 MG/1; MG/1
1 TABLET, FILM COATED ORAL EVERY 12 HOURS
Qty: 10 TABLET | Refills: 0 | Status: SHIPPED | OUTPATIENT
Start: 2021-05-20 | End: 2021-05-25

## 2021-05-20 RX ADMIN — FUROSEMIDE 20 MG: 20 TABLET ORAL at 10:02

## 2021-05-20 RX ADMIN — POLYETHYLENE GLYCOL 3350 17 G: 17 POWDER, FOR SOLUTION ORAL at 10:03

## 2021-05-20 RX ADMIN — FAMOTIDINE 20 MG: 20 TABLET, FILM COATED ORAL at 10:02

## 2021-05-20 RX ADMIN — Medication 10 ML: at 05:23

## 2021-05-20 RX ADMIN — LORATADINE 10 MG: 10 TABLET ORAL at 10:02

## 2021-05-20 RX ADMIN — GABAPENTIN 100 MG: 100 CAPSULE ORAL at 10:02

## 2021-05-20 RX ADMIN — THERA TABS 1 TABLET: TAB at 10:02

## 2021-05-20 RX ADMIN — Medication 10 ML: at 14:00

## 2021-05-20 RX ADMIN — AMOXICILLIN AND CLAVULANATE POTASSIUM 1 TABLET: 875; 125 TABLET, FILM COATED ORAL at 10:02

## 2021-05-20 NOTE — PROGRESS NOTES
Call made to [facility] [phone], spoke with Edmond Neil, scheduled patient for 4 pm , patient is discharged to [address]

## 2021-05-20 NOTE — PROGRESS NOTES
Problem: Falls - Risk of  Goal: *Absence of Falls  Description: Document Armando Mahajan Fall Risk and appropriate interventions in the flowsheet.   Outcome: Progressing Towards Goal  Note: Fall Risk Interventions:  Mobility Interventions: Bed/chair exit alarm, Patient to call before getting OOB         Medication Interventions: Patient to call before getting OOB    Elimination Interventions: Bed/chair exit alarm, Call light in reach    History of Falls Interventions: Bed/chair exit alarm, Door open when patient unattended, Room close to nurse's station         Problem: Patient Education: Go to Patient Education Activity  Goal: Patient/Family Education  Outcome: Progressing Towards Goal     Problem: General Medical Care Plan  Goal: *Vital signs within specified parameters  Outcome: Progressing Towards Goal     Problem: Pain  Goal: *Control of Pain  Outcome: Progressing Towards Goal     Problem: Patient Education: Go to Patient Education Activity  Goal: Patient/Family Education  Outcome: Progressing Towards Goal

## 2021-05-20 NOTE — PROGRESS NOTES
Problem: Self Care Deficits Care Plan (Adult)  Goal: *Acute Goals and Plan of Care (Insert Text)  Description: Occupational Therapy Goals  Initiated 5/17/2021 within 7 day(s). 1.  Patient will perform bed mobility in preparation for selfcare with supervision/set-up maintaining spinal protective precautions with 100% accuracy. 2.  Patient will perform upper body dressing with supervision/set-up. 3.  Patient will perform lower body dressing with supervision/set-up using AE. 4.  Patient will perform toilet transfers with supervision/set-up. 5.  Patient will perform all aspects of toileting with supervision/set-up. 6.  Patient will perform standing functional activity with supervision/ set-up for 4-7 minutes, F+ balance  7. Patient will utilize energy conservation techniques during functional activities with verbal cues. Prior Level of Function: Patient was independent with self-care and used a rolling walker for functional mobility PTA. Outcome: Progressing Towards Goal   OCCUPATIONAL THERAPY TREATMENT    Patient: Colton Sabillon (12 y.o. female)  Date: 5/20/2021  Diagnosis: Thoracic compression fracture ([address] [S22.000A]  T12 compression fracture ([address] [J79.296S] Thoracic compression fracture ([address]       Precautions: Fall  PLOF: Patient was independent with self-care and used a rolling walker for functional mobility PTA. Chart, occupational therapy assessment, plan of care, and goals were reviewed. ASSESSMENT:  Pt cleared by RN for OT tx at this time. PT co tx to maximize pt safety and participation. Pt presented supine in bed upon therapist arrival and agreeable for participation. Pt able to recall spinal precautions and performed log roll with MIN A and required MOD A to transition to EOB in prep for functional task. Once sitting EOB required MIN A donning pullover shirt and donning TLSO requiring vc's for anterior shifting to promote upright posture.  STS transfer MIN A with RW requiring second for increased safety w/ mod vc's for proper hand placement. Pt performed SPT to reclining chair with MIN A for RW mgmt, pt demonstrated poor eccentric control when descending onto chair. Pt was provided reacher and educated on EC/WS techniques to increase (I) and safety during LB ADLs, pt verbalized understanding. Pt was left with B LE's elevated, chair active, and all needs left within reach. RN made aware of pt's participation and position. Progression toward goals:  []          Improving appropriately and progressing toward goals  [x]          Improving slowly and progressing toward goals  []          Not making progress toward goals and plan of care will be adjusted     PLAN:  Patient continues to benefit from skilled intervention to address the above impairments. Continue treatment per established plan of care. Discharge Recommendations: Rehab   Further Equipment Recommendations for Discharge: N/A     SUBJECTIVE:   Patient stated  I had a busy day yesterday. \"     OBJECTIVE DATA SUMMARY:   Cognitive/Behavioral Status:  Neurologic State: Alert, Appropriate for age  Orientation Level: Oriented X4  Cognition: Appropriate decision making, Follows commands  Safety/Judgement: Fall prevention    Functional Mobility and Transfers for ADLs:   Bed Mobility:  Rolling: Minimum assistance  Supine to Sit: Moderate assistance  Sit to Supine: Moderate assistance  Scooting: Contact guard assistance   Transfers:  Sit to Stand: Minimum assistance  Stand to Sit: Minimum assistance       Balance:  Sitting: Impaired  Sitting - Static: Fair (occasional)  Sitting - Dynamic: Fair (occasional)  Standing: Impaired; With support  Standing - Static: Fair  Standing - Dynamic : Fair;Poor    ADL Intervention:     Upper Body Dressing Assistance  Orthotics(Brace):  Minimum assistance (TLSO)  Pullover Shirt: Minimum assistance    Pain:  Pain level pre-treatment: 5/10   Pain level post-treatment: 5/10  Pain Intervention(s): Medication (see MAR); Rest, Ice, Repositioning  Response to intervention: Nurse notified, See doc flow    Activity Tolerance:    Fair   Please refer to the flowsheet for vital signs taken during this treatment. After treatment:   [x]  Patient left in no apparent distress sitting up in chair  []  Patient left in no apparent distress in bed  [x]  Call bell left within reach  [x]  Nursing notified  []  Caregiver present  [x]  Chair alarm activated    COMMUNICATION/EDUCATION:   [x] Role of Occupational Therapy in the acute care setting  [] Home safety education was provided and the patient/caregiver indicated understanding. [x] Patient/family have participated as able in working towards goals and plan of care. [x] Patient/family agree to work toward stated goals and plan of care. [] Patient understands intent and goals of therapy, but is neutral about his/her participation. [] Patient is unable to participate in goal setting and plan of care.       Thank you for this referral.  JORJE Che  Time Calculation: 23 mins

## 2021-05-20 NOTE — PROGRESS NOTES
Important Message from Medicare\" reviewed and explained with the patient at bedside and signature was obtained. A signed copy provided to patient/representative. Original signed document placed in patient's chart.        Tawanda Spicer MSN, RN, ACM-RN     [phone]- pager  [phone]- main office

## 2021-05-20 NOTE — DISCHARGE SUMMARY
[facility]  Discharge Summary       Patient: Marcela Crenshaw Age: 80 y.o. : 1935 MR#: [id number] [id number]: [id number]  PCP on record: Bridgette Basilio MD  Admit date: 5/15/2021  Discharge date: 2021    Consults:  -BRIE Escamilla, PA- IR  -   Procedures:21  Procedure/Description:  Image guided T12 kyphoplasty. -     Significant Diagnostic Studies: -CT Lumbar spine 5/15/21:  IMPRESSION     Acute compression fracture of T12 with 20% vertebral body height loss as  discussed.     Multilevel degenerative disc disease throughout the lumbar spine causing  moderate/severe neural foraminal narrowing throughout and moderate/severe spinal  canal narrowing at L3-L4 and L4-5.     Interval progression of multilevel facet arthropathy.     Please see report for multiple additional findings and for further details. 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 Noted to have acute compression fracture of T12 with 20% vertebral body height loss, s/p kyphoplasty . Pt tolerated the procedure w/o complications. Still reports some pain and impaired mobility due to pain and weakness. PT/OT evaluated and recommended rehab transfer.  Both patient and daughter agreed for PT/OT recommendations.   -Urinary retention, per daughter, work up thus far unrevealing for cause of retention. Has not tolerated Flomax in the past. Pt at times does intermittent self cath. Continued to have retention. Subsequently complained of dysuria, ua abnormal. Urine cx from  shows enteroccocculs sensitive to ampicillin. Continue to monitor urine output, at risk of complications from urinary retention.      HISTORY OF:  -Severe spinal stenosis  -HTN  -HLD  -IBS  -GERD  -Depression  -Hx colon cancer status post partial resection               Today's examination of the patient revealed:     Subjective:   Pt has no specific complaints.   Objective:   VS:   Visit Vitals  /69 (BP 1 Location: Left upper arm, BP Patient Position: At rest)   Pulse 87   Temp 97.5 °F (36.4 °C)   Resp 16   Ht 5' 5\" (1.651 m)   Wt 74.8 kg (165 lb)   SpO2 97%   BMI 27.46 kg/m²      Tmax/24hrs: Temp (24hrs), Av.4 °F (36.3 °C), Min:96.5 °F (35.8 °C), Max:98.7 °F (37.1 °C)     Input/Output:     Intake/Output Summary (Last 24 hours) at 2021 1255  Last data filed at 2021 0453  Gross per 24 hour   Intake 730 ml   Output 1105 ml   Net -375 ml       General: alert, awake, in NAD  Cardiovascular:  RRR, no murmur  Pulmonary:  CTAB  GI:  Abd soft, nt, nd  Extremities:No edema    Additional:  Neuro: no gross abnormalities     Labs:    Recent Results (from the past 24 hour(s))   URINALYSIS W/ RFLX MICROSCOPIC    Collection Time: 21  6:12 PM   Result Value Ref Range    Color YELLOW      Appearance CLOUDY      Specific gravity 1.018 1.005 - 1.030      pH (UA) 6.0 5.0 - 8.0      Protein Negative NEG mg/dL    Glucose Negative NEG mg/dL    Ketone Negative NEG mg/dL    Bilirubin Negative NEG      Blood TRACE (A) NEG      Urobilinogen 0.2 0.2 - 1.0 EU/dL    Nitrites Negative NEG      Leukocyte Esterase LARGE (A) NEG     URINE MICROSCOPIC ONLY    Collection Time: 21  6:12 PM   Result Value Ref Range    WBC TOO NUMEROUS TO COUNT 0 - 5 /hpf    RBC Negative 0 - 5 /hpf    Epithelial cells FEW 0 - 5 /lpf    Bacteria Negative NEG /hpf     Additional Data Reviewed:     Condition on discharge:stable   Disposition:    []Home   []Home with Home Health   [x]SNF/NH   []Rehab   []Home with family   []Alternate Facility:____________________      Discharge Medications:     Current Discharge Medication List      START taking these medications    Details   amoxicillin-clavulanate (AUGMENTIN) 875-125 mg per tablet Take 1 Tablet by mouth every twelve (12) hours for 5 days. Qty: 10 Tablet, Refills: 0      cyclobenzaprine (FLEXERIL) 10 mg tablet Take 0.5 Tablets by mouth three (3) times daily as needed for Muscle Spasm(s) for up to 5 days. Qty: 21 Tablet, Refills: 0      HYDROcodone-acetaminophen (NORCO) 5-325 mg per tablet Take 1 Tablet by mouth every six (6) hours as needed for Pain for up to 3 days. Max Daily Amount: 4 Tablets. Qty: 12 Tablet, Refills: 0    Associated Diagnoses: Compression fracture of T12 vertebra, initial encounter ([facility]         CONTINUE these medications which have NOT CHANGED    Details   mirtazapine (REMERON) 30 mg tablet Take 1 Tab by mouth nightly. Qty: 30 Tab, Refills: 5      fexofenadine (Allergy Relief, fexofenadine,) 180 mg tablet Take 180 mg by mouth daily. gabapentin (NEURONTIN) 100 mg capsule Take 1 Cap by mouth two (2) times a day. Max Daily Amount: 200 mg. Qty: 180 Cap, Refills: 1    Associated Diagnoses: Lumbar disc disease      acetaminophen (TYLENOL) 500 mg capsule Take 500 mg by mouth every four (4) hours as needed for Pain.      pravastatin (PRAVACHOL) 10 mg tablet Take 1 Tab by mouth nightly. Qty: 30 Tab, Refills: 5    Associated Diagnoses: Mixed hyperlipidemia      ondansetron hcl (ZOFRAN) 4 mg tablet Take 4 mg by mouth every eight (8) hours as needed for Nausea. alum-mag hydroxide-simeth (MYLANTA) 200-200-20 mg/5 mL susp Take 30 mL by mouth daily as needed for Other (PRN: Gastric Reflux, in PM).  PRN: Gastric Reflux, in PM      famotidine (PEPCID) 20 mg tablet Take 20 mg by mouth daily. melatonin tab tablet Take 10 mg by mouth nightly. cholecalciferol (VITAMIN D3) 1,000 unit cap Take 1,000 Units by mouth daily. multivitamin/iron/folic acid (CENTRUM WOMEN PO) Take 1 Tab by mouth daily. cetirizine (ZYRTEC) 10 mg tablet Take 10 mg by mouth daily. polyethylene glycol (MIRALAX) 17 gram packet Take 17 g by mouth daily as needed for Other (PRN Constipation). PRN Constipation. Mix with 8 oz of liquid of choice, such as water. Bifidobacterium Infantis (ALIGN) 4 mg cap Take 4 mg by mouth daily. furosemide (LASIX) 20 mg tablet Take 1 Tab by mouth every other day. Indications: Edema  Qty: 90 Tab, Refills: 3    Associated Diagnoses: Leg edema      dicyclomine (BENTYL) 10 mg capsule Take 1 Cap by mouth four (4) times daily. Qty: 90 Cap, Refills: 3    Associated Diagnoses: Irritable bowel syndrome with both constipation and diarrhea               Follow-up Appointments:   1. Your PCP: Deepika Kim MD, within 7-10days  2.  Urologist for urinary retention  >30 minutes spent coordinating this discharge (review instructions/follow-up, prescriptions, preparing report for sign off)    Signed:  Una Jacobs MD  5/20/2021  12:55 PM

## 2021-05-20 NOTE — PROGRESS NOTES
Problem: Mobility Impaired (Adult and Pediatric)  Goal: *Acute Goals and Plan of Care (Insert Text)  Description: Physical Therapy Goals  Initiated 5/17/2021 and to be accomplished within 7 day(s)  1. Patient will move from supine to sit and sit to supine , scoot up and down, and roll side to side in bed with supervision/set-up. 2.  Patient will transfer from bed to chair and chair to bed with supervision/set-up using the least restrictive device. 3.  Patient will perform sit to stand with supervision/set-up. 4.  Patient will ambulate with supervision/set-up for 50 feet with the least restrictive device. 5.  Patient will perform spinal precautions 80% to protect spinal fracture and allow for proper healing. PLOF: pt lives in an FPC and used a rollator PTA, supervsion with mobility for safety     Outcome: Progressing Towards Goal     PHYSICAL THERAPY TREATMENT    Patient: Haleigh Moses (47 y.o. female)  Date: 5/20/2021  Diagnosis: Thoracic compression fracture ([address] [S22.000A]  T12 compression fracture ([address] [V88.604E] Thoracic compression fracture ([address]       Precautions: Fall  PLOF: see above     ASSESSMENT:  Pt seen in bed in NAD, POD 1 T12 kyphoplasty and agreeable to PT. Pt was seen with OT to maximize functional mobility and safety. Pt was able to recite spinal precautions and able to perform log roll with min A, mod A to come to sitting EOB. Once sitting, pt given assist with donning TLSO with cues for upright posture for proper placement. Pt then stands up with min A including verbal cues for hand placement. Pt demonstrates ability to turn and pivot to sit in recliner with min A for RW management as well as steadying due to decreased foot clearance and shuffling of steps. Pt then is educated on use of arm rests for controlled lowering into chair. Pt positioned for comfort with all needs met and call bell within reach, educated to call for help prior to getting.  Recommend rehab upon discharge. Progression toward goals:   [x]      Improving appropriately and progressing toward goals  []      Improving slowly and progressing toward goals  []      Not making progress toward goals and plan of care will be adjusted     PLAN:  Patient continues to benefit from skilled intervention to address the above impairments. Continue treatment per established plan of care. Discharge Recommendations:  Rehab  Further Equipment Recommendations for Discharge:  N/A     SUBJECTIVE:   Patient stated I am more comfortable sitting up.     OBJECTIVE DATA SUMMARY:   Critical Behavior:  Neurologic State: Alert, Appropriate for age  Orientation Level: Oriented X4  Cognition: Appropriate decision making, Follows commands  Safety/Judgement: Fall prevention  Functional Mobility Training:  Bed Mobility:  Rolling: Minimum assistance  Supine to Sit: Moderate assistance  Sit to Supine: Moderate assistance  Scooting: Contact guard assistance         Transfers:  Sit to Stand: Minimum assistance  Stand to Sit: Minimum assistance          Balance:  Sitting: Impaired  Sitting - Static: Fair (occasional)  Sitting - Dynamic: Fair (occasional)  Standing: Impaired; With support  Standing - Static: Fair  Standing - Dynamic : Fair;Poor      Pain:  Pain level pre-treatment: 5/10  Pain level post-treatment: 5/10   Pain Intervention(s): Medication (see MAR); Rest, Ice, Repositioning   Response to intervention: Nurse notified    Activity Tolerance:   Good    Please refer to the flowsheet for vital signs taken during this treatment. After treatment:   [x] Patient left in no apparent distress sitting up in chair  [] Patient left in no apparent distress in bed  [x] Call bell left within reach  [x] Nursing notified  [] Caregiver present  [x] Bed alarm activated- chair  [] SCDs applied      COMMUNICATION/EDUCATION:   [x]         Role of Physical Therapy in the acute care setting.   [x]         Fall prevention education was provided and the patient/caregiver indicated understanding. [x]         Patient/family have participated as able in working toward goals and plan of care. [x]         Patient/family agree to work toward stated goals and plan of care. []         Patient understands intent and goals of therapy, but is neutral about his/her participation.   []         Patient is unable to participate in stated goals/plan of care: ongoing with therapy staff.  []         Other:        Primus Blight   Time Calculation: 24 mins

## 2021-05-20 NOTE — PROGRESS NOTES
Phone call from Dr. Cindy Poon - request conversation for follow with patient for DDNR completion prior to transfer to SNF. Up to see patient, she is alert and able to follow conversation appropriately - she is clear on her wish for no resuscitative efforts including chest compressions, shock or life support in case of cardiopulmonary arrest. DDNR was completed with patient in accordance with previously documented conversation and conversation with this provider. Family aware of DNR per conversation with attending Dr. Cindy Poon.        Signed By: Pamela Wells NP     May 20, 2021

## 2021-05-20 NOTE — PROGRESS NOTES
Report called to [facility] and report given to Chica Ramirez [facility].

## 2021-05-20 NOTE — PROGRESS NOTES
Notified by Dr. Ted Bonds that pt is medically ready for discharge. Met with pt at the bedside and pt confirmed that she is agreeable to SNF at [facility]. Pt requested that I contact her dtr, Juan Vicente to provide an update. Call placed to pt's dtr, Juan Cinthia ([phone]) and notified her of transition plan. Dtr confirmed that pt has been fully vaccinated [employer]- last dose on 2/19/21). Dtr also confirmed that pt has over $60,000 in assets. UAI deferred at this time. Call placed to [facility] at [facility] and notified her of pt's discharge readiness today. [facility] confirmed that she is able to accept the pt today. Also notified [facility] of the aforementioned. Will arrange transport. PCS form faxed to [facility] transport. Notified pt's bedside nurse, Ligia Caba.      Clifford Vicente, MSN, RN, ACM-RN     [phone]- pager  [phone]- main office

## 2021-05-20 NOTE — PROGRESS NOTES
Received pt alert and oriented x 4 with daughter at bedside. Dressing to back dry and intact. Taking in p.o. fluids well at this time. No complaints of pain. purwick in use with clear yellow urine in canister. No s/s of distress noted . Will continue to monitor.      [id number] Bedside report given to Ariella Martin

## 2021-05-20 NOTE — PROGRESS NOTES
End of Shift Note     Bedside and verbal shift change report given to Vishnu Murray (On coming nurse) by Renato Sparks (Off going nurse).   Report included the following information:      --Procedure Summary     --MAR,     --Recent Results     --Med Rec Status

## 2021-05-21 PROCEDURE — 3331090002 HH PPS REVENUE DEBIT

## 2021-05-21 PROCEDURE — 3331090001 HH PPS REVENUE CREDIT

## 2021-05-21 NOTE — ANESTHESIA POSTPROCEDURE EVALUATION
* No procedures listed *.     MAC    Anesthesia Post Evaluation      Multimodal analgesia: multimodal analgesia used between 6 hours prior to anesthesia start to PACU discharge  Patient location during evaluation: PACU  Patient participation: complete - patient participated  Level of consciousness: awake and alert  Pain management: adequate  Airway patency: patent  Anesthetic complications: no  Cardiovascular status: acceptable  Respiratory status: acceptable  Hydration status: acceptable  Post anesthesia nausea and vomiting:  none  Final Post Anesthesia Temperature Assessment:  Normothermia (36.0-37.5 degrees C)      INITIAL Post-op Vital signs:   Vitals Value Taken Time   /63 05/20/21 1515   Temp 36.8 °C (98.2 °F) 05/20/21 1515   Pulse 92 05/20/21 1515   Resp 16 05/20/21 1515   SpO2 95 % 05/20/21 1515

## 2021-05-22 PROCEDURE — 3331090002 HH PPS REVENUE DEBIT

## 2021-05-22 PROCEDURE — 3331090001 HH PPS REVENUE CREDIT

## 2021-05-23 PROCEDURE — 3331090001 HH PPS REVENUE CREDIT

## 2021-05-23 PROCEDURE — 3331090002 HH PPS REVENUE DEBIT

## 2021-05-24 ENCOUNTER — HOSPITAL ENCOUNTER (EMERGENCY)
Age: 86
Discharge: HOME | End: 2021-05-24
Attending: EMERGENCY MEDICINE
Payer: MEDICARE

## 2021-05-24 ENCOUNTER — APPOINTMENT (OUTPATIENT)
Dept: CT IMAGING | Age: 86
End: 2021-05-24
Attending: EMERGENCY MEDICINE
Payer: MEDICARE

## 2021-05-24 ENCOUNTER — APPOINTMENT (OUTPATIENT)
Dept: GENERAL RADIOLOGY | Age: 86
End: 2021-05-24
Attending: EMERGENCY MEDICINE
Payer: MEDICARE

## 2021-05-24 VITALS
TEMPERATURE: 98.6 F | HEIGHT: 64 IN | OXYGEN SATURATION: 98 % | SYSTOLIC BLOOD PRESSURE: 119 MMHG | WEIGHT: 150 LBS | DIASTOLIC BLOOD PRESSURE: 51 MMHG | HEART RATE: 78 BPM | RESPIRATION RATE: 16 BRPM | BODY MASS INDEX: 25.61 KG/M2
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
## 2021-05-24 PROCEDURE — 3331090001 HH PPS REVENUE CREDIT

## 2021-05-24 PROCEDURE — 72131 CT LUMBAR SPINE W/O DYE: CPT

## 2021-05-24 PROCEDURE — 73521 X-RAY EXAM HIPS BI 2 VIEWS: CPT

## 2021-05-24 PROCEDURE — 70450 CT HEAD/BRAIN W/O DYE: CPT

## 2021-05-24 PROCEDURE — 3331090002 HH PPS REVENUE DEBIT

## 2021-05-24 PROCEDURE — 99284 EMERGENCY DEPT VISIT MOD MDM: CPT

## 2021-05-24 PROCEDURE — 72125 CT NECK SPINE W/O DYE: CPT

## 2021-05-24 PROCEDURE — 74011250637 HC RX REV CODE- 250/637: Performed by: EMERGENCY MEDICINE

## 2021-05-24 RX ORDER — HYDROCODONE BITARTRATE AND ACETAMINOPHEN 5; 325 MG/1; MG/1
1 TABLET ORAL
Status: COMPLETED | OUTPATIENT
Start: 2021-05-24 | End: 2021-05-24

## 2021-05-24 RX ADMIN — HYDROCODONE BITARTRATE AND ACETAMINOPHEN 1 TABLET: 5; 325 TABLET ORAL at 08:04

## 2021-05-24 NOTE — ED NOTES
Pt reports pain to posterior head after sustaining a fall last night. Denies any LOC.   Pt currently A/O.

## 2021-05-24 NOTE — ED NOTES
Tamara at [facility] contacted and made aware of patient's disposition and intent to return, also of negative findings and of medication administered.

## 2021-05-24 NOTE — ED TRIAGE NOTES
Pt sustained a fall last night, landed on her back and then hit the back of her head. Denies any LOC. Reports pain to back of head. Pt resident of [facility].

## 2021-05-24 NOTE — ED NOTES
I spoke with daughter, who is agreeable with discharge back to [facility]. Patient wanting to discuss this with daughter before going back, daughter states she is aware of her mother's concerns already and will speak with her, but states to proceed with discharge.

## 2021-05-24 NOTE — DISCHARGE INSTRUCTIONS
"Pt vomiting constantly in triage stating \"i waited too long\".  " 1.  The CT scan of your back did not show any acute injuries. CT scan of your head did not show any acute injuries. The CT scan of your head demonstrated some moderate dilation of the ventricles. This can be normal as someone ages as the brain naturally get somewhat smaller with time. It can rarely be caused by normal pressure hydrocephalus, however I think it is unlikely that you have this. You do not currently demonstrate any symptoms of it, however to err on the side of caution contact your primary care doctor to have this evaluated further to rule this out. 2.  If you develop any new or worsening areas of pain or anything it was not bothering you today but develops please return at once for reevaluation. Likewise return for any new or worsening symptoms.

## 2021-05-24 NOTE — ED PROVIDER NOTES
EMERGENCY DEPARTMENT HISTORY AND PHYSICAL EXAM    8:01 AM    Date: 5/24/2021  Patient Name: Haleigh Moses    History of Presenting Illness     Chief Complaint   Patient presents with   Viktoriya Chase Fall       History Provided By: Patient  Location/Duration/Severity/Modifying factors   Patient is an 14-year-old female with a past medical history of hypertension, high cholesterol, recent T12 kyphoplasty, as well as other noted past medical history presenting to emergency department after a fall. She reports the fall occurred in the early hours of the morning, about 4-5 hours ago. She tells me that she typically does not ambulate independently typically uses a Rollator or even a wheelchair at times however last night she was walking out of bed in the middle of the night and she acknowledged that she was not supposed to be up on her feet but by the time she realized this she reports she lost her balance and she fell landing on her back. She believes she struck the right side of her back, and then eventually hit her head on the ground. She does not take any blood thinners. Did not lose any consciousness. Pain rated at a 4 right now, took two 325 mg tablets of Tylenol prior to arrival.  She reports that that seemed to help slightly but she feels like it is stuck in her lower throat and would like some water. Pain described as achy and crampy, seemingly worse in the right lateral hip as well as the mid to lower back. Patient denies any recent illness otherwise  No chest pain, shortness of breath, fevers or chills, no nausea vomiting diarrhea. Complains of a mild head pain in the area of the fall. The history is provided by the patient and the EMS personnel. No  was used. Fall  The accident occurred 3 to 5 hours ago. The fall occurred while walking and while standing. She fell from a height of ground level. She landed on hard floor. There was no blood loss.  The point of impact was the head and right hip (Right low back, right hip). The pain is present in the right hip (Right low back). The pain is at a severity of 4/10. She was not ambulatory at the scene. There was no entrapment after the fall. There was no drug use involved in the accident. There was no alcohol use involved in the accident. Associated symptoms include headaches. Pertinent negatives include no fever, no numbness, no abdominal pain, no nausea, no vomiting, no extremity weakness, no hearing loss, no loss of consciousness, no tingling and no laceration. The risk factors include being elderly and recurrent falls. The symptoms are aggravated by activity. She has tried acetaminophen for the symptoms. The treatment provided mild relief. PCP: Dipak Mandel MD    Current Outpatient Medications   Medication Sig Dispense Refill    amoxicillin-clavulanate (AUGMENTIN) 875-125 mg per tablet Take 1 Tablet by mouth every twelve (12) hours for 5 days. 10 Tablet 0    cyclobenzaprine (FLEXERIL) 10 mg tablet Take 0.5 Tablets by mouth three (3) times daily as needed for Muscle Spasm(s) for up to 5 days. 21 Tablet 0    mirtazapine (REMERON) 30 mg tablet Take 1 Tab by mouth nightly. (Patient taking differently: Take 1 Tab by mouth nightly. Missed 5 days per patient, due to slow renewal) 30 Tab 5    fexofenadine (Allergy Relief, fexofenadine,) 180 mg tablet Take 180 mg by mouth daily.  gabapentin (NEURONTIN) 100 mg capsule Take 1 Cap by mouth two (2) times a day. Max Daily Amount: 200 mg. 180 Cap 1    acetaminophen (TYLENOL) 500 mg capsule Take 500 mg by mouth every four (4) hours as needed for Pain.  pravastatin (PRAVACHOL) 10 mg tablet Take 1 Tab by mouth nightly. 30 Tab 5    ondansetron hcl (ZOFRAN) 4 mg tablet Take 4 mg by mouth every eight (8) hours as needed for Nausea.  alum-mag hydroxide-simeth (MYLANTA) 200-200-20 mg/5 mL susp Take 30 mL by mouth daily as needed for Other (PRN: Gastric Reflux, in PM).  PRN: Gastric Reflux, in PM      famotidine (PEPCID) 20 mg tablet Take 20 mg by mouth daily.  melatonin tab tablet Take 10 mg by mouth nightly.  cholecalciferol (VITAMIN D3) 1,000 unit cap Take 1,000 Units by mouth daily.  multivitamin/iron/folic acid (CENTRUM WOMEN PO) Take 1 Tab by mouth daily.  cetirizine (ZYRTEC) 10 mg tablet Take 10 mg by mouth daily.  polyethylene glycol (MIRALAX) 17 gram packet Take 17 g by mouth daily as needed for Other (PRN Constipation). PRN Constipation. Mix with 8 oz of liquid of choice, such as water.  Bifidobacterium Infantis (ALIGN) 4 mg cap Take 4 mg by mouth daily.  furosemide (LASIX) 20 mg tablet Take 1 Tab by mouth every other day. Indications: Edema 90 Tab 3    dicyclomine (BENTYL) 10 mg capsule Take 1 Cap by mouth four (4) times daily. (Patient taking differently: Take 1 Cap by mouth four (4) times daily as needed for Other (PRN:  IBS symptoms).  PRN:  IBS symptoms) 90 Cap 3       Past History     Past Medical History:  Past Medical History:   Diagnosis Date    Arthritis     Cancer ([facility] [address] 2002    Colon     Chronic pain     abdominal due to IBS    Depression     GERD (gastroesophageal reflux disease)     Hypertension     Migraine headache 2014    Mixed hyperlipidemia 9/3/2020    Occipital neuralgia 2014    PUD (peptic ulcer disease)     S/P colon resection 2002    clear presently        Past Surgical History:  Past Surgical History:   Procedure Laterality Date    HX CHOLECYSTECTOMY  2015    HX HYSTERECTOMY  1965    HX TONSIL AND ADENOIDECTOMY      HX WISDOM TEETH EXTRACTION      x4    IR KYPHOPLASTY THORACIC  5/19/2021       Family History:  Family History   Problem Relation Age of Onset    Hypertension Mother     Stroke Mother     Heart Attack Father     Hypertension Father     Parkinson's Disease Sister     Hypertension Brother     Cancer Brother         prostate    No Known Problems Son     No Known Problems Daughter Social History:  Social History     Tobacco Use    Smoking status: Never Smoker    Smokeless tobacco: Never Used   Substance Use Topics    Alcohol use: Yes     Alcohol/week: 0.0 - 1.0 standard drinks    Drug use: Not on file       Allergies: Allergies   Allergen Reactions    Macrobid [Nitrofurantoin Monohyd/M-Cryst] Nausea and Vomiting    Flomax [Tamsulosin] Myalgia and Vertigo    Pneumococcal Vaccine Other (comments)    Lexapro [Escitalopram Oxalate] Other (comments)     ? Hyponatremia per pt hx       I reviewed and confirmed the above information with patient and updated as necessary. Review of Systems     Review of Systems   Constitutional: Negative for chills and fever. HENT: Negative for congestion, rhinorrhea, sinus pressure and sneezing. Eyes: Negative for visual disturbance. Respiratory: Negative for cough and shortness of breath. Cardiovascular: Negative for chest pain. Gastrointestinal: Negative for abdominal pain, diarrhea, nausea and vomiting. Genitourinary: Negative for dysuria, frequency and urgency. Musculoskeletal: Positive for arthralgias (Right hip) and back pain. Negative for extremity weakness and neck pain. Skin: Negative for rash. Neurological: Positive for headaches. Negative for tingling, loss of consciousness, syncope and numbness. Physical Exam     Visit Vitals  BP (!) 119/51   Pulse 78   Temp 98.6 °F (37 °C)   Resp 16   Ht 5' 4\" (1.626 m)   Wt 68 kg (150 lb)   SpO2 98%   BMI 25.75 kg/m²       Physical Exam  Vitals and nursing note reviewed. Constitutional:       General: She is not in acute distress. Appearance: Normal appearance. She is normal weight. She is not ill-appearing or toxic-appearing. HENT:      Head: Normocephalic and atraumatic. Right Ear: External ear normal.      Left Ear: External ear normal.      Nose: Nose normal. No congestion or rhinorrhea.       Mouth/Throat:      Mouth: Mucous membranes are moist.   Eyes: Conjunctiva/sclera: Conjunctivae normal.   Neck:      Comments: There is no midline spinal neck tenderness. Cardiovascular:      Rate and Rhythm: Normal rate and regular rhythm. Pulses: Normal pulses. Heart sounds: Normal heart sounds. No murmur heard. Pulmonary:      Effort: Pulmonary effort is normal.      Breath sounds: Normal breath sounds. No wheezing, rhonchi or rales. Abdominal:      General: Abdomen is flat. Tenderness: There is no abdominal tenderness. There is no guarding or rebound. Musculoskeletal:         General: No swelling or tenderness. Normal range of motion. Cervical back: Normal range of motion and neck supple. Right lower leg: No edema. Left lower leg: No edema. Comments: No tenderness elicited in the thoracic spine until approximately the level of T12 with there is mild tenderness, along the midline. Otherwise no tenderness throughout. The bilateral hips are nontender with gentle logroll, she has no pain elicited with flexion extension abduction or abduction of the hip. Complains of mild tenderness along the right greater trochanter. Pelvis is stable to AP compression. The upper extremities are nontender on palpation throughout, there is no chest wall flank or abdominal tenderness. Skin:     General: Skin is warm and dry. Capillary Refill: Capillary refill takes less than 2 seconds. Findings: No laceration or rash. Neurological:      General: No focal deficit present. Mental Status: She is alert and oriented to person, place, and time. Comments: Normal sensory exam of the lower extremities. Negative straight leg raise. 5/5 strength in hip flexion, hip extension, 5/5 strength in knee flexion and extension bilaterally. 5/5 strength in plantar dorsiflexion, EHL extension is 5/5 bilaterally.            Diagnostic Study Results     Labs -  No results found for this or any previous visit (from the past 24 hour(s)). Radiologic Studies -   XR HIPS BI W OR WO AP PELV   Final Result   No fracture identified. If continued clinical concern, consider CT or MRI. CT HEAD WO CONT   Final Result      1. Small focal contusion at midline upper scalp without skull fracture. 2.  No acute intracranial abnormality. 3.  Moderate ventricular dilatation, more pronounced to cerebral atrophy. Superimposed NPH needs to be excluded clinically. 4.  Mild microvascular disease of white matter. Thank you for your referral.      CT SPINE CERV WO CONT   Final Result      No CT evidence of acute C-spine injury seen. Degenerative changes in lower C-spine as above. Thank you for your referral.       CT SPINE LUMB WO CONT   Final Result      1. No acute compression fracture. Remote compression fracture with   vertebroplasty at T12.       2. Moderate dextroscoliosis with spondylosis and DDD at L2-3.      3. Multilevel minimal listhesis as above. Thank you for your referral.                   Medical Decision Making   I am the first provider for this patient. I reviewed the vital signs, available nursing notes, past medical history, past surgical history, family history and social history. Vital Signs-Reviewed the patient's vital signs. Records Reviewed: Nursing Notes, Old Medical Records, Previous Radiology Studies and Previous Laboratory Studies (Time of Review: 8:01 AM)    ED Course: Progress Notes, Reevaluation, and Consults:  ED Course as of May 24 1134   Mon May 24, 2021   7321 Still awaiting the x-ray to be taken. CT results are pending. [LITTLE]   0316 X-ray of the bilateral hips and pelvis by my interpretation shows no fracture or dislocation. Formal read is pending.     [LITTLE]      ED Course User Index  [LITTLE] Natacha Starr DO         Provider Notes (Medical Decision Making):   MDM  Number of Diagnoses or Management Options  Fall, initial encounter  Diagnosis management comments: Patient is an 42-year-old female presents after mechanical ground-level fall. Patient has recurrent falls reports that she does not typically ambulate without some sort of assist device but she tried to walk last night accidentally. She recently had a T12 kyphoplasty. The differential includes intracranial injury such as subdural hematoma, subarachnoid hemorrhage, intraparenchymal hemorrhage, cerebral contusion, likewise would consider spinal injury such as compression fracture, lumbar strain, thoracic strain, pars fracture, spinous process fracture, etc.    On complete secondary survey the patient does not have any other signs of any traumatic injury in her hips legs shoulders abdomen or chest.  We will get imaging of her head, C-spine as can't exclude due to age, as well as CT lumbar spine with attention to T12 to evaluate for fracture of that region. Results reviewed: The CT imaging of her brain, C-spine and L-spine with attention to T12 are unremarkable. There is no fracture to the recently cemented vertebra. The CT scan shows incidental finding of ventricular dilation. She does not clearly have any evidence of normal pressure hydrocephalus however patient was advised of this and I recommended that she call her primary care doctor to have this followed up and also provide her with written instructions as such. Hip x-ray and pelvis are normal as well. Patient is feeling better we will plan to discharge her back to her nursing facility.     At this time, patient is stable and appropriate for discharge home.  Patient demonstrates understanding of current diagnoses and is in agreement with the treatment plan. Chuy Brizuela are advised that while the likelihood of serious underlying condition is low at this point given the evaluation performed today, we cannot fully rule it out. Chuy Rook are advised to immediately return with any new symptoms or worsening of current condition.  All questions have been answered. Tino Castellano is given educational material regarding their diagnoses, including danger symptoms and when to return to the ED. This note was dictated utilizing Dragon voice recognition software. Unfortunately this leads to occasional typographical errors. I apologize in advance if the situation occurs. If questions occur please do not hesitate to contact me directly. Katherine Fox, DO          Procedures    Critical Care Time: 0    Diagnosis     Clinical Impression:   1. Fall, initial encounter        Disposition: Discharge    Follow-up Information     Follow up With Specialties Details Why Contact Info    Chelsea Mtz MD Family Medicine  Call later today or tomorrow for follow-up appointment in approximately 3 to 4 days, to review results from today. [id number] [facility] [address] EMERGENCY DEPT Emergency Medicine  As needed, If symptoms worsen [address]  [phone]           Discharge Medication List as of 5/24/2021 10:07 AM      CONTINUE these medications which have NOT CHANGED    Details   amoxicillin-clavulanate (AUGMENTIN) 875-125 mg per tablet Take 1 Tablet by mouth every twelve (12) hours for 5 days. , Print, Disp-10 Tablet, R-0      cyclobenzaprine (FLEXERIL) 10 mg tablet Take 0.5 Tablets by mouth three (3) times daily as needed for Muscle Spasm(s) for up to 5 days. , Print, Disp-21 Tablet, R-0      mirtazapine (REMERON) 30 mg tablet Take 1 Tab by mouth nightly. , Print, Disp-30 Tab, R-5      fexofenadine (Allergy Relief, fexofenadine,) 180 mg tablet Take 180 mg by mouth daily. , Historical Med      gabapentin (NEURONTIN) 100 mg capsule Take 1 Cap by mouth two (2) times a day.  Max Daily Amount: 200 mg., Normal, Disp-180 Cap, R-1      acetaminophen (TYLENOL) 500 mg capsule Take 500 mg by mouth every four (4) hours as needed for Pain., Historical Med      pravastatin (PRAVACHOL) 10 mg tablet Take 1 Tab by mouth nightly., Normal, Disp-30 Tab, R-5      ondansetron hcl (ZOFRAN) 4 mg tablet Take 4 mg by mouth every eight (8) hours as needed for Nausea., Historical Med      alum-mag hydroxide-simeth (MYLANTA) 200-200-20 mg/5 mL susp Take 30 mL by mouth daily as needed for Other (PRN: Gastric Reflux, in PM). PRN: Gastric Reflux, in PM, Historical Med      famotidine (PEPCID) 20 mg tablet Take 20 mg by mouth daily. , Historical Med      melatonin tab tablet Take 10 mg by mouth nightly., Historical Med      cholecalciferol (VITAMIN D3) 1,000 unit cap Take 1,000 Units by mouth daily. , Historical Med      multivitamin/iron/folic acid (CENTRUM WOMEN PO) Take 1 Tab by mouth daily. , Historical Med      cetirizine (ZYRTEC) 10 mg tablet Take 10 mg by mouth daily. , Historical Med      polyethylene glycol (MIRALAX) 17 gram packet Take 17 g by mouth daily as needed for Other (PRN Constipation). PRN Constipation. Mix with 8 oz of liquid of choice, such as water., Historical Med      Bifidobacterium Infantis (ALIGN) 4 mg cap Take 4 mg by mouth daily. , Historical Med      furosemide (LASIX) 20 mg tablet Take 1 Tab by mouth every other day. Indications: Edema, Print, Disp-90 Tab, R-3      dicyclomine (BENTYL) 10 mg capsule Take 1 Cap by mouth four (4) times daily. , Print, Disp-90 Cap, R-3         STOP taking these medications       HYDROcodone-acetaminophen (NORCO) 5-325 mg per tablet Comments:   Reason for Stopping:               Devika Cristina DO   Emergency Medicine   May 24, 2021, 8:01 AM     This note is dictated utilizing Dragon voice recognition software. Unfortunately this leads to occasional typographical errors using the voice recognition. I apologize in advance if the situation occurs. If questions occur please do not hesitate to contact me directly.     Devika Cristina, DO

## 2021-05-25 ENCOUNTER — HOSPITAL ENCOUNTER (OUTPATIENT)
Dept: LAB | Age: 86
Discharge: HOME | End: 2021-05-25
Payer: COMMERCIAL

## 2021-05-25 LAB
ANION GAP SERPL CALC-SCNC: 5 MMOL/L (ref 3–18)
BUN SERPL-MCNC: 15 MG/DL (ref 7–18)
BUN/CREAT SERPL: 22 (ref 12–20)
CALCIUM SERPL-MCNC: 9.2 MG/DL (ref 8.5–10.1)
CHLORIDE SERPL-SCNC: 100 MMOL/L (ref 100–111)
CO2 SERPL-SCNC: 31 MMOL/L (ref 21–32)
CREAT SERPL-MCNC: 0.67 MG/DL (ref 0.6–1.3)
ERYTHROCYTE [DISTWIDTH] IN BLOOD BY AUTOMATED COUNT: 13.6 % (ref 11.6–14.5)
GLUCOSE SERPL-MCNC: 101 MG/DL (ref 74–99)
HCT VFR BLD AUTO: 39.7 % (ref 35–45)
HGB BLD-MCNC: 12.8 G/DL (ref 12–16)
MCH RBC QN AUTO: 27.7 PG (ref 24–34)
MCHC RBC AUTO-ENTMCNC: 32.2 G/DL (ref 31–37)
MCV RBC AUTO: 85.9 FL (ref 74–97)
PLATELET # BLD AUTO: 207 K/UL (ref 135–420)
PMV BLD AUTO: 9.7 FL (ref 9.2–11.8)
POTASSIUM SERPL-SCNC: 4.7 MMOL/L (ref 3.5–5.5)
RBC # BLD AUTO: 4.62 M/UL (ref 4.2–5.3)
SODIUM SERPL-SCNC: 136 MMOL/L (ref 136–145)
WBC # BLD AUTO: 6.5 K/UL (ref 4.6–13.2)

## 2021-05-25 PROCEDURE — 3331090002 HH PPS REVENUE DEBIT

## 2021-05-25 PROCEDURE — 3331090001 HH PPS REVENUE CREDIT

## 2021-05-25 PROCEDURE — 85027 COMPLETE CBC AUTOMATED: CPT

## 2021-05-25 PROCEDURE — 80048 BASIC METABOLIC PNL TOTAL CA: CPT

## 2021-05-26 PROCEDURE — 3331090002 HH PPS REVENUE DEBIT

## 2021-05-26 PROCEDURE — 3331090001 HH PPS REVENUE CREDIT

## 2021-05-27 PROCEDURE — 3331090001 HH PPS REVENUE CREDIT

## 2021-05-27 PROCEDURE — 3331090002 HH PPS REVENUE DEBIT

## 2021-05-28 ENCOUNTER — PATIENT OUTREACH (OUTPATIENT)
Dept: CASE MANAGEMENT | Age: 86
End: 2021-05-28

## 2021-05-28 PROCEDURE — 3331090002 HH PPS REVENUE DEBIT

## 2021-05-28 PROCEDURE — 3331090001 HH PPS REVENUE CREDIT

## 2021-05-28 NOTE — PROGRESS NOTES
Post Acute Facility Update     *The information contained in this note was received during a weekly care coordination call with the post acute facility*    This patient was discharged from [facility] to [facility] ([facility])   on 5/20/21. Hospital Discharge Diagnosis per Dr. Abigail Carrion:    Acute compression fracture of T12 with 20% vertebral body height loss as  discussed.   Hx of Sigmoid Cancer  Anxiety  HTN  Severe Spinal Stenosis     Current Facility: [address] [place] [address] ([facility])  Anticipated Discharge Date: 6/18/21 Tentatively    Facility Nursing Update: no new orders  Facility Social Work Update: Lives at [facility]. Was not independent PTA  Upper Extremity Assistance: Stand by Assist - Presence and Cueing  Lower Extremity Assistance: Minimal Assistance   Bed Mobility: Stand by Assist - Presence and Cueing  Transfers: Contact Guard Assist - hands on patient for balance   Ambulation: Contact Guard Assist - hands on patient for balance   How far (in feet) is the patient ambulating?  50 feet  Device Used: walker  Barriers to Discharge: to return to [facility]  Other:

## 2021-05-29 PROCEDURE — 3331090001 HH PPS REVENUE CREDIT

## 2021-05-29 PROCEDURE — 3331090002 HH PPS REVENUE DEBIT

## 2021-05-30 PROCEDURE — 3331090002 HH PPS REVENUE DEBIT

## 2021-05-30 PROCEDURE — 3331090001 HH PPS REVENUE CREDIT

## 2021-06-03 ENCOUNTER — HOSPITAL ENCOUNTER (OUTPATIENT)
Dept: LAB | Age: 86
Discharge: HOME | End: 2021-06-03

## 2021-06-03 ENCOUNTER — HOME CARE VISIT (OUTPATIENT)
Dept: HOME HEALTH SERVICES | Facility: HOME HEALTH | Age: 86
End: 2021-06-03

## 2021-06-03 PROCEDURE — 87186 SC STD MICRODIL/AGAR DIL: CPT

## 2021-06-03 PROCEDURE — 87077 CULTURE AEROBIC IDENTIFY: CPT

## 2021-06-03 PROCEDURE — 81001 URINALYSIS AUTO W/SCOPE: CPT

## 2021-06-03 PROCEDURE — 87086 URINE CULTURE/COLONY COUNT: CPT

## 2021-06-03 NOTE — CASE COMMUNICATION
Patient has been discharged from [address] , she remains in the SNF/Rehab facility through end of [facility] certification period.   Thank you for your referral.

## 2021-06-04 ENCOUNTER — PATIENT OUTREACH (OUTPATIENT)
Dept: CASE MANAGEMENT | Age: 86
End: 2021-06-04

## 2021-06-04 LAB
APPEARANCE UR: NORMAL
BACTERIA URNS QL MICRO: ABNORMAL /HPF
BILIRUB UR QL: NEGATIVE
COLOR UR: YELLOW
EPITH CASTS URNS QL MICRO: ABNORMAL /LPF (ref 0–5)
GLUCOSE UR STRIP.AUTO-MCNC: NEGATIVE MG/DL
HGB UR QL STRIP: NORMAL
KETONES UR QL STRIP.AUTO: NORMAL MG/DL
LEUKOCYTE ESTERASE UR QL STRIP.AUTO: NORMAL
NITRITE UR QL STRIP.AUTO: NEGATIVE
PH UR STRIP: 6 [PH]
PROT UR STRIP-MCNC: 30 MG/DL
RBC #/AREA URNS HPF: ABNORMAL /HPF (ref 0–5)
SP GR UR REFRACTOMETRY: 1.02 (ref 1–1.04)
UROBILINOGEN UR QL STRIP.AUTO: 0.2 EU/DL
WBC URNS QL MICRO: ABNORMAL /HPF (ref 0–5)

## 2021-06-04 NOTE — PROGRESS NOTES
Post Acute Facility Update     *The information contained in this note was received during a weekly care coordination call with the post acute facility*    This patient was discharged from [facility] to [facility] ([facility])   on 5/20/21. Hospital Discharge Diagnosis per Dr. Jean Cure:    Acute compression fracture of T12 with 20% vertebral body height loss as  discussed.   Hx of Sigmoid Cancer  Anxiety  HTN  Severe Spinal Stenosis     Current Facility: [address] [place] [address] ([facility])  Anticipated Discharge Date: 6/18/21 Tentatively    Facility Nursing Update: no new orders  Facility Social Work Update: Lives at [facility]. Was not independent PTA  Upper Extremity Assistance: Stand by Assist - Presence and Cueing  Lower Extremity Assistance: Stand by Assist - Presence and Cueing  Bed Mobility: Independent  Transfers: Stand by Assist - Presence and Cueing  Ambulation: Stand by Assist - Presence and Cueing  How far (in feet) is the patient ambulating?  150 feet  Device Used: walker  Barriers to Discharge: to return to [facility]  Other:

## 2021-06-06 LAB
BACTERIA SPEC CULT: ABNORMAL
CC UR VC: ABNORMAL
SERVICE CMNT-IMP: ABNORMAL

## 2021-06-11 ENCOUNTER — TELEPHONE (OUTPATIENT)
Dept: INTERNAL MEDICINE CLINIC | Age: 86
End: 2021-06-11

## 2021-06-11 ENCOUNTER — PATIENT OUTREACH (OUTPATIENT)
Dept: CASE MANAGEMENT | Age: 86
End: 2021-06-11

## 2021-06-11 NOTE — TELEPHONE ENCOUNTER
Ronaldo Short is calling from [address] [place] [address] stating patient is just returning there and she needs to confirm orders.

## 2021-06-11 NOTE — TELEPHONE ENCOUNTER
Contacted yunior at [facility], she was informed that we can only confirm that the patient was seen at the ER. We cannot confirm orders without the patient having an appointment. She can contact the ER and speak with the RN on duty.

## 2021-06-12 ENCOUNTER — HOME HEALTH ADMISSION (OUTPATIENT)
Dept: HOME HEALTH SERVICES | Facility: HOME HEALTH | Age: 86
End: 2021-06-12
Payer: MEDICARE

## 2021-06-13 ENCOUNTER — HOME CARE VISIT (OUTPATIENT)
Dept: SCHEDULING | Facility: HOME HEALTH | Age: 86
End: 2021-06-13
Payer: MEDICARE

## 2021-06-13 VITALS
SYSTOLIC BLOOD PRESSURE: 110 MMHG | OXYGEN SATURATION: 96 % | DIASTOLIC BLOOD PRESSURE: 80 MMHG | RESPIRATION RATE: 17 BRPM | HEART RATE: 89 BPM | TEMPERATURE: 97.1 F

## 2021-06-13 PROCEDURE — 3331090001 HH PPS REVENUE CREDIT

## 2021-06-13 PROCEDURE — 3331090002 HH PPS REVENUE DEBIT

## 2021-06-13 PROCEDURE — G0151 HHCP-SERV OF PT,EA 15 MIN: HCPCS

## 2021-06-13 PROCEDURE — 400013 HH SOC

## 2021-06-13 PROCEDURE — 400018 HH-NO PAY CLAIM PROCEDURE

## 2021-06-14 ENCOUNTER — HOME CARE VISIT (OUTPATIENT)
Dept: HOME HEALTH SERVICES | Facility: HOME HEALTH | Age: 86
End: 2021-06-14
Payer: MEDICARE

## 2021-06-14 PROCEDURE — 3331090001 HH PPS REVENUE CREDIT

## 2021-06-14 PROCEDURE — 3331090002 HH PPS REVENUE DEBIT

## 2021-06-15 ENCOUNTER — OFFICE VISIT (OUTPATIENT)
Dept: ORTHOPEDIC SURGERY | Age: 86
End: 2021-06-15
Payer: MEDICARE

## 2021-06-15 ENCOUNTER — HOME CARE VISIT (OUTPATIENT)
Dept: SCHEDULING | Facility: HOME HEALTH | Age: 86
End: 2021-06-15
Payer: MEDICARE

## 2021-06-15 VITALS
OXYGEN SATURATION: 98 % | WEIGHT: 155.2 LBS | BODY MASS INDEX: 26.5 KG/M2 | TEMPERATURE: 97.8 F | RESPIRATION RATE: 17 BRPM | HEART RATE: 67 BPM | HEIGHT: 64 IN

## 2021-06-15 DIAGNOSIS — M81.0 POSTMENOPAUSAL OSTEOPOROSIS: ICD-10-CM

## 2021-06-15 DIAGNOSIS — Z13.820 OSTEOPOROSIS SCREENING: ICD-10-CM

## 2021-06-15 DIAGNOSIS — M48.062 SPINAL STENOSIS OF LUMBAR REGION WITH NEUROGENIC CLAUDICATION: Primary | ICD-10-CM

## 2021-06-15 DIAGNOSIS — M54.50 LUMBAR SPINE PAIN: ICD-10-CM

## 2021-06-15 PROCEDURE — G9717 DOC PT DX DEP/BP F/U NT REQ: HCPCS | Performed by: PHYSICAL MEDICINE & REHABILITATION

## 2021-06-15 PROCEDURE — 1090F PRES/ABSN URINE INCON ASSESS: CPT | Performed by: PHYSICAL MEDICINE & REHABILITATION

## 2021-06-15 PROCEDURE — 99214 OFFICE O/P EST MOD 30 MIN: CPT | Performed by: PHYSICAL MEDICINE & REHABILITATION

## 2021-06-15 PROCEDURE — 1101F PT FALLS ASSESS-DOCD LE1/YR: CPT | Performed by: PHYSICAL MEDICINE & REHABILITATION

## 2021-06-15 PROCEDURE — 3331090001 HH PPS REVENUE CREDIT

## 2021-06-15 PROCEDURE — 3331090002 HH PPS REVENUE DEBIT

## 2021-06-15 PROCEDURE — G8536 NO DOC ELDER MAL SCRN: HCPCS | Performed by: PHYSICAL MEDICINE & REHABILITATION

## 2021-06-15 PROCEDURE — G8427 DOCREV CUR MEDS BY ELIG CLIN: HCPCS | Performed by: PHYSICAL MEDICINE & REHABILITATION

## 2021-06-15 PROCEDURE — 1111F DSCHRG MED/CURRENT MED MERGE: CPT | Performed by: PHYSICAL MEDICINE & REHABILITATION

## 2021-06-15 PROCEDURE — G0157 HHC PT ASSISTANT EA 15: HCPCS

## 2021-06-15 PROCEDURE — G8419 CALC BMI OUT NRM PARAM NOF/U: HCPCS | Performed by: PHYSICAL MEDICINE & REHABILITATION

## 2021-06-15 RX ORDER — MELOXICAM 15 MG/1
15 TABLET ORAL DAILY
Qty: 30 TABLET | Refills: 2 | Status: CANCELLED | OUTPATIENT
Start: 2021-06-15

## 2021-06-15 NOTE — H&P (VIEW-ONLY)
[address]  [address], [address]  [place]  Phone: [phone]  Fax: [phone]        Jeni Shyam  : 1935  PCP: Elis Mendes MD  6/15/2021    PROGRESS NOTE      HISTORY OF PRESENT ILLNESS  Megan Hernandez is a 80 y.o. female who was seen as a new patient 19 with c/o neck and low back pain with episodic pain radiating into the RLE. Her neck pain is the most bothersome. Pt notes that over the last 6 weeks, she has had PT and OT at [facility] in [facility]. She notes that she found it beneficial, but when she is complete with PT, her pain returns. She has a limited standing tolerance.  Lumbar MRI 4/3/18. Per my read, multilevel degenerative changes, most significantly facet arthropathy, no more than moderate spinal stenosis. Multilevel foraminal stenosis without any focal disc herniation. Pt notes that she is able to do her own laundry, but she has pain reproduced when she makes the bed. She has h/o occipital headaches that she notes have progressively worsened. She reports limited ROM of her neck, especially towards the left. She previously found benefit from PT and manipulations for her neck pain. She finds some relief from heat, massage, and Lidocaine cream. She denies radicular symptoms into her upper extremities. Pt notes that she was previously active with swimming, but over the last 3 years she has been. She is currently taking Gabapentin 100 mg QHS (TID caused somnolence). She recently had a Prednisone dose pack that was not very beneficial. She attended PT (-19; Diane Shine). She notes that she has only had one session of dry needling so far (on the left), but she has found it very beneficial (she is supposed to have her session on the right soon). Pt notes that the exercises in PT have been painful.  Cervical MRI 19: Multilevel degenerative findings along cervical spine most pronounced at C5-6 where there is a kyphosis as well as mild cord impingement. Multilevel foraminal stenoses, some levels of severe, such as on the right at C5-6; as delineated above. She returned to see NP Tim 3/9/21 with back pain radiating into the bilateral buttocks and BLE (R>L) to the calves. She described a cold, wet sensation in her legs. [place] [facility] [place], but did not find it beneficial. She had a fall due to her legs feeling heavy and giving out on her. She takes Gabapentin 100 mg BID (intolerant to TID due to grogginess). Lumbar spine MRI dated 3/29/21 reviewed. Per report, High-grade canal stenosis is present at L3-4 and L4-5 as discussed above. She had a right trochanteric bursa injection by Dr. Ayaz Allen (4/8/21) that improved her lateral RLE burning/numbness. She had a fall where she fractured T12 and underwent a kyphoplasty 5/19/21. Ximena Gonzalez comes in to the office today for f/u. She feels weakness in the legs. Pt notes that after she left [facility], she was given a large back brace, but she finds it burdensome and uncomfortable. She feels great following her kyphoplasty. Her daughter notes that she has known osteoporosis. Pain Score: 6/10. PmHx: osteoporosis, IBS, h/o T12 compression fracture w/ kyphoplasty, depression, anxiety    ASSESSMENT  Ximena Gonzalez is an 80year-old female with BLE weakness that is likely due to lumbar spinal stenosis (severe L3-4, L4-5). She had low back pain following a fall and underwent a kyphoplasty for a recent T12 compression fracture with significant improvement. PLAN  1. L5-S1 YOLY  2. DEXA Scan & Osteoporosis Panel - osteoporosis screening. 3. Mobic 15mg daily. Add Tylenol if needed. 4. Provided chair yoga exercises to add to HEP. Pt will f/u in 2-4 weeks after injection or sooner as needed. Diagnoses and all orders for this visit:    1.  Spinal stenosis of lumbar region with neurogenic claudication  -     SCHEDULE SURGERY  -     meloxicam (MOBIC) 15 mg tablet; Take 1 Tablet by mouth daily. 2. Osteoporosis screening  -     DEXA BONE DENSITY STUDY AXIAL; Future  -     VITAMIN D, 25 HYDROXY; Future  -     METABOLIC PANEL, COMPREHENSIVE; Future  -     MAGNESIUM; Future  -     PHOSPHORUS; Future  -     CBC WITH AUTOMATED DIFF; Future    3. Lumbar spine pain  -     meloxicam (MOBIC) 15 mg tablet; Take 1 Tablet by mouth daily. PAST MEDICAL HISTORY   Past Medical History:   Diagnosis Date    Arthritis     Cancer [facility]) 2002    Colon     Chronic pain     abdominal due to IBS    Depression     GERD (gastroesophageal reflux disease)     Hypertension     Migraine headache 2014    Mixed hyperlipidemia 9/3/2020    Occipital neuralgia 2014    PUD (peptic ulcer disease)     S/P colon resection 2002    clear presently        Past Surgical History:   Procedure Laterality Date    HX CHOLECYSTECTOMY  2015    HX HYSTERECTOMY  1965    HX TONSIL AND ADENOIDECTOMY      HX WISDOM TEETH EXTRACTION      x4    IR KYPHOPLASTY THORACIC  5/19/2021   . MEDICATIONS      Current Outpatient Medications   Medication Sig Dispense Refill    mirtazapine (REMERON) 30 mg tablet Take 1 Tab by mouth nightly. (Patient taking differently: Take 1 Tab by mouth nightly. Missed 5 days per patient, due to slow renewal) 30 Tab 5    fexofenadine (Allergy Relief, fexofenadine,) 180 mg tablet Take 180 mg by mouth daily.  gabapentin (NEURONTIN) 100 mg capsule Take 1 Cap by mouth two (2) times a day. Max Daily Amount: 200 mg. 180 Cap 1    acetaminophen (TYLENOL) 500 mg capsule Take 500 mg by mouth every four (4) hours as needed for Pain.  pravastatin (PRAVACHOL) 10 mg tablet Take 1 Tab by mouth nightly. 30 Tab 5    ondansetron hcl (ZOFRAN) 4 mg tablet Take 4 mg by mouth every eight (8) hours as needed for Nausea.       alum-mag hydroxide-simeth (MYLANTA) 200-200-20 mg/5 mL susp Take 30 mL by mouth daily as needed for Other (PRN: Gastric Reflux, in PM). PRN: Gastric Reflux, in PM      famotidine (PEPCID) 20 mg tablet Take 20 mg by mouth daily.  melatonin tab tablet Take 10 mg by mouth nightly.  cholecalciferol (VITAMIN D3) 1,000 unit cap Take 1,000 Units by mouth daily.  multivitamin/iron/folic acid (CENTRUM WOMEN PO) Take 1 Tab by mouth daily.  cetirizine (ZYRTEC) 10 mg tablet Take 10 mg by mouth daily.  polyethylene glycol (MIRALAX) 17 gram packet Take 17 g by mouth daily as needed for Other (PRN Constipation). PRN Constipation. Mix with 8 oz of liquid of choice, such as water.  Bifidobacterium Infantis (ALIGN) 4 mg cap Take 4 mg by mouth daily.  furosemide (LASIX) 20 mg tablet Take 1 Tab by mouth every other day. Indications: Edema 90 Tab 3    dicyclomine (BENTYL) 10 mg capsule Take 1 Cap by mouth four (4) times daily. (Patient taking differently: Take 1 Cap by mouth four (4) times daily as needed for Other (PRN:  IBS symptoms). PRN:  IBS symptoms) 90 Cap 3        ALLERGIES    Allergies   Allergen Reactions    Macrobid [Nitrofurantoin Monohyd/M-Cryst] Nausea and Vomiting    Flomax [Tamsulosin] Myalgia and Vertigo    Pneumococcal Vaccine Other (comments)    Lexapro [Escitalopram Oxalate] Other (comments)     ? Hyponatremia per pt hx          SOCIAL HISTORY    Social History     Socioeconomic History    Marital status:      Spouse name: Not on file    Number of children: Not on file    Years of education: Not on file    Highest education level: Not on file   Tobacco Use    Smoking status: Never Smoker    Smokeless tobacco: Never Used   Substance and Sexual Activity    Alcohol use:  Yes     Alcohol/week: 0.0 - 1.0 standard drinks    Sexual activity: Never     Social Determinants of Health     Financial Resource Strain:     Difficulty of Paying Living Expenses:    Food Insecurity:     Worried About Running Out of Food in the Last Year:     Roxi of Food in the Last Year:    Transportation Needs:     Lack of Transportation (Medical):  Lack of Transportation (Non-Medical):    Physical Activity:     Days of Exercise per Week:     Minutes of Exercise per Session:    Stress:     Feeling of Stress :    Social Connections:     Frequency of Communication with Friends and Family:     Frequency of Social Gatherings with Friends and Family:     Attends Druze Services:     Active Member of Clubs or Organizations:     Attends Club or Organization Meetings:     Marital Status:        FAMILY HISTORY  Family History   Problem Relation Age of Onset    Hypertension Mother     Stroke Mother     Heart Attack Father     Hypertension Father     Parkinson's Disease Sister     Hypertension Brother     Cancer Brother         prostate    No Known Problems Son     No Known Problems Daughter          REVIEW OF SYSTEMS  Review of Systems   Constitutional: Negative for chills, fever and weight loss. Respiratory: Negative for shortness of breath. Cardiovascular: Negative for chest pain. Gastrointestinal: Negative for constipation. Negative for fecal incontinence    Genitourinary: Negative for dysuria. Negative for urinary incontinence   Skin: Negative for rash. Neurological: Positive for focal weakness ( BLE). Negative for dizziness, tingling, tremors and headaches. Endo/Heme/Allergies: Does not bruise/bleed easily. Psychiatric/Behavioral: The patient does not have insomnia.            PHYSICAL EXAMINATION  Visit Vitals  Pulse 67   Temp 97.8 °F (36.6 °C) (Temporal)   Resp 17   Ht 5' 4\" (1.626 m)   Wt 155 lb 3.2 oz (70.4 kg)   SpO2 98%   BMI 26.64 kg/m²       Pain Assessment  6/15/2021   Location of Pain Back   Location Modifiers -   Severity of Pain 6   Quality of Pain Aching   Quality of Pain Comment -   Duration of Pain Persistent   Frequency of Pain Constant   Aggravating Factors -   Aggravating Factors Comment -   Limiting Behavior -   Relieving Factors -           Constitutional:  Well developed, well nourished, in no acute distress. Psychiatric: Affect and mood are appropriate. Integumentary: No rashes or abrasions noted on exposed areas. SPINE/MUSCULOSKELETAL EXAM    Cervical spine:  Neck is midline. Normal muscle tone. No focal atrophy is noted. ROM limited bilaterally (L>R). Shoulder ROM intact.   Tenderness to palpation of trapezii, scalenes, sternocleidomastoids, and cervicothoracic paraspinals bilaterally. Negative Spurling's sign. Negative Tinel's sign. Negative Gutierrez's sign.                                                                                                                             Sensation in the bilateral arms grossly intact to light touch.      Lumbar spine:  No rash, ecchymosis, or gross obliquity. No fasciculations. No focal atrophy is noted. No pain with hip ROM. Full range of motion. Mild tenderness to palpation. No tenderness to palpation at the sciatic notch. SI joints non-tender. Trochanters non tender.     Sensation in the bilateral legs grossly intact to light touch.     No pain with lumbar extension, flexion, or rotation. MOTOR:      Biceps  Triceps Deltoids Wrist Ext Wrist Flex Hand Intrin   Right 5/5 5/5 5/5 5/5 5/5 5/5   Left 5/5 5/5 5/5 5/5 5/5 5/5             Hip Flex  Quads Hamstrings Ankle DF EHL Ankle PF   Right 5/5 +4/5 5/5 5/5 5/5 5/5   Left 5/5 5/5 5/5 +4/5 5/5 5/5     DTRs are 1+ biceps, triceps, brachioradialis, patella, and Achilles.     Negative Straight Leg raise. Squat not tested. No difficulty with tandem gait.      Ambulation with Rollator walker. FWB.       RADIOGRAPHS  Cervical Spine CT images taken on 5/24/21:  There is focal reversal of lordotic curvature at lower C-spine at the  level of C5-6 along with moderate disc space narrowing, endplate sclerosis and  osteophytes.  This causes mild ventral canal stenosis and severe right neural  foramina stenosis. Similar but less degree degenerative changes also seen at  C6-7 without significant canal or foraminal stenosis. The vertebral column and  alignment of the cervical spine are otherwise unremarkable. No evidence of acute  compression fracture or listhesis identified. The odontoid and C1-2 relationship  appear within normal.  The prevertebral soft tissue space appears unremarkable.         IMPRESSION     No CT evidence of acute C-spine injury seen. Degenerative changes in lower C-spine as above. Lumbar Spine CT images taken on 5/24/21:  There is a moderate dextroscoliosis with apex at L2-L3 along with moderate  osteophytes and left lateral severe disc space narrowing at L2-3 and severe left  foramina stenosis. Moderate osteopenia. Moderate height loss at T12 with post  augmentation changes and minimal retropulsion. Moderate facet arthropathy in mid  and the lower lumbar spine. There is minimal retrolisthesis of L1 on L2 and  L5-S1 with associated posterior disc space narrowing. No acute compression  fracture seen.     Imaged paraspinal region appear unremarkable.     IMPRESSION     1. No acute compression fracture. Remote compression fracture with  vertebroplasty at T12.      2. Moderate dextroscoliosis with spondylosis and DDD at L2-3.     3. Multilevel minimal listhesis as above. Lumbar Spine CT images taken on 5/15/21:  Acute appearing compression deformity of T12 with approximately 20% vertebral  body height loss. There is minimal posterior bowing of the superior cortex and  mild anterior bowing of the anterior cortex.     Moderate/severe multilevel degenerative disc disease throughout the lumbar spine  similar to prior CT.  Findings are most pronounced at L5-S1 and L2-L3.     Trace retrolisthesis of L2 on L3 and L1 on L2 similar to prior exam.     No interval change in scoliosis with a rotary component.     Degenerative changes of the spinous processes grossly similar.     Multilevel facet arthropathy has progressed. Findings most pronounced at L3-L4,  L4-5 and L5-S1 and more pronounced along the right facets.     Vacuum phenomenon noted within both sacroiliac joints with osteophyte formation.     Overall bone mineral density is decreased which limits evaluation.     Visualized lungs demonstrate groundglass opacities and interlobular septal  thickening. Findings may be acute and/or chronic.     Stable upper pole left renal cyst.     Atherosclerosis.     Moderate/severe multilevel neuroforaminal narrowing noted throughout the lumbar  spine. Moderate/severe spinal canal narrowing noted at L3-L4 and L4-5.     IMPRESSION     Acute compression fracture of T12 with 20% vertebral body height loss as  discussed.     Multilevel degenerative disc disease throughout the lumbar spine causing  moderate/severe neural foraminal narrowing throughout and moderate/severe spinal  canal narrowing at L3-L4 and L4-5.     Interval progression of multilevel facet arthropathy.     Lumbar MRI images taken on 3/19/2021:  Alignment: Intact lordosis  Vertebral body height: Normal  Marrow signal: Unremarkable  Disc spaces: Preserved height and signal intensity  Conus: Terminates at T12-L1     Axial imaging correlation:      L1-2: There is a 2 to 3 mm retrolisthesis of L1 on L2 with anterior osteophytic  change present.     Central canal is nonstenotic     The right neuroforamina and left neuroforamina demonstrate no significant  stenosis.     L2-3: There is a diffuse annular bulge across the midline extending into the  right left neuroforamina. There is marked intradiscal degenerative changes with  chronic Modic endplate reactive changes present. The central canal is  nonstenotic     The left neuroforamina right neuroforamina demonstrate no high-grade stenosis.     L3-4: Central canal stenosis secondary to marked ligamentous hypertrophy and  facet arthropathy noted.  Thecal sac 0.76 cm centrally.     The right neuroforamina and left neuroforamina are spared any significant  stenosis exiting nerve roots are not compressed.     L4-5: High-grade central canal stenosis is present AP dimension maximum 0.7 cm  there is marked hypertrophy of the facets and ligamentous structures.     Left neuroforamina is patent     Right neuroforamina demonstrates encroachment from bulging disc and  hypertrophied facet with mild compression of the exiting right L4 nerve root  seen on sagittal image 12 series 2.           L5-S1: There is no central canal stenosis there is anterior and posterior  osteophytic formation. Posterior osteophyte formations encroaching into the  caudal aspect of the right neuroforamina with facet hypertrophy result in mild  neuroforaminal stenosis. The exiting right L5 root is not however compressed.     Left neuroforamina demonstrates less severe changes exiting nerve not  compressed. Facets demonstrate hypertrophic changes but otherwise intact     Other structures: There is a benign cyst arising off the upper pole of the left  kidney diameter 2.6 cm. No features of malignancy are seen.     Retroperitoneal structures aorta otherwise normal.     IMPRESSION     1. High-grade canal stenosis is present at L3-4 and L4-5 as discussed above. Cervical MRI images taken on 9/25/19 personally reviewed with patient:  Alignment: Kyphosis centered at C5-6. Slight C6 retrolisthesis. Vertebral body height: Normal  Marrow signal: No focal concerning marrow signal. There is mild to moderate  discogenic endplate edema straddling C5-6 and less so C6-7. Also some edema  within the dens. No fracture or bone erosion. Disc spaces: Severe narrowing and desiccation of C5-6 followed by C6-7.     Cervicomedullary Junction: Patent  Cervical cord: No cord expansion or atrophy.  Further discussed below where  relevant.     On axial imaging, findings at each level are as follows:     C2/C3: Small left paracentral disc protrusion. Bilateral facet arthropathy with  buckling of ligamentum flavum. Minor abutment and flattening of posterior cord  without cord edema. Patent canal and foramina.     C3/C4: Mild disc osteophyte ridge formation. Bilateral facet arthropathy. Buckling of ligamentum flavum. Patent canal. Moderate left foraminal stenosis.     C4/C5: Slight anterolisthesis. Likely facet ankylosis on the left with  hypertrophic spurring. The canal is patent. Mild to moderate left foraminal  stenosis.     C5/C6: Uncovering of the disc. Broad-based disc osteophyte complex. Bilateral  facet arthropathy, right more than left. There is low-grade active facet  inflammation on the right. Buckling of ligamentum flavum. Anterior cord contact  and flattening. No cord edema. Minor spinal stenosis. Severe right foraminal  stenosis.     C6/C7: Bilobed broadly based disc osteophyte complex. Minor buckling of  ligamentum flavum. Minor spinal stenosis. Moderate foraminal stenoses.     C7/T1: Patent canal and foramina.     Other structures: Unremarkable     IMPRESSION  IMPRESSION:     1. Multilevel degenerative findings along cervical spine  -most pronounced at C5-6 where there is a kyphosis as well as mild cord  impingement  -Multilevel foraminal stenoses, some levels of severe, such as on the right at  C5-6; as delineated above    30 minutes of face-to-face contact were spent with the patient during today's visit extensively discussing symptoms and treatment plan. All questions were answered. More than half of this visit today was spent on counseling.      Written by Alden Morris as dictated by Alaina Tracy MD

## 2021-06-15 NOTE — PROGRESS NOTES
[address]  [address], [address]  [facility], [address]  Phone: [phone]  Fax: [phone]        Marty Alford  : 1935  PCP: Rosette Mosqueda MD  6/15/2021    PROGRESS NOTE      HISTORY OF PRESENT ILLNESS  Cee Lane is a 80 y.o. female who was seen as a new patient 19 with c/o neck and low back pain with episodic pain radiating into the RLE. Her neck pain is the most bothersome. Pt notes that over the last 6 weeks, she has had PT and OT at [facility] in [facility]. She notes that she found it beneficial, but when she is complete with PT, her pain returns. She has a limited standing tolerance.  Lumbar MRI 4/3/18. Per my read, multilevel degenerative changes, most significantly facet arthropathy, no more than moderate spinal stenosis. Multilevel foraminal stenosis without any focal disc herniation. Pt notes that she is able to do her own laundry, but she has pain reproduced when she makes the bed. She has h/o occipital headaches that she notes have progressively worsened. She reports limited ROM of her neck, especially towards the left. She previously found benefit from PT and manipulations for her neck pain. She finds some relief from heat, massage, and Lidocaine cream. She denies radicular symptoms into her upper extremities. Pt notes that she was previously active with swimming, but over the last 3 years she has been. She is currently taking Gabapentin 100 mg QHS (TID caused somnolence). She recently had a Prednisone dose pack that was not very beneficial. She attended PT (-19; Diane Shine). She notes that she has only had one session of dry needling so far (on the left), but she has found it very beneficial (she is supposed to have her session on the right soon). Pt notes that the exercises in PT have been painful.  Cervical MRI 19: Multilevel degenerative findings along cervical spine most pronounced at C5-6 where there is a kyphosis as well as mild cord impingement. Multilevel foraminal stenoses, some levels of severe, such as on the right at C5-6; as delineated above. She returned to see NP Tim 3/9/21 with back pain radiating into the bilateral buttocks and BLE (R>L) to the calves. She described a cold, wet sensation in her legs. She attended PT at [facility] where she lives in assisted living, but did not find it beneficial. She had a fall due to her legs feeling heavy and giving out on her. She takes Gabapentin 100 mg BID (intolerant to TID due to grogginess). Lumbar spine MRI dated 3/29/21 reviewed. Per report, High-grade canal stenosis is present at L3-4 and L4-5 as discussed above. She had a right trochanteric bursa injection by Dr. Girma Collier (4/8/21) that improved her lateral RLE burning/numbness. She had a fall where she fractured T12 and underwent a kyphoplasty 5/19/21. Amrit Preston comes in to the office today for f/u. She feels weakness in the legs. Pt notes that after she left [facility], she was given a large back brace, but she finds it burdensome and uncomfortable. She feels great following her kyphoplasty. Her daughter notes that she has known osteoporosis. Pain Score: 6/10. PmHx: osteoporosis, IBS, h/o T12 compression fracture w/ kyphoplasty, depression, anxiety    ASSESSMENT  Amrit Preston is an 80year-old female with BLE weakness that is likely due to lumbar spinal stenosis (severe L3-4, L4-5). She had low back pain following a fall and underwent a kyphoplasty for a recent T12 compression fracture with significant improvement. PLAN  1. L5-S1 YOLY  2. DEXA Scan & Osteoporosis Panel - osteoporosis screening. 3. Mobic 15mg daily. Add Tylenol if needed. 4. Provided chair yoga exercises to add to HEP. Pt will f/u in 2-4 weeks after injection or sooner as needed. Diagnoses and all orders for this visit:    1.  Spinal stenosis of lumbar region with neurogenic claudication  -     SCHEDULE SURGERY  -     meloxicam (MOBIC) 15 mg tablet; Take 1 Tablet by mouth daily. 2. Osteoporosis screening  -     DEXA BONE DENSITY STUDY AXIAL; Future  -     VITAMIN D, 25 HYDROXY; Future  -     METABOLIC PANEL, COMPREHENSIVE; Future  -     MAGNESIUM; Future  -     PHOSPHORUS; Future  -     CBC WITH AUTOMATED DIFF; Future    3. Lumbar spine pain  -     meloxicam (MOBIC) 15 mg tablet; Take 1 Tablet by mouth daily. PAST MEDICAL HISTORY   Past Medical History:   Diagnosis Date    Arthritis     Cancer [facility]) 2002    Colon     Chronic pain     abdominal due to IBS    Depression     GERD (gastroesophageal reflux disease)     Hypertension     Migraine headache 2014    Mixed hyperlipidemia 9/3/2020    Occipital neuralgia 2014    PUD (peptic ulcer disease)     S/P colon resection 2002    clear presently        Past Surgical History:   Procedure Laterality Date    HX CHOLECYSTECTOMY  2015    HX HYSTERECTOMY  1965    HX TONSIL AND ADENOIDECTOMY      HX WISDOM TEETH EXTRACTION      x4    IR KYPHOPLASTY THORACIC  5/19/2021   . MEDICATIONS      Current Outpatient Medications   Medication Sig Dispense Refill    mirtazapine (REMERON) 30 mg tablet Take 1 Tab by mouth nightly. (Patient taking differently: Take 1 Tab by mouth nightly. Missed 5 days per patient, due to slow renewal) 30 Tab 5    fexofenadine (Allergy Relief, fexofenadine,) 180 mg tablet Take 180 mg by mouth daily.  gabapentin (NEURONTIN) 100 mg capsule Take 1 Cap by mouth two (2) times a day. Max Daily Amount: 200 mg. 180 Cap 1    acetaminophen (TYLENOL) 500 mg capsule Take 500 mg by mouth every four (4) hours as needed for Pain.  pravastatin (PRAVACHOL) 10 mg tablet Take 1 Tab by mouth nightly. 30 Tab 5    ondansetron hcl (ZOFRAN) 4 mg tablet Take 4 mg by mouth every eight (8) hours as needed for Nausea.       alum-mag hydroxide-simeth (MYLANTA) 200-200-20 mg/5 mL susp Take 30 mL by mouth daily as needed for Other (PRN: Gastric Reflux, in PM). PRN: Gastric Reflux, in PM      famotidine (PEPCID) 20 mg tablet Take 20 mg by mouth daily.  melatonin tab tablet Take 10 mg by mouth nightly.  cholecalciferol (VITAMIN D3) 1,000 unit cap Take 1,000 Units by mouth daily.  multivitamin/iron/folic acid (CENTRUM WOMEN PO) Take 1 Tab by mouth daily.  cetirizine (ZYRTEC) 10 mg tablet Take 10 mg by mouth daily.  polyethylene glycol (MIRALAX) 17 gram packet Take 17 g by mouth daily as needed for Other (PRN Constipation). PRN Constipation. Mix with 8 oz of liquid of choice, such as water.  Bifidobacterium Infantis (ALIGN) 4 mg cap Take 4 mg by mouth daily.  furosemide (LASIX) 20 mg tablet Take 1 Tab by mouth every other day. Indications: Edema 90 Tab 3    dicyclomine (BENTYL) 10 mg capsule Take 1 Cap by mouth four (4) times daily. (Patient taking differently: Take 1 Cap by mouth four (4) times daily as needed for Other (PRN:  IBS symptoms). PRN:  IBS symptoms) 90 Cap 3        ALLERGIES    Allergies   Allergen Reactions    Macrobid [Nitrofurantoin Monohyd/M-Cryst] Nausea and Vomiting    Flomax [Tamsulosin] Myalgia and Vertigo    Pneumococcal Vaccine Other (comments)    Lexapro [Escitalopram Oxalate] Other (comments)     ? Hyponatremia per pt hx          SOCIAL HISTORY    Social History     Socioeconomic History    Marital status:      Spouse name: Not on file    Number of children: Not on file    Years of education: Not on file    Highest education level: Not on file   Tobacco Use    Smoking status: Never Smoker    Smokeless tobacco: Never Used   Substance and Sexual Activity    Alcohol use:  Yes     Alcohol/week: 0.0 - 1.0 standard drinks    Sexual activity: Never     Social Determinants of Health     Financial Resource Strain:     Difficulty of Paying Living Expenses:    Food Insecurity:     Worried About Running Out of Food in the Last Year:     Roxi of Food in the Last Year:    Transportation Needs:     Lack of Transportation (Medical):  Lack of Transportation (Non-Medical):    Physical Activity:     Days of Exercise per Week:     Minutes of Exercise per Session:    Stress:     Feeling of Stress :    Social Connections:     Frequency of Communication with Friends and Family:     Frequency of Social Gatherings with Friends and Family:     Attends Islam Services:     Active Member of Clubs or Organizations:     Attends Club or Organization Meetings:     Marital Status:        FAMILY HISTORY  Family History   Problem Relation Age of Onset    Hypertension Mother     Stroke Mother     Heart Attack Father     Hypertension Father     Parkinson's Disease Sister     Hypertension Brother     Cancer Brother         prostate    No Known Problems Son     No Known Problems Daughter          REVIEW OF SYSTEMS  Review of Systems   Constitutional: Negative for chills, fever and weight loss. Respiratory: Negative for shortness of breath. Cardiovascular: Negative for chest pain. Gastrointestinal: Negative for constipation. Negative for fecal incontinence    Genitourinary: Negative for dysuria. Negative for urinary incontinence   Skin: Negative for rash. Neurological: Positive for focal weakness ( BLE). Negative for dizziness, tingling, tremors and headaches. Endo/Heme/Allergies: Does not bruise/bleed easily. Psychiatric/Behavioral: The patient does not have insomnia.            PHYSICAL EXAMINATION  Visit Vitals  Pulse 67   Temp 97.8 °F (36.6 °C) (Temporal)   Resp 17   Ht 5' 4\" (1.626 m)   Wt 155 lb 3.2 oz (70.4 kg)   SpO2 98%   BMI 26.64 kg/m²       Pain Assessment  6/15/2021   Location of Pain Back   Location Modifiers -   Severity of Pain 6   Quality of Pain Aching   Quality of Pain Comment -   Duration of Pain Persistent   Frequency of Pain Constant   Aggravating Factors -   Aggravating Factors Comment -   Limiting Behavior -   Relieving Factors -           Constitutional:  Well developed, well nourished, in no acute distress. Psychiatric: Affect and mood are appropriate. Integumentary: No rashes or abrasions noted on exposed areas. SPINE/MUSCULOSKELETAL EXAM    Cervical spine:  Neck is midline. Normal muscle tone. No focal atrophy is noted. ROM limited bilaterally (L>R). Shoulder ROM intact.   Tenderness to palpation of trapezii, scalenes, sternocleidomastoids, and cervicothoracic paraspinals bilaterally. Negative Spurling's sign. Negative Tinel's sign. Negative Gutierrez's sign.                                                                                                                             Sensation in the bilateral arms grossly intact to light touch.      Lumbar spine:  No rash, ecchymosis, or gross obliquity. No fasciculations. No focal atrophy is noted. No pain with hip ROM. Full range of motion. Mild tenderness to palpation. No tenderness to palpation at the sciatic notch. SI joints non-tender. Trochanters non tender.     Sensation in the bilateral legs grossly intact to light touch.     No pain with lumbar extension, flexion, or rotation. MOTOR:      Biceps  Triceps Deltoids Wrist Ext Wrist Flex Hand Intrin   Right 5/5 5/5 5/5 5/5 5/5 5/5   Left 5/5 5/5 5/5 5/5 5/5 5/5             Hip Flex  Quads Hamstrings Ankle DF EHL Ankle PF   Right 5/5 +4/5 5/5 5/5 5/5 5/5   Left 5/5 5/5 5/5 +4/5 5/5 5/5     DTRs are 1+ biceps, triceps, brachioradialis, patella, and Achilles.     Negative Straight Leg raise. Squat not tested. No difficulty with tandem gait.      Ambulation with Rollator walker. FWB.       RADIOGRAPHS  Cervical Spine CT images taken on 5/24/21:  There is focal reversal of lordotic curvature at lower C-spine at the  level of C5-6 along with moderate disc space narrowing, endplate sclerosis and  osteophytes.  This causes mild ventral canal stenosis and severe right neural  foramina stenosis. Similar but less degree degenerative changes also seen at  C6-7 without significant canal or foraminal stenosis. The vertebral column and  alignment of the cervical spine are otherwise unremarkable. No evidence of acute  compression fracture or listhesis identified. The odontoid and C1-2 relationship  appear within normal.  The prevertebral soft tissue space appears unremarkable.         IMPRESSION     No CT evidence of acute C-spine injury seen. Degenerative changes in lower C-spine as above. Lumbar Spine CT images taken on 5/24/21:  There is a moderate dextroscoliosis with apex at L2-L3 along with moderate  osteophytes and left lateral severe disc space narrowing at L2-3 and severe left  foramina stenosis. Moderate osteopenia. Moderate height loss at T12 with post  augmentation changes and minimal retropulsion. Moderate facet arthropathy in mid  and the lower lumbar spine. There is minimal retrolisthesis of L1 on L2 and  L5-S1 with associated posterior disc space narrowing. No acute compression  fracture seen.     Imaged paraspinal region appear unremarkable.     IMPRESSION     1. No acute compression fracture. Remote compression fracture with  vertebroplasty at T12.      2. Moderate dextroscoliosis with spondylosis and DDD at L2-3.     3. Multilevel minimal listhesis as above. Lumbar Spine CT images taken on 5/15/21:  Acute appearing compression deformity of T12 with approximately 20% vertebral  body height loss. There is minimal posterior bowing of the superior cortex and  mild anterior bowing of the anterior cortex.     Moderate/severe multilevel degenerative disc disease throughout the lumbar spine  similar to prior CT.  Findings are most pronounced at L5-S1 and L2-L3.     Trace retrolisthesis of L2 on L3 and L1 on L2 similar to prior exam.     No interval change in scoliosis with a rotary component.     Degenerative changes of the spinous processes grossly similar.     Multilevel facet arthropathy has progressed. Findings most pronounced at L3-L4,  L4-5 and L5-S1 and more pronounced along the right facets.     Vacuum phenomenon noted within both sacroiliac joints with osteophyte formation.     Overall bone mineral density is decreased which limits evaluation.     Visualized lungs demonstrate groundglass opacities and interlobular septal  thickening. Findings may be acute and/or chronic.     Stable upper pole left renal cyst.     Atherosclerosis.     Moderate/severe multilevel neuroforaminal narrowing noted throughout the lumbar  spine. Moderate/severe spinal canal narrowing noted at L3-L4 and L4-5.     IMPRESSION     Acute compression fracture of T12 with 20% vertebral body height loss as  discussed.     Multilevel degenerative disc disease throughout the lumbar spine causing  moderate/severe neural foraminal narrowing throughout and moderate/severe spinal  canal narrowing at L3-L4 and L4-5.     Interval progression of multilevel facet arthropathy.     Lumbar MRI images taken on 3/19/2021:  Alignment: Intact lordosis  Vertebral body height: Normal  Marrow signal: Unremarkable  Disc spaces: Preserved height and signal intensity  Conus: Terminates at T12-L1     Axial imaging correlation:      L1-2: There is a 2 to 3 mm retrolisthesis of L1 on L2 with anterior osteophytic  change present.     Central canal is nonstenotic     The right neuroforamina and left neuroforamina demonstrate no significant  stenosis.     L2-3: There is a diffuse annular bulge across the midline extending into the  right left neuroforamina. There is marked intradiscal degenerative changes with  chronic Modic endplate reactive changes present. The central canal is  nonstenotic     The left neuroforamina right neuroforamina demonstrate no high-grade stenosis.     L3-4: Central canal stenosis secondary to marked ligamentous hypertrophy and  facet arthropathy noted.  Thecal sac 0.76 cm centrally.     The right neuroforamina and left neuroforamina are spared any significant  stenosis exiting nerve roots are not compressed.     L4-5: High-grade central canal stenosis is present AP dimension maximum 0.7 cm  there is marked hypertrophy of the facets and ligamentous structures.     Left neuroforamina is patent     Right neuroforamina demonstrates encroachment from bulging disc and  hypertrophied facet with mild compression of the exiting right L4 nerve root  seen on sagittal image 12 series 2.           L5-S1: There is no central canal stenosis there is anterior and posterior  osteophytic formation. Posterior osteophyte formations encroaching into the  caudal aspect of the right neuroforamina with facet hypertrophy result in mild  neuroforaminal stenosis. The exiting right L5 root is not however compressed.     Left neuroforamina demonstrates less severe changes exiting nerve not  compressed. Facets demonstrate hypertrophic changes but otherwise intact     Other structures: There is a benign cyst arising off the upper pole of the left  kidney diameter 2.6 cm. No features of malignancy are seen.     Retroperitoneal structures aorta otherwise normal.     IMPRESSION     1. High-grade canal stenosis is present at L3-4 and L4-5 as discussed above. Cervical MRI images taken on 9/25/19 personally reviewed with patient:  Alignment: Kyphosis centered at C5-6. Slight C6 retrolisthesis. Vertebral body height: Normal  Marrow signal: No focal concerning marrow signal. There is mild to moderate  discogenic endplate edema straddling C5-6 and less so C6-7. Also some edema  within the dens. No fracture or bone erosion. Disc spaces: Severe narrowing and desiccation of C5-6 followed by C6-7.     Cervicomedullary Junction: Patent  Cervical cord: No cord expansion or atrophy.  Further discussed below where  relevant.     On axial imaging, findings at each level are as follows:     C2/C3: Small left paracentral disc protrusion. Bilateral facet arthropathy with  buckling of ligamentum flavum. Minor abutment and flattening of posterior cord  without cord edema. Patent canal and foramina.     C3/C4: Mild disc osteophyte ridge formation. Bilateral facet arthropathy. Buckling of ligamentum flavum. Patent canal. Moderate left foraminal stenosis.     C4/C5: Slight anterolisthesis. Likely facet ankylosis on the left with  hypertrophic spurring. The canal is patent. Mild to moderate left foraminal  stenosis.     C5/C6: Uncovering of the disc. Broad-based disc osteophyte complex. Bilateral  facet arthropathy, right more than left. There is low-grade active facet  inflammation on the right. Buckling of ligamentum flavum. Anterior cord contact  and flattening. No cord edema. Minor spinal stenosis. Severe right foraminal  stenosis.     C6/C7: Bilobed broadly based disc osteophyte complex. Minor buckling of  ligamentum flavum. Minor spinal stenosis. Moderate foraminal stenoses.     C7/T1: Patent canal and foramina.     Other structures: Unremarkable     IMPRESSION  IMPRESSION:     1. Multilevel degenerative findings along cervical spine  -most pronounced at C5-6 where there is a kyphosis as well as mild cord  impingement  -Multilevel foraminal stenoses, some levels of severe, such as on the right at  C5-6; as delineated above    30 minutes of face-to-face contact were spent with the patient during today's visit extensively discussing symptoms and treatment plan. All questions were answered. More than half of this visit today was spent on counseling.      Written by Valerio Amaro as dictated by Nino Kenney MD

## 2021-06-16 ENCOUNTER — TELEPHONE (OUTPATIENT)
Dept: ORTHOPEDIC SURGERY | Age: 86
End: 2021-06-16

## 2021-06-16 PROCEDURE — 3331090001 HH PPS REVENUE CREDIT

## 2021-06-16 PROCEDURE — 3331090002 HH PPS REVENUE DEBIT

## 2021-06-16 NOTE — TELEPHONE ENCOUNTER
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   Please pend the order. (pt has had a prior compression fracture)

## 2021-06-16 NOTE — TELEPHONE ENCOUNTER
Perm to Release is not up to date. Order has had diagnosis updated. Attempted to contact patient at number below to inform, Reached unidentified voicemail, left message, identified myself/facility/callback number, requested return call to facility.

## 2021-06-16 NOTE — TELEPHONE ENCOUNTER
Patient's daughter called to report she spoke with [facility] who told her they could not schedule the patient's bone scan because our order has an incorrect diagnosis code for Medicare. Please change and resend.       Cesar Higginbotham [phone]

## 2021-06-17 ENCOUNTER — TRANSCRIBE ORDER (OUTPATIENT)
Dept: SCHEDULING | Age: 86
End: 2021-06-17

## 2021-06-17 PROCEDURE — 3331090001 HH PPS REVENUE CREDIT

## 2021-06-17 PROCEDURE — 3331090002 HH PPS REVENUE DEBIT

## 2021-06-17 NOTE — TELEPHONE ENCOUNTER
Returned call to [facility], verified patient's Name/, no Recent Perm to Release is noted on file for patient, Renard devries aware this will need to be updated in office at next apt. [facility] aware she can now call [facility] to set up appt for procedure. No further action required at this time.

## 2021-06-18 ENCOUNTER — HOME CARE VISIT (OUTPATIENT)
Dept: HOME HEALTH SERVICES | Facility: HOME HEALTH | Age: 86
End: 2021-06-18
Payer: MEDICARE

## 2021-06-18 PROCEDURE — 3331090001 HH PPS REVENUE CREDIT

## 2021-06-18 PROCEDURE — 3331090002 HH PPS REVENUE DEBIT

## 2021-06-19 PROCEDURE — 3331090001 HH PPS REVENUE CREDIT

## 2021-06-19 PROCEDURE — 3331090002 HH PPS REVENUE DEBIT

## 2021-06-20 PROCEDURE — 3331090002 HH PPS REVENUE DEBIT

## 2021-06-20 PROCEDURE — 3331090001 HH PPS REVENUE CREDIT

## 2021-06-21 VITALS
DIASTOLIC BLOOD PRESSURE: 68 MMHG | TEMPERATURE: 98.2 F | SYSTOLIC BLOOD PRESSURE: 117 MMHG | RESPIRATION RATE: 19 BRPM | HEART RATE: 65 BPM | OXYGEN SATURATION: 98 %

## 2021-06-21 PROCEDURE — 3331090001 HH PPS REVENUE CREDIT

## 2021-06-21 PROCEDURE — 3331090002 HH PPS REVENUE DEBIT

## 2021-06-22 ENCOUNTER — HOME CARE VISIT (OUTPATIENT)
Dept: SCHEDULING | Facility: HOME HEALTH | Age: 86
End: 2021-06-22
Payer: MEDICARE

## 2021-06-22 DIAGNOSIS — Z13.820 OSTEOPOROSIS SCREENING: ICD-10-CM

## 2021-06-22 PROCEDURE — 3331090002 HH PPS REVENUE DEBIT

## 2021-06-22 PROCEDURE — 3331090001 HH PPS REVENUE CREDIT

## 2021-06-22 PROCEDURE — G0157 HHC PT ASSISTANT EA 15: HCPCS

## 2021-06-23 ENCOUNTER — HOME CARE VISIT (OUTPATIENT)
Dept: SCHEDULING | Facility: HOME HEALTH | Age: 86
End: 2021-06-23
Payer: MEDICARE

## 2021-06-23 PROCEDURE — 3331090001 HH PPS REVENUE CREDIT

## 2021-06-23 PROCEDURE — 3331090002 HH PPS REVENUE DEBIT

## 2021-06-23 PROCEDURE — G0152 HHCP-SERV OF OT,EA 15 MIN: HCPCS

## 2021-06-24 VITALS
RESPIRATION RATE: 16 BRPM | HEART RATE: 65 BPM | SYSTOLIC BLOOD PRESSURE: 123 MMHG | DIASTOLIC BLOOD PRESSURE: 68 MMHG | TEMPERATURE: 97.9 F | OXYGEN SATURATION: 97 %

## 2021-06-24 DIAGNOSIS — M54.50 LUMBAR SPINE PAIN: ICD-10-CM

## 2021-06-24 DIAGNOSIS — Z13.820 OSTEOPOROSIS SCREENING: ICD-10-CM

## 2021-06-24 DIAGNOSIS — M48.062 SPINAL STENOSIS OF LUMBAR REGION WITH NEUROGENIC CLAUDICATION: ICD-10-CM

## 2021-06-24 DIAGNOSIS — M81.0 POSTMENOPAUSAL OSTEOPOROSIS: ICD-10-CM

## 2021-06-24 PROCEDURE — 3331090001 HH PPS REVENUE CREDIT

## 2021-06-24 PROCEDURE — 3331090002 HH PPS REVENUE DEBIT

## 2021-06-25 ENCOUNTER — HOME CARE VISIT (OUTPATIENT)
Dept: SCHEDULING | Facility: HOME HEALTH | Age: 86
End: 2021-06-25
Payer: MEDICARE

## 2021-06-25 PROCEDURE — G0157 HHC PT ASSISTANT EA 15: HCPCS

## 2021-06-25 PROCEDURE — 3331090001 HH PPS REVENUE CREDIT

## 2021-06-25 PROCEDURE — 3331090002 HH PPS REVENUE DEBIT

## 2021-06-26 PROCEDURE — 3331090002 HH PPS REVENUE DEBIT

## 2021-06-26 PROCEDURE — 3331090001 HH PPS REVENUE CREDIT

## 2021-06-27 VITALS
OXYGEN SATURATION: 98 % | RESPIRATION RATE: 18 BRPM | TEMPERATURE: 98 F | DIASTOLIC BLOOD PRESSURE: 80 MMHG | HEART RATE: 80 BPM | TEMPERATURE: 97.9 F | SYSTOLIC BLOOD PRESSURE: 122 MMHG | HEART RATE: 70 BPM | RESPIRATION RATE: 18 BRPM | OXYGEN SATURATION: 98 % | SYSTOLIC BLOOD PRESSURE: 122 MMHG | DIASTOLIC BLOOD PRESSURE: 60 MMHG

## 2021-06-27 PROCEDURE — 3331090001 HH PPS REVENUE CREDIT

## 2021-06-27 PROCEDURE — 3331090002 HH PPS REVENUE DEBIT

## 2021-06-28 PROCEDURE — 3331090001 HH PPS REVENUE CREDIT

## 2021-06-28 PROCEDURE — 3331090002 HH PPS REVENUE DEBIT

## 2021-06-29 ENCOUNTER — HOME CARE VISIT (OUTPATIENT)
Dept: SCHEDULING | Facility: HOME HEALTH | Age: 86
End: 2021-06-29
Payer: MEDICARE

## 2021-06-29 ENCOUNTER — APPOINTMENT (OUTPATIENT)
Dept: GENERAL RADIOLOGY | Age: 86
End: 2021-06-29
Attending: PHYSICAL MEDICINE & REHABILITATION
Payer: MEDICARE

## 2021-06-29 ENCOUNTER — PATIENT MESSAGE (OUTPATIENT)
Dept: INTERNAL MEDICINE CLINIC | Age: 86
End: 2021-06-29

## 2021-06-29 ENCOUNTER — HOSPITAL ENCOUNTER (OUTPATIENT)
Age: 86
Setting detail: OUTPATIENT SURGERY
Discharge: HOME | End: 2021-06-29
Attending: PHYSICAL MEDICINE & REHABILITATION | Admitting: PHYSICAL MEDICINE & REHABILITATION
Payer: MEDICARE

## 2021-06-29 VITALS
SYSTOLIC BLOOD PRESSURE: 192 MMHG | HEART RATE: 82 BPM | TEMPERATURE: 98.4 F | DIASTOLIC BLOOD PRESSURE: 90 MMHG | OXYGEN SATURATION: 97 % | RESPIRATION RATE: 20 BRPM

## 2021-06-29 LAB — GLUCOSE BLD STRIP.AUTO-MCNC: 98 MG/DL (ref 70–110)

## 2021-06-29 PROCEDURE — 74011250637 HC RX REV CODE- 250/637: Performed by: PHYSICAL MEDICINE & REHABILITATION

## 2021-06-29 PROCEDURE — 82962 GLUCOSE BLOOD TEST: CPT

## 2021-06-29 PROCEDURE — 74011250636 HC RX REV CODE- 250/636: Performed by: PHYSICAL MEDICINE & REHABILITATION

## 2021-06-29 PROCEDURE — 76010000009 HC PAIN MGT 0 TO 30 MIN PROC: Performed by: PHYSICAL MEDICINE & REHABILITATION

## 2021-06-29 PROCEDURE — 3331090001 HH PPS REVENUE CREDIT

## 2021-06-29 PROCEDURE — G0157 HHC PT ASSISTANT EA 15: HCPCS

## 2021-06-29 PROCEDURE — 3331090002 HH PPS REVENUE DEBIT

## 2021-06-29 PROCEDURE — 77030014124 HC TY EPDRL BBMI -A: Performed by: PHYSICAL MEDICINE & REHABILITATION

## 2021-06-29 PROCEDURE — 74011000250 HC RX REV CODE- 250: Performed by: PHYSICAL MEDICINE & REHABILITATION

## 2021-06-29 PROCEDURE — 2709999900 HC NON-CHARGEABLE SUPPLY: Performed by: PHYSICAL MEDICINE & REHABILITATION

## 2021-06-29 PROCEDURE — 74011000636 HC RX REV CODE- 636: Performed by: PHYSICAL MEDICINE & REHABILITATION

## 2021-06-29 RX ORDER — DIAZEPAM 5 MG/1
5-20 TABLET ORAL ONCE
Status: COMPLETED | OUTPATIENT
Start: 2021-06-29 | End: 2021-06-29

## 2021-06-29 RX ORDER — DEXAMETHASONE SODIUM PHOSPHATE 100 MG/10ML
INJECTION INTRAMUSCULAR; INTRAVENOUS AS NEEDED
Status: DISCONTINUED | OUTPATIENT
Start: 2021-06-29 | End: 2021-06-29 | Stop reason: HOSPADM

## 2021-06-29 RX ORDER — LIDOCAINE HYDROCHLORIDE 10 MG/ML
INJECTION, SOLUTION EPIDURAL; INFILTRATION; INTRACAUDAL; PERINEURAL AS NEEDED
Status: DISCONTINUED | OUTPATIENT
Start: 2021-06-29 | End: 2021-06-29 | Stop reason: HOSPADM

## 2021-06-29 RX ADMIN — DIAZEPAM 10 MG: 5 TABLET ORAL at 13:24

## 2021-06-29 NOTE — INTERVAL H&P NOTE
Update History & Physical    The Patient's History and Physical of Macarena 15,   2021 was reviewed with the patient and I examined the patient. There was no change. The surgical site was confirmed by the patient and me. Plan:  The risk, benefits, expected outcome, and alternative to the recommended procedure have been discussed with the patient. Patient understands and wants to proceed with the procedure.     Electronically signed by James Jade MD on 6/29/2021 at 1:24 PM

## 2021-06-29 NOTE — DISCHARGE INSTRUCTIONS
[facility]   (NILO)  Dr. Amira Lam, Dr. Remington Marquez, Dr. Dayna Guzman Spinal Procedure (Block) Instructions    * Do not drive a car, operate heavy machinery or dangerous equipment, or make important decisions for 12-24 hours. * Light activity as tolerated; may rest for the remainder of the day. * Resume pre-block medications including those from your other doctors. * Do not drink alcoholic beverages for 24 hours. Alcohol and the medications you have received may interact and cause an adverse reaction. * You may feel better this evening and worse tomorrow, as the numbing medications wears off and the steroid has yet to begin to work. After 48-72 hrs the steroid should begin to release bringing you relief. If you had a medial branch block, no steroids were used. The medial branch block is a test to see if you are a candidate for radiofrequency ablation (RFA). The anesthetic (numbing medicine)  will wear off by the next day. * You may shower this evening and remove any bandages. * Avoid hot tubs/pools/tub soaks and heating pads for 24 hours. You may use cold packs on the procedure site as tolerated for the first 24 hours. * If a headache develops, drink plenty of fluids and rest.  Take over the counter medications for headache if needed. If the headache continues longer than 24 hours, call MD at the [address] [place] [address] Avenue. [phone]    * Continue taking pain medications as needed. * You may resume your regular diet if tolerated. Otherwise, start with sips of water and advance slowly. * If Diabetic: check your blood sugar three times a day for the next 3 days. If your sugar is greater than 300 call your family doctor. If your sugar is greater than 400, have someone transport you to the nearest Emergency Room. * If you experience any of the following problems, Please Call the [address] [place] [address] Avenue at [phone].         * Excessive pain, swelling, redness or odor at or around the surgical area    * Fever of 101 or higher    * Nausea / Vomiting lasting longer than 4 hours or if unable to take medications. * Severe Headache    * Weakness or numbness in arms or legs that is not      resolving   * Any NEW signs of decreased circulation or nerve impairment in leg: change in color, swelling, persistent numbness, tingling                    * Prolonged increase in pain greater than 4 days      PATIENT INSTRUCTIONS:    After oral sedation, for 12-24 hours or while taking prescription Narcotics:  · Limit your activities  · Do not drive and operate hazardous machinery  · Do not make important personal or business decisions  · Do  not drink alcoholic beverages  · If you have not urinated within 8 hours after discharge, please contact your surgeon on call. *  Please give a list of your current medications to your Primary Care Provider. *  Please update this list whenever your medications are discontinued, doses are      changed, or new medications (including over-the-counter products) are added. *  Please carry medication information at all times in case of emergency situations. These are general instructions for a healthy lifestyle:    No smoking/ No tobacco products/ Avoid exposure to second hand smoke    Surgeon General's Warning:  Quitting smoking now greatly reduces serious risk to your health. Obesity, smoking, and sedentary lifestyle greatly increases your risk for illness    A healthy diet, regular physical exercise & weight monitoring are important for maintaining a healthy lifestyle    You may be retaining fluid if you have a history of heart failure or if you experience any of the following symptoms:  Weight gain of 3 pounds or more overnight or 5 pounds in a week, increased swelling in our hands or feet or shortness of breath while lying flat in bed.   Please call your doctor as soon as you notice any of these symptoms; do not wait until your next office visit. Recognize signs and symptoms of STROKE:    F-face looks uneven    A-arms unable to move or move unevenly    S-speech slurred or non-existent    T-time-call 911 as soon as signs and symptoms begin-DO NOT go       Back to bed or wait to see if you get better-TIME IS BRAIN.

## 2021-06-29 NOTE — PROCEDURES
Intralaminar Epidural Steroid Procedure Note        Patient Name   Shanika Suresh  Date of Procedure: June 29, 2021  Preoperative Diagnosis: Lumbar spinal stenosis  Postoperative Diagnosis: Same  Location [facility], [address]      Procedure:  Epidural Steroid Injection    Consent:  Informed consent was obtained prior to the procedure. In addition to the potential risks associated with the procedure itself, the patient was informed both verbally and in writing of the potential side effects of the use of glucocorticoid. The patient appeared to comprehend the informed consent and desired to have the procedure performed. Procedure in Detail:  The patient was taken to the procedure suite and placed in the prone position on the operating table on appropriate padding. The posterior lumbar region was prepped and draped in the usual sterile fashion. Intraoperative fluoroscopy was used to localize the L5-S1 interspace. The skin was infiltrated with 1% lidocaine. An 18-gauge standard spinal Tuohy needle was advanced into the epidural space at L5-S1 under fluoroscopic guidance using the loss of resistance technique. Scant warm clear fluid noted, needle withdrawn in case this was CSF. Patient verbal, no complaints. Tuohy advanced from different site, left paramedian L5/S1. Loss of resistance technique employed. No CSF encountered. Yes  A small amount of Isovue was injected into the epidural space, confirming appropriated needle placement on fluoroscopy. No vascular uptake was identified. Next, 2ml of 1% Lidocaine and 10mg of preservative free Dexamethasone were injected via the Tuohy needle. The needle was removed from the patient. The patient tolerated the procedure well and was discharged home with designated  and care instructions. Patient reported tono-procedural pain on Visual Analog Scale:  pre-6; post-0. LE strength intact . SLR negative. Advised patient to drink plenty of fluids and some caffeine if she develops a headache.       Signed By: Verna Rodrigez MD                      June 29, 2021

## 2021-06-30 PROCEDURE — 3331090001 HH PPS REVENUE CREDIT

## 2021-06-30 PROCEDURE — 3331090002 HH PPS REVENUE DEBIT

## 2021-07-01 PROCEDURE — 3331090001 HH PPS REVENUE CREDIT

## 2021-07-01 PROCEDURE — 3331090002 HH PPS REVENUE DEBIT

## 2021-07-02 ENCOUNTER — OFFICE VISIT (OUTPATIENT)
Dept: INTERNAL MEDICINE CLINIC | Age: 86
End: 2021-07-02
Payer: MEDICARE

## 2021-07-02 ENCOUNTER — HOSPITAL ENCOUNTER (OUTPATIENT)
Dept: LAB | Age: 86
Discharge: HOME | End: 2021-07-02
Payer: MEDICARE

## 2021-07-02 ENCOUNTER — HOME CARE VISIT (OUTPATIENT)
Dept: HOME HEALTH SERVICES | Facility: HOME HEALTH | Age: 86
End: 2021-07-02
Payer: MEDICARE

## 2021-07-02 VITALS
WEIGHT: 153 LBS | HEIGHT: 64 IN | BODY MASS INDEX: 26.12 KG/M2 | SYSTOLIC BLOOD PRESSURE: 133 MMHG | HEART RATE: 73 BPM | TEMPERATURE: 97.4 F | OXYGEN SATURATION: 97 % | DIASTOLIC BLOOD PRESSURE: 58 MMHG | RESPIRATION RATE: 14 BRPM

## 2021-07-02 DIAGNOSIS — R33.9 URINARY RETENTION: ICD-10-CM

## 2021-07-02 DIAGNOSIS — I10 ESSENTIAL HYPERTENSION: ICD-10-CM

## 2021-07-02 DIAGNOSIS — R73.02 IMPAIRED GLUCOSE TOLERANCE (ORAL): ICD-10-CM

## 2021-07-02 DIAGNOSIS — C18.7 CANCER OF SIGMOID (HCC): ICD-10-CM

## 2021-07-02 DIAGNOSIS — R60.0 BILATERAL LEG EDEMA: Primary | ICD-10-CM

## 2021-07-02 DIAGNOSIS — R60.0 BILATERAL LEG EDEMA: ICD-10-CM

## 2021-07-02 DIAGNOSIS — Z13.820 OSTEOPOROSIS SCREENING: ICD-10-CM

## 2021-07-02 DIAGNOSIS — R73.9 HYPERGLYCEMIA: ICD-10-CM

## 2021-07-02 LAB
25(OH)D3 SERPL-MCNC: 37.5 NG/ML (ref 30–100)
ALBUMIN SERPL-MCNC: 3.8 G/DL (ref 3.4–5)
ALBUMIN/GLOB SERPL: 1 {RATIO} (ref 0.8–1.7)
ALP SERPL-CCNC: 124 U/L (ref 45–117)
ALT SERPL-CCNC: 27 U/L (ref 13–56)
ANION GAP SERPL CALC-SCNC: 3 MMOL/L (ref 3–18)
AST SERPL-CCNC: 21 U/L (ref 10–38)
BASOPHILS # BLD: 0.1 K/UL (ref 0–0.1)
BASOPHILS NFR BLD: 1 % (ref 0–2)
BILIRUB SERPL-MCNC: 0.3 MG/DL (ref 0.2–1)
BUN SERPL-MCNC: 25 MG/DL (ref 7–18)
BUN/CREAT SERPL: 26 (ref 12–20)
CALCIUM SERPL-MCNC: 9.3 MG/DL (ref 8.5–10.1)
CHLORIDE SERPL-SCNC: 105 MMOL/L (ref 100–111)
CO2 SERPL-SCNC: 32 MMOL/L (ref 21–32)
CREAT SERPL-MCNC: 0.97 MG/DL (ref 0.6–1.3)
DIFFERENTIAL METHOD BLD: ABNORMAL
EOSINOPHIL # BLD: 0.2 K/UL (ref 0–0.4)
EOSINOPHIL NFR BLD: 2 % (ref 0–5)
ERYTHROCYTE [DISTWIDTH] IN BLOOD BY AUTOMATED COUNT: 14.7 % (ref 11.6–14.5)
GLOBULIN SER CALC-MCNC: 3.9 G/DL (ref 2–4)
GLUCOSE SERPL-MCNC: 104 MG/DL (ref 74–99)
HCT VFR BLD AUTO: 39.2 % (ref 35–45)
HGB BLD-MCNC: 11.9 G/DL (ref 12–16)
LYMPHOCYTES # BLD: 2.4 K/UL (ref 0.9–3.6)
LYMPHOCYTES NFR BLD: 33 % (ref 21–52)
MAGNESIUM SERPL-MCNC: 2.5 MG/DL (ref 1.6–2.6)
MCH RBC QN AUTO: 27.1 PG (ref 24–34)
MCHC RBC AUTO-ENTMCNC: 30.4 G/DL (ref 31–37)
MCV RBC AUTO: 89.3 FL (ref 74–97)
MONOCYTES # BLD: 0.8 K/UL (ref 0.05–1.2)
MONOCYTES NFR BLD: 11 % (ref 3–10)
NEUTS SEG # BLD: 3.8 K/UL (ref 1.8–8)
NEUTS SEG NFR BLD: 53 % (ref 40–73)
PHOSPHATE SERPL-MCNC: 4.6 MG/DL (ref 2.5–4.9)
PLATELET # BLD AUTO: 216 K/UL (ref 135–420)
PMV BLD AUTO: 10.6 FL (ref 9.2–11.8)
POTASSIUM SERPL-SCNC: 4 MMOL/L (ref 3.5–5.5)
PROT SERPL-MCNC: 7.7 G/DL (ref 6.4–8.2)
RBC # BLD AUTO: 4.39 M/UL (ref 4.2–5.3)
SODIUM SERPL-SCNC: 140 MMOL/L (ref 136–145)
WBC # BLD AUTO: 7.3 K/UL (ref 4.6–13.2)

## 2021-07-02 PROCEDURE — 36415 COLL VENOUS BLD VENIPUNCTURE: CPT

## 2021-07-02 PROCEDURE — 1101F PT FALLS ASSESS-DOCD LE1/YR: CPT | Performed by: INTERNAL MEDICINE

## 2021-07-02 PROCEDURE — 3331090001 HH PPS REVENUE CREDIT

## 2021-07-02 PROCEDURE — 80053 COMPREHEN METABOLIC PANEL: CPT

## 2021-07-02 PROCEDURE — 99214 OFFICE O/P EST MOD 30 MIN: CPT | Performed by: INTERNAL MEDICINE

## 2021-07-02 PROCEDURE — G8419 CALC BMI OUT NRM PARAM NOF/U: HCPCS | Performed by: INTERNAL MEDICINE

## 2021-07-02 PROCEDURE — 82306 VITAMIN D 25 HYDROXY: CPT

## 2021-07-02 PROCEDURE — 1090F PRES/ABSN URINE INCON ASSESS: CPT | Performed by: INTERNAL MEDICINE

## 2021-07-02 PROCEDURE — 83735 ASSAY OF MAGNESIUM: CPT

## 2021-07-02 PROCEDURE — 85025 COMPLETE CBC W/AUTO DIFF WBC: CPT

## 2021-07-02 PROCEDURE — G0463 HOSPITAL OUTPT CLINIC VISIT: HCPCS | Performed by: INTERNAL MEDICINE

## 2021-07-02 PROCEDURE — 3331090002 HH PPS REVENUE DEBIT

## 2021-07-02 PROCEDURE — G9717 DOC PT DX DEP/BP F/U NT REQ: HCPCS | Performed by: INTERNAL MEDICINE

## 2021-07-02 PROCEDURE — G8427 DOCREV CUR MEDS BY ELIG CLIN: HCPCS | Performed by: INTERNAL MEDICINE

## 2021-07-02 PROCEDURE — G8536 NO DOC ELDER MAL SCRN: HCPCS | Performed by: INTERNAL MEDICINE

## 2021-07-02 PROCEDURE — 84100 ASSAY OF PHOSPHORUS: CPT

## 2021-07-02 NOTE — PROGRESS NOTES
Sue Marquez presents today for   Chief Complaint   Patient presents with    Leg Swelling     Bilateral leg swelling. Patient states that the swelling does not go down with elevation. Coordination of Care:  1. Have you been to the ER, urgent care clinic since your last visit? Hospitalized since your last visit? no    2. Have you seen or consulted any other health care providers outside of the [address] [place] [address] since your last visit? Include any pap smears or colon screening.  yes

## 2021-07-02 NOTE — PROGRESS NOTES
[facility]:  7/2/2021, MRN: [id number]      Kevin Matthew is a 80 y.o. female and presents to clinic for Leg Swelling (Bilateral leg swelling. Patient states that the swelling does not go down with elevation. )      Subjective: The patient is an 80-year-old female with history of sigmoid colon cancer status post surgery, prediabetes, history of strictures, osteopenia, hyponatremia, lumbar disc disease, hypertension, IBS, vitamin D deficiency, vertebral body fracture, allergic rhinitis, GERD, and urinary retention. 1.  BLE Edema: Responsive to elevating her left leg only. Her RLE is not responsive to elevating her leg. Her RLE is tight. No SOB. +Sedentary lifestyle. No recent travel history. No alleviating factors are known for her right lower extremity edema. 2.  Hyperglycemia: She has had elevated blood glucoses this year noted on labs. She has a history of prediabetes. She is overdue for an A1c to be checked. 3. H/o Falls: She suffered from several falls earlier this year, 1 of which resulted in acute compression fracture. She subsequently underwent kyphoplasty in May. 4. Urinary Retention: Treated off and on with intermittent self-catheterization. During her most recent hospitalization earlier this year, patient was found to have a urine culture positive for Enterococcus. The culture showed sensitivity to ampicillin. The patient was subsequently treated with antibiotics. 5. HTN: BP is stable on lasix. No adverse side effects. 6. H/o Colon Cancer: In remission. No abdominal pain or rectal bleeding.       Patient Active Problem List    Diagnosis Date Noted    Thoracic compression fracture ([address] 05/15/2021    T12 compression fracture ([address] 05/15/2021    Mixed hyperlipidemia 09/03/2020    Depression 09/03/2020    Impaired glucose tolerance 10/10/2019    Urinary retention 09/01/2018    Cancer of sigmoid ([address] 09/01/2018    Anxiety 09/01/2018    Irritable bowel syndrome with both constipation and diarrhea 09/01/2018    Lumbar disc disease 09/01/2018    Essential hypertension 09/01/2018    Allergic rhinitis 09/01/2018    Vitamin D deficiency 09/01/2018       Current Outpatient Medications   Medication Sig Dispense Refill    mirtazapine (REMERON) 30 mg tablet Take 1 Tab by mouth nightly. (Patient taking differently: Take 1 Tab by mouth nightly. Missed 5 days per patient, due to slow renewal) 30 Tab 5    fexofenadine (Allergy Relief, fexofenadine,) 180 mg tablet Take 180 mg by mouth daily.  gabapentin (NEURONTIN) 100 mg capsule Take 1 Cap by mouth two (2) times a day. Max Daily Amount: 200 mg. 180 Cap 1    acetaminophen (TYLENOL) 500 mg capsule Take 500 mg by mouth every four (4) hours as needed for Pain.  pravastatin (PRAVACHOL) 10 mg tablet Take 1 Tab by mouth nightly. 30 Tab 5    ondansetron hcl (ZOFRAN) 4 mg tablet Take 4 mg by mouth every eight (8) hours as needed for Nausea.  alum-mag hydroxide-simeth (MYLANTA) 200-200-20 mg/5 mL susp Take 30 mL by mouth daily as needed for Other (PRN: Gastric Reflux, in PM). PRN: Gastric Reflux, in PM      famotidine (PEPCID) 20 mg tablet Take 20 mg by mouth daily.  melatonin tab tablet Take 10 mg by mouth nightly.  cholecalciferol (VITAMIN D3) 1,000 unit cap Take 1,000 Units by mouth daily.  multivitamin/iron/folic acid (CENTRUM WOMEN PO) Take 1 Tab by mouth daily.  cetirizine (ZYRTEC) 10 mg tablet Take 10 mg by mouth daily.  polyethylene glycol (MIRALAX) 17 gram packet Take 17 g by mouth daily as needed for Other (PRN Constipation). PRN Constipation. Mix with 8 oz of liquid of choice, such as water.  Bifidobacterium Infantis (ALIGN) 4 mg cap Take 4 mg by mouth daily.  furosemide (LASIX) 20 mg tablet Take 1 Tab by mouth every other day. Indications: Edema (Patient taking differently: Take 20 mg by mouth daily.  Indications: visible water retention) 90 Tab 3    dicyclomine (BENTYL) 10 mg capsule Take 1 Cap by mouth four (4) times daily. (Patient taking differently: Take 1 Cap by mouth four (4) times daily as needed for Other (PRN:  IBS symptoms). PRN:  IBS symptoms) 90 Cap 3       Allergies   Allergen Reactions    Macrobid [Nitrofurantoin Monohyd/M-Cryst] Nausea and Vomiting    Flomax [Tamsulosin] Myalgia and Vertigo    Pneumococcal Vaccine Other (comments)    Lexapro [Escitalopram Oxalate] Other (comments)     ? Hyponatremia per pt hx       Past Medical History:   Diagnosis Date    Arthritis     Cancer [facility]) 2002    Colon     Chronic pain     abdominal due to IBS    Depression     GERD (gastroesophageal reflux disease)     Hypertension     Migraine headache 2014    Mixed hyperlipidemia 9/3/2020    Occipital neuralgia 2014    PUD (peptic ulcer disease)     S/P colon resection 2002    clear presently        Past Surgical History:   Procedure Laterality Date    HX CHOLECYSTECTOMY  2015    HX HYSTERECTOMY  1965    HX TONSIL AND ADENOIDECTOMY      HX WISDOM TEETH EXTRACTION      x4    IR KYPHOPLASTY THORACIC  5/19/2021       Family History   Problem Relation Age of Onset    Hypertension Mother     Stroke Mother     Heart Attack Father     Hypertension Father     Parkinson's Disease Sister     Hypertension Brother     Cancer Brother         prostate    No Known Problems Son     No Known Problems Daughter        Social History     Tobacco Use    Smoking status: Never Smoker    Smokeless tobacco: Never Used   Substance Use Topics    Alcohol use: Yes     Alcohol/week: 0.0 - 1.0 standard drinks       ROS   Review of Systems   Constitutional: Negative for chills and fever. HENT: Negative for ear pain and sore throat. Eyes: Negative for blurred vision and pain. Respiratory: Negative for cough. Cardiovascular: Positive for leg swelling. Negative for chest pain. Gastrointestinal: Negative for abdominal pain, blood in stool and melena.    Genitourinary: Negative for dysuria and hematuria. Musculoskeletal: Positive for joint pain. Negative for myalgias. Skin: Negative for rash. Neurological: Negative for headaches. Endo/Heme/Allergies: Does not bruise/bleed easily. Psychiatric/Behavioral: Negative for substance abuse. Objective     Vitals:    07/02/21 1509 07/02/21 1515   BP: (!) 145/56 (!) 133/58   Pulse: 73    Resp: 14    Temp: 97.4 °F (36.3 °C)    TempSrc: Temporal    SpO2: 97%    Weight: 153 lb (69.4 kg)    Height: 5' 4\" (1.626 m)    PainSc:   0 - No pain        Physical Exam  Vitals and nursing note reviewed. HENT:      Head: Normocephalic and atraumatic. Right Ear: External ear normal.      Left Ear: External ear normal.   Eyes:      General: No scleral icterus. Right eye: No discharge. Left eye: No discharge. Conjunctiva/sclera: Conjunctivae normal.   Cardiovascular:      Rate and Rhythm: Normal rate and regular rhythm. Heart sounds: Normal heart sounds. No murmur heard. No friction rub. No gallop. Pulmonary:      Effort: Pulmonary effort is normal. No respiratory distress. Breath sounds: Normal breath sounds. No wheezing or rales. Abdominal:      General: Bowel sounds are normal. There is no distension. Palpations: Abdomen is soft. There is no mass. Tenderness: There is no abdominal tenderness. There is no guarding or rebound. Musculoskeletal:         General: No swelling (BUE) or tenderness (BUE). Cervical back: Neck supple. Comments: +BLE edema is present but her RLE is larger than her LLE   Lymphadenopathy:      Cervical: No cervical adenopathy. Skin:     General: Skin is warm and dry. Findings: No erythema. Neurological:      Mental Status: She is alert and oriented to person, place, and time. Motor: No abnormal muscle tone. Gait: Gait is intact.  Gait normal.   Psychiatric:         Mood and Affect: Mood and affect normal.         LABS   Data Review:   Lab Results   Component Value Date/Time    WBC 6.5 05/25/2021 07:30 AM    HGB 12.8 05/25/2021 07:30 AM    HCT 39.7 05/25/2021 07:30 AM    PLATELET 270 78/22/4306 07:30 AM    MCV 85.9 05/25/2021 07:30 AM       Lab Results   Component Value Date/Time    Sodium 136 05/25/2021 07:30 AM    Potassium 4.7 05/25/2021 07:30 AM    Chloride 100 05/25/2021 07:30 AM    CO2 31 05/25/2021 07:30 AM    Anion gap 5 05/25/2021 07:30 AM    Glucose 101 (H) 05/25/2021 07:30 AM    BUN 15 05/25/2021 07:30 AM    Creatinine 0.67 05/25/2021 07:30 AM    BUN/Creatinine ratio 22 (H) 05/25/2021 07:30 AM    GFR est AA >60 05/25/2021 07:30 AM    GFR est non-AA >60 05/25/2021 07:30 AM    Calcium 9.2 05/25/2021 07:30 AM       Lab Results   Component Value Date/Time    Cholesterol, total 203 (H) 02/25/2021 06:45 AM    HDL Cholesterol 65 (H) 02/25/2021 06:45 AM    LDL, calculated 111 (H) 02/25/2021 06:45 AM    VLDL, calculated 27 02/25/2021 06:45 AM    Triglyceride 135 02/25/2021 06:45 AM    CHOL/HDL Ratio 3.1 02/25/2021 06:45 AM       Lab Results   Component Value Date/Time    Hemoglobin A1c 5.7 (H) 02/25/2021 06:45 AM       Assessment/Plan:   1. Bilateral leg edema:   - BLE PVL Study ordered to r/o DVT    ORDERS:  - DUPLEX LOWER EXT VENOUS BILAT; Future    2. Cancer of sigmoid : In remission. Observation    3. Essential hypertension: Stable. - C/w rx as prescribed. - Checking labs. ORDERS:  - HEMOGLOBIN A1C WITH EAG; Future  - METABOLIC PANEL, COMPREHENSIVE; Future    4. Hyperglycemia/Prediabetes:  - Low carb diet recommended. - Checking labs. ORDERS:  - HEMOGLOBIN A1C WITH EAG; Future    5. H/o Fall:   - F/u with Orthopedics.   - Activity as tolerated. 6. UTI: Resolved. Observation.       Health Maintenance Due   Topic Date Due    COVID-19 Vaccine (1) Never done    Shingrix Vaccine Age 50> (1 of 2) Never done    Bone Densitometry (Dexa) Screening  Never done    Medicare Yearly Exam  12/11/2020         Lab review: labs are reviewed in the EHR and ordered as mentioned above    I have discussed the diagnosis with the patient and the intended plan as seen in the above orders. The patient has received an after-visit summary and questions were answered concerning future plans. I have discussed medication side effects and warnings with the patient as well. I have reviewed the plan of care with the patient, accepted their input and they are in agreement with the treatment goals. All questions were answered. The patient understands the plan of care. Handouts provided today with above information. Pt instructed if symptoms worsen to call the office or report to the ED for continued care. Greater than 50% of the visit time was spent in counseling and/or coordination of care. Voice recognition was used to generate this report, which may have resulted in some phonetic based errors in grammar and contents. Even though attempts were made to correct all the mistakes, some may have been missed, and remained in the body of the document.           Carl Urban MD

## 2021-07-02 NOTE — PATIENT INSTRUCTIONS
Body Mass Index: Care Instructions  Your Care Instructions     Body mass index (BMI) can help you see if your weight is raising your risk for health problems. It uses a formula to compare how much you weigh with how tall you are. · A BMI lower than 18.5 is considered underweight. · A BMI between 18.5 and 24.9 is considered healthy. · A BMI between 25 and 29.9 is considered overweight. A BMI of 30 or higher is considered obese. If your BMI is in the normal range, it means that you have a lower risk for weight-related health problems. If your BMI is in the overweight or obese range, you may be at increased risk for weight-related health problems, such as high blood pressure, heart disease, stroke, arthritis or joint pain, and diabetes. If your BMI is in the underweight range, you may be at increased risk for health problems such as fatigue, lower protection (immunity) against illness, muscle loss, bone loss, hair loss, and hormone problems. BMI is just one measure of your risk for weight-related health problems. You may be at higher risk for health problems if you are not active, you eat an unhealthy diet, or you drink too much alcohol or use tobacco products. Follow-up care is a key part of your treatment and safety. Be sure to make and go to all appointments, and call your doctor if you are having problems. It's also a good idea to know your test results and keep a list of the medicines you take. How can you care for yourself at home? · Practice healthy eating habits. This includes eating plenty of fruits, vegetables, whole grains, lean protein, and low-fat dairy. · If your doctor recommends it, get more exercise. Walking is a good choice. Bit by bit, increase the amount you walk every day. Try for at least 30 minutes on most days of the week. · Do not smoke. Smoking can increase your risk for health problems. If you need help quitting, talk to your doctor about stop-smoking programs and medicines. These can increase your chances of quitting for good. · Limit alcohol to 2 drinks a day for men and 1 drink a day for women. Too much alcohol can cause health problems. If you have a BMI higher than 25  · Your doctor may do other tests to check your risk for weight-related health problems. This may include measuring the distance around your waist. A waist measurement of more than 40 inches in men or 35 inches in women can increase the risk of weight-related health problems. · Talk with your doctor about steps you can take to stay healthy or improve your health. You may need to make lifestyle changes to lose weight and stay healthy, such as changing your diet and getting regular exercise. If you have a BMI lower than 18.5  · Your doctor may do other tests to check your risk for health problems. · Talk with your doctor about steps you can take to stay healthy or improve your health. You may need to make lifestyle changes to gain or maintain weight and stay healthy, such as getting more healthy foods in your diet and doing exercises to build muscle. Where can you learn more? Go to http://www.garza.com/  Enter S176 in the search box to learn more about \"Body Mass Index: Care Instructions. \"  Current as of: September 23, 2020               Content Version: 12.8  © 2006-2021 [employer]wise[employer]. Care instructions adapted under license by [employer] (which disclaims liability or warranty for this information). If you have questions about a medical condition or this instruction, always ask your healthcare professional. [place] [address] any warranty or liability for your use of this information.

## 2021-07-03 PROCEDURE — 3331090002 HH PPS REVENUE DEBIT

## 2021-07-03 PROCEDURE — 3331090001 HH PPS REVENUE CREDIT

## 2021-07-04 PROCEDURE — 3331090001 HH PPS REVENUE CREDIT

## 2021-07-04 PROCEDURE — 3331090002 HH PPS REVENUE DEBIT

## 2021-07-05 VITALS — DIASTOLIC BLOOD PRESSURE: 61 MMHG | HEART RATE: 69 BPM | SYSTOLIC BLOOD PRESSURE: 129 MMHG | RESPIRATION RATE: 18 BRPM

## 2021-07-05 PROCEDURE — 3331090002 HH PPS REVENUE DEBIT

## 2021-07-05 PROCEDURE — 3331090001 HH PPS REVENUE CREDIT

## 2021-07-06 ENCOUNTER — HOME CARE VISIT (OUTPATIENT)
Dept: SCHEDULING | Facility: HOME HEALTH | Age: 86
End: 2021-07-06
Payer: MEDICARE

## 2021-07-06 VITALS
HEART RATE: 74 BPM | OXYGEN SATURATION: 98 % | TEMPERATURE: 97.3 F | RESPIRATION RATE: 14 BRPM | DIASTOLIC BLOOD PRESSURE: 54 MMHG | SYSTOLIC BLOOD PRESSURE: 120 MMHG

## 2021-07-06 PROCEDURE — 3331090002 HH PPS REVENUE DEBIT

## 2021-07-06 PROCEDURE — 3331090001 HH PPS REVENUE CREDIT

## 2021-07-06 PROCEDURE — G0151 HHCP-SERV OF PT,EA 15 MIN: HCPCS

## 2021-07-07 ENCOUNTER — TELEPHONE (OUTPATIENT)
Dept: INTERNAL MEDICINE CLINIC | Age: 86
End: 2021-07-07

## 2021-07-07 ENCOUNTER — HOME CARE VISIT (OUTPATIENT)
Dept: SCHEDULING | Facility: HOME HEALTH | Age: 86
End: 2021-07-07
Payer: MEDICARE

## 2021-07-07 ENCOUNTER — HOSPITAL ENCOUNTER (OUTPATIENT)
Dept: VASCULAR SURGERY | Age: 86
Discharge: HOME | End: 2021-07-07
Attending: INTERNAL MEDICINE
Payer: MEDICARE

## 2021-07-07 DIAGNOSIS — I82.4Y1 DEEP VEIN THROMBOSIS (DVT) OF PROXIMAL VEIN OF RIGHT LOWER EXTREMITY, UNSPECIFIED CHRONICITY (HCC): ICD-10-CM

## 2021-07-07 DIAGNOSIS — I82.4Y1 DEEP VEIN THROMBOSIS (DVT) OF PROXIMAL VEIN OF RIGHT LOWER EXTREMITY, UNSPECIFIED CHRONICITY (HCC): Primary | ICD-10-CM

## 2021-07-07 PROCEDURE — G0157 HHC PT ASSISTANT EA 15: HCPCS

## 2021-07-07 PROCEDURE — 93970 EXTREMITY STUDY: CPT

## 2021-07-07 PROCEDURE — 3331090001 HH PPS REVENUE CREDIT

## 2021-07-07 PROCEDURE — 3331090002 HH PPS REVENUE DEBIT

## 2021-07-07 NOTE — TELEPHONE ENCOUNTER
Wet read call from Chris Christopher came in from [facility] lab, that patient has positive DVT at right popliteal. Dr Michael Blum has been verbally  alerted to patient findings and stated she will send something in for the patient. The patient has gone home and wait for Dr. Flakita Pagan instructions . A hard copy of results will be entered into the patient's chart shortly.

## 2021-07-07 NOTE — TELEPHONE ENCOUNTER
Spoke with [facility] they stated that the deliveries for medications come from [facility] care, and we would need to call them to see when there next delivery would be. [facility], they did receive the rx but will not be delivered until tomorrow. Do you want to send another rx to a local pharmacy for the daughter to ?

## 2021-07-07 NOTE — CASE COMMUNICATION
I completed re-assessment. Do you think she could use a few more visits? She has improved in her balance per Tinetti, but the back issue causing leg and low back numbness  does not seem to have changed. I reminded her of the back exercises and instructed her to do them 2x daily, before getting up and when going to bed. We also discussed posture both in the chair reading, and standing and walking. These can all be re-inforced. She kept saying she is getting worse, but she is not worse in everything. Something is making her feel that way though.   Since you see her more, what is your impression of her progress and potential?

## 2021-07-07 NOTE — TELEPHONE ENCOUNTER
I spoke to her daughter about her results. Her daughter was at her last office apt. Please confirm that [facility] can fill her xarelto today for her. Please call her daughter with the information to Hematology. He number is [phone]. She coordinates her mother's care and the pt has given permission for her daughter to do this for her.     Dr. Jovita Roach  [facility], [address] [place] [address], [address] [place] [address] Str.  Phone: [phone]  Fax: [phone]

## 2021-07-07 NOTE — TELEPHONE ENCOUNTER
Sending the rx to [facility].    Dr. Yoel Javier  [facility], [address] [facility], [address]  Phone: [phone]  Fax: [phone]

## 2021-07-08 ENCOUNTER — HOSPITAL ENCOUNTER (OUTPATIENT)
Dept: BONE DENSITY | Age: 86
Discharge: HOME | End: 2021-07-08
Attending: PHYSICAL MEDICINE & REHABILITATION
Payer: MEDICARE

## 2021-07-08 PROCEDURE — 3331090001 HH PPS REVENUE CREDIT

## 2021-07-08 PROCEDURE — 77080 DXA BONE DENSITY AXIAL: CPT

## 2021-07-08 PROCEDURE — 3331090002 HH PPS REVENUE DEBIT

## 2021-07-09 PROCEDURE — 3331090001 HH PPS REVENUE CREDIT

## 2021-07-09 PROCEDURE — 3331090002 HH PPS REVENUE DEBIT

## 2021-07-10 PROCEDURE — 3331090002 HH PPS REVENUE DEBIT

## 2021-07-10 PROCEDURE — 3331090001 HH PPS REVENUE CREDIT

## 2021-07-11 PROCEDURE — 3331090001 HH PPS REVENUE CREDIT

## 2021-07-11 PROCEDURE — 3331090002 HH PPS REVENUE DEBIT

## 2021-07-12 ENCOUNTER — HOSPITAL ENCOUNTER (OUTPATIENT)
Dept: LAB | Age: 86
Discharge: HOME | End: 2021-07-12
Payer: MEDICARE

## 2021-07-12 VITALS
TEMPERATURE: 98.1 F | RESPIRATION RATE: 18 BRPM | DIASTOLIC BLOOD PRESSURE: 81 MMHG | HEART RATE: 70 BPM | SYSTOLIC BLOOD PRESSURE: 120 MMHG | OXYGEN SATURATION: 98 %

## 2021-07-12 LAB
ALBUMIN SERPL-MCNC: 3.6 G/DL (ref 3.4–5)
ALBUMIN/GLOB SERPL: 1 {RATIO} (ref 0.8–1.7)
ALP SERPL-CCNC: 115 U/L (ref 45–117)
ALT SERPL-CCNC: 24 U/L (ref 13–56)
ANION GAP SERPL CALC-SCNC: 7 MMOL/L (ref 3–18)
AST SERPL-CCNC: 22 U/L (ref 10–38)
BILIRUB SERPL-MCNC: 0.4 MG/DL (ref 0.2–1)
BUN SERPL-MCNC: 17 MG/DL (ref 7–18)
BUN/CREAT SERPL: 20 (ref 12–20)
CALCIUM SERPL-MCNC: 9.2 MG/DL (ref 8.5–10.1)
CHLORIDE SERPL-SCNC: 109 MMOL/L (ref 100–111)
CO2 SERPL-SCNC: 27 MMOL/L (ref 21–32)
CREAT SERPL-MCNC: 0.87 MG/DL (ref 0.6–1.3)
EST. AVERAGE GLUCOSE BLD GHB EST-MCNC: 123 MG/DL
GLOBULIN SER CALC-MCNC: 3.5 G/DL (ref 2–4)
GLUCOSE SERPL-MCNC: 104 MG/DL (ref 74–99)
HBA1C MFR BLD: 5.9 % (ref 4.2–5.6)
POTASSIUM SERPL-SCNC: 4.4 MMOL/L (ref 3.5–5.5)
PROT SERPL-MCNC: 7.1 G/DL (ref 6.4–8.2)
SODIUM SERPL-SCNC: 143 MMOL/L (ref 136–145)

## 2021-07-12 PROCEDURE — 3331090001 HH PPS REVENUE CREDIT

## 2021-07-12 PROCEDURE — 83036 HEMOGLOBIN GLYCOSYLATED A1C: CPT

## 2021-07-12 PROCEDURE — 3331090002 HH PPS REVENUE DEBIT

## 2021-07-12 PROCEDURE — 36415 COLL VENOUS BLD VENIPUNCTURE: CPT

## 2021-07-12 PROCEDURE — 80053 COMPREHEN METABOLIC PANEL: CPT

## 2021-07-13 ENCOUNTER — HOME CARE VISIT (OUTPATIENT)
Dept: SCHEDULING | Facility: HOME HEALTH | Age: 86
End: 2021-07-13
Payer: MEDICARE

## 2021-07-13 ENCOUNTER — TELEPHONE (OUTPATIENT)
Dept: INTERNAL MEDICINE CLINIC | Age: 86
End: 2021-07-13

## 2021-07-13 PROCEDURE — 400013 HH SOC

## 2021-07-13 PROCEDURE — 400018 HH-NO PAY CLAIM PROCEDURE

## 2021-07-13 PROCEDURE — G0151 HHCP-SERV OF PT,EA 15 MIN: HCPCS

## 2021-07-13 PROCEDURE — 3331090002 HH PPS REVENUE DEBIT

## 2021-07-13 PROCEDURE — 3331090003 HH PPS REVENUE ADJ

## 2021-07-13 PROCEDURE — 3331090001 HH PPS REVENUE CREDIT

## 2021-07-13 NOTE — TELEPHONE ENCOUNTER
Pt physical therapist called to make sure she has the correct dosage of medicine xarelto. Please give her a call back at [phone]. Please advise.  Thank you!!

## 2021-07-14 VITALS
DIASTOLIC BLOOD PRESSURE: 70 MMHG | RESPIRATION RATE: 16 BRPM | TEMPERATURE: 97.8 F | SYSTOLIC BLOOD PRESSURE: 118 MMHG | OXYGEN SATURATION: 97 % | HEART RATE: 66 BPM

## 2021-07-14 PROCEDURE — 3331090001 HH PPS REVENUE CREDIT

## 2021-07-14 PROCEDURE — 3331090002 HH PPS REVENUE DEBIT

## 2021-07-14 NOTE — TELEPHONE ENCOUNTER
Verified with Padmini Samaniego the dosing on the Xarelto. No further questions or concerns at this time.

## 2021-07-15 NOTE — PROGRESS NOTES
I will discuss her results with her at her upcoming appointment. Her A1c is up to 5.9 from 5.7. Her CMP is unremarkable.     Dr. Levi Quiroz  [facility] [address], [address] [place] [address] Str.  Phone: [phone]  Fax: [phone]

## 2021-07-19 NOTE — PROGRESS NOTES
Meli Ask presents today for   Chief Complaint   Patient presents with    Follow-up     3 wk f/u   [facility]     7-12-21    Leg Swelling     BLE-Patient reports no improvement            Coordination of Care:  1. Have you been to the ER, urgent care clinic since your last visit? Hospitalized since your last visit? NO    2. Have you seen or consulted any other health care providers outside of the [address] [place] [address] since your last visit? Include any pap smears or colon screening.  YES

## 2021-07-20 ENCOUNTER — OFFICE VISIT (OUTPATIENT)
Dept: INTERNAL MEDICINE CLINIC | Age: 86
End: 2021-07-20
Payer: MEDICARE

## 2021-07-20 VITALS
OXYGEN SATURATION: 97 % | BODY MASS INDEX: 26.12 KG/M2 | HEART RATE: 65 BPM | HEIGHT: 64 IN | SYSTOLIC BLOOD PRESSURE: 111 MMHG | TEMPERATURE: 97.4 F | DIASTOLIC BLOOD PRESSURE: 52 MMHG | RESPIRATION RATE: 14 BRPM | WEIGHT: 153 LBS

## 2021-07-20 DIAGNOSIS — I82.439 ACUTE DEEP VEIN THROMBOSIS (DVT) OF POPLITEAL VEIN, UNSPECIFIED LATERALITY (HCC): Primary | ICD-10-CM

## 2021-07-20 DIAGNOSIS — Z00.00 MEDICARE ANNUAL WELLNESS VISIT, SUBSEQUENT: ICD-10-CM

## 2021-07-20 DIAGNOSIS — R73.9 HYPERGLYCEMIA: ICD-10-CM

## 2021-07-20 DIAGNOSIS — Z71.89 ADVANCE CARE PLANNING: ICD-10-CM

## 2021-07-20 PROCEDURE — G0439 PPPS, SUBSEQ VISIT: HCPCS | Performed by: INTERNAL MEDICINE

## 2021-07-20 PROCEDURE — 1101F PT FALLS ASSESS-DOCD LE1/YR: CPT | Performed by: INTERNAL MEDICINE

## 2021-07-20 PROCEDURE — G0463 HOSPITAL OUTPT CLINIC VISIT: HCPCS | Performed by: INTERNAL MEDICINE

## 2021-07-20 PROCEDURE — 99214 OFFICE O/P EST MOD 30 MIN: CPT | Performed by: INTERNAL MEDICINE

## 2021-07-20 PROCEDURE — G9717 DOC PT DX DEP/BP F/U NT REQ: HCPCS | Performed by: INTERNAL MEDICINE

## 2021-07-20 PROCEDURE — G8419 CALC BMI OUT NRM PARAM NOF/U: HCPCS | Performed by: INTERNAL MEDICINE

## 2021-07-20 PROCEDURE — 1090F PRES/ABSN URINE INCON ASSESS: CPT | Performed by: INTERNAL MEDICINE

## 2021-07-20 PROCEDURE — G8536 NO DOC ELDER MAL SCRN: HCPCS | Performed by: INTERNAL MEDICINE

## 2021-07-20 PROCEDURE — G8427 DOCREV CUR MEDS BY ELIG CLIN: HCPCS | Performed by: INTERNAL MEDICINE

## 2021-07-20 RX ORDER — ZOSTER VACCINE RECOMBINANT, ADJUVANTED 50 MCG/0.5
0.5 KIT INTRAMUSCULAR
Qty: 1 ML | Refills: 0 | Status: SHIPPED | OUTPATIENT
Start: 2021-07-20 | End: 2021-07-27

## 2021-07-20 NOTE — PROGRESS NOTES
[facility]:  7/20/2021, MRN: [id number]      Perez Garcia is a 80 y.o. female and presents to clinic for Follow-up (3 wk f/u), Labs (7-12-21), and Leg Swelling (BLE-Patient reports no improvement)    Subjective: The patient is an 70-year-old female with history of sigmoid colon cancer status post surgery, prediabetes, history of strictures, osteopenia, hyponatremia, lumbar disc disease, hypertension, IBS, vitamin D deficiency, vertebral body fracture, allergic rhinitis, GERD, and urinary retention. 1. Prediabetes: She is checking her BGs daily. BGs are <120 on avg. her most recent labs show that her A1c is up to 5.9 from 5.7. Her CMP is unremarkable. 2. RLE DVT: This is a new diagnosis. She was started on Xarelto. She does not need any refills. Her father had blood clots. +H/o sigmoid colon cancer. She continues to have swelling along her right leg but the swelling is slightly improved. She was referred to [facility] for evaluation and is being scheduled for an appointment. BLE PVL 7/7/21: Acute occlusive DVT right popliteal vein and right peroneal vein. Acute nonocclusive DVT right posterior tibial vein. No evidence of DVT demonstrated left lower extremity.         Patient Active Problem List    Diagnosis Date Noted    Thoracic compression fracture ([address] 05/15/2021    T12 compression fracture ([address] 05/15/2021    Mixed hyperlipidemia 09/03/2020    Depression 09/03/2020    Impaired glucose tolerance 10/10/2019    Urinary retention 09/01/2018    Cancer of sigmoid ([address] 09/01/2018    Anxiety 09/01/2018    Irritable bowel syndrome with both constipation and diarrhea 09/01/2018    Lumbar disc disease 09/01/2018    Essential hypertension 09/01/2018    Allergic rhinitis 09/01/2018    Vitamin D deficiency 09/01/2018       Current Outpatient Medications   Medication Sig Dispense Refill    rivaroxaban (XARELTO STARTER SHAWANDA) 15 mg (42)- 20 mg (9) DsPk Take 15 mg by mouth two (2) times a day.  [START ON 7/29/2021] rivaroxaban (XARELTO) 20 mg tab tablet Take 1 Tablet by mouth daily. 30 Tablet 2    mirtazapine (REMERON) 30 mg tablet Take 1 Tab by mouth nightly. (Patient taking differently: Take 1 Tab by mouth nightly. Missed 5 days per patient, due to slow renewal) 30 Tab 5    fexofenadine (Allergy Relief, fexofenadine,) 180 mg tablet Take 180 mg by mouth daily.  gabapentin (NEURONTIN) 100 mg capsule Take 1 Cap by mouth two (2) times a day. Max Daily Amount: 200 mg. 180 Cap 1    acetaminophen (TYLENOL) 500 mg capsule Take 500 mg by mouth every four (4) hours as needed for Pain.  pravastatin (PRAVACHOL) 10 mg tablet Take 1 Tab by mouth nightly. 30 Tab 5    ondansetron hcl (ZOFRAN) 4 mg tablet Take 4 mg by mouth every eight (8) hours as needed for Nausea.  alum-mag hydroxide-simeth (MYLANTA) 200-200-20 mg/5 mL susp Take 30 mL by mouth daily as needed for Other (PRN: Gastric Reflux, in PM). PRN: Gastric Reflux, in PM      famotidine (PEPCID) 20 mg tablet Take 20 mg by mouth daily.  melatonin tab tablet Take 10 mg by mouth nightly.  cholecalciferol (VITAMIN D3) 1,000 unit cap Take 1,000 Units by mouth daily.  multivitamin/iron/folic acid (CENTRUM WOMEN PO) Take 1 Tab by mouth daily.  cetirizine (ZYRTEC) 10 mg tablet Take 10 mg by mouth daily. As needed for allergies      polyethylene glycol (MIRALAX) 17 gram packet Take 17 g by mouth daily as needed for Other (PRN Constipation). PRN Constipation. Mix with 8 oz of liquid of choice, such as water.  Bifidobacterium Infantis (ALIGN) 4 mg cap Take 4 mg by mouth daily.  furosemide (LASIX) 20 mg tablet Take 1 Tab by mouth every other day. Indications: Edema (Patient taking differently: Take 20 mg by mouth daily. Indications: visible water retention) 90 Tab 3    dicyclomine (BENTYL) 10 mg capsule Take 1 Cap by mouth four (4) times daily.  (Patient taking differently: Take 10 mg by mouth four (4) times daily as needed for Other (PRN:  IBS symptoms). PRN:  IBS symptoms Takin 1 tab at night) 90 Cap 3       Allergies   Allergen Reactions    Macrobid [Nitrofurantoin Monohyd/M-Cryst] Nausea and Vomiting    Flomax [Tamsulosin] Myalgia and Vertigo    Pneumococcal Vaccine Other (comments)    Lexapro [Escitalopram Oxalate] Other (comments)     ? Hyponatremia per pt hx       Past Medical History:   Diagnosis Date    Arthritis     Cancer [facility]) 2002    Colon     Chronic pain     abdominal due to IBS    Depression     GERD (gastroesophageal reflux disease)     Hypertension     Migraine headache 2014    Mixed hyperlipidemia 9/3/2020    Occipital neuralgia 2014    PUD (peptic ulcer disease)     S/P colon resection 2002    clear presently        Past Surgical History:   Procedure Laterality Date    HX CHOLECYSTECTOMY  2015    HX HYSTERECTOMY  1965    HX TONSIL AND ADENOIDECTOMY      HX WISDOM TEETH EXTRACTION      x4    IR KYPHOPLASTY THORACIC  5/19/2021       Family History   Problem Relation Age of Onset    Hypertension Mother     Stroke Mother     Heart Attack Father     Hypertension Father     Parkinson's Disease Sister     Hypertension Brother     Cancer Brother         prostate    No Known Problems Son     No Known Problems Daughter        Social History     Tobacco Use    Smoking status: Never Smoker    Smokeless tobacco: Never Used   Substance Use Topics    Alcohol use: Yes     Alcohol/week: 0.0 - 1.0 standard drinks       ROS   Review of Systems   Constitutional: Negative for chills and fever. HENT: Negative for ear pain and sore throat. Eyes: Negative for blurred vision and pain. Respiratory: Negative for cough and shortness of breath. Cardiovascular: Positive for leg swelling. Negative for chest pain. Gastrointestinal: Negative for abdominal pain, blood in stool and melena. Genitourinary: Negative for dysuria and hematuria. Musculoskeletal: Positive for joint pain. Negative for myalgias. Skin: Negative for rash. Neurological: Negative for headaches. Endo/Heme/Allergies: Does not bruise/bleed easily. Psychiatric/Behavioral: Negative for substance abuse. Objective     Vitals:    07/20/21 1005   BP: (!) 111/52   Pulse: 65   Resp: 14   Temp: 97.4 °F (36.3 °C)   TempSrc: Temporal   SpO2: 97%   Weight: 153 lb (69.4 kg)   Height: 5' 4\" (1.626 m)   PainSc:   4   PainLoc: Hip       Physical Exam  Vitals and nursing note reviewed. HENT:      Head: Normocephalic and atraumatic. Right Ear: External ear normal.      Left Ear: External ear normal.   Eyes:      General: No scleral icterus. Right eye: No discharge. Left eye: No discharge. Conjunctiva/sclera: Conjunctivae normal.   Cardiovascular:      Rate and Rhythm: Normal rate and regular rhythm. Heart sounds: Normal heart sounds. No murmur heard. No friction rub. No gallop. Pulmonary:      Effort: Pulmonary effort is normal. No respiratory distress. Breath sounds: Normal breath sounds. No wheezing or rales. Abdominal:      General: Bowel sounds are normal. There is no distension. Palpations: Abdomen is soft. There is no mass. Tenderness: There is no abdominal tenderness. There is no guarding or rebound. Musculoskeletal:         General: No swelling (BUE) or tenderness (BUE). Cervical back: Neck supple. Comments: RLE>LLE   Lymphadenopathy:      Cervical: No cervical adenopathy. Skin:     General: Skin is warm and dry. Findings: No erythema. Neurological:      Mental Status: She is alert and oriented to person, place, and time. Motor: No abnormal muscle tone. Gait: Gait is intact.  Gait normal.   Psychiatric:         Mood and Affect: Mood and affect normal.         LABS   Data Review:   Lab Results   Component Value Date/Time    WBC 7.3 07/02/2021 04:44 PM    HGB 11.9 (L) 07/02/2021 04:44 PM    HCT 39.2 07/02/2021 04:44 PM    PLATELET 888 07/02/2021 04:44 PM    MCV 89.3 07/02/2021 04:44 PM       Lab Results   Component Value Date/Time    Sodium 143 07/12/2021 08:00 AM    Potassium 4.4 07/12/2021 08:00 AM    Chloride 109 07/12/2021 08:00 AM    CO2 27 07/12/2021 08:00 AM    Anion gap 7 07/12/2021 08:00 AM    Glucose 104 (H) 07/12/2021 08:00 AM    BUN 17 07/12/2021 08:00 AM    Creatinine 0.87 07/12/2021 08:00 AM    BUN/Creatinine ratio 20 07/12/2021 08:00 AM    GFR est AA >60 07/12/2021 08:00 AM    GFR est non-AA >60 07/12/2021 08:00 AM    Calcium 9.2 07/12/2021 08:00 AM       Lab Results   Component Value Date/Time    Cholesterol, total 203 (H) 02/25/2021 06:45 AM    HDL Cholesterol 65 (H) 02/25/2021 06:45 AM    LDL, calculated 111 (H) 02/25/2021 06:45 AM    VLDL, calculated 27 02/25/2021 06:45 AM    Triglyceride 135 02/25/2021 06:45 AM    CHOL/HDL Ratio 3.1 02/25/2021 06:45 AM       Lab Results   Component Value Date/Time    Hemoglobin A1c 5.9 (H) 07/12/2021 08:00 AM       Assessment/Plan:   1. Hyperglycemia/Prediabetes:  --I encouraged her to maintain a low-carb diet.  -We will check an A1c later this year. ORDERS:  - HEMOGLOBIN A1C WITH EAG; Future    2. DVT: New diagnosis. -Continue medication as prescribed.  -I encouraged her to follow-up with Hematology to r/o a predispose coagulopathy given her family history. If she has an underlying coagulopathy, I discussed the need for her to be on anticoagulation of some kind for the remainder of her lifetime. We also discussed the risk of taking anticoagulation. All questions were answered. Fall risk reduction measures were discussed as well given her frailty. Health Maintenance Due   Topic Date Due    COVID-19 Vaccine (1) Never done    Shingrix Vaccine Age 50> (1 of 2) Never done    Medicare Yearly Exam  12/11/2020     Lab review: labs are reviewed in the EHR and ordered as mentioned above.     I have discussed the diagnosis with the patient and the intended plan as seen in the above orders. The patient has received an after-visit summary and questions were answered concerning future plans. I have discussed medication side effects and warnings with the patient as well. I have reviewed the plan of care with the patient, accepted their input and they are in agreement with the treatment goals. All questions were answered. The patient understands the plan of care. Handouts provided today with above information. Pt instructed if symptoms worsen to call the office or report to the ED for continued care. Greater than 50% of the visit time was spent in counseling and/or coordination of care. Voice recognition was used to generate this report, which may have resulted in some phonetic based errors in grammar and contents. Even though attempts were made to correct all the mistakes, some may have been missed, and remained in the body of the document.           Kristen Dickerson MD

## 2021-07-26 ENCOUNTER — HOSPITAL ENCOUNTER (OUTPATIENT)
Dept: LAB | Age: 86
Discharge: HOME | End: 2021-07-26
Payer: MEDICARE

## 2021-07-26 ENCOUNTER — TELEPHONE (OUTPATIENT)
Dept: INTERNAL MEDICINE CLINIC | Age: 86
End: 2021-07-26

## 2021-07-26 ENCOUNTER — OFFICE VISIT (OUTPATIENT)
Dept: INTERNAL MEDICINE CLINIC | Age: 86
End: 2021-07-26
Payer: MEDICARE

## 2021-07-26 VITALS
SYSTOLIC BLOOD PRESSURE: 133 MMHG | HEIGHT: 64 IN | DIASTOLIC BLOOD PRESSURE: 61 MMHG | HEART RATE: 74 BPM | TEMPERATURE: 98 F | BODY MASS INDEX: 26.5 KG/M2 | OXYGEN SATURATION: 96 % | RESPIRATION RATE: 14 BRPM | WEIGHT: 155.2 LBS

## 2021-07-26 DIAGNOSIS — L98.9 SKIN LESION: ICD-10-CM

## 2021-07-26 DIAGNOSIS — N39.0 URINARY TRACT INFECTION WITHOUT HEMATURIA, SITE UNSPECIFIED: Primary | ICD-10-CM

## 2021-07-26 DIAGNOSIS — N39.0 URINARY TRACT INFECTION WITHOUT HEMATURIA, SITE UNSPECIFIED: ICD-10-CM

## 2021-07-26 LAB
BILIRUB UR QL STRIP: NORMAL
GLUCOSE UR-MCNC: NORMAL MG/DL
KETONES P FAST UR STRIP-MCNC: NORMAL MG/DL
PH UR STRIP: 5 [PH] (ref 4.6–8)
PROT UR QL STRIP: NORMAL
SP GR UR STRIP: 1 (ref 1–1.03)
UA UROBILINOGEN AMB POC: NORMAL (ref 0.2–1)
URINALYSIS CLARITY POC: CLEAR
URINALYSIS COLOR POC: NORMAL
URINE BLOOD POC: NORMAL
URINE LEUKOCYTES POC: NORMAL
URINE NITRITES POC: POSITIVE

## 2021-07-26 PROCEDURE — G0463 HOSPITAL OUTPT CLINIC VISIT: HCPCS | Performed by: INTERNAL MEDICINE

## 2021-07-26 PROCEDURE — G8536 NO DOC ELDER MAL SCRN: HCPCS | Performed by: INTERNAL MEDICINE

## 2021-07-26 PROCEDURE — 87086 URINE CULTURE/COLONY COUNT: CPT

## 2021-07-26 PROCEDURE — 87077 CULTURE AEROBIC IDENTIFY: CPT

## 2021-07-26 PROCEDURE — 81003 URINALYSIS AUTO W/O SCOPE: CPT | Performed by: INTERNAL MEDICINE

## 2021-07-26 PROCEDURE — G8419 CALC BMI OUT NRM PARAM NOF/U: HCPCS | Performed by: INTERNAL MEDICINE

## 2021-07-26 PROCEDURE — G8427 DOCREV CUR MEDS BY ELIG CLIN: HCPCS | Performed by: INTERNAL MEDICINE

## 2021-07-26 PROCEDURE — 99214 OFFICE O/P EST MOD 30 MIN: CPT | Performed by: INTERNAL MEDICINE

## 2021-07-26 PROCEDURE — G9717 DOC PT DX DEP/BP F/U NT REQ: HCPCS | Performed by: INTERNAL MEDICINE

## 2021-07-26 PROCEDURE — 1101F PT FALLS ASSESS-DOCD LE1/YR: CPT | Performed by: INTERNAL MEDICINE

## 2021-07-26 PROCEDURE — 87186 SC STD MICRODIL/AGAR DIL: CPT

## 2021-07-26 PROCEDURE — 1090F PRES/ABSN URINE INCON ASSESS: CPT | Performed by: INTERNAL MEDICINE

## 2021-07-26 RX ORDER — CIPROFLOXACIN 500 MG/1
500 TABLET ORAL 2 TIMES DAILY
Qty: 6 TABLET | Refills: 0 | Status: SHIPPED | OUTPATIENT
Start: 2021-07-26 | End: 2021-07-29

## 2021-07-26 NOTE — TELEPHONE ENCOUNTER
Daughter called and said mother is having symptoms of UTI, frequency and burning. Okay to have her come in and do urinalysis or does she need to be seen? She was just seen 7/20/21.

## 2021-07-26 NOTE — TELEPHONE ENCOUNTER
Per Dr. Erin Lombardi, add on to her schedule today.   Natalia Gill aware and will bring patient in at 1:45pm today to see Dr. Erin Lombardi

## 2021-07-26 NOTE — PROGRESS NOTES
Depression Screening:  3 most recent PHQ Screens 7/20/2021   Little interest or pleasure in doing things Not at all   Feeling down, depressed, irritable, or hopeless Not at all   Total Score PHQ 2 0       Learning Assessment:  Learning Assessment 6/11/2019   PRIMARY LEARNER Patient   HIGHEST LEVEL OF EDUCATION - PRIMARY LEARNER  GRADUATED HIGH SCHOOL OR GED   BARRIERS PRIMARY LEARNER NONE   CO-LEARNER CAREGIVER No   PRIMARY LANGUAGE ENGLISH   LEARNER PREFERENCE PRIMARY DEMONSTRATION   ANSWERED BY patient   RELATIONSHIP SELF       Abuse Screening:  Abuse Screening Questionnaire 7/20/2021   Do you ever feel afraid of your partner? N   Are you in a relationship with someone who physically or mentally threatens you? N   Is it safe for you to go home? Y       Fall Risk  Fall Risk Assessment, last 12 mths 7/26/2021   Able to walk? Yes   Fall in past 12 months? 1   Do you feel unsteady? -   Are you worried about falling -   Is TUG test greater than 12 seconds? -   Is the gait abnormal? -   Number of falls in past 12 months 2   Fall with injury? 1       ADL  ADL Assessment 7/20/2021   Feeding yourself No Help Needed   Getting from bed to chair No Help Needed   Getting dressed No Help Needed   Bathing or showering No Help Needed   Walk across the room (includes cane/walker) Help Needed   Using the telphone No Help Needed   Taking your medications Help Needed   Preparing meals Help Needed   Managing money (expenses/bills) Help Needed   Moderately strenuous housework (laundry) Help Needed   Shopping for personal items (toiletries/medicines) Help Needed   Shopping for groceries Help Needed   Driving Help Needed   Climbing a flight of stairs Help Needed   Getting to places beyond walking distances Help Needed       Travel Screening:    Travel Screening     Question   Response    In the last month, have you been in contact with someone who was confirmed or suspected to have Coronavirus / COVID-19?   No / Unsure    Have you had a COVID-19 viral test in the last 14 days? No    Do you have any of the following new or worsening symptoms? None of these    Have you traveled internationally or domestically in the last month? No      Travel History   Travel since 06/26/21     No documented travel since 06/26/21          Health Maintenance reviewed and discussed and ordered per Provider. Health Maintenance Due   Topic Date Due    COVID-19 Vaccine (1) Never done    Shingrix Vaccine Age 50> (1 of 2) Never done    Medicare Yearly Exam  12/11/2020   . Kacie Krause presents today for   Chief Complaint   Patient presents with    Follow-up       Is someone accompanying this pt? Yes, Se Huffman daughter    Is the patient using any DME equipment during OV? Yes, walker    Coordination of Care:  1. Have you been to the ER, urgent care clinic since your last visit? Hospitalized since your last visit? no    2. Have you seen or consulted any other health care providers outside of the [address] [place] [address] since your last visit? Include any pap smears or colon screening.  no

## 2021-07-26 NOTE — PROGRESS NOTES
INTERNISTS [facility]:  7/26/2021, MRN: [id number]      Oscar Han is a 80 y.o. female and presents to clinic for Follow-up    Subjective: The patient is an 80-year-old female with history of sigmoid colon cancer status post surgery, prediabetes, history of strictures, osteopenia, hyponatremia, lumbar disc disease, hypertension, IBS, vitamin D deficiency, vertebral body fracture, allergic rhinitis, GERD, and urinary retention. UTI: No fever. No hematuria. No lower back pain. She has some achiness along her lower abdomen. Pain is pressure like. +Dysuria since this morning has been present. +Urgency. She reports that sx are similar to what she has had in the past with a UTI. She has had 2 UTIs since May. She recalls having a large BM on Friday and was incontinent. She believes this is the cause of her UTI. Patient Active Problem List    Diagnosis Date Noted    Thoracic compression fracture ([address] 05/15/2021    T12 compression fracture ([address] 05/15/2021    Mixed hyperlipidemia 09/03/2020    Depression 09/03/2020    Impaired glucose tolerance 10/10/2019    Urinary retention 09/01/2018    Cancer of sigmoid ([address] 09/01/2018    Anxiety 09/01/2018    Irritable bowel syndrome with both constipation and diarrhea 09/01/2018    Lumbar disc disease 09/01/2018    Essential hypertension 09/01/2018    Allergic rhinitis 09/01/2018    Vitamin D deficiency 09/01/2018       Current Outpatient Medications   Medication Sig Dispense Refill    varicella-zoster recombinant, PF, (Shingrix, PF,) 50 mcg/0.5 mL susr injection 0.5 mL by IntraMUSCular route every two (2) months for 2 doses. 1 mL 0    rivaroxaban (XARELTO STARTER SHAWANDA) 15 mg (42)- 20 mg (9) DsPk Take 15 mg by mouth two (2) times a day.  [START ON 7/29/2021] rivaroxaban (XARELTO) 20 mg tab tablet Take 1 Tablet by mouth daily. 30 Tablet 2    mirtazapine (REMERON) 30 mg tablet Take 1 Tab by mouth nightly.  (Patient taking differently: Take 1 Tab by mouth nightly. Missed 5 days per patient, due to slow renewal) 30 Tab 5    fexofenadine (Allergy Relief, fexofenadine,) 180 mg tablet Take 180 mg by mouth daily.  gabapentin (NEURONTIN) 100 mg capsule Take 1 Cap by mouth two (2) times a day. Max Daily Amount: 200 mg. 180 Cap 1    acetaminophen (TYLENOL) 500 mg capsule Take 500 mg by mouth every four (4) hours as needed for Pain.  pravastatin (PRAVACHOL) 10 mg tablet Take 1 Tab by mouth nightly. 30 Tab 5    ondansetron hcl (ZOFRAN) 4 mg tablet Take 4 mg by mouth every eight (8) hours as needed for Nausea.  alum-mag hydroxide-simeth (MYLANTA) 200-200-20 mg/5 mL susp Take 30 mL by mouth daily as needed for Other (PRN: Gastric Reflux, in PM). PRN: Gastric Reflux, in PM      famotidine (PEPCID) 20 mg tablet Take 20 mg by mouth daily.  melatonin tab tablet Take 10 mg by mouth nightly.  cholecalciferol (VITAMIN D3) 1,000 unit cap Take 1,000 Units by mouth daily.  multivitamin/iron/folic acid (CENTRUM WOMEN PO) Take 1 Tab by mouth daily.  cetirizine (ZYRTEC) 10 mg tablet Take 10 mg by mouth daily. As needed for allergies      polyethylene glycol (MIRALAX) 17 gram packet Take 17 g by mouth daily as needed for Other (PRN Constipation). PRN Constipation. Mix with 8 oz of liquid of choice, such as water.  Bifidobacterium Infantis (ALIGN) 4 mg cap Take 4 mg by mouth daily.  furosemide (LASIX) 20 mg tablet Take 1 Tab by mouth every other day. Indications: Edema (Patient taking differently: Take 20 mg by mouth daily. Indications: visible water retention) 90 Tab 3    dicyclomine (BENTYL) 10 mg capsule Take 1 Cap by mouth four (4) times daily. (Patient taking differently: Take 10 mg by mouth four (4) times daily as needed for Other (PRN:  IBS symptoms).  PRN:  IBS symptoms Takin 1 tab at night) 90 Cap 3       Allergies   Allergen Reactions    Macrobid [Nitrofurantoin Monohyd/M-Cryst] Nausea and Vomiting    Flomax [Tamsulosin] Myalgia and Vertigo    Pneumococcal Vaccine Other (comments)    Lexapro [Escitalopram Oxalate] Other (comments)     ? Hyponatremia per pt hx       Past Medical History:   Diagnosis Date    Arthritis     Cancer [facility]) 2002    Colon     Chronic pain     abdominal due to IBS    Depression     GERD (gastroesophageal reflux disease)     Hypertension     Migraine headache 2014    Mixed hyperlipidemia 9/3/2020    Occipital neuralgia 2014    PUD (peptic ulcer disease)     S/P colon resection 2002    clear presently        Past Surgical History:   Procedure Laterality Date    HX CHOLECYSTECTOMY  2015    HX HYSTERECTOMY  1965    HX TONSIL AND ADENOIDECTOMY      HX WISDOM TEETH EXTRACTION      x4    IR KYPHOPLASTY THORACIC  5/19/2021       Family History   Problem Relation Age of Onset    Hypertension Mother     Stroke Mother     Heart Attack Father     Hypertension Father     Parkinson's Disease Sister     Hypertension Brother     Cancer Brother         prostate    No Known Problems Son     No Known Problems Daughter        Social History     Tobacco Use    Smoking status: Never Smoker    Smokeless tobacco: Never Used   Substance Use Topics    Alcohol use: Yes     Alcohol/week: 0.0 - 1.0 standard drinks       ROS   Review of Systems   Constitutional: Negative for chills and fever. HENT: Negative for ear pain and sore throat. Eyes: Negative for blurred vision and pain. Respiratory: Negative for cough and shortness of breath. Cardiovascular: Negative for chest pain. Gastrointestinal: Negative for blood in stool and melena. Genitourinary: Positive for dysuria and urgency. Negative for hematuria. Musculoskeletal: Positive for joint pain (off/on). Negative for myalgias. Skin: Negative for rash. +Skin lesion along her cheek   Neurological: Negative for headaches. Endo/Heme/Allergies: Does not bruise/bleed easily.    Psychiatric/Behavioral: Negative for substance abuse.       Objective     Vitals:    07/26/21 1408   BP: 133/61   Pulse: 74   Resp: 14   Temp: 98 °F (36.7 °C)   TempSrc: Temporal   SpO2: 96%   Weight: 155 lb 3.2 oz (70.4 kg)   Height: 5' 4\" (1.626 m)   PainSc:   8   PainLoc: Abdomen       Physical Exam  Vitals and nursing note reviewed. HENT:      Head: Normocephalic and atraumatic. Right Ear: External ear normal.      Left Ear: External ear normal.   Eyes:      General: No scleral icterus. Right eye: No discharge. Left eye: No discharge. Conjunctiva/sclera: Conjunctivae normal.   Cardiovascular:      Rate and Rhythm: Normal rate and regular rhythm. Heart sounds: Normal heart sounds. No murmur heard. No friction rub. No gallop. Pulmonary:      Effort: Pulmonary effort is normal. No respiratory distress. Breath sounds: Normal breath sounds. No wheezing or rales. Abdominal:      General: Bowel sounds are normal. There is no distension. Palpations: Abdomen is soft. There is no mass. Tenderness: There is no abdominal tenderness. There is no guarding or rebound. Musculoskeletal:         General: No swelling (BUE) or tenderness (BUE). Cervical back: Neck supple. Lymphadenopathy:      Cervical: No cervical adenopathy. Skin:     General: Skin is warm and dry. Findings: No erythema. Comments: +Right cheek irregular;y shaped skin lesion     Neurological:      Mental Status: She is alert and oriented to person, place, and time. Motor: No abnormal muscle tone. Gait: Gait is intact.  Gait normal.   Psychiatric:         Mood and Affect: Mood and affect normal.         LABS   Data Review:   Lab Results   Component Value Date/Time    WBC 7.3 07/02/2021 04:44 PM    HGB 11.9 (L) 07/02/2021 04:44 PM    HCT 39.2 07/02/2021 04:44 PM    PLATELET 143 27/32/5180 04:44 PM    MCV 89.3 07/02/2021 04:44 PM       Lab Results   Component Value Date/Time    Sodium 143 07/12/2021 08:00 AM    Potassium 4.4 07/12/2021 08:00 AM    Chloride 109 07/12/2021 08:00 AM    CO2 27 07/12/2021 08:00 AM    Anion gap 7 07/12/2021 08:00 AM    Glucose 104 (H) 07/12/2021 08:00 AM    BUN 17 07/12/2021 08:00 AM    Creatinine 0.87 07/12/2021 08:00 AM    BUN/Creatinine ratio 20 07/12/2021 08:00 AM    GFR est AA >60 07/12/2021 08:00 AM    GFR est non-AA >60 07/12/2021 08:00 AM    Calcium 9.2 07/12/2021 08:00 AM       Lab Results   Component Value Date/Time    Cholesterol, total 203 (H) 02/25/2021 06:45 AM    HDL Cholesterol 65 (H) 02/25/2021 06:45 AM    LDL, calculated 111 (H) 02/25/2021 06:45 AM    VLDL, calculated 27 02/25/2021 06:45 AM    Triglyceride 135 02/25/2021 06:45 AM    CHOL/HDL Ratio 3.1 02/25/2021 06:45 AM       Lab Results   Component Value Date/Time    Hemoglobin A1c 5.9 (H) 07/12/2021 08:00 AM       Assessment/Plan:   1. UTI:   - UA and culture ordered. - Abx ordered. ORDERS:  - AMB POC URINALYSIS DIP STICK AUTO W/O MICRO  - CULTURE, URINE; Future    2. Cheek Lesion:   - I encouraged her to monitor this area. We discussed the need to see Dermatology      Health Maintenance Due   Topic Date Due    COVID-19 Vaccine (1) Never done    Shingrix Vaccine Age 50> (1 of 2) Never done    Medicare Yearly Exam  12/11/2020         Lab review: labs are reviewed in the EHR    I have discussed the diagnosis with the patient and the intended plan as seen in the above orders. The patient has received an after-visit summary and questions were answered concerning future plans. I have discussed medication side effects and warnings with the patient as well. I have reviewed the plan of care with the patient, accepted their input and they are in agreement with the treatment goals. All questions were answered. The patient understands the plan of care. Handouts provided today with above information. Pt instructed if symptoms worsen to call the office or report to the ED for continued care.   Greater than 50% of the visit time was spent in counseling and/or coordination of care. Voice recognition was used to generate this report, which may have resulted in some phonetic based errors in grammar and contents. Even though attempts were made to correct all the mistakes, some may have been missed, and remained in the body of the document.           Georgann Kocher, MD

## 2021-07-27 ENCOUNTER — OFFICE VISIT (OUTPATIENT)
Dept: ORTHOPEDIC SURGERY | Age: 86
End: 2021-07-27
Payer: MEDICARE

## 2021-07-27 VITALS
RESPIRATION RATE: 14 BRPM | TEMPERATURE: 98.6 F | BODY MASS INDEX: 26.22 KG/M2 | WEIGHT: 153.6 LBS | HEART RATE: 78 BPM | OXYGEN SATURATION: 97 % | HEIGHT: 64 IN

## 2021-07-27 DIAGNOSIS — M48.062 SPINAL STENOSIS OF LUMBAR REGION WITH NEUROGENIC CLAUDICATION: Primary | ICD-10-CM

## 2021-07-27 DIAGNOSIS — M81.0 POSTMENOPAUSAL OSTEOPOROSIS: ICD-10-CM

## 2021-07-27 PROBLEM — I82.439 ACUTE DEEP VEIN THROMBOSIS (DVT) OF POPLITEAL VEIN (HCC): Status: ACTIVE | Noted: 2021-07-27

## 2021-07-27 PROCEDURE — G8427 DOCREV CUR MEDS BY ELIG CLIN: HCPCS | Performed by: PHYSICAL MEDICINE & REHABILITATION

## 2021-07-27 PROCEDURE — G8419 CALC BMI OUT NRM PARAM NOF/U: HCPCS | Performed by: PHYSICAL MEDICINE & REHABILITATION

## 2021-07-27 PROCEDURE — G8536 NO DOC ELDER MAL SCRN: HCPCS | Performed by: PHYSICAL MEDICINE & REHABILITATION

## 2021-07-27 PROCEDURE — G9717 DOC PT DX DEP/BP F/U NT REQ: HCPCS | Performed by: PHYSICAL MEDICINE & REHABILITATION

## 2021-07-27 PROCEDURE — 99213 OFFICE O/P EST LOW 20 MIN: CPT | Performed by: PHYSICAL MEDICINE & REHABILITATION

## 2021-07-27 PROCEDURE — 1101F PT FALLS ASSESS-DOCD LE1/YR: CPT | Performed by: PHYSICAL MEDICINE & REHABILITATION

## 2021-07-27 PROCEDURE — 1090F PRES/ABSN URINE INCON ASSESS: CPT | Performed by: PHYSICAL MEDICINE & REHABILITATION

## 2021-07-27 NOTE — PROGRESS NOTES
INTERNISTS OF [facility]:  07/27/21, [id number]      The Subsequent Medicare Annual Wellness Exam PROGRESS NOTE    This is a Subsequent Medicare Annual Wellness Exam (AWV). I have reviewed the patient's medical history in detail and updated the computerized patient record. Emerita Ramirez is a 80 y.o.  female and presents for an annual wellness exam.    SUBJECTIVE    Past Medical History:   Diagnosis Date    Arthritis     Cancer ([address] 2002    Colon     Chronic pain     abdominal due to IBS    Depression     GERD (gastroesophageal reflux disease)     Hypertension     Migraine headache 2014    Mixed hyperlipidemia 9/3/2020    Occipital neuralgia 2014    PUD (peptic ulcer disease)     S/P colon resection 2002    clear presently       Past Surgical History:   Procedure Laterality Date    HX CHOLECYSTECTOMY  2015    HX HYSTERECTOMY  1965    HX TONSIL AND ADENOIDECTOMY      HX WISDOM TEETH EXTRACTION      x4    IR KYPHOPLASTY THORACIC  5/19/2021     Current Outpatient Medications   Medication Sig Dispense Refill    varicella-zoster recombinant, PF, (Shingrix, PF,) 50 mcg/0.5 mL susr injection 0.5 mL by IntraMUSCular route every two (2) months for 2 doses. 1 mL 0    rivaroxaban (XARELTO STARTER SHAWANDA) 15 mg (42)- 20 mg (9) DsPk Take 15 mg by mouth two (2) times a day.  [START ON 7/29/2021] rivaroxaban (XARELTO) 20 mg tab tablet Take 1 Tablet by mouth daily. 30 Tablet 2    mirtazapine (REMERON) 30 mg tablet Take 1 Tab by mouth nightly. (Patient taking differently: Take 1 Tab by mouth nightly. Missed 5 days per patient, due to slow renewal) 30 Tab 5    fexofenadine (Allergy Relief, fexofenadine,) 180 mg tablet Take 180 mg by mouth daily.  gabapentin (NEURONTIN) 100 mg capsule Take 1 Cap by mouth two (2) times a day. Max Daily Amount: 200 mg. 180 Cap 1    acetaminophen (TYLENOL) 500 mg capsule Take 500 mg by mouth every four (4) hours as needed for Pain.       pravastatin (PRAVACHOL) 10 mg tablet Take 1 Tab by mouth nightly. 30 Tab 5    ondansetron hcl (ZOFRAN) 4 mg tablet Take 4 mg by mouth every eight (8) hours as needed for Nausea.  alum-mag hydroxide-simeth (MYLANTA) 200-200-20 mg/5 mL susp Take 30 mL by mouth daily as needed for Other (PRN: Gastric Reflux, in PM). PRN: Gastric Reflux, in PM      famotidine (PEPCID) 20 mg tablet Take 20 mg by mouth daily.  melatonin tab tablet Take 10 mg by mouth nightly.  cholecalciferol (VITAMIN D3) 1,000 unit cap Take 1,000 Units by mouth daily.  multivitamin/iron/folic acid (CENTRUM WOMEN PO) Take 1 Tab by mouth daily.  cetirizine (ZYRTEC) 10 mg tablet Take 10 mg by mouth daily. As needed for allergies      polyethylene glycol (MIRALAX) 17 gram packet Take 17 g by mouth daily as needed for Other (PRN Constipation). PRN Constipation. Mix with 8 oz of liquid of choice, such as water.  Bifidobacterium Infantis (ALIGN) 4 mg cap Take 4 mg by mouth daily.  furosemide (LASIX) 20 mg tablet Take 1 Tab by mouth every other day. Indications: Edema (Patient taking differently: Take 20 mg by mouth daily. Indications: visible water retention) 90 Tab 3    dicyclomine (BENTYL) 10 mg capsule Take 1 Cap by mouth four (4) times daily. (Patient taking differently: Take 10 mg by mouth four (4) times daily as needed for Other (PRN:  IBS symptoms). PRN:  IBS symptoms Takin 1 tab at night) 90 Cap 3    ciprofloxacin HCl (CIPRO) 500 mg tablet Take 1 Tablet by mouth two (2) times a day for 3 days. 6 Tablet 0     Allergies   Allergen Reactions    Macrobid [Nitrofurantoin Monohyd/M-Cryst] Nausea and Vomiting    Flomax [Tamsulosin] Myalgia and Vertigo    Pneumococcal Vaccine Other (comments)    Lexapro [Escitalopram Oxalate] Other (comments)     ? Hyponatremia per pt hx     Family History   Problem Relation Age of Onset    Hypertension Mother     Stroke Mother     Heart Attack Father     Hypertension Father     Parkinson's Disease Sister     Hypertension Brother     Cancer Brother         prostate    No Known Problems Son     No Known Problems Daughter      Social History     Tobacco Use    Smoking status: Never Smoker    Smokeless tobacco: Never Used   Substance Use Topics    Alcohol use: Yes     Alcohol/week: 0.0 - 1.0 standard drinks     Patient Active Problem List   Diagnosis Code    Urinary retention R33.9    Cancer of [address]moid ([facility] [address] [facility] C18.7    Anxiety F41.9    Irritable bowel syndrome with both constipation and diarrhea K58.2    Lumbar disc disease M51.9    Essential hypertension I10    Allergic rhinitis J30.9    Vitamin D deficiency E55.9    Impaired glucose tolerance R73.02    Mixed hyperlipidemia E78.2    Depression F32.9    Thoracic compression fracture (HCC) S22.000A    T12 compression fracture (HCC) S22.080A     The patient is an 79-year-old female with history of sigmoid colon cancer status post surgery, prediabetes, history of strictures, osteopenia, hyponatremia, lumbar disc disease, hypertension, IBS, vitamin D deficiency, vertebral body fracture, allergic rhinitis, GERD, and urinary retention. Health Maintenance History  Immunizations reviewed: Tdap up to date   Pneumovax: up to date   Flu: due later this year  Zoster: over-due    Immunization History   Administered Date(s) Administered    Influenza High Dose Vaccine PF 09/27/2018    Influenza Vaccine 10/01/2020    Influenza Vaccine (Tri) Adjuvanted (>65 Yrs FLUAD TRI 13892) 10/10/2019       Colonoscopy: Up to date. No bleeding. Eye exam: Up to date    Mammo: Up to date    Dexascan: Up to date    Pelvic/Pap: No bleeding. Review of Systems   Constitutional: Negative for chills, fever, malaise/fatigue and weight loss. HENT: Negative for ear pain and sore throat. Eyes: Negative for blurred vision and pain. Respiratory: Negative for shortness of breath. Cardiovascular: Positive for leg swelling. Negative for chest pain. Gastrointestinal: Negative for abdominal pain, blood in stool and melena. Genitourinary: Negative for dysuria and hematuria. Musculoskeletal: Positive for joint pain. Skin: Negative for rash. Neurological: Negative for headaches. Endo/Heme/Allergies: Does not bruise/bleed easily. Psychiatric/Behavioral: Negative for depression and substance abuse. Depression Risk Factor Screening:      Patient Health Questionnaire (PHQ-2)   Over the last 2 weeks, how often have you been bothered by any of the following problems? · Little interest or pleasure in doing things? · Not at all. [0]  · Feeling down, depressed, or hopeless? · Not at all. [0]    Total Score: 0/6  PHQ-2 Assessment Scoring:   A score of 2 or more requires further screening with the PHQ-9    Alcohol Risk Factor Screening:   Women: On any occasion during the past 3 months, have you had more than 3 drinks containing alcohol? no    Do you average more than 7 drinks per week? no    Tobacco Use Screening:     Social History     Tobacco Use   Smoking Status Never Smoker   Smokeless Tobacco Never Used       Hearing Loss    Her hearing is unchanged    Activities of Daily Living   Self-care. Requires assistance with: no ADLs  She needs help with IADLs, not ADLs    Fall Risk   she had 2 falls w/i the past year    Abuse Screen   None    Additional Examination Findings:  Vitals:    07/20/21 1005   BP: (!) 111/52   Pulse: 65   Resp: 14   Temp: 97.4 °F (36.3 °C)   TempSrc: Temporal   SpO2: 97%   Weight: 153 lb (69.4 kg)   Height: 5' 4\" (1.626 m)   PainSc:   4   PainLoc: Hip      Body mass index is 26.26 kg/m². Evaluation of Cognitive Function:  Mood/affect: Euthymic  Appearance: Well-groomed  Family member/caregiver input: she is with her daughter today      General:   Well-nourished, well-groomed, pleasant, alert, in no acute distress.      Head:  Normocephalic, atraumatic  Ears:  External ears WNL  Eyes:  Clear sclera  Neuro:   Alert, conversant, appropriate, following commands  Skin:    No rashes noted  Psych: Affect, mood and judgment appropriate      Dementia Screen:  Clock Drawing (ten past eleven) Exercise: unremarkable. LABS   Data Review:   Lab Results   Component Value Date/Time    Sodium 143 07/12/2021 08:00 AM    Potassium 4.4 07/12/2021 08:00 AM    Chloride 109 07/12/2021 08:00 AM    CO2 27 07/12/2021 08:00 AM    Anion gap 7 07/12/2021 08:00 AM    Glucose 104 (H) 07/12/2021 08:00 AM    BUN 17 07/12/2021 08:00 AM    Creatinine 0.87 07/12/2021 08:00 AM    BUN/Creatinine ratio 20 07/12/2021 08:00 AM    GFR est AA >60 07/12/2021 08:00 AM    GFR est non-AA >60 07/12/2021 08:00 AM    Calcium 9.2 07/12/2021 08:00 AM       Lab Results   Component Value Date/Time    WBC 7.3 07/02/2021 04:44 PM    HGB 11.9 (L) 07/02/2021 04:44 PM    HCT 39.2 07/02/2021 04:44 PM    PLATELET 238 08/78/2026 04:44 PM    MCV 89.3 07/02/2021 04:44 PM       Lab Results   Component Value Date/Time    Hemoglobin A1c 5.9 (H) 07/12/2021 08:00 AM       Lab Results   Component Value Date/Time    Cholesterol, total 203 (H) 02/25/2021 06:45 AM    HDL Cholesterol 65 (H) 02/25/2021 06:45 AM    LDL, calculated 111 (H) 02/25/2021 06:45 AM    VLDL, calculated 27 02/25/2021 06:45 AM    Triglyceride 135 02/25/2021 06:45 AM    CHOL/HDL Ratio 3.1 02/25/2021 06:45 AM       Patient Care Team:  Светлана Upton MD as PCP - General (Family Medicine)  Светлана Upton MD as PCP - [facility] Marva Wheeler Provider    End-of-life planning  Advanced Directive in the case than an injury or illness causes the patient to be unable to make health care decisions was discussed with the patient.      Advice/Referrals/Counselling/Plan:   Education and counseling provided:  Are appropriate based on today's review and evaluation  End-of-Life planning (with patient's consent)  Pneumococcal Vaccine  Influenza Vaccine  Screening Mammography  Screening Pap and pelvic (covered once every 2 years)  Colorectal cancer screening tests  Cardiovascular screening blood test  Bone mass measurement (DEXA)  Screening for glaucoma  Diabetes screening test  Include in education list (weight loss, physical activity, smoking cessation, fall prevention, and nutrition)    ICD-10-CM ICD-9-CM    1. Hyperglycemia  R73.9 790.29 HEMOGLOBIN A1C WITH EAG     reviewed diet, exercise and weight control. Brief written plan, checklist    Assessment/Plan:    Health Maintenance:  - I encouraged her to get all recommended vaccinations. Lab review: labs are reviewed in the [address] [place] [address] (ACP) Provider Conversation     Date of ACP Conversation: 07/27/21  Persons included in Conversation:  patient and family    Authorized Decision Maker (if patient is incapable of making informed decisions): This person is:   Next of Kin by law (only applies in absence of a Healthcare Power of  or Legal Guardian)          For Patients with Decision Making Capacity:   Values/Goals: Exploration of values, goals, and preferences if recovery is not expected, even with continued medical treatment in the event of:  Imminent death  Severe, permanent brain injury    Conversation Outcomes / Follow-Up Plan:   ACP in process - information provided, considering goals and options. I encouraged her to complete her advance directive. She wants her daughter who is with her right now to be her primary POA. She does not want her  to be her primary POA since he has advanced dementia. I have discussed the diagnosis with the patient and the intended plan as seen in the above orders. The patient has received an after-visit summary and questions were answered concerning future plans. I have discussed medication side effects and warnings with the patient as well. I have reviewed the plan of care with the patient, accepted their input and they are in agreement with the treatment goals.      Follow-up and Dispositions · Return in about 5 months (around 12/20/2021).

## 2021-07-27 NOTE — ACP (ADVANCE CARE PLANNING)
Advance Care Planning  Advance Care Planning (ACP) Provider Conversation     Date of ACP Conversation: 07/20/21  Persons included in Conversation:  patient and family    Authorized Decision Maker (if patient is incapable of making informed decisions): This person is:   Next of Kin by law (only applies in absence of a Healthcare Power of  or Legal Guardian)          For Patients with Decision Making Capacity:   Values/Goals: Exploration of values, goals, and preferences if recovery is not expected, even with continued medical treatment in the event of:  Imminent death  Severe, permanent brain injury    Conversation Outcomes / Follow-Up Plan:   ACP in process - information provided, considering goals and options. I encouraged her to complete her advance directive. She wants her daughter who is with her right now to be her primary POA. She does not want her  to be her primary POA since he has advanced dementia.     Dr. Ric Cerrato  [facility] of [address], [address] [place] [address].  Phone: [phone]  Fax: [phone]

## 2021-07-27 NOTE — PROGRESS NOTES
[address]  [address], [address]  [place]  Phone: [phone]  Fax: [phone]        Davy Mohs  : 1935  PCP: Cira Mcgowan MD  2021    PROGRESS NOTE      HISTORY OF PRESENT ILLNESS  Nidhi Alvarez is a 80 y.o. female who was seen as a new patient 19 with c/o neck and low back pain with episodic pain radiating into the RLE. Her neck pain is the most bothersome. Pt notes that over the last 6 weeks, she has had PT and OT at [facility] in [place]. She notes that she found it beneficial, but when she is complete with PT, her pain returns. She has a limited standing tolerance.  Lumbar MRI 4/3/18. Per my read, multilevel degenerative changes, most significantly facet arthropathy, no more than moderate spinal stenosis. Multilevel foraminal stenosis without any focal disc herniation. Pt notes that she is able to do her own laundry, but she has pain reproduced when she makes the bed. She has h/o occipital headaches that she notes have progressively worsened. She reports limited ROM of her neck, especially towards the left. She previously found benefit from PT and manipulations for her neck pain. She finds some relief from heat, massage, and Lidocaine cream. She denies radicular symptoms into her upper extremities. Pt notes that she was previously active with swimming, but over the last 3 years she has been. She is currently taking Gabapentin 100 mg QHS (TID caused somnolence). She recently had a Prednisone dose pack that was not very beneficial. She attended PT (-19; Diane Shine). She notes that she has only had one session of dry needling so far (on the left), but she has found it very beneficial (she is supposed to have her session on the right soon).  Pt notes that the exercises in PT have been painful. Cervical MRI 19: Multilevel degenerative findings along cervical spine most pronounced at C5-6 where there is a kyphosis as well as mild cord impingement. Multilevel foraminal stenoses, some levels of severe, such as on the right at C5-6; as delineated above.     She returned to see NP Tim 3/9/21 with back pain radiating into the bilateral buttocks and BLE (R>L) to the calves. She described a cold, wet sensation in her legs. She attended PT at [facility] where she lives in assisted living, but did not find it beneficial. She had a fall due to her legs feeling heavy and giving out on her. She takes Gabapentin 100 mg BID (intolerant to TID due to grogginess). Lumbar spine MRI dated 3/29/21 reviewed. Per report, High-grade canal stenosis is present at L3-4 and L4-5 as discussed above. She had a right trochanteric bursa injection by Dr. Jeronimo Chino (4/8/21) that improved her lateral RLE burning/numbness. She had a fall where she fractured T12 and underwent a kyphoplasty 5/19/21. She felt weakness in the legs. Pt noted that after she left [facility], she was given a large back brace, but she found it burdensome and uncomfortable. She felt great following her kyphoplasty. Her daughter noted that she has known osteoporosis. Mary Litteljohn comes in to the office today for f/u. She underwent an L5-S1 YOLY (6/29/21; Dr. Florencio Coley) with significant benefit for about 1 week, then her symptoms gradually returned. She also c/o a newer pain in the right QL. She recently completed PT. She continues to take Gabapentin 200 mg daily, but was originally prescribed 300 mg daily. She has not tried the full dose yet. She notes that 10 days following the injection, she noticed a blood clot in the RLE. She is now waking Xarelto, and she is scheduled to see a vascular specialist soon. Pain Score: 5/10.     PmHx: osteoporosis, IBS, h/o T12 compression fracture w/ kyphoplasty, depression, anxiety     ASSESSMENT  Mary Littlejohn is an 80year-old female with BLE weakness that is likely due to lumbar spinal stenosis (severe L3-4, L4-5). She had low back pain following a fall and underwent a kyphoplasty for a T12 compression fracture with significant improvement. We discussed options of: surgical consult, increasing Gabapentin/changing neuromodulator    PLAN  1. Begin taking Gabapentin 100 mg TID as prescribed (by Dr. Alexander Herrera). She can call us for increased dose, refill, or if she needs to switch to a different neuromodulator. 2. Maintain HEP. Pt will f/u in with NP for osteoporosis clinic; with Dr. Romulo Newton in 3 months or sooner as needed. Diagnoses and all orders for this visit:    1. Spinal stenosis of lumbar region with neurogenic claudication    2. Postmenopausal osteoporosis         PAST MEDICAL HISTORY   Past Medical History:   Diagnosis Date    Acute deep vein thrombosis (DVT) of popliteal vein ([facility] [address] 7/27/2021    Arthritis     Cancer ([facility] [address] 2002    Colon     Chronic pain     abdominal due to IBS    Depression     GERD (gastroesophageal reflux disease)     Hypertension     Migraine headache 2014    Mixed hyperlipidemia 9/3/2020    Occipital neuralgia 2014    PUD (peptic ulcer disease)     S/P colon resection 2002    clear presently        Past Surgical History:   Procedure Laterality Date    HX CHOLECYSTECTOMY  2015    HX HYSTERECTOMY  1965    HX TONSIL AND ADENOIDECTOMY      HX WISDOM TEETH EXTRACTION      x4    IR KYPHOPLASTY THORACIC  5/19/2021   . MEDICATIONS      Current Outpatient Medications   Medication Sig Dispense Refill    ciprofloxacin HCl (CIPRO) 500 mg tablet Take 1 Tablet by mouth two (2) times a day for 3 days. 6 Tablet 0    rivaroxaban (XARELTO STARTER SHAWANDA) 15 mg (42)- 20 mg (9) DsPk Take 15 mg by mouth two (2) times a day.  mirtazapine (REMERON) 30 mg tablet Take 1 Tab by mouth nightly. (Patient taking differently: Take 1 Tab by mouth nightly.  Missed 5 days per patient, due to slow renewal) 30 Tab 5    fexofenadine (Allergy Relief, fexofenadine,) 180 mg tablet Take 180 mg by mouth daily.  gabapentin (NEURONTIN) 100 mg capsule Take 1 Cap by mouth two (2) times a day. Max Daily Amount: 200 mg. 180 Cap 1    acetaminophen (TYLENOL) 500 mg capsule Take 500 mg by mouth every four (4) hours as needed for Pain.  pravastatin (PRAVACHOL) 10 mg tablet Take 1 Tab by mouth nightly. 30 Tab 5    ondansetron hcl (ZOFRAN) 4 mg tablet Take 4 mg by mouth every eight (8) hours as needed for Nausea.  alum-mag hydroxide-simeth (MYLANTA) 200-200-20 mg/5 mL susp Take 30 mL by mouth daily as needed for Other (PRN: Gastric Reflux, in PM). PRN: Gastric Reflux, in PM      famotidine (PEPCID) 20 mg tablet Take 20 mg by mouth daily.  melatonin tab tablet Take 10 mg by mouth nightly.  cholecalciferol (VITAMIN D3) 1,000 unit cap Take 1,000 Units by mouth daily.  multivitamin/iron/folic acid (CENTRUM WOMEN PO) Take 1 Tab by mouth daily.  cetirizine (ZYRTEC) 10 mg tablet Take 10 mg by mouth daily. As needed for allergies      polyethylene glycol (MIRALAX) 17 gram packet Take 17 g by mouth daily as needed for Other (PRN Constipation). PRN Constipation. Mix with 8 oz of liquid of choice, such as water.  Bifidobacterium Infantis (ALIGN) 4 mg cap Take 4 mg by mouth daily.  furosemide (LASIX) 20 mg tablet Take 1 Tab by mouth every other day. Indications: Edema (Patient taking differently: Take 20 mg by mouth daily. Indications: visible water retention) 90 Tab 3    dicyclomine (BENTYL) 10 mg capsule Take 1 Cap by mouth four (4) times daily. (Patient taking differently: Take 10 mg by mouth four (4) times daily as needed for Other (PRN:  IBS symptoms). PRN:  IBS symptoms Takin 1 tab at night) 90 Cap 3    varicella-zoster recombinant, PF, (Shingrix, PF,) 50 mcg/0.5 mL susr injection 0.5 mL by IntraMUSCular route every two (2) months for 2 doses.  (Patient not taking: Reported on 7/27/2021) 1 mL 0    [START ON 7/29/2021] rivaroxaban (XARELTO) 20 mg tab tablet Take 1 Tablet by mouth daily. 30 Tablet 2        ALLERGIES    Allergies   Allergen Reactions    Macrobid [Nitrofurantoin Monohyd/M-Cryst] Nausea and Vomiting    Flomax [Tamsulosin] Myalgia and Vertigo    Pneumococcal Vaccine Other (comments)    Lexapro [Escitalopram Oxalate] Other (comments)     ? Hyponatremia per pt hx          SOCIAL HISTORY    Social History     Socioeconomic History    Marital status:      Spouse name: Not on file    Number of children: Not on file    Years of education: Not on file    Highest education level: Not on file   Tobacco Use    Smoking status: Never Smoker    Smokeless tobacco: Never Used   Substance and Sexual Activity    Alcohol use: Yes     Alcohol/week: 0.0 - 1.0 standard drinks    Sexual activity: Never     Social Determinants of Health     Financial Resource Strain:     Difficulty of Paying Living Expenses:    Food Insecurity:     Worried About Running Out of Food in the Last Year:     [address] in the Last Year:    Transportation Needs:     Lack of Transportation (Medical):  Lack of Transportation (Non-Medical):    Physical Activity:     Days of Exercise per Week:     Minutes of Exercise per Session:    Stress:     Feeling of Stress :    Social Connections:     Frequency of Communication with Friends and Family:     Frequency of Social Gatherings with Friends and Family:     Attends Christian Services:     Active Member of Clubs or Organizations:     Attends Club or Organization Meetings:     Marital Status:        FAMILY HISTORY    Family History   Problem Relation Age of Onset    Hypertension Mother     Stroke Mother     Heart Attack Father     Hypertension Father     Parkinson's Disease Sister     Hypertension Brother     Cancer Brother         prostate    No Known Problems Son     No Known Problems Daughter          REVIEW OF SYSTEMS  Review of Systems   Constitutional: Negative for chills, fever and weight loss. Respiratory: Negative for shortness of breath. Cardiovascular: Negative for chest pain. Gastrointestinal: Negative for constipation. Negative for fecal incontinence    Genitourinary: Negative for dysuria. Negative for urinary incontinence   Musculoskeletal: Positive for back pain. Skin: Negative for rash. Neurological: Positive for focal weakness ( BLE). Negative for dizziness, tingling, tremors and headaches. Endo/Heme/Allergies: Does not bruise/bleed easily. Psychiatric/Behavioral: The patient does not have insomnia. PHYSICAL EXAMINATION  Visit Vitals  Pulse 78   Temp 98.6 °F (37 °C) (Temporal)   Resp 14   Ht 5' 4\" (1.626 m)   Wt 153 lb 9.6 oz (69.7 kg)   SpO2 97%   BMI 26.37 kg/m²       Pain Assessment  7/27/2021   Location of Pain Back;Leg;Hip   Location Modifiers Left;Right   Severity of Pain 5   Quality of Pain Aching;Burning; Other (Comment)   Quality of Pain Comment \"numbness/stabbing/weakness\"   Duration of Pain Persistent   Frequency of Pain Constant   Aggravating Factors -   Aggravating Factors Comment -   Limiting Behavior Yes   Relieving Factors -   Result of Injury No           Constitutional:  Well developed, well nourished, in no acute distress. Psychiatric: Affect and mood are appropriate. Integumentary: No rashes or abrasions noted on exposed areas. SPINE/MUSCULOSKELETAL EXAM    Cervical spine:  Neck is midline. Normal muscle tone. No focal atrophy is noted. ROM limited bilaterally (L>R). Shoulder ROM intact.   Tenderness to palpation of trapezii, scalenes, sternocleidomastoids, and cervicothoracic paraspinals bilaterally. Negative Spurling's sign. Negative Tinel's sign. Negative Gutierrez's sign.                                                                                                                             Sensation in the bilateral arms grossly intact to light touch.      Lumbar spine:  No rash, ecchymosis, or gross obliquity. No fasciculations. No focal atrophy is noted. No pain with hip ROM. Full range of motion. Mild tenderness to palpation. No tenderness to palpation at the sciatic notch. SI joints non-tender. Trochanters non tender.     Sensation in the bilateral legs grossly intact to light touch.     No pain with lumbar extension, flexion, or rotation. Updates 7/27/21:  Negative SLR on the R  Tenderness to palpation of right QL    MOTOR:       Biceps  Triceps Deltoids Wrist Ext Wrist Flex Hand Intrin   Right 5/5 5/5 5/5 5/5 5/5 5/5   Left 5/5 5/5 5/5 5/5 5/5 5/5                     Hip Flex  Quads Hamstrings Ankle DF EHL Ankle PF   Right 5/5 +4/5 5/5 5/5 5/5 5/5   Left 5/5 5/5 5/5 +4/5 5/5 5/5      DTRs are 1+ biceps, triceps, brachioradialis, patella, and Achilles.     Negative Straight Leg raise. Squat not tested. No difficulty with tandem gait.      Ambulation with Rollator walker. FWB.       RADIOGRAPHS  Cervical Spine CT images taken on 5/24/21:  There is focal reversal of lordotic curvature at lower C-spine at the  level of C5-6 along with moderate disc space narrowing, endplate sclerosis and  osteophytes. This causes mild ventral canal stenosis and severe right neural  foramina stenosis. Similar but less degree degenerative changes also seen at  C6-7 without significant canal or foraminal stenosis. The vertebral column and  alignment of the cervical spine are otherwise unremarkable. No evidence of acute  compression fracture or listhesis identified. The odontoid and C1-2 relationship  appear within normal.  The prevertebral soft tissue space appears unremarkable.         IMPRESSION     No CT evidence of acute C-spine injury seen. Degenerative changes in lower C-spine as above.     Lumbar Spine CT images taken on 5/24/21:  There is a moderate dextroscoliosis with apex at L2-L3 along with moderate  osteophytes and left lateral severe disc space narrowing at L2-3 and severe left  foramina stenosis. Moderate osteopenia. Moderate height loss at T12 with post  augmentation changes and minimal retropulsion. Moderate facet arthropathy in mid  and the lower lumbar spine. There is minimal retrolisthesis of L1 on L2 and  L5-S1 with associated posterior disc space narrowing. No acute compression  fracture seen.     Imaged paraspinal region appear unremarkable.     IMPRESSION     1. No acute compression fracture. Remote compression fracture with  vertebroplasty at T12.      2. Moderate dextroscoliosis with spondylosis and DDD at L2-3.     3. Multilevel minimal listhesis as above.     Lumbar Spine CT images taken on 5/15/21:  Acute appearing compression deformity of T12 with approximately 20% vertebral  body height loss. There is minimal posterior bowing of the superior cortex and  mild anterior bowing of the anterior cortex.     Moderate/severe multilevel degenerative disc disease throughout the lumbar spine  similar to prior CT. Findings are most pronounced at L5-S1 and L2-L3.     Trace retrolisthesis of L2 on L3 and L1 on L2 similar to prior exam.     No interval change in scoliosis with a rotary component.     Degenerative changes of the spinous processes grossly similar.     Multilevel facet arthropathy has progressed. Findings most pronounced at L3-L4,  L4-5 and L5-S1 and more pronounced along the right facets.     Vacuum phenomenon noted within both sacroiliac joints with osteophyte formation.     Overall bone mineral density is decreased which limits evaluation.     Visualized lungs demonstrate groundglass opacities and interlobular septal  thickening. Findings may be acute and/or chronic.     Stable upper pole left renal cyst.     Atherosclerosis.     Moderate/severe multilevel neuroforaminal narrowing noted throughout the lumbar  spine.  Moderate/severe spinal canal narrowing noted at L3-L4 and L4-5.     IMPRESSION     Acute compression fracture of T12 with 20% vertebral body height loss as  discussed.     Multilevel degenerative disc disease throughout the lumbar spine causing  moderate/severe neural foraminal narrowing throughout and moderate/severe spinal  canal narrowing at L3-L4 and L4-5.     Interval progression of multilevel facet arthropathy.     Lumbar MRI images taken on 3/19/2021:  Alignment: Intact lordosis  Vertebral body height: Normal  Marrow signal: Unremarkable  Disc spaces: Preserved height and signal intensity  Conus: Terminates at T12-L1     Axial imaging correlation:      L1-2: There is a 2 to 3 mm retrolisthesis of L1 on L2 with anterior osteophytic  change present.     Central canal is nonstenotic     The right neuroforamina and left neuroforamina demonstrate no significant  stenosis.     L2-3: There is a diffuse annular bulge across the midline extending into the  right left neuroforamina. There is marked intradiscal degenerative changes with  chronic Modic endplate reactive changes present. The central canal is  nonstenotic     The left neuroforamina right neuroforamina demonstrate no high-grade stenosis.     L3-4: Central canal stenosis secondary to marked ligamentous hypertrophy and  facet arthropathy noted. Thecal sac 0.76 cm centrally.     The right neuroforamina and left neuroforamina are spared any significant  stenosis exiting nerve roots are not compressed.     L4-5: High-grade central canal stenosis is present AP dimension maximum 0.7 cm  there is marked hypertrophy of the facets and ligamentous structures.     Left neuroforamina is patent     Right neuroforamina demonstrates encroachment from bulging disc and  hypertrophied facet with mild compression of the exiting right L4 nerve root  seen on sagittal image 12 series 2.           L5-S1: There is no central canal stenosis there is anterior and posterior  osteophytic formation.  Posterior osteophyte formations encroaching into the  caudal aspect of the right neuroforamina with facet hypertrophy result in mild  neuroforaminal stenosis. The exiting right L5 root is not however compressed.     Left neuroforamina demonstrates less severe changes exiting nerve not  compressed. Facets demonstrate hypertrophic changes but otherwise intact     Other structures: There is a benign cyst arising off the upper pole of the left  kidney diameter 2.6 cm. No features of malignancy are seen.     Retroperitoneal structures aorta otherwise normal.     IMPRESSION     1. High-grade canal stenosis is present at L3-4 and L4-5 as discussed above.     Cervical MRI images taken on 9/25/19 personally reviewed with patient:  Alignment: Kyphosis centered at C5-6. Slight C6 retrolisthesis. Vertebral body height: Normal  Marrow signal: No focal concerning marrow signal. There is mild to moderate  discogenic endplate edema straddling C5-6 and less so C6-7. Also some edema  within the dens. No fracture or bone erosion. Disc spaces: Severe narrowing and desiccation of C5-6 followed by C6-7.     Cervicomedullary Junction: Patent  Cervical cord: No cord expansion or atrophy. Further discussed below where  relevant.     On axial imaging, findings at each level are as follows:     C2/C3: Small left paracentral disc protrusion. Bilateral facet arthropathy with  buckling of ligamentum flavum. Minor abutment and flattening of posterior cord  without cord edema. Patent canal and foramina.     C3/C4: Mild disc osteophyte ridge formation. Bilateral facet arthropathy. Buckling of ligamentum flavum. Patent canal. Moderate left foraminal stenosis.     C4/C5: Slight anterolisthesis. Likely facet ankylosis on the left with  hypertrophic spurring. The canal is patent. Mild to moderate left foraminal  stenosis.     C5/C6: Uncovering of the disc. Broad-based disc osteophyte complex. Bilateral  facet arthropathy, right more than left. There is low-grade active facet  inflammation on the right. Buckling of ligamentum flavum. Anterior cord contact  and flattening.  No cord edema. Minor spinal stenosis. Severe right foraminal  stenosis.     C6/C7: Bilobed broadly based disc osteophyte complex. Minor buckling of  ligamentum flavum. Minor spinal stenosis. Moderate foraminal stenoses.     C7/T1: Patent canal and foramina.     Other structures: Unremarkable     IMPRESSION  IMPRESSION:     1. Multilevel degenerative findings along cervical spine  -most pronounced at C5-6 where there is a kyphosis as well as mild cord  impingement  -Multilevel foraminal stenoses, some levels of severe, such as on the right at  C5-6; as delineated above    20 minutes of face-to-face contact were spent with the patient during today's visit extensively discussing symptoms and treatment plan. All questions were answered. More than half of this visit today was spent on counseling.      Written by Penelope Ness as dictated by Abhilash Cason MD

## 2021-07-28 DIAGNOSIS — M51.9 LUMBAR DISC DISEASE: ICD-10-CM

## 2021-07-28 RX ORDER — GABAPENTIN 100 MG/1
100 CAPSULE ORAL 3 TIMES DAILY
Qty: 270 CAPSULE | Refills: 1 | Status: SHIPPED | OUTPATIENT
Start: 2021-07-28 | End: 2021-11-10

## 2021-07-28 NOTE — TELEPHONE ENCOUNTER
Per pt was told by Dr. Ashley Lyons to take Neurontin 3 times a day. Please review office notes from 07/27/2021 from Dr. Ankush Ro. Pt was advised to take Neurontin 100 mg TID. Last visit 07/26/2021 MD Alvarez Sevilla   Next appointment 12/20/2021 MD Alvarez Sevilla   Previous refill encounter(s)   03/11/2021 Neurontin #180 with 1 refill. No access to      Requested Prescriptions     Pending Prescriptions Disp Refills    gabapentin (NEURONTIN) 100 mg capsule 180 Capsule 1     Sig: Take 1 Capsule by mouth two (2) times a day. Max Daily Amount: 200 mg.

## 2021-07-29 LAB
BACTERIA SPEC CULT: ABNORMAL
CC UR VC: ABNORMAL
SERVICE CMNT-IMP: ABNORMAL

## 2021-07-29 NOTE — TELEPHONE ENCOUNTER
Lori Leal at [facility] calling [phone]. Got rx yesterday. Needs to confirm directions.

## 2021-07-29 NOTE — TELEPHONE ENCOUNTER
Called and provided verbal order to D/C the BID dosing. Jeronimo Veloz requires this since patient is in a long term care facility. They will fill the TID dosing. Nothing further is needed at this time.

## 2021-08-10 ENCOUNTER — OFFICE VISIT (OUTPATIENT)
Dept: ORTHOPEDIC SURGERY | Age: 86
End: 2021-08-10
Payer: MEDICARE

## 2021-08-10 VITALS
OXYGEN SATURATION: 99 % | WEIGHT: 154 LBS | HEIGHT: 64 IN | HEART RATE: 70 BPM | BODY MASS INDEX: 26.29 KG/M2 | TEMPERATURE: 97.1 F

## 2021-08-10 DIAGNOSIS — M81.0 POSTMENOPAUSAL OSTEOPOROSIS: Primary | ICD-10-CM

## 2021-08-10 PROCEDURE — 1090F PRES/ABSN URINE INCON ASSESS: CPT | Performed by: NURSE PRACTITIONER

## 2021-08-10 PROCEDURE — 99213 OFFICE O/P EST LOW 20 MIN: CPT | Performed by: NURSE PRACTITIONER

## 2021-08-10 PROCEDURE — G8419 CALC BMI OUT NRM PARAM NOF/U: HCPCS | Performed by: NURSE PRACTITIONER

## 2021-08-10 PROCEDURE — 1101F PT FALLS ASSESS-DOCD LE1/YR: CPT | Performed by: NURSE PRACTITIONER

## 2021-08-10 PROCEDURE — G8536 NO DOC ELDER MAL SCRN: HCPCS | Performed by: NURSE PRACTITIONER

## 2021-08-10 PROCEDURE — G9717 DOC PT DX DEP/BP F/U NT REQ: HCPCS | Performed by: NURSE PRACTITIONER

## 2021-08-10 PROCEDURE — G8427 DOCREV CUR MEDS BY ELIG CLIN: HCPCS | Performed by: NURSE PRACTITIONER

## 2021-08-10 NOTE — PROGRESS NOTES
Chief complaint   Chief Complaint   Patient presents with    Back Pain     Osteo treatment       History of Present Illness:  Perez Garcia is a  80 y.o.  female who comes in today for osteoporosis evaluation. She had a fall where she fractured T12 and underwent a kyphoplasty 5/19/21. She had a bone density done with the following results:     Lumbar Spine Levels: L1-L3  Mean Bone Mineral Density (BMD):  0.926 g/cm2    T Score: -2.0  Z Score: -0.1     On PA image of the lumbar spine obtained for localization of vertebral levels  (not a diagnostic quality radiograph), there is apparent increased density at  L4. The density is not well characterized on the basis of this image, but  likely degenerative. Since this density spuriously increases measured bone  mineral density, this has been excluded.      Left distal 1/3 Radius BMD:  0.635 g/cm2    T Score: -2.8      Z Score: 0.5     Left Total Proximal Femur BMD: 0.582 g/cm2    T Score:  -3.4  Z Score:  -1.1     Right Total Proximal Femur BMD: 0.634 g/cm2   T Score:  -3.0  Z Score:  -0.6     Left Femoral Neck BMD:  0.621 g/cm2    T Score: -3.0  Z Score: -0.6     Right Femoral Neck BMD:  0.630 g/cm2    T Score: -2.9  Z Score: -0.5      IMPRESSION     BMD measures consistent with osteoporosis. The patient did have some blood work done last month that showed a normal vitamin D level 37.5 and normal calcium level. She denies any pain at the moment. She does report she recently had a DVT in the right lower extremity and is on Xarelto. Physical Exam: The patient is a 49-year-old female well-developed well-nourished who is alert and oriented with a normal mood and affect. She has a full weightbearing nonantalgic gait utilizing rolling walker. She has 4-5 strength bilateral upper lower extremities. Negative straight leg raise. Negative Gabe's.       Assessment and Plan:    I will start the patient on Evenity after confirming that she can take it with a blood clot. Addendum:  I spoke with [employer] and also the rep for them and they confirm that there is no contraindication for Evenity and blood clots. I called the patient's daughter and reported this to her. I asked her to speak with the vascular doctor tomorrow at her appt regarding starting Evenity. She agrees to do so and will let us know if he feels it is okay to start the med. I will hold the order until I hear back from the patient/daughter. Medications:  Current Outpatient Medications   Medication Sig Dispense Refill    gabapentin (NEURONTIN) 100 mg capsule Take 1 Capsule by mouth three (3) times daily. Max Daily Amount: 300 mg. 270 Capsule 1    rivaroxaban (XARELTO) 20 mg tab tablet Take 1 Tablet by mouth daily. 30 Tablet 2    mirtazapine (REMERON) 30 mg tablet Take 1 Tab by mouth nightly. (Patient taking differently: Take 1 Tab by mouth nightly. Missed 5 days per patient, due to slow renewal) 30 Tab 5    fexofenadine (Allergy Relief, fexofenadine,) 180 mg tablet Take 180 mg by mouth daily.  acetaminophen (TYLENOL) 500 mg capsule Take 500 mg by mouth every four (4) hours as needed for Pain.  pravastatin (PRAVACHOL) 10 mg tablet Take 1 Tab by mouth nightly. 30 Tab 5    ondansetron hcl (ZOFRAN) 4 mg tablet Take 4 mg by mouth every eight (8) hours as needed for Nausea.  alum-mag hydroxide-simeth (MYLANTA) 200-200-20 mg/5 mL susp Take 30 mL by mouth daily as needed for Other (PRN: Gastric Reflux, in PM). PRN: Gastric Reflux, in PM      famotidine (PEPCID) 20 mg tablet Take 20 mg by mouth daily.  melatonin tab tablet Take 10 mg by mouth nightly.  cholecalciferol (VITAMIN D3) 1,000 unit cap Take 1,000 Units by mouth daily.  multivitamin/iron/folic acid (CENTRUM WOMEN PO) Take 1 Tab by mouth daily.  cetirizine (ZYRTEC) 10 mg tablet Take 10 mg by mouth daily.  As needed for allergies      polyethylene glycol (MIRALAX) 17 gram packet Take 17 g by mouth daily as needed for Other (PRN Constipation). PRN Constipation. Mix with 8 oz of liquid of choice, such as water.  Bifidobacterium Infantis (ALIGN) 4 mg cap Take 4 mg by mouth daily.  furosemide (LASIX) 20 mg tablet Take 1 Tab by mouth every other day. Indications: Edema (Patient taking differently: Take 20 mg by mouth daily. Indications: visible water retention) 90 Tab 3    dicyclomine (BENTYL) 10 mg capsule Take 1 Cap by mouth four (4) times daily. (Patient taking differently: Take 10 mg by mouth four (4) times daily as needed for Other (PRN:  IBS symptoms). PRN:  IBS symptoms Takin 1 tab at night) 90 Cap 3    rivaroxaban (XARELTO STARTER SHAWANDA) 15 mg (42)- 20 mg (9) DsPk Take 15 mg by mouth two (2) times a day. Review of systems:    Past Medical History:   Diagnosis Date    Acute deep vein thrombosis (DVT) of popliteal vein ([facility] [address] 7/27/2021    Arthritis     Cancer ([facility] [address] 2002    Colon     Chronic pain     abdominal due to IBS    Depression     GERD (gastroesophageal reflux disease)     Hypertension     Migraine headache 2014    Mixed hyperlipidemia 9/3/2020    Occipital neuralgia 2014    PUD (peptic ulcer disease)     S/P colon resection 2002    clear presently      Past Surgical History:   Procedure Laterality Date    HX CHOLECYSTECTOMY  2015    HX HYSTERECTOMY  1965    HX TONSIL AND ADENOIDECTOMY      HX WISDOM TEETH EXTRACTION      x4    IR KYPHOPLASTY THORACIC  5/19/2021     Social History     Socioeconomic History    Marital status:      Spouse name: Not on file    Number of children: Not on file    Years of education: Not on file    Highest education level: Not on file   Occupational History    Not on file   Tobacco Use    Smoking status: Never Smoker    Smokeless tobacco: Never Used   Substance and Sexual Activity    Alcohol use:  Yes     Alcohol/week: 0.0 - 1.0 standard drinks    Drug use: Not on file    Sexual activity: Never   Other Topics Concern    Not on file   Social History Narrative    Not on file     Social Determinants of Health     Financial Resource Strain:     Difficulty of Paying Living Expenses:    Food Insecurity:     Worried About Running Out of Food in the Last Year:     [address] in the Last Year:    Transportation Needs:     Lack of Transportation (Medical):  Lack of Transportation (Non-Medical):    Physical Activity:     Days of Exercise per Week:     Minutes of Exercise per Session:    Stress:     Feeling of Stress :    Social Connections:     Frequency of Communication with Friends and Family:     Frequency of Social Gatherings with Friends and Family:     Attends [employer] Services:     Active Member of Clubs or Organizations:     Attends Club or Organization Meetings:     Marital Status:    Intimate Partner Violence:     Fear of Current or Ex-Partner:     Emotionally Abused:     Physically Abused:     Sexually Abused:      Family History   Problem Relation Age of Onset    Hypertension Mother     Stroke Mother     Heart Attack Father     Hypertension Father     Parkinson's Disease Sister     Hypertension Brother     Cancer Brother         prostate    No Known Problems Son     No Known Problems Daughter        Physical Exam:  Visit Vitals  Pulse 70   Temp 97.1 °F (36.2 °C) (Tympanic)   Ht 5' 4\" (1.626 m)   Wt 154 lb (69.9 kg)   SpO2 99%   BMI 26.43 kg/m²     Pain Scale: 5/10       has been . reviewed and is appropriate          Diagnoses and all orders for this visit:    1. Postmenopausal osteoporosis            Follow-up and Dispositions    · Return in about 1 year (around 8/10/2022) for with NP Tim.              We have informed Nicolette Ricketts to notify us for immediate appointment if she has any worsening neurogical symptoms or if an emergency situation presents, then call [phone]

## 2021-08-11 ENCOUNTER — OFFICE VISIT (OUTPATIENT)
Dept: VASCULAR SURGERY | Age: 86
End: 2021-08-11
Payer: MEDICARE

## 2021-08-11 VITALS
HEIGHT: 64 IN | HEART RATE: 65 BPM | RESPIRATION RATE: 17 BRPM | BODY MASS INDEX: 26.29 KG/M2 | DIASTOLIC BLOOD PRESSURE: 80 MMHG | OXYGEN SATURATION: 99 % | WEIGHT: 154 LBS | SYSTOLIC BLOOD PRESSURE: 140 MMHG

## 2021-08-11 DIAGNOSIS — I82.431 ACUTE DEEP VEIN THROMBOSIS (DVT) OF POPLITEAL VEIN OF RIGHT LOWER EXTREMITY (HCC): Primary | ICD-10-CM

## 2021-08-11 PROCEDURE — 99203 OFFICE O/P NEW LOW 30 MIN: CPT | Performed by: SURGERY

## 2021-08-11 NOTE — PROGRESS NOTES
[facility]    Chief Complaint   Patient presents with    New Patient    Swelling       History and Physical    80 y.o. female  With DVT of popliteal vein, on Xarelto. History of colon cancer, hypertension, hyperlipidemia and GERD. Patient is on diuretics Qother day. Edema previously resolved with elevation. Patient had an epidural 10 days prior to the diagnosis of DVT at which time she noticed edema had worsened and did not resolve. She was initiated on Xarelto. Patient denies pain in the right leg. She has a sedentary lifestyle and is not very active. She does not wear compression stockings. Recent PVL 7/7/2021 reviewed and discussed with the patient: This shows acute right popliteal and posterior tibial as well as peroneal vein thrombosis. Interpretation Summary       · Acute occlusive thrombus present in the right popliteal vein. · Acute non-occlusive thrombus present in the right posterior tibial vein. · Acute occlusive thrombus present in the right peroneal vein. · No evidence of acute deep vein thrombosis in the left common femoral, superficial femoral, popliteal, posterior tibial, and peroneal veins. The veins were imaged in the transverse and longitudinal planes. The vessels showed normal color filling and compressibility. Doppler interrogation showed phasic and spontaneous flow. Acute occlusive DVT right popliteal vein and right peroneal vein. Acute nonocclusive DVT right posterior tibial vein.   No evidence of DVT demonstrated left lower extremity.          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    Anxiety F41.9    Irritable bowel syndrome with both constipation and diarrhea K58.2    Lumbar disc disease M51.9    Essential hypertension I10    Allergic rhinitis J30.9    Vitamin D deficiency E55.9    Impaired glucose tolerance R73.02    Mixed hyperlipidemia E78.2    Depression F32.9    Thoracic compression fracture (HCC) S22.000A    T12 compression fracture (HCC) S22.080A    Acute deep vein thrombosis (DVT) of popliteal vein ([facility]) I82.439     Past Surgical History:   Procedure Laterality Date    HX CHOLECYSTECTOMY  2015    HX HYSTERECTOMY  1965    HX TONSIL AND ADENOIDECTOMY      HX WISDOM TEETH EXTRACTION      x4    IR KYPHOPLASTY THORACIC  5/19/2021     Current Outpatient Medications   Medication Sig Dispense Refill    gabapentin (NEURONTIN) 100 mg capsule Take 1 Capsule by mouth three (3) times daily. Max Daily Amount: 300 mg. 270 Capsule 1    rivaroxaban (XARELTO) 20 mg tab tablet Take 1 Tablet by mouth daily. 30 Tablet 2    mirtazapine (REMERON) 30 mg tablet Take 1 Tab by mouth nightly. (Patient taking differently: Take 1 Tab by mouth nightly. Missed 5 days per patient, due to slow renewal) 30 Tab 5    fexofenadine (Allergy Relief, fexofenadine,) 180 mg tablet Take 180 mg by mouth daily.  acetaminophen (TYLENOL) 500 mg capsule Take 500 mg by mouth every four (4) hours as needed for Pain.  pravastatin (PRAVACHOL) 10 mg tablet Take 1 Tab by mouth nightly. 30 Tab 5    ondansetron hcl (ZOFRAN) 4 mg tablet Take 4 mg by mouth every eight (8) hours as needed for Nausea.  alum-mag hydroxide-simeth (MYLANTA) 200-200-20 mg/5 mL susp Take 30 mL by mouth daily as needed for Other (PRN: Gastric Reflux, in PM). PRN: Gastric Reflux, in PM      famotidine (PEPCID) 20 mg tablet Take 20 mg by mouth daily.       melatonin tab tablet Take 10 mg by mouth nightly.  cholecalciferol (VITAMIN D3) 1,000 unit cap Take 1,000 Units by mouth daily.  multivitamin/iron/folic acid (CENTRUM WOMEN PO) Take 1 Tab by mouth daily.  cetirizine (ZYRTEC) 10 mg tablet Take 10 mg by mouth daily. As needed for allergies      polyethylene glycol (MIRALAX) 17 gram packet Take 17 g by mouth daily as needed for Other (PRN Constipation). PRN Constipation. Mix with 8 oz of liquid of choice, such as water.  Bifidobacterium Infantis (ALIGN) 4 mg cap Take 4 mg by mouth daily.  furosemide (LASIX) 20 mg tablet Take 1 Tab by mouth every other day. Indications: Edema (Patient taking differently: Take 20 mg by mouth daily. Indications: visible water retention) 90 Tab 3    dicyclomine (BENTYL) 10 mg capsule Take 1 Cap by mouth four (4) times daily. (Patient taking differently: Take 10 mg by mouth four (4) times daily as needed for Other (PRN:  IBS symptoms). PRN:  IBS symptoms Takin 1 tab at night) 90 Cap 3     Allergies   Allergen Reactions    Macrobid [Nitrofurantoin Monohyd/M-Cryst] Nausea and Vomiting    Flomax [Tamsulosin] Myalgia and Vertigo    Pneumococcal Vaccine Other (comments)    Lexapro [Escitalopram Oxalate] Other (comments)     ? Hyponatremia per pt hx     Social History     Socioeconomic History    Marital status:      Spouse name: Not on file    Number of children: Not on file    Years of education: Not on file    Highest education level: Not on file   Occupational History    Not on file   Tobacco Use    Smoking status: Never Smoker    Smokeless tobacco: Never Used   Substance and Sexual Activity    Alcohol use:  Yes     Alcohol/week: 0.0 - 1.0 standard drinks    Drug use: Not on file    Sexual activity: Never   Other Topics Concern    Not on file   Social History Narrative    Not on file     Social Determinants of Health     Financial Resource Strain:     Difficulty of Paying Living Expenses:    Food Insecurity:     Worried About Running Out of Food in the Last Year:    [address] in the Last Year:    Transportation Needs:     Lack of Transportation (Medical):  Lack of Transportation (Non-Medical):    Physical Activity:     Days of Exercise per Week:     Minutes of Exercise per Session:    Stress:     Feeling of Stress :    Social Connections:     Frequency of Communication with Friends and Family:     Frequency of Social Gatherings with Friends and Family:     Attends [employer] Services:     Active Member of Clubs or Organizations:     Attends Club or Organization Meetings:     Marital Status:    Intimate Partner Violence:     Fear of Current or Ex-Partner:     Emotionally Abused:     Physically Abused:     Sexually Abused:       Family History   Problem Relation Age of Onset    Hypertension Mother     Stroke Mother     Heart Attack Father     Hypertension Father     Parkinson's Disease Sister     Hypertension Brother     Cancer Brother         prostate    No Known Problems Son     No Known Problems Daughter        Review of Systems    General: negative for fever   Eyes: negative for vision loss   HENT: negative for cold symptoms   Respiratory negative for shortness of breath   Cardiac: negative for chest pain   Vascular Positive for edema   Gastrointestinal: negative for abdominal pain   Genitourinary: negative for dysuria    Endocrine: negative for excessive thirst   Skin: negative for rash   Neurological: negative for paralysis   Psychiatric: negative for depression          Physical Exam:    Visit Vitals  BP (!) 140/80 (BP 1 Location: Left upper arm, BP Patient Position: Sitting)   Pulse 65   Resp 17   Ht 5' 4\" (1.626 m)   Wt 154 lb (69.9 kg)   SpO2 99%   BMI 26.43 kg/m²        Constitutional:  Patient is well developed, well nourished, and not distressed. HEENT: atraumatic, normocephalic, wearing a mask. Cardiovascular:  Normal rate, regular rhythm, normal heart sounds. No murmur heard.   Pulses: Right:      Radial         2+    Dorsalis      2+    Post Tib      2+ Left:        Radial         2+    Dorsalis      2+    Post Tib      2+       Pulmonary/Chest: Effort normal   Extremities: Normal range of motion. Pitting present on the right. Digits no cyanosis or clubbing  Neurological:  she  is alert and oriented x3 . Psych: Appropriate mood and affect. Skin:  Skin is warm and dry. No rash noted. No erythema. No ulcers. Impression and Plan:  80 y.o. female with recent diagnosis of RLE DVT. 1. RLE popliteal vein DVT. This occurred after epidural. Patient also quite sedentary. She was initiated on Xarelto when DVT was diagnosed. Agree with anticoagulation. Will repeat the duplex in 3 months to evaluate chronicity of thrombus. Recommended Compression, elevation and moisturization. The treatment plan was reviewed with the patient in detail. The patient voiced understanding of this plan and all questions and concerns were addressed. The patient agrees with this plan. We discussed the signs and symptoms that would require earlier attention or intervention. I appreciate the opportunity to participate in the care of your patient. I will be sure to keep you informed of any subsequent changes in the treatment plan. If you have any questions or concerns, please feel free to contact me. Rupesh Wheat MD PhD  Vascular Surgery    Covering for [employer] [facility]          PLEASE NOTE:  This document has been produced using voice recognition software. Unrecognized errors in transcription may be present.

## 2021-10-15 ENCOUNTER — HOSPITAL ENCOUNTER (EMERGENCY)
Age: 86
Discharge: HOME | End: 2021-10-15
Attending: STUDENT IN AN ORGANIZED HEALTH CARE EDUCATION/TRAINING PROGRAM
Payer: MEDICARE

## 2021-10-15 ENCOUNTER — APPOINTMENT (OUTPATIENT)
Dept: GENERAL RADIOLOGY | Age: 86
End: 2021-10-15
Attending: EMERGENCY MEDICINE
Payer: MEDICARE

## 2021-10-15 VITALS
TEMPERATURE: 97.8 F | OXYGEN SATURATION: 97 % | RESPIRATION RATE: 18 BRPM | HEART RATE: 64 BPM | DIASTOLIC BLOOD PRESSURE: 66 MMHG | SYSTOLIC BLOOD PRESSURE: 148 MMHG

## 2021-10-15 DIAGNOSIS — S52.612A CLOSED DISPLACED FRACTURE OF STYLOID PROCESS OF LEFT ULNA, INITIAL ENCOUNTER: ICD-10-CM

## 2021-10-15 DIAGNOSIS — S52.502A CLOSED FRACTURE OF DISTAL END OF LEFT RADIUS, UNSPECIFIED FRACTURE MORPHOLOGY, INITIAL ENCOUNTER: Primary | ICD-10-CM

## 2021-10-15 PROCEDURE — 73110 X-RAY EXAM OF WRIST: CPT

## 2021-10-15 PROCEDURE — 75810000053 HC SPLINT APPLICATION

## 2021-10-15 PROCEDURE — 99283 EMERGENCY DEPT VISIT LOW MDM: CPT

## 2021-10-15 RX ORDER — OXYCODONE AND ACETAMINOPHEN 5; 325 MG/1; MG/1
1 TABLET ORAL
Qty: 6 TABLET | Refills: 0 | Status: SHIPPED | OUTPATIENT
Start: 2021-10-15 | End: 2021-10-17

## 2021-10-15 RX ORDER — OXYCODONE AND ACETAMINOPHEN 5; 325 MG/1; MG/1
1 TABLET ORAL
Qty: 6 TABLET | Refills: 0 | Status: SHIPPED | OUTPATIENT
Start: 2021-10-15 | End: 2021-10-15 | Stop reason: SDUPTHER

## 2021-10-15 NOTE — ED PROVIDER NOTES
EMERGENCY DEPARTMENT HISTORY AND PHYSICAL EXAM    Date: 10/15/2021  Patient Name: Martha López    History of Presenting Illness     Chief Complaint   Patient presents with    Wrist Injury         History Provided By: Patient and Patient's Daughter  Additional History (Context): Martha López is a 80 y.o. female with hypertension, hyperlipidemia and osteoarthritis who presents with left wrist injury after falling today. She was turning with her Rollator in the turn too quickly causing her Rollator to roll over and she went down onto her left wrist.  Denies any head injury or hand pain or elbow pain. Is right-hand dominant. Has applied ice for comfort. PCP: Evie Olivares MD    Current Outpatient Medications   Medication Sig Dispense Refill    oxyCODONE-acetaminophen (Percocet) 5-325 mg per tablet Take 1 Tablet by mouth every eight (8) hours as needed for Pain for up to 2 days. Max Daily Amount: 3 Tablets. 6 Tablet 0    gabapentin (NEURONTIN) 100 mg capsule Take 1 Capsule by mouth three (3) times daily. Max Daily Amount: 300 mg. 270 Capsule 1    rivaroxaban (XARELTO) 20 mg tab tablet Take 1 Tablet by mouth daily. 30 Tablet 2    mirtazapine (REMERON) 30 mg tablet Take 1 Tab by mouth nightly. (Patient taking differently: Take 1 Tab by mouth nightly. Missed 5 days per patient, due to slow renewal) 30 Tab 5    fexofenadine (Allergy Relief, fexofenadine,) 180 mg tablet Take 180 mg by mouth daily.  acetaminophen (TYLENOL) 500 mg capsule Take 500 mg by mouth every four (4) hours as needed for Pain.  pravastatin (PRAVACHOL) 10 mg tablet Take 1 Tab by mouth nightly. 30 Tab 5    ondansetron hcl (ZOFRAN) 4 mg tablet Take 4 mg by mouth every eight (8) hours as needed for Nausea.  alum-mag hydroxide-simeth (MYLANTA) 200-200-20 mg/5 mL susp Take 30 mL by mouth daily as needed for Other (PRN: Gastric Reflux, in PM).  PRN: Gastric Reflux, in PM      famotidine (PEPCID) 20 mg tablet Take 20 mg by mouth daily.  melatonin tab tablet Take 10 mg by mouth nightly.  cholecalciferol (VITAMIN D3) 1,000 unit cap Take 1,000 Units by mouth daily.  multivitamin/iron/folic acid (CENTRUM WOMEN PO) Take 1 Tab by mouth daily.  cetirizine (ZYRTEC) 10 mg tablet Take 10 mg by mouth daily. As needed for allergies      polyethylene glycol (MIRALAX) 17 gram packet Take 17 g by mouth daily as needed for Other (PRN Constipation). PRN Constipation. Mix with 8 oz of liquid of choice, such as water.  Bifidobacterium Infantis (ALIGN) 4 mg cap Take 4 mg by mouth daily.  furosemide (LASIX) 20 mg tablet Take 1 Tab by mouth every other day. Indications: Edema (Patient taking differently: Take 20 mg by mouth daily. Indications: visible water retention) 90 Tab 3    dicyclomine (BENTYL) 10 mg capsule Take 1 Cap by mouth four (4) times daily. (Patient taking differently: Take 10 mg by mouth four (4) times daily as needed for Other (PRN:  IBS symptoms).  PRN:  IBS symptoms Takin 1 tab at night) 90 Cap 3       Past History     Past Medical History:  Past Medical History:   Diagnosis Date    Acute deep vein thrombosis (DVT) of popliteal vein (HCC) 7/27/2021    Arthritis     Cancer ([facility] [address] 2002    Colon     Chronic pain     abdominal due to IBS    Depression     GERD (gastroesophageal reflux disease)     Hypertension     Migraine headache 2014    Mixed hyperlipidemia 9/3/2020    Occipital neuralgia 2014    PUD (peptic ulcer disease)     S/P colon resection 2002    clear presently        Past Surgical History:  Past Surgical History:   Procedure Laterality Date    HX CHOLECYSTECTOMY  2015    HX HYSTERECTOMY  1965    HX TONSIL AND ADENOIDECTOMY      HX WISDOM TEETH EXTRACTION      x4    IR KYPHOPLASTY THORACIC  5/19/2021       Family History:  Family History   Problem Relation Age of Onset    Hypertension Mother     Stroke Mother     Heart Attack Father     Hypertension Father     Parkinson's Disease Sister     Hypertension Brother     Cancer Brother         prostate    No Known Problems Son     No Known Problems Daughter        Social History:  Social History     Tobacco Use    Smoking status: Never Smoker    Smokeless tobacco: Never Used   Substance Use Topics    Alcohol use: Yes     Alcohol/week: 0.0 - 1.0 standard drinks    Drug use: Not on file       Allergies: Allergies   Allergen Reactions    Macrobid [Nitrofurantoin Monohyd/M-Cryst] Nausea and Vomiting    Flomax [Tamsulosin] Myalgia and Vertigo    Pneumococcal Vaccine Other (comments)    Lexapro [Escitalopram Oxalate] Other (comments)     ? Hyponatremia per pt hx         Review of Systems   Review of Systems   Musculoskeletal: Positive for arthralgias and joint swelling. Neurological: Negative for weakness and numbness. All Other Systems Negative  Physical Exam     Vitals:    10/15/21 1522   BP: (!) 148/66   Pulse: 64   Resp: 18   Temp: 97.8 °F (36.6 °C)   SpO2: 97%     Physical Exam  Vitals and nursing note reviewed. Constitutional:       Appearance: She is well-developed. HENT:      Head: Normocephalic and atraumatic. Right Ear: External ear normal.      Left Ear: External ear normal.      Nose: Nose normal.   Eyes:      Conjunctiva/sclera: Conjunctivae normal.      Pupils: Pupils are equal, round, and reactive to light. Neck:      Vascular: No JVD. Trachea: No tracheal deviation. Cardiovascular:      Rate and Rhythm: Normal rate and regular rhythm. Heart sounds: Normal heart sounds. No murmur heard. No friction rub. No gallop. Pulmonary:      Effort: Pulmonary effort is normal. No respiratory distress. Breath sounds: Normal breath sounds. No wheezing or rales. Abdominal:      General: Bowel sounds are normal. There is no distension. Palpations: Abdomen is soft. There is no mass. Tenderness: There is no abdominal tenderness. There is no guarding or rebound.    Musculoskeletal: General: Swelling, tenderness, deformity and signs of injury present. Cervical back: Normal range of motion and neck supple. Comments: Dorsal left wrist is angulated and tender and swollen the lateral aspect of the joint line. Pulse palpable. Nontender hand distal forearm or elbow tenderness. Skin:     General: Skin is warm and dry. Findings: No rash. Neurological:      Mental Status: She is alert and oriented to person, place, and time. Cranial Nerves: No cranial nerve deficit. Deep Tendon Reflexes: Reflexes are normal and symmetric. Psychiatric:         Behavior: Behavior normal.          Diagnostic Study Results     Labs -   No results found for this or any previous visit (from the past 12 hour(s)). Radiologic Studies -   XR WRIST LT AP/LAT/OBL MIN 3V    (Results Pending)     CT Results  (Last 48 hours)    None        CXR Results  (Last 48 hours)    None            Medical Decision Making   I am the first provider for this patient. I reviewed the vital signs, available nursing notes, past medical history, past surgical history, family history and social history. Vital Signs-Reviewed the patient's vital signs. Records Reviewed: Nursing Notes    Procedures:  Procedures    Provider Notes (Medical Decision Making): distal radius fracture. Place in splint and treat pain, refer to ortho. Splint applied by tech to left wrist; excellent position. N/v intact before and after application. MED RECONCILIATION:  No current facility-administered medications for this encounter. Current Outpatient Medications   Medication Sig    oxyCODONE-acetaminophen (Percocet) 5-325 mg per tablet Take 1 Tablet by mouth every eight (8) hours as needed for Pain for up to 2 days. Max Daily Amount: 3 Tablets.  gabapentin (NEURONTIN) 100 mg capsule Take 1 Capsule by mouth three (3) times daily.  Max Daily Amount: 300 mg.    rivaroxaban (XARELTO) 20 mg tab tablet Take 1 Tablet by mouth daily.    mirtazapine (REMERON) 30 mg tablet Take 1 Tab by mouth nightly. (Patient taking differently: Take 1 Tab by mouth nightly. Missed 5 days per patient, due to slow renewal)    fexofenadine (Allergy Relief, fexofenadine,) 180 mg tablet Take 180 mg by mouth daily.  acetaminophen (TYLENOL) 500 mg capsule Take 500 mg by mouth every four (4) hours as needed for Pain.  pravastatin (PRAVACHOL) 10 mg tablet Take 1 Tab by mouth nightly.  ondansetron hcl (ZOFRAN) 4 mg tablet Take 4 mg by mouth every eight (8) hours as needed for Nausea.  alum-mag hydroxide-simeth (MYLANTA) 200-200-20 mg/5 mL susp Take 30 mL by mouth daily as needed for Other (PRN: Gastric Reflux, in PM). PRN: Gastric Reflux, in PM    famotidine (PEPCID) 20 mg tablet Take 20 mg by mouth daily.  melatonin tab tablet Take 10 mg by mouth nightly.  cholecalciferol (VITAMIN D3) 1,000 unit cap Take 1,000 Units by mouth daily.  multivitamin/iron/folic acid (CENTRUM WOMEN PO) Take 1 Tab by mouth daily.  cetirizine (ZYRTEC) 10 mg tablet Take 10 mg by mouth daily. As needed for allergies    polyethylene glycol (MIRALAX) 17 gram packet Take 17 g by mouth daily as needed for Other (PRN Constipation). PRN Constipation. Mix with 8 oz of liquid of choice, such as water.  Bifidobacterium Infantis (ALIGN) 4 mg cap Take 4 mg by mouth daily.  furosemide (LASIX) 20 mg tablet Take 1 Tab by mouth every other day. Indications: Edema (Patient taking differently: Take 20 mg by mouth daily. Indications: visible water retention)    dicyclomine (BENTYL) 10 mg capsule Take 1 Cap by mouth four (4) times daily. (Patient taking differently: Take 10 mg by mouth four (4) times daily as needed for Other (PRN:  IBS symptoms). PRN:  IBS symptoms Takin 1 tab at night)       Disposition:  home    DISCHARGE NOTE:   4:09 PM    Pt has been reexamined. Patient has no new complaints, changes, or physical findings. Care plan outlined and precautions discussed. Results of x-rays were reviewed with the patient. All medications were reviewed with the patient; will d/c home with percocet. All of pt's questions and concerns were addressed. Patient was instructed and agrees to follow up with ortho, as well as to return to the ED upon further deterioration. Patient is ready to go home. Follow-up Information     Follow up With Specialties Details Why Contact Info    Alana PRYOR, DO Hand Surgery Schedule an appointment as soon as possible for a visit in 3 days  [address] [facility] [address] [place] [id number]  [phone]      [facility] EMERGENCY DEPT Emergency Medicine  If symptoms worsen return immediately [address]  [phone]          Current Discharge Medication List      START taking these medications    Details   oxyCODONE-acetaminophen (Percocet) 5-325 mg per tablet Take 1 Tablet by mouth every eight (8) hours as needed for Pain for up to 2 days. Max Daily Amount: 3 Tablets. Qty: 6 Tablet, Refills: 0  Start date: 10/15/2021, End date: 10/17/2021    Associated Diagnoses: Closed fracture of distal end of left radius, unspecified fracture morphology, initial encounter             Diagnosis     Clinical Impression:   1.  Closed fracture of distal end of left radius, unspecified fracture morphology, initial encounter

## 2021-10-15 NOTE — ED TRIAGE NOTES
Reports mechanical fall at home and fell on left arm, complaining of pain in left arm and wrist. Swelling and deformity to left wrist.

## 2021-10-18 ENCOUNTER — HOSPITAL ENCOUNTER (OUTPATIENT)
Dept: LAB | Age: 86
Discharge: HOME | End: 2021-10-18
Payer: MEDICARE

## 2021-10-18 LAB
EST. AVERAGE GLUCOSE BLD GHB EST-MCNC: 114 MG/DL
HBA1C MFR BLD: 5.6 % (ref 4.2–5.6)

## 2021-10-18 PROCEDURE — 83036 HEMOGLOBIN GLYCOSYLATED A1C: CPT

## 2021-10-19 NOTE — PROGRESS NOTES
Please let her know that her A1c is normal at 5.6.     Dr. Ranjana Taylor  [facility], [address] [facility], [address]  Phone: [phone]  Fax: [phone]

## 2021-10-20 NOTE — PROGRESS NOTES
Patients daughter contacted, patient identified with two identifiers (Name & ). Patients daughter aware of results per  and verbalizes understanding.

## 2021-10-21 ENCOUNTER — OFFICE VISIT (OUTPATIENT)
Dept: ORTHOPEDIC SURGERY | Age: 86
End: 2021-10-21

## 2021-10-21 VITALS
HEART RATE: 71 BPM | OXYGEN SATURATION: 98 % | RESPIRATION RATE: 16 BRPM | WEIGHT: 158 LBS | BODY MASS INDEX: 26.98 KG/M2 | HEIGHT: 64 IN | TEMPERATURE: 97.1 F

## 2021-10-21 DIAGNOSIS — S52.532A CLOSED COLLES' FRACTURE OF LEFT RADIUS, INITIAL ENCOUNTER: Primary | ICD-10-CM

## 2021-10-21 PROCEDURE — 1101F PT FALLS ASSESS-DOCD LE1/YR: CPT | Performed by: ORTHOPAEDIC SURGERY

## 2021-10-21 PROCEDURE — 1090F PRES/ABSN URINE INCON ASSESS: CPT | Performed by: ORTHOPAEDIC SURGERY

## 2021-10-21 PROCEDURE — G8427 DOCREV CUR MEDS BY ELIG CLIN: HCPCS | Performed by: ORTHOPAEDIC SURGERY

## 2021-10-21 PROCEDURE — G8419 CALC BMI OUT NRM PARAM NOF/U: HCPCS | Performed by: ORTHOPAEDIC SURGERY

## 2021-10-21 PROCEDURE — G8536 NO DOC ELDER MAL SCRN: HCPCS | Performed by: ORTHOPAEDIC SURGERY

## 2021-10-21 PROCEDURE — 99214 OFFICE O/P EST MOD 30 MIN: CPT | Performed by: ORTHOPAEDIC SURGERY

## 2021-10-21 PROCEDURE — G9717 DOC PT DX DEP/BP F/U NT REQ: HCPCS | Performed by: ORTHOPAEDIC SURGERY

## 2021-10-21 RX ORDER — ACETAMINOPHEN AND CODEINE PHOSPHATE 300; 30 MG/1; MG/1
1 TABLET ORAL
Qty: 20 TABLET | Refills: 0 | Status: SHIPPED | OUTPATIENT
Start: 2021-10-21 | End: 2021-10-26

## 2021-10-21 NOTE — PROGRESS NOTES
Gail Templeton is a 80 y.o. female right handed retiree. Worker's Compensation and legal considerations: none filed. Vitals:    10/21/21 1045   Pulse: 71   Resp: 16   Temp: 97.1 °F (36.2 °C)   SpO2: 98%   Weight: 158 lb (71.7 kg)   Height: 5' 4\" (1.626 m)   PainSc:   5   PainLoc: Wrist           Chief Complaint   Patient presents with    Wrist Pain     left         HPI: Patient presents today for ER follow-up after breaking her left wrist.  She was placed into a splint after gentle manipulation. She reports mild to moderate pain depending on activity.     Date of onset: 10/15/2021    Injury: Yes: Comment: Fall    Prior Treatment:  Yes: Comment: ED splint    Numbness/ Tingling: No      ROS: Review of Systems - General ROS: negative  Psychological ROS: negative  ENT ROS: negative  Allergy and Immunology ROS: negative  Hematological and Lymphatic ROS: negative  Respiratory ROS: no cough, shortness of breath, or wheezing  Cardiovascular ROS: no chest pain or dyspnea on exertion  Gastrointestinal ROS: no abdominal pain, change in bowel habits, or black or bloody stools  Musculoskeletal ROS: negative  Neurological ROS: negative  Dermatological ROS: negative    Past Medical History:   Diagnosis Date    Acute deep vein thrombosis (DVT) of popliteal vein ([facility] [address] 7/27/2021    Arthritis     Cancer ([facility] [address] 2002    Colon     Chronic pain     abdominal due to IBS    Depression     GERD (gastroesophageal reflux disease)     Hypertension     Migraine headache 2014    Mixed hyperlipidemia 9/3/2020    Occipital neuralgia 2014    PUD (peptic ulcer disease)     S/P colon resection 2002    clear presently        Past Surgical History:   Procedure Laterality Date    HX CHOLECYSTECTOMY  2015    HX HYSTERECTOMY  1965    HX TONSIL AND ADENOIDECTOMY      HX WISDOM TEETH EXTRACTION      x4    IR KYPHOPLASTY THORACIC  5/19/2021       Current Outpatient Medications   Medication Sig Dispense Refill    acetaminophen-codeine (Tylenol-Codeine #3) 300-30 mg per tablet Take 1 Tablet by mouth every four (4) hours as needed for Pain for up to 5 days. Max Daily Amount: 6 Tablets. 20 Tablet 0    gabapentin (NEURONTIN) 100 mg capsule Take 1 Capsule by mouth three (3) times daily. Max Daily Amount: 300 mg. 270 Capsule 1    rivaroxaban (XARELTO) 20 mg tab tablet Take 1 Tablet by mouth daily. 30 Tablet 2    mirtazapine (REMERON) 30 mg tablet Take 1 Tab by mouth nightly. (Patient taking differently: Take 1 Tab by mouth nightly. Missed 5 days per patient, due to slow renewal) 30 Tab 5    fexofenadine (Allergy Relief, fexofenadine,) 180 mg tablet Take 180 mg by mouth daily.  pravastatin (PRAVACHOL) 10 mg tablet Take 1 Tab by mouth nightly. 30 Tab 5    alum-mag hydroxide-simeth (MYLANTA) 200-200-20 mg/5 mL susp Take 30 mL by mouth daily as needed for Other (PRN: Gastric Reflux, in PM). PRN: Gastric Reflux, in PM      famotidine (PEPCID) 20 mg tablet Take 20 mg by mouth daily.  melatonin tab tablet Take 10 mg by mouth nightly.  cholecalciferol (VITAMIN D3) 1,000 unit cap Take 1,000 Units by mouth daily.  multivitamin/iron/folic acid (CENTRUM WOMEN PO) Take 1 Tab by mouth daily.  cetirizine (ZYRTEC) 10 mg tablet Take 10 mg by mouth daily. As needed for allergies      Bifidobacterium Infantis (ALIGN) 4 mg cap Take 4 mg by mouth daily.  furosemide (LASIX) 20 mg tablet Take 1 Tab by mouth every other day. Indications: Edema (Patient taking differently: Take 20 mg by mouth daily. Indications: visible water retention) 90 Tab 3    dicyclomine (BENTYL) 10 mg capsule Take 1 Cap by mouth four (4) times daily. (Patient taking differently: Take 10 mg by mouth four (4) times daily as needed for Other (PRN:  IBS symptoms). PRN:  IBS symptoms Takin 1 tab at night) 90 Cap 3    ondansetron hcl (ZOFRAN) 4 mg tablet Take 4 mg by mouth every eight (8) hours as needed for Nausea.  (Patient not taking: Reported on 10/21/2021)      polyethylene glycol (MIRALAX) 17 gram packet Take 17 g by mouth daily as needed for Other (PRN Constipation). PRN Constipation. Mix with 8 oz of liquid of choice, such as water. (Patient not taking: Reported on 10/21/2021)         Allergies   Allergen Reactions    Macrobid [Nitrofurantoin Monohyd/M-Cryst] Nausea and Vomiting    Flomax [Tamsulosin] Myalgia and Vertigo    Pneumococcal Vaccine Other (comments)    Lexapro [Escitalopram Oxalate] Other (comments)     ? Hyponatremia per pt hx           PE:     Physical Exam  Vitals and nursing note reviewed. Constitutional:       General: She is not in acute distress. Appearance: Normal appearance. She is not ill-appearing. Cardiovascular:      Pulses: Normal pulses. Pulmonary:      Effort: Pulmonary effort is normal. No respiratory distress. Musculoskeletal:         General: Swelling, tenderness, deformity and signs of injury present. Cervical back: Normal range of motion and neck supple. Right lower leg: No edema. Skin:     General: Skin is warm and dry. Capillary Refill: Capillary refill takes less than 2 seconds. Findings: No bruising or erythema. Neurological:      General: No focal deficit present. Mental Status: She is alert and oriented to person, place, and time. Psychiatric:         Mood and Affect: Mood normal.         Behavior: Behavior normal.            Left wrist: There is a mild deformity noted about the wrist with mild edema noted. There is minimal tenderness to palpation and patient appears comfortable in her brace. Neurovascularly intact distally with full range of motion of the digits. Imaging:     External plain films of left distal radius is positive for a dorsally angulated comminuted fracture.         ICD-10-CM ICD-9-CM    1. Closed Colles' fracture of left radius, initial encounter  S52.532A 813.41 acetaminophen-codeine (Tylenol-Codeine #3) 300-30 mg per tablet         Plan:     Left wrist brace to be worn at all times and to cover for showering. I have given the patient options regarding her treatment given her age and functional status. We have discussed possible open reduction internal fixation, closed reduction and casting here in the clinic today, as well as treating it in a brace that can be loosened or tightened based on swelling but to be worn as a cast.  The first treatment option would give her the most anatomic fixation however the comorbidity that comes with surgery and risks thereof may outweigh the benefits of function. Additionally casting has been discussed as a not completely benign procedure given the possibility of cast breakdown and skin breakdown. The main risk with only bracing is the fact the patient could develop further arthritis as well as functional decline. However after discussing with the patient, given the fact that she does not do much other than sit down and reading occasionally walk she thinks this will be the best treatment option for her. Follow-up and Dispositions    · Return in about 2 weeks (around 11/4/2021) for Reevaluation and x-rays.           Plan was reviewed with patient, who verbalized agreement and understanding of the plan

## 2021-11-01 ENCOUNTER — TELEPHONE (OUTPATIENT)
Dept: INTERNAL MEDICINE CLINIC | Age: 86
End: 2021-11-01

## 2021-11-01 RX ORDER — ELECTROLYTES/DEXTROSE
SOLUTION, ORAL ORAL 2 TIMES DAILY
COMMUNITY

## 2021-11-01 NOTE — TELEPHONE ENCOUNTER
Pt daughter sent message via my chart , she said [facility] pt needs form filled out , daughter asking  if she can either drop form off or can someone see her mom anytime this week please advise

## 2021-11-01 NOTE — TELEPHONE ENCOUNTER
Spoke with patients daughter, she will provide the forms for review to see if an appt is needed. Will call daughter once form is reviewed and appt is determined.

## 2021-11-04 ENCOUNTER — OFFICE VISIT (OUTPATIENT)
Dept: ORTHOPEDIC SURGERY | Age: 86
End: 2021-11-04
Payer: MEDICARE

## 2021-11-04 VITALS
TEMPERATURE: 97.1 F | HEART RATE: 72 BPM | OXYGEN SATURATION: 97 % | WEIGHT: 157 LBS | HEIGHT: 64 IN | BODY MASS INDEX: 26.8 KG/M2

## 2021-11-04 DIAGNOSIS — S52.532D CLOSED COLLES' FRACTURE OF LEFT RADIUS WITH ROUTINE HEALING, SUBSEQUENT ENCOUNTER: Primary | ICD-10-CM

## 2021-11-04 PROCEDURE — 1101F PT FALLS ASSESS-DOCD LE1/YR: CPT | Performed by: ORTHOPAEDIC SURGERY

## 2021-11-04 PROCEDURE — 73110 X-RAY EXAM OF WRIST: CPT | Performed by: ORTHOPAEDIC SURGERY

## 2021-11-04 PROCEDURE — 1090F PRES/ABSN URINE INCON ASSESS: CPT | Performed by: ORTHOPAEDIC SURGERY

## 2021-11-04 PROCEDURE — G8427 DOCREV CUR MEDS BY ELIG CLIN: HCPCS | Performed by: ORTHOPAEDIC SURGERY

## 2021-11-04 PROCEDURE — G8536 NO DOC ELDER MAL SCRN: HCPCS | Performed by: ORTHOPAEDIC SURGERY

## 2021-11-04 PROCEDURE — 99213 OFFICE O/P EST LOW 20 MIN: CPT | Performed by: ORTHOPAEDIC SURGERY

## 2021-11-04 PROCEDURE — G9717 DOC PT DX DEP/BP F/U NT REQ: HCPCS | Performed by: ORTHOPAEDIC SURGERY

## 2021-11-04 PROCEDURE — G8419 CALC BMI OUT NRM PARAM NOF/U: HCPCS | Performed by: ORTHOPAEDIC SURGERY

## 2021-11-04 NOTE — TELEPHONE ENCOUNTER
Her paperwork has been completed.     Dr. Sudha Boyd  [facility], [address] [place] [address], [facility] [address]  Phone: [phone]  Fax: [phone]

## 2021-11-09 DIAGNOSIS — M51.9 LUMBAR DISC DISEASE: ICD-10-CM

## 2021-11-10 RX ORDER — GABAPENTIN 100 MG/1
CAPSULE ORAL
Qty: 90 CAPSULE | Refills: 5 | Status: SHIPPED | OUTPATIENT
Start: 2021-11-10 | End: 2022-05-24

## 2021-11-11 ENCOUNTER — TELEPHONE (OUTPATIENT)
Dept: INTERNAL MEDICINE CLINIC | Age: 86
End: 2021-11-11

## 2021-11-11 DIAGNOSIS — S22.080D COMPRESSION FRACTURE OF T12 VERTEBRA WITH ROUTINE HEALING, SUBSEQUENT ENCOUNTER: ICD-10-CM

## 2021-11-11 DIAGNOSIS — M51.9 LUMBAR DISC DISEASE: Primary | ICD-10-CM

## 2021-11-11 DIAGNOSIS — I82.431 ACUTE DEEP VEIN THROMBOSIS (DVT) OF POPLITEAL VEIN OF RIGHT LOWER EXTREMITY (HCC): ICD-10-CM

## 2021-11-11 NOTE — TELEPHONE ENCOUNTER
Received call from [address] living [facility] asking for clarification on the patients medication Lasix. Patient reports that she is taking 20 mg every other day, but our records she is talking it daily. Please advise on how patient should take the medication. Please call RN back with clarification at [phone]    Rn also asking if we would sign orders for PT/OT eval/treat. She was instructed to fax over a order request for review.

## 2021-11-12 NOTE — TELEPHONE ENCOUNTER
Please let her know it is okay to take Lasix 20 g p.o. every other day. Please place a referral to PT and OT per her request for me to sign.     Thanks,  Dr. Gonzalez Guard  [facility], [address] [place] [address], [address] [place] [address] Str[address]  Phone: [phone]  Fax: [phone]

## 2021-11-30 LAB — RIGHT POP RFX: 2.5 S

## 2021-12-03 ENCOUNTER — TELEPHONE (OUTPATIENT)
Dept: VASCULAR SURGERY | Age: 86
End: 2021-12-03

## 2021-12-03 NOTE — TELEPHONE ENCOUNTER
Phone conversation with Ms. Ilene Serrato regarding her mother, Roger Almaguer. Daughter requested phone call with ultrasound results. Imaging reviewed. 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 Findings were communicated to the patient's daughter. Given the absence of any acute or indeterminate age thrombus, will discontinue patient's anticoagulation (Xarelto). Ms. Earle Herring is 80years old and a bit unsteady on her feet (she recently fell and broke her wrist). I think the risks of anticoagulation outweigh its benefits at this time. Discussed with patient's daughter who agrees. Patient has moved to an assisted living facility and is ambulating more now than previously. I let her daughter know that activity is important to prevent recurrence of DVT.          Daniel Dejesus MD PhD  Vascular Surgery

## 2021-12-09 ENCOUNTER — OFFICE VISIT (OUTPATIENT)
Dept: ORTHOPEDIC SURGERY | Age: 86
End: 2021-12-09
Payer: MEDICARE

## 2021-12-09 VITALS — OXYGEN SATURATION: 99 % | WEIGHT: 159.2 LBS | BODY MASS INDEX: 27.33 KG/M2 | TEMPERATURE: 97.1 F | HEART RATE: 71 BPM

## 2021-12-09 DIAGNOSIS — S52.532D CLOSED COLLES' FRACTURE OF LEFT RADIUS WITH ROUTINE HEALING, SUBSEQUENT ENCOUNTER: Primary | ICD-10-CM

## 2021-12-09 PROCEDURE — 99213 OFFICE O/P EST LOW 20 MIN: CPT | Performed by: ORTHOPAEDIC SURGERY

## 2021-12-09 PROCEDURE — 1090F PRES/ABSN URINE INCON ASSESS: CPT | Performed by: ORTHOPAEDIC SURGERY

## 2021-12-09 PROCEDURE — G8427 DOCREV CUR MEDS BY ELIG CLIN: HCPCS | Performed by: ORTHOPAEDIC SURGERY

## 2021-12-09 PROCEDURE — G8536 NO DOC ELDER MAL SCRN: HCPCS | Performed by: ORTHOPAEDIC SURGERY

## 2021-12-09 PROCEDURE — 1101F PT FALLS ASSESS-DOCD LE1/YR: CPT | Performed by: ORTHOPAEDIC SURGERY

## 2021-12-09 PROCEDURE — 73110 X-RAY EXAM OF WRIST: CPT | Performed by: ORTHOPAEDIC SURGERY

## 2021-12-09 PROCEDURE — G9717 DOC PT DX DEP/BP F/U NT REQ: HCPCS | Performed by: ORTHOPAEDIC SURGERY

## 2021-12-09 PROCEDURE — G8419 CALC BMI OUT NRM PARAM NOF/U: HCPCS | Performed by: ORTHOPAEDIC SURGERY

## 2021-12-09 NOTE — PROGRESS NOTES
Lucho Tyler is a 80 y.o. female right handed retiree. Worker's Compensation and legal considerations: none filed. Vitals:    12/09/21 1122   Pulse: 71   Temp: 97.1 °F (36.2 °C)   TempSrc: Temporal   SpO2: 99%   Weight: 159 lb 3.2 oz (72.2 kg)   PainSc:   2           Chief Complaint   Patient presents with    Wrist Injury     left       HPI: Patient presents today for follow-up of left distal radius fracture. She has been in a brace. She reports improvement in pain. 11/4/2021 HPI: Patient presents today for follow-up after nonoperative treatment of the left distal radius fracture in a brace. We had a long discussion previously about all the treatment options and they prefer to go this route. She reports some continued pain and swelling however improved since last visit. She has been wearing the brace continuously. Initial HPI: Patient presents today for ER follow-up after breaking her left wrist.  She was placed into a splint after gentle manipulation. She reports mild to moderate pain depending on activity.     Date of onset: 10/15/2021    Injury: Yes: Comment: Fall    Prior Treatment:  Yes: Comment: Left wrist brace    Numbness/ Tingling: No      ROS: Review of Systems - General ROS: negative  Psychological ROS: negative  ENT ROS: negative  Allergy and Immunology ROS: negative  Hematological and Lymphatic ROS: negative  Respiratory ROS: no cough, shortness of breath, or wheezing  Cardiovascular ROS: no chest pain or dyspnea on exertion  Gastrointestinal ROS: no abdominal pain, change in bowel habits, or black or bloody stools  Musculoskeletal ROS: negative  Neurological ROS: negative  Dermatological ROS: negative    Past Medical History:   Diagnosis Date    Acute deep vein thrombosis (DVT) of popliteal vein (HCC) 7/27/2021    Arthritis     Cancer ([facility] [address] 2002    Colon     Chronic pain     abdominal due to IBS    Depression     GERD (gastroesophageal reflux disease)     Hypertension     Migraine headache 2014    Mixed hyperlipidemia 9/3/2020    Occipital neuralgia 2014    PUD (peptic ulcer disease)     S/P colon resection 2002    clear presently        Past Surgical History:   Procedure Laterality Date    HX CHOLECYSTECTOMY  2015    HX HYSTERECTOMY  1965    HX TONSIL AND ADENOIDECTOMY      HX WISDOM TEETH EXTRACTION      x4    IR KYPHOPLASTY THORACIC  5/19/2021       Current Outpatient Medications   Medication Sig Dispense Refill    gabapentin (NEURONTIN) 100 mg capsule TAKE ONE CAPSULE BY MOUTH THREE TIMES DAILY *MAX 300MG/ DAY* FOR LUMBAR DISC DISEASE 90 Capsule 5    menthol (COUGH DROPS MM) by Mucous Membrane route. Self administer OTC meds prn.  acetaminophen (TYLENOL EXTRA STRENGTH PO) Take  by mouth. Self administer OTC meds prn.  loperamide HCl (IMODIUM PO) Take  by mouth. Self administer OTC meds prn.  calcium carbonate (TUMS PO) Take  by mouth. Self administer OTC meds prn.  calcium carb/magnesium hydrox (ROLAIDS PO) Take  by mouth. Self administer OTC meds prn.  bismuth subsalicylate (PEPTO-BISMOL PO) Take  by mouth. Self administer OTC meds prn.  capsaicin/me-salicylate/menth (ARTHRITIS CREAM EX) by Apply Externally route. Self administer OTC meds prn.  hydrocortisone-aloe vera (Hydrocortisone Plus) 1 % topical cream Apply  to affected area two (2) times a day. Self administer OTC meds prn.  mirtazapine (REMERON) 30 mg tablet Take 1 Tab by mouth nightly. (Patient taking differently: Take 1 Tab by mouth nightly. Missed 5 days per patient, due to slow renewal) 30 Tab 5    fexofenadine (Allergy Relief, fexofenadine,) 180 mg tablet Take 180 mg by mouth daily.  pravastatin (PRAVACHOL) 10 mg tablet Take 1 Tab by mouth nightly. 30 Tab 5    ondansetron hcl (ZOFRAN) 4 mg tablet Take 4 mg by mouth every eight (8) hours as needed for Nausea.       alum-mag hydroxide-simeth (MYLANTA) 200-200-20 mg/5 mL susp Take 30 mL by mouth daily as needed for Other (PRN: Gastric Reflux, in PM). PRN: Gastric Reflux, in PM      famotidine (PEPCID) 20 mg tablet Take 20 mg by mouth daily.  melatonin tab tablet Take 10 mg by mouth nightly. Self administer OTC meds prn.  cholecalciferol (VITAMIN D3) 1,000 unit cap Take 1,000 Units by mouth daily.  multivitamin/iron/folic acid (CENTRUM WOMEN PO) Take 1 Tab by mouth daily.  cetirizine (ZYRTEC) 10 mg tablet Take 10 mg by mouth daily. As needed for allergies      polyethylene glycol (MIRALAX) 17 gram packet Take 17 g by mouth daily as needed for PRN Reason (Other) (PRN Constipation). PRN Constipation. Mix with 8 oz of liquid of choice, such as water.  Bifidobacterium Infantis (ALIGN) 4 mg cap Take 4 mg by mouth daily.  furosemide (LASIX) 20 mg tablet Take 1 Tab by mouth every other day. Indications: Edema (Patient taking differently: Take 20 mg by mouth. Taking every other day. Indications: visible water retention) 90 Tab 3    dicyclomine (BENTYL) 10 mg capsule Take 1 Cap by mouth four (4) times daily. (Patient taking differently: Take 10 mg by mouth four (4) times daily as needed for Other (PRN:  IBS symptoms). PRN:  IBS symptoms Takin 1 tab at night) 90 Cap 3       Allergies   Allergen Reactions    Macrobid [Nitrofurantoin Monohyd/M-Cryst] Nausea and Vomiting    Flomax [Tamsulosin] Myalgia and Vertigo    Pneumococcal Vaccine Other (comments)    Lexapro [Escitalopram Oxalate] Other (comments)     ? Hyponatremia per pt hx           PE:     Physical Exam  Vitals and nursing note reviewed. Constitutional:       General: She is not in acute distress. Appearance: Normal appearance. She is not ill-appearing. Cardiovascular:      Pulses: Normal pulses. Pulmonary:      Effort: Pulmonary effort is normal. No respiratory distress. Musculoskeletal:         General: Tenderness and deformity present. No swelling or signs of injury.       Cervical back: Normal range of motion and neck supple. Right lower leg: No edema. Skin:     General: Skin is warm and dry. Capillary Refill: Capillary refill takes less than 2 seconds. Findings: No bruising or erythema. Neurological:      General: No focal deficit present. Mental Status: She is alert and oriented to person, place, and time. Psychiatric:         Mood and Affect: Mood normal.         Behavior: Behavior normal.            Left wrist: No edema noted. Mild tenderness to palpation dorsally. Range of motion improved from last visit. Imagin/9/2021 3 views of left wrist shows interval callus formation and continued dorsally angulated comminuted fracture. 2021 does show some mild interval displacement of the fracture however not severe. There is no callus formation noted. External plain films of left distal radius is positive for a dorsally angulated comminuted fracture. ICD-10-CM ICD-9-CM    1. Closed Colles' fracture of left radius with routine healing, subsequent encounter  S52.532D V54.12 AMB POC XRAY, WRIST; COMPLETE, 3+ VIE         Plan:     Continue brace wear when out of the house progressively creeps brace wear. Follow-up and Dispositions    · Return if symptoms worsen or fail to improve.           Plan was reviewed with patient, who verbalized agreement and understanding of the plan

## 2021-12-17 NOTE — PROGRESS NOTES
Cely Maurer presents today for   Chief Complaint   Patient presents with    Follow-up            1. \"Have you been to the ER, urgent care clinic since your last visit? Hospitalized since your last visit? \" {no    2. \"Have you seen or consulted any other health care providers outside of the [address] [place] [address] since your last visit? \" yes

## 2021-12-20 ENCOUNTER — OFFICE VISIT (OUTPATIENT)
Dept: INTERNAL MEDICINE CLINIC | Age: 86
End: 2021-12-20
Payer: MEDICARE

## 2021-12-20 VITALS
HEIGHT: 64 IN | RESPIRATION RATE: 14 BRPM | TEMPERATURE: 97.8 F | SYSTOLIC BLOOD PRESSURE: 118 MMHG | OXYGEN SATURATION: 96 % | WEIGHT: 160 LBS | BODY MASS INDEX: 27.31 KG/M2 | DIASTOLIC BLOOD PRESSURE: 53 MMHG | HEART RATE: 64 BPM

## 2021-12-20 DIAGNOSIS — E78.2 MIXED HYPERLIPIDEMIA: ICD-10-CM

## 2021-12-20 DIAGNOSIS — R60.0 LEG EDEMA: ICD-10-CM

## 2021-12-20 DIAGNOSIS — R79.89 ABNORMAL CBC: ICD-10-CM

## 2021-12-20 DIAGNOSIS — F32.A DEPRESSION, UNSPECIFIED DEPRESSION TYPE: ICD-10-CM

## 2021-12-20 DIAGNOSIS — Z51.81 ENCOUNTER FOR THERAPEUTIC DRUG LEVEL MONITORING: ICD-10-CM

## 2021-12-20 DIAGNOSIS — R73.02 IMPAIRED GLUCOSE TOLERANCE: Primary | ICD-10-CM

## 2021-12-20 DIAGNOSIS — M51.9 LUMBAR DISC DISEASE: ICD-10-CM

## 2021-12-20 DIAGNOSIS — S52.532A CLOSED COLLES' FRACTURE OF LEFT RADIUS, INITIAL ENCOUNTER: ICD-10-CM

## 2021-12-20 PROCEDURE — G9717 DOC PT DX DEP/BP F/U NT REQ: HCPCS | Performed by: INTERNAL MEDICINE

## 2021-12-20 PROCEDURE — G0463 HOSPITAL OUTPT CLINIC VISIT: HCPCS | Performed by: INTERNAL MEDICINE

## 2021-12-20 PROCEDURE — 99214 OFFICE O/P EST MOD 30 MIN: CPT | Performed by: INTERNAL MEDICINE

## 2021-12-20 PROCEDURE — 1090F PRES/ABSN URINE INCON ASSESS: CPT | Performed by: INTERNAL MEDICINE

## 2021-12-20 PROCEDURE — G8427 DOCREV CUR MEDS BY ELIG CLIN: HCPCS | Performed by: INTERNAL MEDICINE

## 2021-12-20 PROCEDURE — 1101F PT FALLS ASSESS-DOCD LE1/YR: CPT | Performed by: INTERNAL MEDICINE

## 2021-12-20 PROCEDURE — G8536 NO DOC ELDER MAL SCRN: HCPCS | Performed by: INTERNAL MEDICINE

## 2021-12-20 PROCEDURE — G8419 CALC BMI OUT NRM PARAM NOF/U: HCPCS | Performed by: INTERNAL MEDICINE

## 2021-12-20 NOTE — PATIENT INSTRUCTIONS
Body Mass Index: Care Instructions  Your Care Instructions     Body mass index (BMI) can help you see if your weight is raising your risk for health problems. It uses a formula to compare how much you weigh with how tall you are. · A BMI lower than 18.5 is considered underweight. · A BMI between 18.5 and 24.9 is considered healthy. · A BMI between 25 and 29.9 is considered overweight. A BMI of 30 or higher is considered obese. If your BMI is in the normal range, it means that you have a lower risk for weight-related health problems. If your BMI is in the overweight or obese range, you may be at increased risk for weight-related health problems, such as high blood pressure, heart disease, stroke, arthritis or joint pain, and diabetes. If your BMI is in the underweight range, you may be at increased risk for health problems such as fatigue, lower protection (immunity) against illness, muscle loss, bone loss, hair loss, and hormone problems. BMI is just one measure of your risk for weight-related health problems. You may be at higher risk for health problems if you are not active, you eat an unhealthy diet, or you drink too much alcohol or use tobacco products. Follow-up care is a key part of your treatment and safety. Be sure to make and go to all appointments, and call your doctor if you are having problems. It's also a good idea to know your test results and keep a list of the medicines you take. How can you care for yourself at home? · Practice healthy eating habits. This includes eating plenty of fruits, vegetables, whole grains, lean protein, and low-fat dairy. · If your doctor recommends it, get more exercise. Walking is a good choice. Bit by bit, increase the amount you walk every day. Try for at least 30 minutes on most days of the week. · Do not smoke. Smoking can increase your risk for health problems. If you need help quitting, talk to your doctor about stop-smoking programs and medicines. These can increase your chances of quitting for good. · Limit alcohol to 2 drinks a day for men and 1 drink a day for women. Too much alcohol can cause health problems. If you have a BMI higher than 25  · Your doctor may do other tests to check your risk for weight-related health problems. This may include measuring the distance around your waist. A waist measurement of more than 40 inches in men or 35 inches in women can increase the risk of weight-related health problems. · Talk with your doctor about steps you can take to stay healthy or improve your health. You may need to make lifestyle changes to lose weight and stay healthy, such as changing your diet and getting regular exercise. If you have a BMI lower than 18.5  · Your doctor may do other tests to check your risk for health problems. · Talk with your doctor about steps you can take to stay healthy or improve your health. You may need to make lifestyle changes to gain or maintain weight and stay healthy, such as getting more healthy foods in your diet and doing exercises to build muscle. Where can you learn more? Go to http://www.garza.com/  Enter S176 in the search box to learn more about \"Body Mass Index: Care Instructions. \"  Current as of: March 17, 2021               Content Version: 13.0  © 2006-2021 [employer]. Care instructions adapted under license by [employer] (which disclaims liability or warranty for this information). If you have questions about a medical condition or this instruction, always ask your healthcare professional. [place] [address] any warranty or liability for your use of this information.

## 2021-12-20 NOTE — PROGRESS NOTES
[facility]:  12/20/2021, MRN: [id number]      Gus Gaytan is a 80 y.o. female and presents to clinic for Follow-up      Subjective: The patient is an 72-year-old female with history of sigmoid colon cancer status post surgery, prediabetes, history of strictures, osteopenia, hyponatremia, lumbar disc disease, hypertension, IBS, left colles radius fracture, vitamin D deficiency, vertebral body fracture, allergic rhinitis, GERD, and urinary retention. 1. Health Maintenance:   - She had one fall w/i the past year. No falls since she moved to a new facility.    - She is staying at the [facility] assisted living facility. [address]:  [facility] ([phone]), Nurse Manuela Card ([phone])    2. Left Colles Radius Fracture: She is doing PT. Followed by Dr. Napoleon Cain. She uses a brace off/on along her left wrist. She uses her walker but it's tough with the fx. 3. Prediabetes (Impaired Glucose Tolerance): Her most recent A1C is WNL. 4. Chronic Lower Back: Taking gabapentin. +H/o lumbar disc disease. \"My back is good. \"  Her RLE \"is not good. \" +RLE weakness. +Right hip pain. +Tylenol. Pain is 0/10 today. 5. HLD: Treated with pravastatin. No adverse side effects. 6. BLE Edema: Taking lasix overy other day. No dizzy spells. Blood pressure is 118/53. 7. Grief/Depression: Her  passed away in between apts. On mirtazapine. She states that her grief is stable.       Patient Active Problem List    Diagnosis Date Noted    Acute deep vein thrombosis (DVT) of popliteal vein ([facility] [address] 07/27/2021    Thoracic compression fracture ([address] 05/15/2021    T12 compression fracture ([facility] [address] 05/15/2021    Mixed hyperlipidemia 09/03/2020    Depression 09/03/2020    Impaired glucose tolerance 10/10/2019    Urinary retention 09/01/2018    Cancer of sigmoid ([address] 09/01/2018    Anxiety 09/01/2018    Irritable bowel syndrome with both constipation and diarrhea 09/01/2018    Lumbar disc disease 09/01/2018    Essential hypertension 09/01/2018    Allergic rhinitis 09/01/2018    Vitamin D deficiency 09/01/2018       Current Outpatient Medications   Medication Sig Dispense Refill    gabapentin (NEURONTIN) 100 mg capsule TAKE ONE CAPSULE BY MOUTH THREE TIMES DAILY *MAX 300MG/ DAY* FOR LUMBAR DISC DISEASE 90 Capsule 5    menthol (COUGH DROPS MM) by Mucous Membrane route. Self administer OTC meds prn.  acetaminophen (TYLENOL EXTRA STRENGTH PO) Take  by mouth. Self administer OTC meds prn.  loperamide HCl (IMODIUM PO) Take  by mouth. Self administer OTC meds prn.  calcium carbonate (TUMS PO) Take  by mouth. Self administer OTC meds prn.  calcium carb/magnesium hydrox (ROLAIDS PO) Take  by mouth. Self administer OTC meds prn.  bismuth subsalicylate (PEPTO-BISMOL PO) Take  by mouth. Self administer OTC meds prn.  capsaicin/me-salicylate/menth (ARTHRITIS CREAM EX) by Apply Externally route. Self administer OTC meds prn.  hydrocortisone-aloe vera (Hydrocortisone Plus) 1 % topical cream Apply  to affected area two (2) times a day. Self administer OTC meds prn.  mirtazapine (REMERON) 30 mg tablet Take 1 Tab by mouth nightly. (Patient taking differently: Take 1 Tab by mouth nightly. Missed 5 days per patient, due to slow renewal) 30 Tab 5    fexofenadine (Allergy Relief, fexofenadine,) 180 mg tablet Take 180 mg by mouth daily.  pravastatin (PRAVACHOL) 10 mg tablet Take 1 Tab by mouth nightly. 30 Tab 5    ondansetron hcl (ZOFRAN) 4 mg tablet Take 4 mg by mouth every eight (8) hours as needed for Nausea.  alum-mag hydroxide-simeth (MYLANTA) 200-200-20 mg/5 mL susp Take 30 mL by mouth daily as needed for Other (PRN: Gastric Reflux, in PM). PRN: Gastric Reflux, in PM      famotidine (PEPCID) 20 mg tablet Take 20 mg by mouth daily.  melatonin tab tablet Take 10 mg by mouth nightly. Self administer OTC meds prn.       cholecalciferol (VITAMIN D3) 1,000 unit cap Take 1,000 Units by mouth daily.  multivitamin/iron/folic acid (CENTRUM WOMEN PO) Take 1 Tab by mouth daily.  cetirizine (ZYRTEC) 10 mg tablet Take 10 mg by mouth daily. As needed for allergies      polyethylene glycol (MIRALAX) 17 gram packet Take 17 g by mouth daily as needed for PRN Reason (Other) (PRN Constipation). PRN Constipation. Mix with 8 oz of liquid of choice, such as water.  Bifidobacterium Infantis (ALIGN) 4 mg cap Take 4 mg by mouth daily.  furosemide (LASIX) 20 mg tablet Take 1 Tab by mouth every other day. Indications: Edema (Patient taking differently: Take 20 mg by mouth. Taking every other day. Indications: visible water retention) 90 Tab 3    dicyclomine (BENTYL) 10 mg capsule Take 1 Cap by mouth four (4) times daily. (Patient taking differently: Take 10 mg by mouth four (4) times daily as needed for Other (PRN:  IBS symptoms). PRN:  IBS symptoms Takin 1 tab at night) 90 Cap 3       Allergies   Allergen Reactions    Macrobid [Nitrofurantoin Monohyd/M-Cryst] Nausea and Vomiting    Flomax [Tamsulosin] Myalgia and Vertigo    Pneumococcal Vaccine Other (comments)    Lexapro [Escitalopram Oxalate] Other (comments)     ? Hyponatremia per pt hx       Past Medical History:   Diagnosis Date    Acute deep vein thrombosis (DVT) of popliteal vein ([facility] [address] 7/27/2021    Arthritis     Cancer ([facility] [address] 2002    Colon     Chronic pain     abdominal due to IBS    Depression     GERD (gastroesophageal reflux disease)     Hypertension     Migraine headache 2014    Mixed hyperlipidemia 9/3/2020    Occipital neuralgia 2014    PUD (peptic ulcer disease)     S/P colon resection 2002    clear presently        Past Surgical History:   Procedure Laterality Date    HX CHOLECYSTECTOMY  2015    HX HYSTERECTOMY  1965    HX TONSIL AND ADENOIDECTOMY      HX WISDOM TEETH EXTRACTION      x4    IR KYPHOPLASTY THORACIC  5/19/2021       Family History   Problem Relation Age of Onset    Hypertension Mother     Stroke Mother     Heart Attack Father     Hypertension Father     Parkinson's Disease Sister     Hypertension Brother     Cancer Brother         prostate    No Known Problems Son     No Known Problems Daughter        Social History     Tobacco Use    Smoking status: Never Smoker    Smokeless tobacco: Never Used   Substance Use Topics    Alcohol use: Yes     Alcohol/week: 0.0 - 1.0 standard drinks       ROS   Review of Systems   Constitutional: Negative for chills and fever. HENT: Negative for ear pain and sore throat. Eyes: Negative for blurred vision and pain. Respiratory: Negative for cough and shortness of breath. Cardiovascular: Negative for chest pain. Gastrointestinal: Negative for abdominal pain, blood in stool and melena. Genitourinary: Negative for dysuria and hematuria. Musculoskeletal: Positive for back pain and joint pain. Negative for myalgias. Skin: Negative for rash. Neurological: Negative for headaches. Endo/Heme/Allergies: Does not bruise/bleed easily. Psychiatric/Behavioral: Positive for depression. Negative for substance abuse and suicidal ideas. Objective     Vitals:    12/20/21 1031   BP: (!) 118/53   Pulse: 64   Resp: 14   Temp: 97.8 °F (36.6 °C)   TempSrc: Temporal   SpO2: 96%   Weight: 160 lb (72.6 kg)   Height: 5' 4\" (1.626 m)   PainSc:   0 - No pain   PainLoc: Wrist       Physical Exam  Vitals and nursing note reviewed. HENT:      Head: Normocephalic and atraumatic. Right Ear: External ear normal.      Left Ear: External ear normal.   Eyes:      General: No scleral icterus. Right eye: No discharge. Left eye: No discharge. Conjunctiva/sclera: Conjunctivae normal.   Cardiovascular:      Rate and Rhythm: Normal rate and regular rhythm. Heart sounds: Normal heart sounds. No murmur heard. No friction rub. No gallop.     Pulmonary:      Effort: Pulmonary effort is normal. No respiratory distress. Breath sounds: Normal breath sounds. No wheezing or rales. Abdominal:      General: Bowel sounds are normal. There is no distension. Palpations: Abdomen is soft. There is no mass. Tenderness: There is no abdominal tenderness. There is no guarding or rebound. Musculoskeletal:         General: No swelling (BUE) or tenderness (RUE). Cervical back: Neck supple. Comments: TARI not examined 2/2 known fracture. Her back exam is unremarkable. +1 BLE edema. Lymphadenopathy:      Cervical: No cervical adenopathy. Skin:     General: Skin is warm and dry. Findings: No erythema or rash. Neurological:      Mental Status: She is alert. Mental status is at baseline. Motor: No abnormal muscle tone.       Gait: Gait normal.   Psychiatric:         Mood and Affect: Mood normal.         LABS   Data Review:   Lab Results   Component Value Date/Time    WBC 7.3 07/02/2021 04:44 PM    HGB 11.9 (L) 07/02/2021 04:44 PM    HCT 39.2 07/02/2021 04:44 PM    PLATELET 267 43/25/0319 04:44 PM    MCV 89.3 07/02/2021 04:44 PM       Lab Results   Component Value Date/Time    Sodium 143 07/12/2021 08:00 AM    Potassium 4.4 07/12/2021 08:00 AM    Chloride 109 07/12/2021 08:00 AM    CO2 27 07/12/2021 08:00 AM    Anion gap 7 07/12/2021 08:00 AM    Glucose 104 (H) 07/12/2021 08:00 AM    BUN 17 07/12/2021 08:00 AM    Creatinine 0.87 07/12/2021 08:00 AM    BUN/Creatinine ratio 20 07/12/2021 08:00 AM    GFR est AA >60 07/12/2021 08:00 AM    GFR est non-AA >60 07/12/2021 08:00 AM    Calcium 9.2 07/12/2021 08:00 AM       Lab Results   Component Value Date/Time    Cholesterol, total 203 (H) 02/25/2021 06:45 AM    HDL Cholesterol 65 (H) 02/25/2021 06:45 AM    LDL, calculated 111 (H) 02/25/2021 06:45 AM    VLDL, calculated 27 02/25/2021 06:45 AM    Triglyceride 135 02/25/2021 06:45 AM    CHOL/HDL Ratio 3.1 02/25/2021 06:45 AM       Lab Results   Component Value Date/Time    Hemoglobin A1c 5.6 10/18/2021 08:39 AM Assessment/Plan:   1. BLE Edema: From venous stasis. - Ok to c/w lasix prn edema but we discussed letting me know if she has dizziness or an SBP<110 - at which time I would recommend d/c this rx. 2.  Prediabetes history: A1c is normal.  Observation. We will check an A1c in 6 months. 3. Left Colles Radius Fracture: Stable.  -Activity per Orthopedics.   -Continue to follow-up with Orthopedics. 4.  Chronic Lower Back Pain: From lumbar disc disease.  -Continue with activity as tolerated.  -Okay to continue with medication as prescribed. -I will check a UDS just before her follow-up visits that we are compliant with state regulations regarding controlled medications. 5.  Hyperlipidemia: Stable. -Continue medication as prescribed.  -We will check labs in 6 months. 6. Depression: She is grieving the death of her .  -Continue with mirtazapine.  -I will reassess this in 6 months. **I will check a CBC just before her follow-up appointment in 6 months. Her hemoglobin was mildly low at 11.9 when checked in July. **    Health Maintenance Due   Topic Date Due    COVID-19 Vaccine (1) Never done    Shingrix Vaccine Age 50> (1 of 2) Never done         Lab review: labs are reviewed in the EHR and ordered as mentioned above. I have discussed the diagnosis with the patient and the intended plan as seen in the above orders. The patient has received an after-visit summary and questions were answered concerning future plans. I have discussed medication side effects and warnings with the patient as well. I have reviewed the plan of care with the patient, accepted their input and they are in agreement with the treatment goals. All questions were answered. The patient understands the plan of care. Handouts provided today with above information. Pt instructed if symptoms worsen to call the office or report to the ED for continued care.   Greater than 50% of the visit time was spent in counseling and/or coordination of care. Voice recognition was used to generate this report, which may have resulted in some phonetic based errors in grammar and contents. Even though attempts were made to correct all the mistakes, some may have been missed, and remained in the body of the document.           Carissa Byers MD

## 2021-12-21 RX ORDER — FUROSEMIDE 20 MG/1
TABLET ORAL
Qty: 90 TABLET | Refills: 3 | Status: SHIPPED | OUTPATIENT
Start: 2021-12-21

## 2021-12-28 ENCOUNTER — TELEPHONE (OUTPATIENT)
Dept: INTERNAL MEDICINE CLINIC | Age: 86
End: 2021-12-28

## 2021-12-28 NOTE — TELEPHONE ENCOUNTER
----- Message from Ventura Moreno MD sent at 12/28/2021  5:14 AM EST -----  Regarding: Faxing of lab orders  Please fax her lab orders, to be done in 6 months, to her assisted living facility ([facility]).      Thanks,  Dr. Kelvin Moreno  [facility], [address] [place] [address], [facility] [address]  Phone: [phone]  Fax: [phone]

## 2021-12-28 NOTE — TELEPHONE ENCOUNTER
----- Message from Allison Arrieta MD sent at 12/28/2021  5:08 AM EST -----  Regarding: Xarelto? F/u with Vascular Surgery needed. Does she need another refill of xarelto? Is she taking this rx? Please have her schedule a follow up apt with Vascular Surgery for 3 months.      Thanks,  Dr. Shabbir Nelson  [facility] [address], [address] [place] [address] Str.  Phone: [phone]  Fax: [phone]

## 2021-12-28 NOTE — TELEPHONE ENCOUNTER
Left message for patients daughter to clarify if patient is taking xarelto, and if so does she need a refill? Also there is lab orders to be done in 6 months, we can fax orders to facility if that is what the patient and daughter would like.

## 2021-12-28 NOTE — TELEPHONE ENCOUNTER
Patients daughter returned call reports patient is no longer on xarelto (the clots have cleared up). She would like a copy of the labs faxed to the facility the patient lives at.

## 2022-02-22 RX ORDER — PHENYLEPHRINE HCL 10 MG/1
10 TABLET, FILM COATED ORAL AS NEEDED
COMMUNITY
End: 2022-02-22 | Stop reason: SDUPTHER

## 2022-02-22 RX ORDER — PHENYLEPHRINE HCL 10 MG/1
10 TABLET, FILM COATED ORAL AS NEEDED
Qty: 30 TABLET | Refills: 2 | Status: SHIPPED | OUTPATIENT
Start: 2022-02-22

## 2022-03-18 PROBLEM — E78.2 MIXED HYPERLIPIDEMIA: Status: ACTIVE | Noted: 2020-09-03

## 2022-03-19 PROBLEM — K58.2 IRRITABLE BOWEL SYNDROME WITH BOTH CONSTIPATION AND DIARRHEA: Status: ACTIVE | Noted: 2018-09-01

## 2022-03-19 PROBLEM — E55.9 VITAMIN D DEFICIENCY: Status: ACTIVE | Noted: 2018-09-01

## 2022-03-19 PROBLEM — M51.9 LUMBAR DISC DISEASE: Status: ACTIVE | Noted: 2018-09-01

## 2022-03-19 PROBLEM — I82.439 ACUTE DEEP VEIN THROMBOSIS (DVT) OF POPLITEAL VEIN (HCC): Status: ACTIVE | Noted: 2021-07-27

## 2022-03-19 PROBLEM — S22.080A T12 COMPRESSION FRACTURE (HCC): Status: ACTIVE | Noted: 2021-05-15

## 2022-03-19 PROBLEM — R73.02 IMPAIRED GLUCOSE TOLERANCE: Status: ACTIVE | Noted: 2019-10-10

## 2022-03-19 PROBLEM — F32.A DEPRESSION: Status: ACTIVE | Noted: 2020-09-03

## 2022-03-19 PROBLEM — J30.9 ALLERGIC RHINITIS: Status: ACTIVE | Noted: 2018-09-01

## 2022-03-20 PROBLEM — R33.9 URINARY RETENTION: Status: ACTIVE | Noted: 2018-09-01

## 2022-03-20 PROBLEM — I10 ESSENTIAL HYPERTENSION: Status: ACTIVE | Noted: 2018-09-01

## 2022-03-20 PROBLEM — S22.000A THORACIC COMPRESSION FRACTURE (HCC): Status: ACTIVE | Noted: 2021-05-15

## 2022-03-20 PROBLEM — F41.9 ANXIETY: Status: ACTIVE | Noted: 2018-09-01

## 2022-03-20 PROBLEM — C18.7 CANCER OF SIGMOID (HCC): Status: ACTIVE | Noted: 2018-09-01

## 2022-04-26 ENCOUNTER — TRANSCRIBE ORDER (OUTPATIENT)
Dept: SCHEDULING | Age: 87
End: 2022-04-26

## 2022-04-26 DIAGNOSIS — Z12.31 VISIT FOR SCREENING MAMMOGRAM: Primary | ICD-10-CM

## 2022-05-24 DIAGNOSIS — M51.9 LUMBAR DISC DISEASE: ICD-10-CM

## 2022-05-24 RX ORDER — GABAPENTIN 100 MG/1
CAPSULE ORAL
Qty: 90 CAPSULE | Refills: 5 | Status: SHIPPED | OUTPATIENT
Start: 2022-05-24 | End: 2022-06-20 | Stop reason: SDUPTHER

## 2022-06-06 ENCOUNTER — HOSPITAL ENCOUNTER (OUTPATIENT)
Dept: MAMMOGRAPHY | Age: 87
Discharge: HOME | End: 2022-06-06
Attending: INTERNAL MEDICINE
Payer: MEDICARE

## 2022-06-06 DIAGNOSIS — Z12.31 VISIT FOR SCREENING MAMMOGRAM: ICD-10-CM

## 2022-06-06 PROCEDURE — 77063 BREAST TOMOSYNTHESIS BI: CPT

## 2022-06-20 ENCOUNTER — OFFICE VISIT (OUTPATIENT)
Dept: INTERNAL MEDICINE CLINIC | Age: 87
End: 2022-06-20
Payer: MEDICARE

## 2022-06-20 VITALS
WEIGHT: 166 LBS | TEMPERATURE: 97 F | OXYGEN SATURATION: 95 % | HEART RATE: 63 BPM | SYSTOLIC BLOOD PRESSURE: 169 MMHG | HEIGHT: 64 IN | DIASTOLIC BLOOD PRESSURE: 76 MMHG | BODY MASS INDEX: 28.34 KG/M2 | RESPIRATION RATE: 18 BRPM

## 2022-06-20 DIAGNOSIS — K21.9 GASTROESOPHAGEAL REFLUX DISEASE, UNSPECIFIED WHETHER ESOPHAGITIS PRESENT: ICD-10-CM

## 2022-06-20 DIAGNOSIS — M51.9 LUMBAR DISC DISEASE: Primary | ICD-10-CM

## 2022-06-20 DIAGNOSIS — I82.431 ACUTE DEEP VEIN THROMBOSIS (DVT) OF POPLITEAL VEIN OF RIGHT LOWER EXTREMITY (HCC): ICD-10-CM

## 2022-06-20 DIAGNOSIS — C18.7 CANCER OF SIGMOID (HCC): ICD-10-CM

## 2022-06-20 DIAGNOSIS — E78.2 MIXED HYPERLIPIDEMIA: ICD-10-CM

## 2022-06-20 DIAGNOSIS — R73.9 HYPERGLYCEMIA: ICD-10-CM

## 2022-06-20 PROCEDURE — 1090F PRES/ABSN URINE INCON ASSESS: CPT | Performed by: INTERNAL MEDICINE

## 2022-06-20 PROCEDURE — G8417 CALC BMI ABV UP PARAM F/U: HCPCS | Performed by: INTERNAL MEDICINE

## 2022-06-20 PROCEDURE — G8427 DOCREV CUR MEDS BY ELIG CLIN: HCPCS | Performed by: INTERNAL MEDICINE

## 2022-06-20 PROCEDURE — 1123F ACP DISCUSS/DSCN MKR DOCD: CPT | Performed by: INTERNAL MEDICINE

## 2022-06-20 PROCEDURE — 1101F PT FALLS ASSESS-DOCD LE1/YR: CPT | Performed by: INTERNAL MEDICINE

## 2022-06-20 PROCEDURE — G9717 DOC PT DX DEP/BP F/U NT REQ: HCPCS | Performed by: INTERNAL MEDICINE

## 2022-06-20 PROCEDURE — G8536 NO DOC ELDER MAL SCRN: HCPCS | Performed by: INTERNAL MEDICINE

## 2022-06-20 PROCEDURE — 99214 OFFICE O/P EST MOD 30 MIN: CPT | Performed by: INTERNAL MEDICINE

## 2022-06-20 PROCEDURE — G0463 HOSPITAL OUTPT CLINIC VISIT: HCPCS | Performed by: INTERNAL MEDICINE

## 2022-06-20 RX ORDER — OMEPRAZOLE 20 MG/1
20 CAPSULE, DELAYED RELEASE ORAL DAILY
Qty: 90 CAPSULE | Refills: 3 | Status: SHIPPED | OUTPATIENT
Start: 2022-06-20 | End: 2022-06-29 | Stop reason: SDUPTHER

## 2022-06-20 NOTE — PROGRESS NOTES
[facility] OF [facility]:  6/20/2022, MRN: [id number]      Carmelita Salazar is a 80 y.o. female and presents to clinic for Follow-up and Cholesterol Problem      Subjective: The patient is an 80year-old female with history of sigmoid colon cancer status post surgery, prediabetes, history of strictures, osteopenia, hyponatremia, lumbar disc disease, hypertension, IBS, left colles radius fracture, vitamin D deficiency, vertebral body fracture, allergic rhinitis, GERD, and urinary retention. 1. GERD: Present for yrs off/on. Occurring more and more recently. She has sx with bending over at night. She is taking pepcid daily. She drinks a lot of mylanta for sx relief. It works to relieve her sx.     2. H/o DVT: Resolved s/p rx. She has no SOB. She has symmetric edema in her legs. 3. Prediabetes: Overdue for labs. Her last A1C in October was WNL. 4. H/o Colon Cancer: No h/o bleeding or abdominal pain. She has constipation off/on. Sx respond to miralax. 5. Sciatica: Her lower back pain radiates down her legs all of the time. Pain is burning in quality. Sx improve with stretching exercises she learned in the past during PT sessions. She is taking gaapentin 100mg tid. 6. HTN: BP is 169/76. Home readings are <140/90. BLE edema responds to elevating her legs. She has some urinary incontinence. She takes lasix prn. +Self catheterizes her self.      BP Readings from Last 3 Encounters:   06/20/22 (!) 169/76   12/20/21 (!) 118/53   10/15/21 (!) 148/66         Patient Active Problem List    Diagnosis Date Noted    Acute deep vein thrombosis (DVT) of popliteal vein ([address] 07/27/2021    Thoracic compression fracture ([address] 05/15/2021    T12 compression fracture ([address] 05/15/2021    Mixed hyperlipidemia 09/03/2020    Depression 09/03/2020    Impaired glucose tolerance 10/10/2019    Urinary retention 09/01/2018    Cancer of sigmoid ([address] 09/01/2018    Anxiety 09/01/2018    Irritable bowel syndrome with both constipation and diarrhea 09/01/2018    Lumbar disc disease 09/01/2018    Essential hypertension 09/01/2018    Allergic rhinitis 09/01/2018    Vitamin D deficiency 09/01/2018       Current Outpatient Medications   Medication Sig Dispense Refill    gabapentin (NEURONTIN) 100 mg capsule TAKE ONE CAPSULE BY MOUTH THREE TIMES DAILY FOR LUMBAR DISC DISEASE *MAX 300MG/ DAY* 90 Capsule 5    phenylephrine hcl (Sudafed PE) 10 mg tab Take 10 mg by mouth as needed (congestion). Follow instructions on label. 30 Tablet 2    menthol (COUGH DROPS MM) by Mucous Membrane route. Self administer OTC meds prn.  acetaminophen (TYLENOL EXTRA STRENGTH PO) Take  by mouth. Self administer OTC meds prn.  loperamide HCl (IMODIUM PO) Take  by mouth. Self administer OTC meds prn.  calcium carbonate (TUMS PO) Take  by mouth. Self administer OTC meds prn.  calcium carb/magnesium hydrox (ROLAIDS PO) Take  by mouth. Self administer OTC meds prn.  bismuth subsalicylate (PEPTO-BISMOL PO) Take  by mouth. Self administer OTC meds prn.  capsaicin/me-salicylate/menth (ARTHRITIS CREAM EX) by Apply Externally route. Self administer OTC meds prn.  hydrocortisone-aloe vera (Hydrocortisone Plus) 1 % topical cream Apply  to affected area two (2) times a day. Self administer OTC meds prn.  mirtazapine (REMERON) 30 mg tablet Take 1 Tab by mouth nightly. (Patient taking differently: Take 1 Tab by mouth nightly. Missed 5 days per patient, due to slow renewal) 30 Tab 5    fexofenadine (Allergy Relief, fexofenadine,) 180 mg tablet Take 180 mg by mouth daily.  pravastatin (PRAVACHOL) 10 mg tablet Take 1 Tab by mouth nightly. 30 Tab 5    ondansetron hcl (ZOFRAN) 4 mg tablet Take 4 mg by mouth every eight (8) hours as needed for Nausea.  alum-mag hydroxide-simeth (MYLANTA) 200-200-20 mg/5 mL susp Take 30 mL by mouth daily as needed for Other (PRN: Gastric Reflux, in PM).  PRN: Gastric Reflux, in PM      famotidine (PEPCID) 20 mg tablet Take 20 mg by mouth daily.  melatonin tab tablet Take 10 mg by mouth nightly. Self administer OTC meds prn.  cholecalciferol (VITAMIN D3) 1,000 unit cap Take 1,000 Units by mouth daily.  multivitamin/iron/folic acid (CENTRUM WOMEN PO) Take 1 Tab by mouth daily.  cetirizine (ZYRTEC) 10 mg tablet Take 10 mg by mouth daily. As needed for allergies      polyethylene glycol (MIRALAX) 17 gram packet Take 17 g by mouth daily as needed for PRN Reason (Other) (PRN Constipation). PRN Constipation. Mix with 8 oz of liquid of choice, such as water.  Bifidobacterium Infantis (ALIGN) 4 mg cap Take 4 mg by mouth daily.  furosemide (Lasix) 20 mg tablet Take 20mg po every other day prn edema. Indications: visible water retention (Patient not taking: Reported on 6/20/2022) 90 Tablet 3    dicyclomine (BENTYL) 10 mg capsule Take 1 Cap by mouth four (4) times daily. (Patient not taking: Reported on 6/20/2022) 90 Cap 3       Allergies   Allergen Reactions    Macrobid [Nitrofurantoin Monohyd/M-Cryst] Nausea and Vomiting    Flomax [Tamsulosin] Myalgia and Vertigo    Pneumococcal Vaccine Other (comments)    Lexapro [Escitalopram Oxalate] Other (comments)     ? Hyponatremia per pt hx       Past Medical History:   Diagnosis Date    Acute deep vein thrombosis (DVT) of popliteal vein [facility] 7/27/2021    Arthritis     Cancer ([facility] 2002    Colon     Chronic pain     abdominal due to IBS    Depression     GERD (gastroesophageal reflux disease)     Hypertension     Migraine headache 2014    Mixed hyperlipidemia 9/3/2020    Occipital neuralgia 2014    PUD (peptic ulcer disease)     S/P colon resection 2002    clear presently        Past Surgical History:   Procedure Laterality Date    HX CHOLECYSTECTOMY  2015    HX HYSTERECTOMY  1965    HX TONSIL AND ADENOIDECTOMY      HX WISDOM TEETH EXTRACTION      x4    IR KYPHOPLASTY THORACIC  5/19/2021       Family History Problem Relation Age of Onset    Hypertension Mother     Stroke Mother     Heart Attack Father     Hypertension Father     Parkinson's Disease Sister     Hypertension Brother     Cancer Brother         prostate    No Known Problems Son     No Known Problems Daughter        Social History     Tobacco Use    Smoking status: Never Smoker    Smokeless tobacco: Never Used   Substance Use Topics    Alcohol use: Yes     Alcohol/week: 0.0 - 1.0 standard drinks       ROS   Review of Systems   Constitutional: Negative for chills and fever. HENT: Negative for ear pain and sore throat. Eyes: Negative for blurred vision and pain. Respiratory: Negative for cough and shortness of breath. Cardiovascular: Negative for chest pain. Gastrointestinal: Negative for abdominal pain, blood in stool and melena. Genitourinary: Negative for dysuria and hematuria. Musculoskeletal: Positive for back pain. Negative for joint pain and myalgias. Skin: Negative for rash. Neurological: Positive for tingling and weakness (generalized weakness). Negative for headaches. Endo/Heme/Allergies: Does not bruise/bleed easily. Psychiatric/Behavioral: Negative for substance abuse. Objective     Vitals:    06/20/22 1428 06/20/22 1432   BP: (!) 146/87 (!) 169/76   Pulse: 63    Resp: 18    Temp: 97 °F (36.1 °C)    TempSrc: Temporal    SpO2: 95%    Weight: 166 lb (75.3 kg)    Height: 5' 4\" (1.626 m)    PainSc:   0 - No pain        Physical Exam  Vitals and nursing note reviewed. HENT:      Head: Normocephalic and atraumatic. Right Ear: External ear normal.      Left Ear: External ear normal.   Eyes:      General: No scleral icterus. Right eye: No discharge. Left eye: No discharge. Conjunctiva/sclera: Conjunctivae normal.   Cardiovascular:      Rate and Rhythm: Normal rate and regular rhythm. Heart sounds: Normal heart sounds. No murmur heard. No friction rub. No gallop.     Pulmonary: Effort: Pulmonary effort is normal. No respiratory distress. Breath sounds: Normal breath sounds. No wheezing or rales. Abdominal:      General: Bowel sounds are normal. There is no distension. Palpations: Abdomen is soft. There is no mass. Tenderness: There is no abdominal tenderness. There is no guarding or rebound. Musculoskeletal:         General: No swelling (BUE) or tenderness (BUE). Cervical back: Neck supple. Comments: Her lumbar spinous processes are mildly TTP. +BLE edema is present and symmetrical   Lymphadenopathy:      Cervical: No cervical adenopathy. Skin:     General: Skin is warm and dry. Findings: No erythema or rash. Neurological:      Mental Status: She is alert. Motor: No abnormal muscle tone.       Gait: Gait normal.   Psychiatric:         Mood and Affect: Mood normal.         LABS   Data Review:   Lab Results   Component Value Date/Time    WBC 7.3 07/02/2021 04:44 PM    HGB 11.9 (L) 07/02/2021 04:44 PM    HCT 39.2 07/02/2021 04:44 PM    PLATELET 799 69/52/5157 04:44 PM    MCV 89.3 07/02/2021 04:44 PM       Lab Results   Component Value Date/Time    Sodium 143 07/12/2021 08:00 AM    Potassium 4.4 07/12/2021 08:00 AM    Chloride 109 07/12/2021 08:00 AM    CO2 27 07/12/2021 08:00 AM    Anion gap 7 07/12/2021 08:00 AM    Glucose 104 (H) 07/12/2021 08:00 AM    BUN 17 07/12/2021 08:00 AM    Creatinine 0.87 07/12/2021 08:00 AM    BUN/Creatinine ratio 20 07/12/2021 08:00 AM    GFR est AA >60 07/12/2021 08:00 AM    GFR est non-AA >60 07/12/2021 08:00 AM    Calcium 9.2 07/12/2021 08:00 AM       Lab Results   Component Value Date/Time    Cholesterol, total 203 (H) 02/25/2021 06:45 AM    HDL Cholesterol 65 (H) 02/25/2021 06:45 AM    LDL, calculated 111 (H) 02/25/2021 06:45 AM    VLDL, calculated 27 02/25/2021 06:45 AM    Triglyceride 135 02/25/2021 06:45 AM    CHOL/HDL Ratio 3.1 02/25/2021 06:45 AM       Lab Results   Component Value Date/Time    Hemoglobin A1c 5.6 10/18/2021 08:39 AM       Assessment/Plan:   1. Gastroesophageal reflux disease: She has persistent symptoms despite use of Pepcid.  -Okay to take Prilosec over-the-counter with Pepcid for better symptom relief. - Checking labs. ORDERS:  - METABOLIC PANEL, COMPREHENSIVE; Future  - CBC WITH AUTOMATED DIFF; Future    2. Hyperglycemia/Prediabetes hx: Her last A1c was 5.6 in October  -We will need to check a follow-up A1c later this year.  -I encouraged her to reduce her carb intake. 3. Mixed hyperlipidemia:   -Continue with pravastatin.  -Checking labs. ORDERS:  - METABOLIC PANEL, COMPREHENSIVE; Future  - LIPID PANEL; Future    4. Cancer of sigmoid: In remission. Continue cancer surveillance and observation. 5. DVT Hx and BLE Edema: I believe her low pressure is falsely high today due to whitecoat hypertension.  -Okay to continue with Lasix as needed for symptomatic relief of bilateral lower extremity edema. - I encouraged him to monitor her blood pressure outside of our office and to notify me if readings are persistently greater than 140/90.  - She is to elevate her legs. - She will notify me if there is unilateral swelling of the legs. 6. Lumbar disc disease: Worsening.  -Increasing her gabapentin from 100 mg 3 times daily to 200 mg 3 times daily. - I will follow-up with her in 2 months to assess her response.  -Activity as tolerated. ORDERS:  - gabapentin (NEURONTIN) 100 mg capsule; Take 2 Capsules by mouth three (3) times daily. Max Daily Amount: 600 mg. Dispense: 180 Capsule; Refill: 5        Health Maintenance Due   Topic Date Due    COVID-19 Vaccine (1) Never done    DTaP/Tdap/Td series (1 - Tdap) Never done    Shingrix Vaccine Age 50> (1 of 2) Never done    Lipid Screen  02/25/2022     Lab review: labs are reviewed in the EHR and ordered as mentioned above. I have discussed the diagnosis with the patient and the intended plan as seen in the above orders.   The patient has received an after-visit summary and questions were answered concerning future plans. I have discussed medication side effects and warnings with the patient as well. I have reviewed the plan of care with the patient, accepted their input and they are in agreement with the treatment goals. All questions were answered. The patient understands the plan of care. Handouts provided today with above information. Pt instructed if symptoms worsen to call the office or report to the ED for continued care. Greater than 50% of the visit time was spent in counseling and/or coordination of care. Voice recognition was used to generate this report, which may have resulted in some phonetic based errors in grammar and contents. Even though attempts were made to correct all the mistakes, some may have been missed, and remained in the body of the document.           Yuko Mcmahan MD

## 2022-06-20 NOTE — PATIENT INSTRUCTIONS
Home Blood Pressure Test: About This Test  What is it? A home blood pressure test allows you to keep track of your blood pressure at home. Blood pressure is a measure of the force of blood against the walls of your arteries. Blood pressure readings include two numbers, such as 130/80 (say \"130 over 80\"). The first number is the systolic pressure. The second number is the diastolic pressure. Why is this test done? You may do this test at home to:  · Find out if you have high blood pressure. · Track your blood pressure if you have high blood pressure. · Track how well medicine is working to reduce high blood pressure. · Check how lifestyle changes, such as weight loss and exercise, are affecting blood pressure. How do you prepare for the test?  For at least 30 minutes before you take your blood pressure, don't exercise, drink caffeine, or smoke. Empty your bladder before the test. Sit quietly with your back straight and both feet on the floor for at least 5 minutes. This helps you take your blood pressure while you feel comfortable and relaxed. How is the test done? · If your doctor recommends it, take your blood pressure twice a day. Take it in the morning and evening. · Sit with your arm slightly bent and resting on a table so that your upper arm is at the same level as your heart. · Use the same arm each time you take your blood pressure. · Place the blood pressure cuff on the bare skin of your upper arm. You may have to roll up your sleeve, remove your arm from the sleeve, or take your shirt off. · Wrap the blood pressure cuff around your upper arm so that the lower edge of the cuff is about 1 inch above the bend of your elbow. · Do not move, talk, or text while you take your blood pressure. Follow the instructions that came with your blood pressure monitor. They might be different from the following. · Press the on/off button on the automatic monitor.  Then you may need to wait until the screen says the monitor is ready. · Press the start button. The cuff will inflate and deflate by itself. · Your blood pressure numbers will appear on the screen. · Wait one minute and take your blood pressure again. · If your monitor does not automatically save your numbers, write them in your log book, along with the date and time. Follow-up care is a key part of your treatment and safety. Be sure to make and go to all appointments, and call your doctor if you are having problems. It's also a good idea to keep a list of the medicines you take. Where can you learn more? Go to http://www.garza.com/  Enter C427 in the search box to learn more about \"Home Blood Pressure Test: About This Test.\"  Current as of: January 10, 2022               Content Version: 13.2  © 2006-2022 [employer]. Care instructions adapted under license by [employer] (which disclaims liability or warranty for this information). If you have questions about a medical condition or this instruction, always ask your healthcare professional. [place] [address] any warranty or liability for your use of this information. Gastroesophageal Reflux Disease (GERD): Care Instructions  Overview     Gastroesophageal reflux disease (GERD) is the backward flow of stomach acid into the esophagus. The esophagus is the tube that leads from your throat to your stomach. A one-way valve prevents the stomach acid from backing up into this tube. But when you have GERD, this valve does not close tightly enough. This can also cause pain and swelling in your esophagus. (This is called esophagitis.)  If you have mild GERD symptoms including heartburn, you may be able to control the problem with antacids or over-the-counter medicine. You can also make lifestyle changes to help reduce your symptoms.  These include changing your diet and eating habits, such as not eating late at night and losing weight. Follow-up care is a key part of your treatment and safety. Be sure to make and go to all appointments, and call your doctor if you are having problems. It's also a good idea to know your test results and keep a list of the medicines you take. How can you care for yourself at home? · Take your medicines exactly as prescribed. Call your doctor if you think you are having a problem with your medicine. · Your doctor may recommend over-the-counter medicine. For mild or occasional indigestion, antacids, such as Tums, Gaviscon, Mylanta, or Maalox, may help. Your doctor also may recommend over-the-counter acid reducers, such as famotidine (Pepcid AC), cimetidine (Tagamet HB), or omeprazole (Prilosec). Read and follow all instructions on the label. If you use these medicines often, talk with your doctor. · Change your eating habits. ? It's best to eat several small meals instead of two or three large meals. ? After you eat, wait 2 to 3 hours before you lie down. ? Avoid foods that make your symptoms worse. These may include chocolate, mint, alcohol, pepper, spicy foods, high-fat foods, or drinks with caffeine in them, such as tea, coffee, lucina, or energy drinks. If your symptoms are worse after you eat a certain food, you may want to stop eating it to see if your symptoms get better. · Do not smoke or chew tobacco. Smoking can make GERD worse. If you need help quitting, talk to your doctor about stop-smoking programs and medicines. These can increase your chances of quitting for good. · If you have GERD symptoms at night, raise the head of your bed 6 to 8 inches by putting the frame on blocks or placing a foam wedge under the head of your mattress. (Adding extra pillows does not work.)  · Do not wear tight clothing around your middle. · Lose weight if you need to. Losing just 5 to 10 pounds can help. When should you call for help?    Call your doctor now or seek immediate medical care if:    · You have new or different belly pain.     · Your stools are black and tarlike or have streaks of blood. Watch closely for changes in your health, and be sure to contact your doctor if:    · Your symptoms have not improved after 2 days.     · Food seems to catch in your throat or chest.   Where can you learn more? Go to http://www.gray.com/  Enter C724 in the search box to learn more about \"Gastroesophageal Reflux Disease (GERD): Care Instructions. \"  Current as of: September 8, 2021               Content Version: 13.2  © 0317-2757 [employer]. Care instructions adapted under license by [employer] (which disclaims liability or warranty for this information). If you have questions about a medical condition or this instruction, always ask your healthcare professional. [address] any warranty or liability for your use of this information.

## 2022-06-20 NOTE — PROGRESS NOTES
Carmen Barajas presents today for   Chief Complaint   Patient presents with    Follow-up    Cholesterol Problem       1. \"Have you been to the ER, urgent care clinic since your last visit? Hospitalized since your last visit? \" yes   [facility]   X 2 UTI     2. \"Have you seen or consulted any other health care providers outside of the [address] [place] [address] since your last visit? \" yes [facility]     3. For patients aged 39-70: Has the patient had a colonoscopy / FIT/ Cologuard? NA - based on age      If the patient is female:    4. For patients aged 41-77: Has the patient had a mammogram within the past 2 years? NA - based on age or sex  See top three    5. For patients aged 21-65: Has the patient had a pap smear?  NA - based on age or sex

## 2022-06-24 ENCOUNTER — HOSPITAL ENCOUNTER (OUTPATIENT)
Dept: LAB | Age: 87
Discharge: HOME | End: 2022-06-24
Payer: MEDICARE

## 2022-06-24 ENCOUNTER — TELEPHONE (OUTPATIENT)
Dept: INTERNAL MEDICINE CLINIC | Age: 87
End: 2022-06-24

## 2022-06-24 DIAGNOSIS — R79.89 ABNORMAL CBC: ICD-10-CM

## 2022-06-24 DIAGNOSIS — K21.9 GASTROESOPHAGEAL REFLUX DISEASE, UNSPECIFIED WHETHER ESOPHAGITIS PRESENT: ICD-10-CM

## 2022-06-24 DIAGNOSIS — R73.02 IMPAIRED GLUCOSE TOLERANCE: ICD-10-CM

## 2022-06-24 DIAGNOSIS — R60.0 LEG EDEMA: ICD-10-CM

## 2022-06-24 DIAGNOSIS — Z51.81 ENCOUNTER FOR THERAPEUTIC DRUG LEVEL MONITORING: ICD-10-CM

## 2022-06-24 DIAGNOSIS — R73.9 HYPERGLYCEMIA: ICD-10-CM

## 2022-06-24 DIAGNOSIS — E78.2 MIXED HYPERLIPIDEMIA: ICD-10-CM

## 2022-06-24 LAB
ALBUMIN SERPL-MCNC: 3.9 G/DL (ref 3.4–5)
ALBUMIN/GLOB SERPL: 1.1 {RATIO} (ref 0.8–1.7)
ALP SERPL-CCNC: 95 U/L (ref 45–117)
ALT SERPL-CCNC: 20 U/L (ref 13–56)
ANION GAP SERPL CALC-SCNC: 5 MMOL/L (ref 3–18)
AST SERPL-CCNC: 23 U/L (ref 10–38)
BASOPHILS # BLD: 0.1 K/UL (ref 0–0.1)
BASOPHILS NFR BLD: 1 % (ref 0–2)
BILIRUB SERPL-MCNC: 0.4 MG/DL (ref 0.2–1)
BUN SERPL-MCNC: 15 MG/DL (ref 7–18)
BUN/CREAT SERPL: 16 (ref 12–20)
CALCIUM SERPL-MCNC: 9.3 MG/DL (ref 8.5–10.1)
CHLORIDE SERPL-SCNC: 101 MMOL/L (ref 100–111)
CHOLEST SERPL-MCNC: 193 MG/DL
CO2 SERPL-SCNC: 30 MMOL/L (ref 21–32)
CREAT SERPL-MCNC: 0.95 MG/DL (ref 0.6–1.3)
DIFFERENTIAL METHOD BLD: ABNORMAL
EOSINOPHIL # BLD: 0.2 K/UL (ref 0–0.4)
EOSINOPHIL NFR BLD: 3 % (ref 0–5)
ERYTHROCYTE [DISTWIDTH] IN BLOOD BY AUTOMATED COUNT: 13.7 % (ref 11.6–14.5)
EST. AVERAGE GLUCOSE BLD GHB EST-MCNC: 120 MG/DL
GLOBULIN SER CALC-MCNC: 3.5 G/DL (ref 2–4)
GLUCOSE SERPL-MCNC: 104 MG/DL (ref 74–99)
HBA1C MFR BLD: 5.8 % (ref 4.2–5.6)
HCT VFR BLD AUTO: 40.2 % (ref 35–45)
HDLC SERPL-MCNC: 58 MG/DL (ref 40–60)
HDLC SERPL: 3.3 {RATIO} (ref 0–5)
HGB BLD-MCNC: 12.5 G/DL (ref 12–16)
IMM GRANULOCYTES # BLD AUTO: 0 K/UL (ref 0–0.04)
IMM GRANULOCYTES NFR BLD AUTO: 0 % (ref 0–0.5)
LDLC SERPL CALC-MCNC: 97.8 MG/DL (ref 0–100)
LIPID PROFILE,FLP: ABNORMAL
LYMPHOCYTES # BLD: 1.7 K/UL (ref 0.9–3.6)
LYMPHOCYTES NFR BLD: 33 % (ref 21–52)
MCH RBC QN AUTO: 27.4 PG (ref 24–34)
MCHC RBC AUTO-ENTMCNC: 31.1 G/DL (ref 31–37)
MCV RBC AUTO: 88.2 FL (ref 78–100)
MONOCYTES # BLD: 0.6 K/UL (ref 0.05–1.2)
MONOCYTES NFR BLD: 11 % (ref 3–10)
NEUTS SEG # BLD: 2.7 K/UL (ref 1.8–8)
NEUTS SEG NFR BLD: 52 % (ref 40–73)
NRBC # BLD: 0 K/UL (ref 0–0.01)
NRBC BLD-RTO: 0 PER 100 WBC
PLATELET # BLD AUTO: 249 K/UL (ref 135–420)
PMV BLD AUTO: 10.6 FL (ref 9.2–11.8)
POTASSIUM SERPL-SCNC: 4.6 MMOL/L (ref 3.5–5.5)
PROT SERPL-MCNC: 7.4 G/DL (ref 6.4–8.2)
RBC # BLD AUTO: 4.56 M/UL (ref 4.2–5.3)
SODIUM SERPL-SCNC: 136 MMOL/L (ref 136–145)
TRIGL SERPL-MCNC: 186 MG/DL (ref ?–150)
VLDLC SERPL CALC-MCNC: 37.2 MG/DL
WBC # BLD AUTO: 5.1 K/UL (ref 4.6–13.2)

## 2022-06-24 PROCEDURE — 80053 COMPREHEN METABOLIC PANEL: CPT

## 2022-06-24 PROCEDURE — 83036 HEMOGLOBIN GLYCOSYLATED A1C: CPT

## 2022-06-24 PROCEDURE — 80061 LIPID PANEL: CPT

## 2022-06-24 PROCEDURE — 36415 COLL VENOUS BLD VENIPUNCTURE: CPT

## 2022-06-24 PROCEDURE — 85025 COMPLETE CBC W/AUTO DIFF WBC: CPT

## 2022-06-24 RX ORDER — GABAPENTIN 100 MG/1
200 CAPSULE ORAL 3 TIMES DAILY
Qty: 180 CAPSULE | Refills: 5 | Status: SHIPPED | OUTPATIENT
Start: 2022-06-24 | End: 2022-11-04 | Stop reason: SDUPTHER

## 2022-06-24 NOTE — TELEPHONE ENCOUNTER
Please let her know that her prescription was sent.      Dr. Yoel Javier  [facility], [address] [facility], [facility] [address]  Phone: [phone]  Fax: [phone]

## 2022-06-24 NOTE — TELEPHONE ENCOUNTER
Patient's daughter, Charles Cha, is calling stating Dr. Edward Wilkinson was going to be sending in an rx for Gabapentin with an increased dose. They have not receive that as of yet. Please send to [facility].

## 2022-06-28 NOTE — PROGRESS NOTES
Please let her know that her A1c is up to 5.8 from 5.6 in October. Her total cholesterol is down to 193 from 203. Triglycerides are 186. HDL is 58. Her LDL is down to 97 from 111 a year ago. Her CMP does not show any significant abnormalities. Her CBC does not show any significant abnormalities. She should continue taking all medications as prescribed.     Dr. Michael Bear  [facility], [address] [place] [address], [address] [place] [address] Str.  Phone: [phone]  Fax: [phone]

## 2022-06-29 ENCOUNTER — TELEPHONE (OUTPATIENT)
Dept: INTERNAL MEDICINE CLINIC | Age: 87
End: 2022-06-29

## 2022-06-29 DIAGNOSIS — K21.9 GASTROESOPHAGEAL REFLUX DISEASE, UNSPECIFIED WHETHER ESOPHAGITIS PRESENT: ICD-10-CM

## 2022-06-29 RX ORDER — OMEPRAZOLE 20 MG/1
20 CAPSULE, DELAYED RELEASE ORAL DAILY
Qty: 90 CAPSULE | Refills: 3 | Status: SHIPPED | OUTPATIENT
Start: 2022-06-29

## 2022-06-29 NOTE — TELEPHONE ENCOUNTER
Spoke with daughter Winter Jo she states Dr. Radha Thomas wanted to change her GERD medication to Prilosec, because Pepcid was not helping as much. Daughter is asking to have [facility]

## 2022-06-29 NOTE — TELEPHONE ENCOUNTER
Please let her know that I ordered prilosec and sent the medication to her preferred pharmacy for the message. She can continue to take Pepcid as needed in addition to the Prilosec.     Dr. Yoel Javier  [facility] of [address], [address] [place] [address] Str.  Phone: [phone]  Fax: [phone]

## 2022-07-14 ENCOUNTER — TELEPHONE (OUTPATIENT)
Dept: INTERNAL MEDICINE CLINIC | Age: 87
End: 2022-07-14

## 2022-07-14 NOTE — TELEPHONE ENCOUNTER
Pt calling asking to be seen today. Says she has a headache and she has been having high bp. Wants to know if she can be seen or get information on what Dr. Verona Henson wants her to do.  Her daughter can bring her in today    bp 146/85 today- has headache  180/74 yesterday afternoon - had a bad headache  188/83 last night

## 2022-07-14 NOTE — TELEPHONE ENCOUNTER
Patient reached, she states that her BP this morning was fine, but last night elevated while she had a headache. Patient advised to take otc tylenol for her headache and if her BP remains elevated along with a headache to go to the ER. Patient keep a log of BP readings x 1 week, and call back. Patient denies any pain, chest pain, blurred vision, or SOB at this time. abdominal pain

## 2022-08-22 ENCOUNTER — OFFICE VISIT (OUTPATIENT)
Dept: INTERNAL MEDICINE CLINIC | Age: 87
End: 2022-08-22
Payer: MEDICARE

## 2022-08-22 VITALS
TEMPERATURE: 98.1 F | BODY MASS INDEX: 28.51 KG/M2 | WEIGHT: 167 LBS | DIASTOLIC BLOOD PRESSURE: 60 MMHG | RESPIRATION RATE: 16 BRPM | OXYGEN SATURATION: 97 % | HEART RATE: 61 BPM | HEIGHT: 64 IN | SYSTOLIC BLOOD PRESSURE: 134 MMHG

## 2022-08-22 DIAGNOSIS — M54.30 SCIATICA, UNSPECIFIED LATERALITY: ICD-10-CM

## 2022-08-22 DIAGNOSIS — C18.7 CANCER OF SIGMOID (HCC): ICD-10-CM

## 2022-08-22 DIAGNOSIS — Z00.00 MEDICARE ANNUAL WELLNESS VISIT, SUBSEQUENT: ICD-10-CM

## 2022-08-22 DIAGNOSIS — Z51.81 ENCOUNTER FOR THERAPEUTIC DRUG MONITORING: ICD-10-CM

## 2022-08-22 DIAGNOSIS — E78.2 MIXED HYPERLIPIDEMIA: ICD-10-CM

## 2022-08-22 DIAGNOSIS — Z71.89 ADVANCE CARE PLANNING: ICD-10-CM

## 2022-08-22 DIAGNOSIS — R73.9 HYPERGLYCEMIA: Primary | ICD-10-CM

## 2022-08-22 PROCEDURE — G0463 HOSPITAL OUTPT CLINIC VISIT: HCPCS | Performed by: INTERNAL MEDICINE

## 2022-08-22 PROCEDURE — 99214 OFFICE O/P EST MOD 30 MIN: CPT | Performed by: INTERNAL MEDICINE

## 2022-08-22 PROCEDURE — G8536 NO DOC ELDER MAL SCRN: HCPCS | Performed by: INTERNAL MEDICINE

## 2022-08-22 PROCEDURE — G8417 CALC BMI ABV UP PARAM F/U: HCPCS | Performed by: INTERNAL MEDICINE

## 2022-08-22 PROCEDURE — 1090F PRES/ABSN URINE INCON ASSESS: CPT | Performed by: INTERNAL MEDICINE

## 2022-08-22 PROCEDURE — G9717 DOC PT DX DEP/BP F/U NT REQ: HCPCS | Performed by: INTERNAL MEDICINE

## 2022-08-22 PROCEDURE — G0439 PPPS, SUBSEQ VISIT: HCPCS | Performed by: INTERNAL MEDICINE

## 2022-08-22 PROCEDURE — G8427 DOCREV CUR MEDS BY ELIG CLIN: HCPCS | Performed by: INTERNAL MEDICINE

## 2022-08-22 PROCEDURE — 1123F ACP DISCUSS/DSCN MKR DOCD: CPT | Performed by: INTERNAL MEDICINE

## 2022-08-22 PROCEDURE — 1101F PT FALLS ASSESS-DOCD LE1/YR: CPT | Performed by: INTERNAL MEDICINE

## 2022-08-22 NOTE — PROGRESS NOTES
INTERNISTS OF [facility]:  08/22/22, [id number]      The Subsequent Medicare Annual Wellness Exam PROGRESS NOTE    This is a [employer] Exam (AWV). I have reviewed the patient's medical history in detail and updated the computerized patient record. Petey Valdes is a 80 y.o.  female and presents for an annual wellness exam.    SUBJECTIVE    Past Medical History:   Diagnosis Date    Acute deep vein thrombosis (DVT) of popliteal vein ([facility] [address] 7/27/2021    Arthritis     Cancer ([facility] [address] 2002    Colon     Chronic pain     abdominal due to IBS    Depression     GERD (gastroesophageal reflux disease)     Hypertension     Migraine headache 2014    Mixed hyperlipidemia 9/3/2020    Occipital neuralgia 2014    PUD (peptic ulcer disease)     S/P colon resection 2002    clear presently       Past Surgical History:   Procedure Laterality Date    HX CHOLECYSTECTOMY  2015    HX HYSTERECTOMY  1965    HX TONSIL AND ADENOIDECTOMY      HX WISDOM TEETH EXTRACTION      x4    IR KYPHOPLASTY THORACIC  5/19/2021     Current Outpatient Medications   Medication Sig Dispense Refill    omeprazole (PRILOSEC) 20 mg capsule Take 1 Capsule by mouth daily. 90 Capsule 3    gabapentin (NEURONTIN) 100 mg capsule Take 2 Capsules by mouth three (3) times daily. Max Daily Amount: 600 mg. 180 Capsule 5    phenylephrine hcl (Sudafed PE) 10 mg tab Take 10 mg by mouth as needed (congestion). Follow instructions on label. 30 Tablet 2    furosemide (Lasix) 20 mg tablet Take 20mg po every other day prn edema. Indications: visible water retention (Patient not taking: Reported on 6/20/2022) 90 Tablet 3    menthol (COUGH DROPS MM) by Mucous Membrane route. Self administer OTC meds prn.      acetaminophen (TYLENOL EXTRA STRENGTH PO) Take  by mouth. Self administer OTC meds prn.      loperamide HCl (IMODIUM PO) Take  by mouth. Self administer OTC meds prn.      calcium carbonate (TUMS PO) Take  by mouth.  Self administer OTC meds prn.      calcium carb/magnesium hydrox (ROLAIDS PO) Take  by mouth. Self administer OTC meds prn.      bismuth subsalicylate (PEPTO-BISMOL PO) Take  by mouth. Self administer OTC meds prn.      capsaicin/me-salicylate/menth (ARTHRITIS CREAM EX) by Apply Externally route. Self administer OTC meds prn.      hydrocortisone-aloe vera (Hydrocortisone Plus) 1 % topical cream Apply  to affected area two (2) times a day. Self administer OTC meds prn.      mirtazapine (REMERON) 30 mg tablet Take 1 Tab by mouth nightly. (Patient taking differently: Take 1 Tab by mouth nightly. Missed 5 days per patient, due to slow renewal) 30 Tab 5    fexofenadine (Allergy Relief, fexofenadine,) 180 mg tablet Take 180 mg by mouth daily. pravastatin (PRAVACHOL) 10 mg tablet Take 1 Tab by mouth nightly. 30 Tab 5    ondansetron hcl (ZOFRAN) 4 mg tablet Take 4 mg by mouth every eight (8) hours as needed for Nausea. alum-mag hydroxide-simeth (MYLANTA) 200-200-20 mg/5 mL susp Take 30 mL by mouth daily as needed for Other (PRN: Gastric Reflux, in PM). PRN: Gastric Reflux, in PM      famotidine (PEPCID) 20 mg tablet Take 20 mg by mouth daily. melatonin tab tablet Take 10 mg by mouth nightly. Self administer OTC meds prn.      cholecalciferol (VITAMIN D3) 1,000 unit cap Take 1,000 Units by mouth daily. multivitamin/iron/folic acid (CENTRUM WOMEN PO) Take 1 Tab by mouth daily. cetirizine (ZYRTEC) 10 mg tablet Take 10 mg by mouth daily. As needed for allergies      polyethylene glycol (MIRALAX) 17 gram packet Take 17 g by mouth daily as needed for PRN Reason (Other) (PRN Constipation). PRN Constipation. Mix with 8 oz of liquid of choice, such as water. Bifidobacterium Infantis (ALIGN) 4 mg cap Take 4 mg by mouth daily. dicyclomine (BENTYL) 10 mg capsule Take 1 Cap by mouth four (4) times daily.  (Patient not taking: Reported on 6/20/2022) 90 Cap 3     Allergies   Allergen Reactions    Macrobid [Nitrofurantoin Monohyd/M-Cryst] Nausea and Vomiting    Flomax [Tamsulosin] Myalgia and Vertigo    Pneumococcal Vaccine Other (comments)    Lexapro [Escitalopram Oxalate] Other (comments)     ? Hyponatremia per pt hx     Family History   Problem Relation Age of Onset    Hypertension Mother     Stroke Mother     Heart Attack Father     Hypertension Father     Parkinson's Disease Sister     Hypertension Brother     Cancer Brother         prostate    No Known Problems Son     No Known Problems Daughter      Social History     Tobacco Use    Smoking status: Never    Smokeless tobacco: Never   Substance Use Topics    Alcohol use: Yes     Alcohol/week: 0.0 - 1.0 standard drinks     Patient Active Problem List   Diagnosis Code    Urinary retention R33.9    Cancer of sigmoid ([facility] [address] C18.7    Anxiety F41.9    Irritable bowel syndrome with both constipation and diarrhea K58.2    Lumbar disc disease M51.9    Essential hypertension I10    Allergic rhinitis J30.9    Vitamin D deficiency E55.9    Impaired glucose tolerance R73.02    Mixed hyperlipidemia E78.2    Depression F32. A    Thoracic compression fracture (HCC) S22.000A    T12 compression fracture ([facility]) S22.080A    Acute deep vein thrombosis (DVT) of popliteal vein ([facility]) I82.439     The patient is an 57-year-old female with history of sigmoid colon cancer status post surgery, prediabetes, history of strictures, osteopenia, hyponatremia, lumbar disc disease, hypertension, right popliteal DVT, IBS, chronic BLE edema, left colles radius fracture, vitamin D deficiency, vertebral body fracture, allergic rhinitis, GERD, and urinary retention. 1.  Sciatica: At her last appointment, she reported worsening symptoms. As a result, I increased her gabapentin from 100 mg 3 times daily to 200mg 3 times daily. Today she reports: she has some drowsiness with taking gabapentin but it is very mild. Her pain is 5 out of 10 in severity along her spine. Her pain is slightly improved since her last visit. Note that she is in physical therapy in the past.    2.  Prediabetes: Her most recent labs show that her A1c is up to 5.8 from 5.6 in October. 3.   HLD: Her total cholesterol is down to 193 from 203. Triglycerides are 186. HDL is 58. Her LDL is down to 97 from 111 a year ago. Her CMP does not show any significant abnormalities. She is taking pravastatin 10 mg daily. 4. General:  -She takes Lasix as needed for BLE edema from venous stasis. Health Maintenance History  Immunizations reviewed: Tdap over-due   Pneumovax: over-due  but she has a h/o intolerance to this vaccine. Flu:  due soon  Zoster: over-due    Immunization History   Administered Date(s) Administered    Influenza High Dose Vaccine PF 09/27/2018, 10/31/2021    Influenza Vaccine 10/01/2020    Influenza Vaccine (Tri) Adjuvanted (>65 Yrs FLUAD TRI 96811) 10/10/2019       Colonoscopy: Up to date. No bleeding hx. +In remission given her h/o colorectal cancer. Eye exam: Up tod ate. Mammo: Up to date. No breast pain    Dexascan: Up to date. Pelvic/Pap: No bleeding/pain. Review of Systems   Constitutional:  Negative for chills and fever. HENT:  Negative for ear pain and sore throat. Eyes:  Negative for blurred vision and pain. Respiratory:  Negative for cough and shortness of breath. Cardiovascular:  Negative for chest pain. Gastrointestinal:  Negative for abdominal pain, blood in stool and melena. Genitourinary:  Negative for dysuria and hematuria. Musculoskeletal:  Positive for back pain and joint pain. Negative for myalgias. Skin:  Negative for rash. Neurological:  Negative for headaches. Endo/Heme/Allergies:  Does not bruise/bleed easily. Psychiatric/Behavioral:  Negative for depression and substance abuse. Depression Risk Factor Screening:      Patient Health Questionnaire (PHQ-2)   Over the last 2 weeks, how often have you been bothered by any of the following problems?   Little interest or pleasure in doing things? Not at all. [0]  Feeling down, depressed, or hopeless? Not at all. [0]    Total Score: 0/6  PHQ-2 Assessment Scoring:   A score of 2 or more requires further screening with the PHQ-9    Alcohol Risk Factor Screening:   Women: On any occasion during the past 3 months, have you had more than 3 drinks containing alcohol? no    Do you average more than 7 drinks per week? no    Tobacco Use Screening:     Social History     Tobacco Use   Smoking Status Never   Smokeless Tobacco Never       Hearing Loss   There have been no changes to the pt's hearing. No additional studies/evaluation is warranted at this time    Activities of Daily Living   She does not need help with ADLs other than ambulation. Requires assistance with: ambulation and meal preparation  She does not drive. Fall Risk   She had one fall s/I the past yr . Abuse Screen   None    Additional Examination Findings: There were no vitals filed for this visit. There is no height or weight on file to calculate BMI. Evaluation of Cognitive Function:  Mood/affect: Euthymic  Appearance: Well-groomed  Family member/caregiver input: She is with her daughter today. General:   Well-nourished, well-groomed, pleasant, alert, in no acute distress. Head:  Normocephalic, atraumatic  Ears:  External ears WNL  Eyes:  Clear sclera  Neuro:   Alert, conversant, appropriate, following commands  Skin:    No rashes noted  Psych: Affect, mood and judgment appropriate      Dementia Screen:  Clock Drawing (ten past eleven) Exercise: Unremarkable.       LABS   Data Review:   Lab Results   Component Value Date/Time    Sodium 136 06/24/2022 02:57 PM    Potassium 4.6 06/24/2022 02:57 PM    Chloride 101 06/24/2022 02:57 PM    CO2 30 06/24/2022 02:57 PM    Anion gap 5 06/24/2022 02:57 PM    Glucose 104 (H) 06/24/2022 02:57 PM    BUN 15 06/24/2022 02:57 PM    Creatinine 0.95 06/24/2022 02:57 PM    BUN/Creatinine ratio 16 06/24/2022 02:57 PM GFR est AA >60 06/24/2022 02:57 PM    GFR est non-AA 56 (L) 06/24/2022 02:57 PM    Calcium 9.3 06/24/2022 02:57 PM       Lab Results   Component Value Date/Time    WBC 5.1 06/24/2022 02:57 PM    HGB 12.5 06/24/2022 02:57 PM    HCT 40.2 06/24/2022 02:57 PM    PLATELET 000 99/69/4274 02:57 PM    MCV 88.2 06/24/2022 02:57 PM       Lab Results   Component Value Date/Time    Hemoglobin A1c 5.8 (H) 06/24/2022 02:57 PM       Lab Results   Component Value Date/Time    Cholesterol, total 193 06/24/2022 02:57 PM    HDL Cholesterol 58 06/24/2022 02:57 PM    LDL, calculated 97.8 06/24/2022 02:57 PM    VLDL, calculated 37.2 06/24/2022 02:57 PM    Triglyceride 186 (H) 06/24/2022 02:57 PM    CHOL/HDL Ratio 3.3 06/24/2022 02:57 PM         Patient Care Team:  Jf Moreno MD as PCP - General (Family Medicine)  Jf Moreno MD as PCP - [address] [place] [address] [facility]    End-of-life planning  Advanced Directive in the case than an injury or illness causes the patient to be unable to make health care decisions was discussed with the patient. Advice/Referrals/Counselling/Plan:   Education and counseling provided:  Are appropriate based on today's review and evaluation  End-of-Life planning (with patient's consent)  Pneumococcal Vaccine  Influenza Vaccine  Screening Mammography  Screening Pap and pelvic (covered once every 2 years)  Colorectal cancer screening tests  Cardiovascular screening blood test  Bone mass measurement (DEXA)  Screening for glaucoma  Diabetes screening test  Include in education list (weight loss, physical activity, smoking cessation, fall prevention, and nutrition)    ICD-10-CM ICD-9-CM    1. Medicare annual wellness visit, subsequent  Z00.00 V70.0       2. Cancer of sigmoid ([facility] [address]  R69.6 587.3 METABOLIC PANEL, COMPREHENSIVE      CBC WITH AUTOMATED DIFF      3. Mixed hyperlipidemia  C42.4 914.6 METABOLIC PANEL, COMPREHENSIVE      LIPID PANEL      4.  Hyperglycemia  R73.9 790.29 HEMOGLOBIN A1C WITH EAG 5. Encounter for therapeutic drug monitoring  Z51.81 V58.83 DRUG SCREEN UR - W/ CONFIRM        reviewed diet, exercise and weight control. Brief written plan, checklist    Assessment/Plan:    Health Maintenance:  -I encouraged her to get recommended vaccinations and screenings. Lab review: labs are reviewed in the [address] [place] [address] (ACP) Provider Conversation     Date of ACP Conversation: 08/22/22  Persons included in Conversation:  patient and family    Authorized Decision Maker (if patient is incapable of making informed decisions): This person is:   Named in Advance Directive or Healthcare Power of           For Patients with Decision Making Capacity:   Values/Goals: Exploration of values, goals, and preferences if recovery is not expected, even with continued medical treatment in the event of:  Imminent death  Severe, permanent brain injury    Conversation Outcomes / Follow-Up Plan:   ACP complete - no further action today. She has no changes to make to her pre-existing advance directive. I have discussed the diagnosis with the patient and the intended plan as seen in the above orders. The patient has received an after-visit summary and questions were answered concerning future plans. I have discussed medication side effects and warnings with the patient as well. I have reviewed the plan of care with the patient, accepted their input and they are in agreement with the treatment goals.

## 2022-08-22 NOTE — PATIENT INSTRUCTIONS

## 2022-08-29 PROBLEM — S22.000A THORACIC COMPRESSION FRACTURE (HCC): Status: RESOLVED | Noted: 2021-05-15 | Resolved: 2022-08-29

## 2022-08-29 NOTE — ACP (ADVANCE CARE PLANNING)
Advance Care Planning  Advance Care Planning (ACP) Provider Conversation     Date of ACP Conversation: 08/22/22  Persons included in Conversation:  patient and family    Authorized Decision Maker (if patient is incapable of making informed decisions): This person is:   Named in Advance Directive or Healthcare Power of           For Patients with Decision Making Capacity:   Values/Goals: Exploration of values, goals, and preferences if recovery is not expected, even with continued medical treatment in the event of:  Imminent death  Severe, permanent brain injury    Conversation Outcomes / Follow-Up Plan:   ACP complete - no further action today. She has no changes to make to her pre-existing advance directive. She has no changes to make to her DNR form. These documents were reviewed with her today. Her daughter is with her today.     Dr. Peterson [place]  [facility] of [address], [address] [place] [address].  Phone: [phone]  Fax: [phone]

## 2022-08-29 NOTE — PROGRESS NOTES
[facility]:  8/29/2022, MRN: [id number]      Cely Maurer is a 80 y.o. female and presents to clinic for Follow-up and Annual Wellness Visit      Subjective: The patient is an 80year-old female with history of sigmoid colon cancer status post surgery, prediabetes, history of strictures, osteopenia, hyponatremia, lumbar disc disease, hypertension, right popliteal DVT, IBS, chronic BLE edema, left colles radius fracture, vitamin D deficiency, vertebral body fracture, allergic rhinitis, GERD, and urinary retention. 1.  Sciatica: At her last appointment, she reported worsening symptoms. As a result, I increased her gabapentin from 100 mg 3 times daily to 200mg 3 times daily. Today she reports: she has some drowsiness with taking gabapentin but it is very mild. Her pain is 5 out of 10 in severity along her spine. Her pain is slightly improved since her last visit. Note that she is in physical therapy in the past.    2.  Prediabetes: Her most recent labs show that her A1c is up to 5.8 from 5.6 in October. 3.   HLD: Her total cholesterol is down to 193 from 203. Triglycerides are 186. HDL is 58. Her LDL is down to 97 from 111 a year ago. Her CMP does not show any significant abnormalities. She is taking pravastatin 10 mg daily. 4. General:  -She takes Lasix as needed for BLE edema from venous stasis.         Patient Active Problem List    Diagnosis Date Noted    Acute deep vein thrombosis (DVT) of popliteal vein ([address] 07/27/2021    Thoracic compression fracture ([address] 05/15/2021    T12 compression fracture ([address] 05/15/2021    Mixed hyperlipidemia 09/03/2020    Depression 09/03/2020    Impaired glucose tolerance 10/10/2019    Urinary retention 09/01/2018    Cancer of sigmoid ([address] 09/01/2018    Anxiety 09/01/2018    Irritable bowel syndrome with both constipation and diarrhea 09/01/2018    Lumbar disc disease 09/01/2018    Essential hypertension 09/01/2018    Allergic rhinitis 09/01/2018    Vitamin D deficiency 09/01/2018       Current Outpatient Medications   Medication Sig Dispense Refill    omeprazole (PRILOSEC) 20 mg capsule Take 1 Capsule by mouth daily. 90 Capsule 3    gabapentin (NEURONTIN) 100 mg capsule Take 2 Capsules by mouth three (3) times daily. Max Daily Amount: 600 mg. 180 Capsule 5    phenylephrine hcl (Sudafed PE) 10 mg tab Take 10 mg by mouth as needed (congestion). Follow instructions on label. 30 Tablet 2    furosemide (Lasix) 20 mg tablet Take 20mg po every other day prn edema. Indications: visible water retention 90 Tablet 3    menthol (COUGH DROPS MM) by Mucous Membrane route. Self administer OTC meds prn.      acetaminophen (TYLENOL EXTRA STRENGTH PO) Take  by mouth. Self administer OTC meds prn.      loperamide HCl (IMODIUM PO) Take  by mouth. Self administer OTC meds prn.      calcium carbonate (TUMS PO) Take  by mouth. Self administer OTC meds prn.      calcium carb/magnesium hydrox (ROLAIDS PO) Take  by mouth. Self administer OTC meds prn.      bismuth subsalicylate (PEPTO-BISMOL PO) Take  by mouth. Self administer OTC meds prn.      capsaicin/me-salicylate/menth (ARTHRITIS CREAM EX) by Apply Externally route. Self administer OTC meds prn.      hydrocortisone-aloe vera 1 % topical cream Apply  to affected area two (2) times a day. Self administer OTC meds prn. fexofenadine (ALLEGRA) 180 mg tablet Take 180 mg by mouth daily. pravastatin (PRAVACHOL) 10 mg tablet Take 1 Tab by mouth nightly. 30 Tab 5    ondansetron hcl (ZOFRAN) 4 mg tablet Take 4 mg by mouth every eight (8) hours as needed for Nausea. alum-mag hydroxide-simeth (MYLANTA) 200-200-20 mg/5 mL susp Take 30 mL by mouth daily as needed for Other (PRN: Gastric Reflux, in PM). PRN: Gastric Reflux, in PM      famotidine (PEPCID) 20 mg tablet Take 20 mg by mouth daily. melatonin tab tablet Take 10 mg by mouth nightly.  Self administer OTC meds prn.      cholecalciferol (VITAMIN D3) 25 mcg (1,000 unit) cap Take 1,000 Units by mouth daily. multivitamin/iron/folic acid (CENTRUM WOMEN PO) Take 1 Tab by mouth daily. cetirizine (ZYRTEC) 10 mg tablet Take 10 mg by mouth daily. As needed for allergies      polyethylene glycol (MIRALAX) 17 gram packet Take 17 g by mouth daily as needed for PRN Reason (Other) (PRN Constipation). PRN Constipation. Mix with 8 oz of liquid of choice, such as water. Bifidobacterium Infantis 4 mg cap Take 4 mg by mouth daily. dicyclomine (BENTYL) 10 mg capsule Take 1 Cap by mouth four (4) times daily. 90 Cap 3    mirtazapine (REMERON) 30 mg tablet Take 1 Tab by mouth nightly. (Patient taking differently: Take 1 Tab by mouth nightly. Missed 5 days per patient, due to slow renewal) 30 Tab 5       Allergies   Allergen Reactions    Macrobid [Nitrofurantoin Monohyd/M-Cryst] Nausea and Vomiting    Flomax [Tamsulosin] Myalgia and Vertigo    Pneumococcal Vaccine Other (comments)    Lexapro [Escitalopram Oxalate] Other (comments)     ? Hyponatremia per pt hx       Past Medical History:   Diagnosis Date    Acute deep vein thrombosis (DVT) of popliteal vein ([facility] 7/27/2021    Arthritis     Cancer ([facility] 2002    Colon     Chronic pain     abdominal due to IBS    Depression     GERD (gastroesophageal reflux disease)     Hypertension     Migraine headache 2014    Mixed hyperlipidemia 9/3/2020    Occipital neuralgia 2014    PUD (peptic ulcer disease)     S/P colon resection 2002    clear presently        Past Surgical History:   Procedure Laterality Date    HX CHOLECYSTECTOMY  2015    HX HYSTERECTOMY  1965    HX TONSIL AND ADENOIDECTOMY      HX WISDOM TEETH EXTRACTION      x4    IR KYPHOPLASTY THORACIC  5/19/2021       Family History   Problem Relation Age of Onset    Hypertension Mother     Stroke Mother     Heart Attack Father     Hypertension Father     Parkinson's Disease Sister     Hypertension Brother     Cancer Brother         prostate    No Known Problems Son     No Known Problems Daughter        Social History     Tobacco Use    Smoking status: Never    Smokeless tobacco: Never   Substance Use Topics    Alcohol use: Yes     Alcohol/week: 0.0 - 1.0 standard drinks       ROS   Review of Systems   Constitutional:  Negative for chills and fever. HENT:  Negative for ear pain and sore throat. Eyes:  Negative for blurred vision and pain. Respiratory:  Negative for cough and shortness of breath. Cardiovascular:  Negative for chest pain. Gastrointestinal:  Negative for abdominal pain, blood in stool and melena. Genitourinary:  Negative for dysuria and hematuria. Musculoskeletal:  Positive for back pain and joint pain. Negative for myalgias. Skin:  Negative for rash. Neurological:  Negative for headaches. Endo/Heme/Allergies:  Does not bruise/bleed easily. Psychiatric/Behavioral:  Negative for depression and substance abuse. Objective     Vitals:    08/22/22 1358   BP: 134/60   Pulse: 61   Resp: 16   Temp: 98.1 °F (36.7 °C)   TempSrc: Temporal   SpO2: 97%   Weight: 167 lb (75.8 kg)   Height: 5' 4\" (1.626 m)   PainSc:   5   PainLoc: Generalized       Physical Exam  Vitals and nursing note reviewed. HENT:      Head: Normocephalic and atraumatic. Right Ear: External ear normal.      Left Ear: External ear normal.   Eyes:      General: No scleral icterus. Right eye: No discharge. Left eye: No discharge. Conjunctiva/sclera: Conjunctivae normal.   Cardiovascular:      Rate and Rhythm: Normal rate and regular rhythm. Heart sounds: Normal heart sounds. No murmur heard. No friction rub. No gallop. Pulmonary:      Effort: Pulmonary effort is normal. No respiratory distress. Breath sounds: Normal breath sounds. No wheezing or rales. Abdominal:      General: Bowel sounds are normal. There is no distension. Palpations: Abdomen is soft. There is no mass. Tenderness: There is no abdominal tenderness.  There is no guarding or rebound. Musculoskeletal:         General: No swelling (BUE) or tenderness (BUE). Cervical back: Neck supple. Comments: +BLE edema is present. She has mild TTP along her lumbar spinous processes   Lymphadenopathy:      Cervical: No cervical adenopathy. Skin:     General: Skin is warm and dry. Findings: No erythema or rash. Neurological:      Mental Status: She is alert. Mental status is at baseline. Motor: No abnormal muscle tone. Psychiatric:         Mood and Affect: Mood normal.       LABS   Data Review:   Lab Results   Component Value Date/Time    WBC 5.1 06/24/2022 02:57 PM    HGB 12.5 06/24/2022 02:57 PM    HCT 40.2 06/24/2022 02:57 PM    PLATELET 272 18/03/2258 02:57 PM    MCV 88.2 06/24/2022 02:57 PM       Lab Results   Component Value Date/Time    Sodium 136 06/24/2022 02:57 PM    Potassium 4.6 06/24/2022 02:57 PM    Chloride 101 06/24/2022 02:57 PM    CO2 30 06/24/2022 02:57 PM    Anion gap 5 06/24/2022 02:57 PM    Glucose 104 (H) 06/24/2022 02:57 PM    BUN 15 06/24/2022 02:57 PM    Creatinine 0.95 06/24/2022 02:57 PM    BUN/Creatinine ratio 16 06/24/2022 02:57 PM    GFR est AA >60 06/24/2022 02:57 PM    GFR est non-AA 56 (L) 06/24/2022 02:57 PM    Calcium 9.3 06/24/2022 02:57 PM       Lab Results   Component Value Date/Time    Cholesterol, total 193 06/24/2022 02:57 PM    HDL Cholesterol 58 06/24/2022 02:57 PM    LDL, calculated 97.8 06/24/2022 02:57 PM    VLDL, calculated 37.2 06/24/2022 02:57 PM    Triglyceride 186 (H) 06/24/2022 02:57 PM    CHOL/HDL Ratio 3.3 06/24/2022 02:57 PM       Lab Results   Component Value Date/Time    Hemoglobin A1c 5.8 (H) 06/24/2022 02:57 PM       Assessment/Plan:   1. Health Maintenance:  -We will check a CMP and CBC in February given her history of colon cancer, in remission. ORDERS:  - METABOLIC PANEL, COMPREHENSIVE; Future  - CBC WITH AUTOMATED DIFF; Future    2.  Mixed hyperlipidemia: Her triglycerides are 186.   -Continue with pravastatin. - Checking a follow-up CMP and lipid panel in January. ORDERS:  - METABOLIC PANEL, COMPREHENSIVE; Future  - LIPID PANEL; Future    3. Hyperglycemia/Prediabetes: Her A1c is up to 5.8  -I encouraged her to lower her carb intake. - We will check a follow-up A1c in February. ORDERS:  - HEMOGLOBIN A1C WITH EAG; Future    4. Sciatica: Improved. -We will check a UDS in February so that we are compliant with state regulations regarding her gabapentin. - Activity as tolerated. - Continue with gabapentin as prescribed for now. ORDERS:  - DRUG SCREEN UR - W/ CONFIRM; Future      ICD-10-CM ICD-9-CM    1. Medicare annual wellness visit, subsequent  Z00.00 V70.0       2. Cancer of sigmoid ([facility]  Q81.4 003.1 METABOLIC PANEL, COMPREHENSIVE      CBC WITH AUTOMATED DIFF      3. Mixed hyperlipidemia  T81.3 218.1 METABOLIC PANEL, COMPREHENSIVE      LIPID PANEL      4. Hyperglycemia  R73.9 790.29 HEMOGLOBIN A1C WITH EAG      5. Encounter for therapeutic drug monitoring  Z51.81 V58.83 DRUG SCREEN UR - W/ CONFIRM            Health Maintenance Due   Topic Date Due    COVID-19 Vaccine (1) Never done    DTaP/Tdap/Td series (1 - Tdap) Never done    Shingrix Vaccine Age 50> (1 of 2) Never done    Medicare Yearly Exam  07/21/2022     Lab review: labs are reviewed in the EHR and ordered as mentioned above. I have discussed the diagnosis with the patient and the intended plan as seen in the above orders. The patient has received an after-visit summary and questions were answered concerning future plans. I have discussed medication side effects and warnings with the patient as well. I have reviewed the plan of care with the patient, accepted their input and they are in agreement with the treatment goals. All questions were answered. The patient understands the plan of care. Handouts provided today with above information. Pt instructed if symptoms worsen to call the office or report to the ED for continued care. Greater than 50% of the visit time was spent in counseling and/or coordination of care. Voice recognition was used to generate this report, which may have resulted in some phonetic based errors in grammar and contents. Even though attempts were made to correct all the mistakes, some may have been missed, and remained in the body of the document. Follow-up and Dispositions    Return in about 7 months (around 3/22/2023).          Chester Harris MD

## 2022-10-13 ENCOUNTER — TELEPHONE (OUTPATIENT)
Dept: INTERNAL MEDICINE CLINIC | Age: 87
End: 2022-10-13

## 2022-10-13 NOTE — TELEPHONE ENCOUNTER
Pa Lopez, with [facility] is looking for orders that were faxed to us on 10/3/22. Pa Lopez can be reached at [phone] until 4:30 or after 8:30 tomorrow morning.  Her fax # is [phone]

## 2022-11-04 ENCOUNTER — TELEPHONE (OUTPATIENT)
Dept: INTERNAL MEDICINE CLINIC | Age: 87
End: 2022-11-04

## 2022-11-04 DIAGNOSIS — M51.9 LUMBAR DISC DISEASE: ICD-10-CM

## 2022-11-04 RX ORDER — GABAPENTIN 100 MG/1
200 CAPSULE ORAL 4 TIMES DAILY
Qty: 240 CAPSULE | Refills: 5 | Status: SHIPPED | OUTPATIENT
Start: 2022-11-04

## 2022-11-04 NOTE — TELEPHONE ENCOUNTER
Please let her know that I increased her gabapentin to 2 capsules 4 times a day vs 2 capsules tid.      Capsule = 100mg    Dr. Paola Eduardo  [facility], [address] [place] [address], [address] [place] [address] Str.  Phone: [phone]  Fax: [phone]

## 2022-11-04 NOTE — TELEPHONE ENCOUNTER
Pts daughter Curlene Mcardle calling re: gabaptentin medication adjustment. Stated it had been discussed with your prior. They feel it is now necessary to add a 4th dose (instead of taking 3 times daily to increase to 4 times daily.      [facility]

## 2022-11-07 NOTE — TELEPHONE ENCOUNTER
Patient's daughter reached and made aware of gabapentin frequency change per Dr. Saad Limon. Daughter states she will call us back if not effective.

## 2023-01-19 ENCOUNTER — TELEPHONE (OUTPATIENT)
Dept: INTERNAL MEDICINE CLINIC | Age: 88
End: 2023-01-19

## 2023-01-19 NOTE — TELEPHONE ENCOUNTER
Zahida with [employer] [facility] calling re: physicians order. Stated she received the paperwork but the first page is not signed.  (No physician signature)    Asking please have signed and fax to [phone]

## 2023-01-19 NOTE — TELEPHONE ENCOUNTER
Form completed.     Dr. Cindi Rodríguez  [facility], [address] [facility], [facility] [address] [facility] [address]  Phone: [phone]  Fax: [phone]

## 2023-02-03 DIAGNOSIS — E78.2 MIXED HYPERLIPIDEMIA: Primary | ICD-10-CM

## 2023-02-05 DIAGNOSIS — R73.9 HYPERGLYCEMIA: Primary | ICD-10-CM

## 2023-02-05 DIAGNOSIS — C18.7 CANCER OF SIGMOID (HCC): Primary | ICD-10-CM

## 2023-02-06 DIAGNOSIS — E78.2 MIXED HYPERLIPIDEMIA: ICD-10-CM

## 2023-02-06 DIAGNOSIS — C18.7 CANCER OF SIGMOID (HCC): Primary | ICD-10-CM

## 2023-02-20 ENCOUNTER — HOSPITAL ENCOUNTER (OUTPATIENT)
Facility: HOSPITAL | Age: 88
Discharge: HOME | End: 2023-02-23
Payer: MEDICARE

## 2023-02-20 ENCOUNTER — TELEPHONE (OUTPATIENT)
Age: 88
End: 2023-02-20

## 2023-02-20 DIAGNOSIS — E78.2 MIXED HYPERLIPIDEMIA: ICD-10-CM

## 2023-02-20 DIAGNOSIS — C18.7 CANCER OF SIGMOID (HCC): ICD-10-CM

## 2023-02-20 DIAGNOSIS — R73.9 HYPERGLYCEMIA: ICD-10-CM

## 2023-02-20 LAB
ALBUMIN SERPL-MCNC: 3.9 G/DL (ref 3.4–5)
ALBUMIN/GLOB SERPL: 1.1 (ref 0.8–1.7)
ALP SERPL-CCNC: 109 U/L (ref 45–117)
ALT SERPL-CCNC: 26 U/L (ref 13–56)
ANION GAP SERPL CALC-SCNC: 4 MMOL/L (ref 3–18)
AST SERPL-CCNC: 27 U/L (ref 10–38)
BASOPHILS # BLD: 0 K/UL (ref 0–0.1)
BASOPHILS NFR BLD: 1 % (ref 0–2)
BILIRUB SERPL-MCNC: 0.3 MG/DL (ref 0.2–1)
BUN SERPL-MCNC: 19 MG/DL (ref 7–18)
BUN/CREAT SERPL: 20 (ref 12–20)
CALCIUM SERPL-MCNC: 9.4 MG/DL (ref 8.5–10.1)
CHLORIDE SERPL-SCNC: 104 MMOL/L (ref 100–111)
CHOLEST SERPL-MCNC: 202 MG/DL
CO2 SERPL-SCNC: 30 MMOL/L (ref 21–32)
CREAT SERPL-MCNC: 0.93 MG/DL (ref 0.6–1.3)
DIFFERENTIAL METHOD BLD: ABNORMAL
EOSINOPHIL # BLD: 0.3 K/UL (ref 0–0.4)
EOSINOPHIL NFR BLD: 5 % (ref 0–5)
ERYTHROCYTE [DISTWIDTH] IN BLOOD BY AUTOMATED COUNT: 14.4 % (ref 11.6–14.5)
EST. AVERAGE GLUCOSE BLD GHB EST-MCNC: 120 MG/DL
GLOBULIN SER CALC-MCNC: 3.5 G/DL (ref 2–4)
GLUCOSE SERPL-MCNC: 122 MG/DL (ref 74–99)
HBA1C MFR BLD: 5.8 % (ref 4.2–5.6)
HCT VFR BLD AUTO: 38.9 % (ref 35–45)
HDLC SERPL-MCNC: 56 MG/DL (ref 40–60)
HDLC SERPL: 3.6 (ref 0–5)
HGB BLD-MCNC: 12.4 G/DL (ref 12–16)
IMM GRANULOCYTES # BLD AUTO: 0 K/UL (ref 0–0.04)
IMM GRANULOCYTES NFR BLD AUTO: 0 % (ref 0–0.5)
LDLC SERPL CALC-MCNC: 112.2 MG/DL (ref 0–100)
LIPID PANEL: ABNORMAL
LYMPHOCYTES # BLD: 2 K/UL (ref 0.9–3.6)
LYMPHOCYTES NFR BLD: 38 % (ref 21–52)
MCH RBC QN AUTO: 27.7 PG (ref 24–34)
MCHC RBC AUTO-ENTMCNC: 31.9 G/DL (ref 31–37)
MCV RBC AUTO: 87 FL (ref 78–100)
MONOCYTES # BLD: 0.6 K/UL (ref 0.05–1.2)
MONOCYTES NFR BLD: 12 % (ref 3–10)
NEUTS SEG # BLD: 2.3 K/UL (ref 1.8–8)
NEUTS SEG NFR BLD: 44 % (ref 40–73)
NRBC # BLD: 0 K/UL (ref 0–0.01)
NRBC BLD-RTO: 0 PER 100 WBC
PLATELET # BLD AUTO: 262 K/UL (ref 135–420)
PMV BLD AUTO: 10.8 FL (ref 9.2–11.8)
POTASSIUM SERPL-SCNC: 4.9 MMOL/L (ref 3.5–5.5)
PROT SERPL-MCNC: 7.4 G/DL (ref 6.4–8.2)
RBC # BLD AUTO: 4.47 M/UL (ref 4.2–5.3)
SODIUM SERPL-SCNC: 138 MMOL/L (ref 136–145)
TRIGL SERPL-MCNC: 169 MG/DL
VLDLC SERPL CALC-MCNC: 33.8 MG/DL
WBC # BLD AUTO: 5.2 K/UL (ref 4.6–13.2)

## 2023-02-20 PROCEDURE — 80053 COMPREHEN METABOLIC PANEL: CPT

## 2023-02-20 PROCEDURE — 36415 COLL VENOUS BLD VENIPUNCTURE: CPT

## 2023-02-20 PROCEDURE — 80061 LIPID PANEL: CPT

## 2023-02-20 PROCEDURE — 85025 COMPLETE CBC W/AUTO DIFF WBC: CPT

## 2023-02-20 PROCEDURE — 83036 HEMOGLOBIN GLYCOSYLATED A1C: CPT

## 2023-02-20 NOTE — TELEPHONE ENCOUNTER
----- Message from Chika Marie sent at 2/17/2023  3:40 PM EST -----  Subject: Message to Provider    QUESTIONS  Information for Provider? would like to speak with Dr. Yemi Sandoval nurse  ---------------------------------------------------------------------------  --------------  [id number]  [phone]; OK to leave message on voicemail  ---------------------------------------------------------------------------  --------------  SCRIPT ANSWERS  Relationship to Patient?  Self

## 2023-02-20 NOTE — TELEPHONE ENCOUNTER
----- Message from Rolly Hale. Destiny Mustafa sent at 2/20/2023 11:20 AM EST -----  Regarding: Advance an appointment   I am moving to a new assisted living. Where I am at presently is not a good situation. I was hoping to get in to see Dr. Sapphire Atwood earlier than the 28th. I am having routine blood work today at [facility]. If I could be fit in on the afternoon of the 23rd that would greatly expedite  my move.     Thank you

## 2023-02-23 ENCOUNTER — OFFICE VISIT (OUTPATIENT)
Age: 88
End: 2023-02-23
Payer: MEDICARE

## 2023-02-23 VITALS
OXYGEN SATURATION: 97 % | HEART RATE: 70 BPM | TEMPERATURE: 98.2 F | DIASTOLIC BLOOD PRESSURE: 63 MMHG | WEIGHT: 167 LBS | HEIGHT: 63 IN | SYSTOLIC BLOOD PRESSURE: 142 MMHG | RESPIRATION RATE: 14 BRPM | BODY MASS INDEX: 29.59 KG/M2

## 2023-02-23 DIAGNOSIS — G89.29 BILATERAL CHRONIC KNEE PAIN: ICD-10-CM

## 2023-02-23 DIAGNOSIS — M25.561 BILATERAL CHRONIC KNEE PAIN: ICD-10-CM

## 2023-02-23 DIAGNOSIS — Z91.81 AT RISK FOR FALLS: ICD-10-CM

## 2023-02-23 DIAGNOSIS — N39.0 RECURRENT UTI: ICD-10-CM

## 2023-02-23 DIAGNOSIS — R60.0 LOCALIZED EDEMA: ICD-10-CM

## 2023-02-23 DIAGNOSIS — G62.9 NEUROPATHY: ICD-10-CM

## 2023-02-23 DIAGNOSIS — M51.9 LUMBAR DISC DISEASE: ICD-10-CM

## 2023-02-23 DIAGNOSIS — R73.02 IMPAIRED GLUCOSE TOLERANCE (ORAL): ICD-10-CM

## 2023-02-23 DIAGNOSIS — M51.9 UNSPECIFIED THORACIC, THORACOLUMBAR AND LUMBOSACRAL INTERVERTEBRAL DISC DISORDER: Primary | ICD-10-CM

## 2023-02-23 DIAGNOSIS — F32.A DEPRESSION, UNSPECIFIED DEPRESSION TYPE: ICD-10-CM

## 2023-02-23 DIAGNOSIS — R29.898 LEG WEAKNESS, BILATERAL: ICD-10-CM

## 2023-02-23 DIAGNOSIS — M25.562 BILATERAL CHRONIC KNEE PAIN: ICD-10-CM

## 2023-02-23 DIAGNOSIS — C18.7 MALIGNANT NEOPLASM OF SIGMOID COLON (HCC): ICD-10-CM

## 2023-02-23 PROBLEM — I82.439 ACUTE DEEP VEIN THROMBOSIS (DVT) OF POPLITEAL VEIN (HCC): Status: RESOLVED | Noted: 2021-07-27 | Resolved: 2023-02-23

## 2023-02-23 PROCEDURE — 1123F ACP DISCUSS/DSCN MKR DOCD: CPT | Performed by: INTERNAL MEDICINE

## 2023-02-23 PROCEDURE — 99214 OFFICE O/P EST MOD 30 MIN: CPT | Performed by: INTERNAL MEDICINE

## 2023-02-23 PROCEDURE — G8428 CUR MEDS NOT DOCUMENT: HCPCS | Performed by: INTERNAL MEDICINE

## 2023-02-23 PROCEDURE — G8484 FLU IMMUNIZE NO ADMIN: HCPCS | Performed by: INTERNAL MEDICINE

## 2023-02-23 PROCEDURE — G8417 CALC BMI ABV UP PARAM F/U: HCPCS | Performed by: INTERNAL MEDICINE

## 2023-02-23 PROCEDURE — 1090F PRES/ABSN URINE INCON ASSESS: CPT | Performed by: INTERNAL MEDICINE

## 2023-02-23 PROCEDURE — 1036F TOBACCO NON-USER: CPT | Performed by: INTERNAL MEDICINE

## 2023-02-23 RX ORDER — CEPHALEXIN 500 MG/1
500 CAPSULE ORAL 4 TIMES DAILY
COMMUNITY
Start: 2023-02-16 | End: 2023-02-26

## 2023-02-23 RX ORDER — LIDOCAINE HCL 4% 4 G/100G
CREAM TOPICAL DAILY PRN
COMMUNITY

## 2023-02-23 RX ORDER — MIRTAZAPINE 30 MG/1
30 TABLET, FILM COATED ORAL NIGHTLY
COMMUNITY
Start: 2023-02-03

## 2023-02-23 RX ORDER — BACITRACIN, NEOMYCIN, POLYMYXIN B 400; 3.5; 5 [USP'U]/G; MG/G; [USP'U]/G
OINTMENT TOPICAL 2 TIMES DAILY
COMMUNITY

## 2023-02-23 RX ORDER — IBUPROFEN 200 MG
200 TABLET ORAL EVERY 6 HOURS PRN
COMMUNITY

## 2023-02-23 SDOH — ECONOMIC STABILITY: HOUSING INSECURITY
IN THE LAST 12 MONTHS, WAS THERE A TIME WHEN YOU DID NOT HAVE A STEADY PLACE TO SLEEP OR SLEPT IN A SHELTER (INCLUDING NOW)?: NO

## 2023-02-23 SDOH — ECONOMIC STABILITY: INCOME INSECURITY: HOW HARD IS IT FOR YOU TO PAY FOR THE VERY BASICS LIKE FOOD, HOUSING, MEDICAL CARE, AND HEATING?: NOT HARD AT ALL

## 2023-02-23 SDOH — ECONOMIC STABILITY: FOOD INSECURITY: WITHIN THE PAST 12 MONTHS, THE FOOD YOU BOUGHT JUST DIDN'T LAST AND YOU DIDN'T HAVE MONEY TO GET MORE.: NEVER TRUE

## 2023-02-23 SDOH — ECONOMIC STABILITY: FOOD INSECURITY: WITHIN THE PAST 12 MONTHS, YOU WORRIED THAT YOUR FOOD WOULD RUN OUT BEFORE YOU GOT MONEY TO BUY MORE.: NEVER TRUE

## 2023-02-23 ASSESSMENT — ENCOUNTER SYMPTOMS
ANAL BLEEDING: 0
BLOOD IN STOOL: 0
COUGH: 0
EYE PAIN: 0
ABDOMINAL PAIN: 0
SORE THROAT: 0
BACK PAIN: 1
SHORTNESS OF BREATH: 0

## 2023-02-23 ASSESSMENT — PATIENT HEALTH QUESTIONNAIRE - PHQ9
3. TROUBLE FALLING OR STAYING ASLEEP: 0
4. FEELING TIRED OR HAVING LITTLE ENERGY: 1
SUM OF ALL RESPONSES TO PHQ QUESTIONS 1-9: 2
5. POOR APPETITE OR OVEREATING: 0
2. FEELING DOWN, DEPRESSED OR HOPELESS: 0
10. IF YOU CHECKED OFF ANY PROBLEMS, HOW DIFFICULT HAVE THESE PROBLEMS MADE IT FOR YOU TO DO YOUR WORK, TAKE CARE OF THINGS AT HOME, OR GET ALONG WITH OTHER PEOPLE: 0
SUM OF ALL RESPONSES TO PHQ QUESTIONS 1-9: 2
6. FEELING BAD ABOUT YOURSELF - OR THAT YOU ARE A FAILURE OR HAVE LET YOURSELF OR YOUR FAMILY DOWN: 0
1. LITTLE INTEREST OR PLEASURE IN DOING THINGS: 1
8. MOVING OR SPEAKING SO SLOWLY THAT OTHER PEOPLE COULD HAVE NOTICED. OR THE OPPOSITE, BEING SO FIGETY OR RESTLESS THAT YOU HAVE BEEN MOVING AROUND A LOT MORE THAN USUAL: 0
9. THOUGHTS THAT YOU WOULD BE BETTER OFF DEAD, OR OF HURTING YOURSELF: 0
SUM OF ALL RESPONSES TO PHQ QUESTIONS 1-9: 2
SUM OF ALL RESPONSES TO PHQ QUESTIONS 1-9: 2
SUM OF ALL RESPONSES TO PHQ9 QUESTIONS 1 & 2: 1
7. TROUBLE CONCENTRATING ON THINGS, SUCH AS READING THE NEWSPAPER OR WATCHING TELEVISION: 0

## 2023-02-23 NOTE — PROGRESS NOTES
Jerry Flaherty presents today for   Chief Complaint   Patient presents with    Follow-up     Needs clearance for new assisted living facility. Received PPD results from [facility]  [facility] on 2-16-23 and read on 2-18-23 with negative result 0mm. 1. \"Have you been to the ER, urgent care clinic since your last visit? Hospitalized since your last visit? \" no    2. \"Have you seen or consulted any other health care providers outside of the [address] [place] [address] since your last visit? \" yes     3. For patients aged 39-70: Has the patient had a colonoscopy / FIT/ Cologuard? NA - based on age      If the patient is female:    4. For patients aged 41-77: Has the patient had a mammogram within the past 2 years? NA - based on age or sex      11. For patients aged 21-65: Has the patient had a pap smear?  NA - based on age or sex

## 2023-02-23 NOTE — PROGRESS NOTES
INTERNISTS OF [facility]:  2/24/2023, MRN: [id number]      Debra Weiner is a 80 y.o. female and presents to clinic for Follow-up (Needs clearance for new assisted living facility. )      Subjective: The patient is an 80year-old female with history of sigmoid colon cancer status post surgery, prediabetes, history of strictures, osteopenia, hyponatremia, lumbar disc disease, hypertension, right popliteal DVT, IBS, chronic BLE edema, left colles radius fracture, vitamin D deficiency, vertebral body fracture, allergic rhinitis, GERD, and urinary retention. 1. H/o Colon Cancer: In remission. Today she reports: no new sx. No rectal bleeding. 2. Depression: Today she reports: she feels \"good. \" Taking: remeron. 3. BLE Edema: BP today, on lasix prn BLE edema. 4. Chronic Pain: In between apts, I increased her gabapentin to 2 capsules qid vs 2 capsules tid. +H/o sciatica. Today she reports: she's been 10 wks w/o PT. The [facility] had a sprinkler system issue and her carpet was damaged. She has been staying with her daughter for 10 wks, waiting for the [facility] to take care of her unit. She wants to move to another facility - [facility]. She noticed improvement in her back pain with the higher gabapentin dose. She uses a walker. She uses a lidocaine patch from her other daughter. She has not get relief of some of her back pain when this was applied to her lower back. 5. Prediabetes: Her A1C was up to 5.8 from 5.6 in October. Her A1C when recently checked was unchanged at 5.8. 6. Recurrent UTIs: She self catheterizes and is doing well. Followed by Urology. +IBS. +Recurrent UTIs. She's had two UTIs since her last apt. Previous urine cx have shown resistance patterns.         Patient Active Problem List    Diagnosis Date Noted    T12 compression fracture ([facility] [place] [address] 05/15/2021    Mixed hyperlipidemia 09/03/2020    Depression 09/03/2020    Impaired glucose tolerance 10/10/2019    Lumbar disc disease 09/01/2018 Pneumonia - Care Day 4 (3/23/2020) by Damaso May, RN         Review Status Review Entered   Completed 3/24/2020 09:53       Criteria Review      Care Day: 4 Care Date: 3/23/2020 Level of Care:    Guideline Day 3    Clinical Status    ( ) * Hemodynamic stability    ( ) * Afebrile, or temperature acceptable for next level of care    (X) * Tachypnea absent    ( ) * Hypoxemia absent    (X) * Mental status at baseline    ( ) * Antibiotic regimen acceptable for next level of care    ( ) * Discharge plans and education understood    Activity    ( ) * Ambulatory    Routes    ( ) * Oral hydration, medications, and diet    Interventions    (X) * Oxygen absent or at baseline need    (X) WBC    * Milestone   Additional Notes   3/23/2020-      Vitals-   Temp- 97.8 oral    Pulse- 99   Resp- 18   BP- 166/93   SpO2- 96% RA       Labs-   Iron- 31 (L)       Medications- Zosyn 3.375g IVPB every 8 hours, amiodarone daily, calcium elemantal BID, enoxaparin daily, enzalutamide daily, ipratropium every 4 hours, montelukast daily, potassium chloride BID, vancomycin 750 mg IVPB BID, vitamin C daily, acetaminophen x2, 0.9% NaCl  ml/hr, ondansetron 4 mg IV x1      Hematology Oncology-   Monitor CBC    F/u cytology from thoracentesis    IV ABX for UTI and PNA, I.D on board.  Patient on Vancomycin and Zosyn   Will continue to follow      Internal Medicine-   Urine culture showing Pseudomonas   S/p Thoracentesis 3/21/20   Piperacillin/tazobactam IV   Vancomycin IV pharmacy to dose   Continue to follow Blood cultures   Blood cultures negative so far   ID following   Pulmonary following   Procalcitonin>100   Palliative has been consulted   Oncology has been consulted   Records from [facility] has been requested   Palliative following      Urology-   Assessment/plan:   Metastatic Prostate Cancer   Left Renal Mass    Chronic Yanez   UTI           Antibiotics per ID, currently on Zosyn    Continue yanez catheter   This will need to be left Irritable bowel syndrome with both constipation and diarrhea 09/01/2018    Vitamin D deficiency 09/01/2018    Allergic rhinitis 09/01/2018    Essential hypertension 09/01/2018    Cancer of sigmoid ([facility] [address] 09/01/2018    Urinary retention 09/01/2018    Anxiety 09/01/2018       Current Outpatient Medications   Medication Sig Dispense Refill    lidocaine (LIDODERM) 5 % Place 1 patch onto the skin daily for 10 days 12 hours on, 12 hours off.  10 patch 3    mirtazapine (REMERON) 30 MG tablet Take 30 mg by mouth nightly Indications: Depression      Multiple Vitamin (MULTIVITAMIN ADULT PO) Take by mouth      Lidocaine HCl 4 % CREA Apply topically daily as needed (pain)      Throat Lozenges (COUGH DROPS MT) by Transmucosal route 3 times daily as needed Indications: Cough      Calcium Carbonate Antacid (TUMS E-X PO) Take by mouth as needed (GERD)      neomycin-bacitracin-polymyxin (NEOSPORIN) 400-5-5000 ointment Apply topically 2 times daily Indications: Minor Cuts to Skin Apply topically 2 times daily prn      bismuth subsalicylate (PEPTO BISMOL) 262 MG/15ML suspension Take 30 mLs by mouth 3 times daily as needed for Diarrhea or Nausea      ibuprofen (ADVIL;MOTRIN) 200 MG tablet Take 200 mg by mouth every 6 hours as needed for Pain      aluminum & magnesium hydroxide-simethicone (MAALOX) 200-200-20 MG/5ML SUSP suspension Take 30 mLs by mouth daily as needed (IBS DIARRHEA)      cetirizine (ZYRTEC) 10 MG tablet Take 10 mg by mouth daily as needed for Allergies or Rhinitis      vitamin D 25 MCG (1000 UT) CAPS Take 1,000 Units by mouth daily Indications: Vitamin D Deficiency      dicyclomine (BENTYL) 10 MG capsule Take 10 mg by mouth 4 times daily Indications: Pain      famotidine (PEPCID) 20 MG tablet Take 20 mg by mouth daily Indications: Gastroesophageal Reflux Disease      fexofenadine (ALLEGRA) 180 MG tablet Take 180 mg by mouth daily as needed (ALLERGIC RHINITIS)      furosemide (LASIX) 20 MG tablet Take 20 mg by mouth indwelling and cont to be changed every 4 weeks   This is due to be changed in approx 2 weeks   Oncology following and managing metastatic disease, currently on Xtandi    He receives Lupron injections through Dr. Sonali Jauregui office every three months and was due to have this this week. This was ordered and is not available in pharmacy at this time. He will cont his Lupron injections per Dr. Sonali Jauregui office upon discharge. He and his daughter state they have been aware of the left renal mass and this has been monitored with Dr. Crystal Hallman as well. Continue management of metastatic disease per oncology   Continue Lupron injections per Dr. Sonali Jauregui office upon discharge   No further  interventions are planned at this time      Pulmonology-   1. Appreciate oncology input liver lesion increased in size   2. Cytology pending pleural fluid    3.  Treating possible urosepsis and pneumonia as per ID      Infectious Disease-   -Keep on vancomycin and Zosyn, van dosing by pharmacy   -Follow blood, urine cultures as well as results of pleural fluid analysis, not concerning for infection   -Pulmonary follow-up   - oncology follow up       Pneumonia - Care Day 3 (3/22/2020) by Troy Baeza RN         Review Status Review Entered   Completed 3/24/2020 09:50       Criteria Review      Care Day: 3 Care Date: 3/22/2020 Level of Care:    Guideline Day 2    Clinical Status    ( ) * No CO2 retention or acidosis    (X) * No requirement for mechanical ventilation    ( ) * Hypotension absent    (X) * Fever absent or reduced    (X) * No hypoxia on room air or oxygenation improved    ( ) * Mental status improved or at baseline    Activity    ( ) * Increased activity    Interventions    (X) Pulse oximetry    Medications    (X) IV or oral antibiotics    * Milestone   Additional Notes   3/22/2020-      Vitals-   Temp- 97.4 temporal    Pulse- 101   Resp- 18   BP- 153/88   SpO2- 94% RA       Labs-   LD: 290 (H)   Vancomycin Tr: 28.7 (HH)      Medications- Zosyn 3.375g IVPB every 8 hours, amiodarone daily, calcium elemantal BID, enoxaparin daily, enzalutamide daily, ipratropium every 4 hours, montelukast daily, potassium chloride BID, vancomycin 750 mg IVPB BID, vitamin C daily, acetaminophen x1, 0.9% NaCl  ml/hr, ondansetron 4 mg IV x1      Internal Medicine-   ASSESSMENT:       Pneumonia, possible relation to gram-negatives   R Pleural effusion   Possible sepsis   Urinary tract infection catheter related   Hepatic carcinoma by history   Renal mass   Rectal mass by CT               PLAN:       S/p Thoracentesis 3/21/20   Piperacillin/tazobactam IV   Vancomycin IV pharmacy to dose   Continue to follow Blood cultures   Follow with Urine culture   ID following   Pulmonary following   Procalcitonin>100   Palliative has been consulted   Oncology has been consulted   Records from [place] [id number] has been requested      Hematology Oncology-   Compared to prior CT from [facility] (2/4/20), he has a new pleural effusion and the dominant liver lesion appears to have increased from 5.9 x 5.2 cm to 6 x 10 cm.     Follow up on cytology    Abx for catheter related UTI and suspected PNA, ID following.     Check iron studies     Infectious Disease-   Assessment and Plan:        · UTI, catheter related, pseudomonas aeruginosa (zosyn sensitive)   · Concern for metastatic malignancy-involving lungs and liver   · Right-sided pleural effusion status post thoracentesis done on 3/21/2020   · Questionable pneumonia       Plan   -Keep on vancomycin and Zosyn, van dosing by pharmacy   -Follow blood, urine cultures as well as results of pleural fluid analysis, not concerning for infection   -Pulmonary follow-up every other day Indications: prn for edema      gabapentin (NEURONTIN) 100 MG capsule Take 200 mg by mouth 4 times daily. Indications: neuropathy      hydrocortisone 1 % cream Apply topically 2 times daily      melatonin 5 MG TABS tablet Take 10 mg by mouth nightly as needed Indications: Trouble Sleeping      omeprazole (PRILOSEC) 20 MG delayed release capsule Take 20 mg by mouth daily Indications: Gastroesophageal Reflux Disease      ondansetron (ZOFRAN) 4 MG tablet Take 4 mg by mouth every 8 hours as needed for Nausea      Phenylephrine HCl 10 MG TABS Take 10 mg by mouth as needed Indications: Congestion of the Sinuses Around the Nose      polyethylene glycol (GLYCOLAX) 17 GM/SCOOP powder Take 17 g by mouth daily as needed      pravastatin (PRAVACHOL) 10 MG tablet Take 10 mg by mouth at bedtime Indications: High Amount of Fats in the Blood      Probiotic Product (ACIDOPHILUS PROBIOTIC) CAPS capsule Take 4 mg by mouth daily      cephALEXin (KEFLEX) 500 MG capsule Take 500 mg by mouth 4 times daily X 10 days. (Patient not taking: Reported on 2/24/2023)      cefdinir (OMNICEF) 300 MG capsule Take 300 mg by mouth 2 times daily (Patient not taking: Reported on 2/24/2023)       No current facility-administered medications for this visit. Allergies   Allergen Reactions    Nitrofurantoin Nausea And Vomiting    Tamsulosin Myalgia and Dizziness or Vertigo    Pneumococcal Vaccine Other (See Comments)    Escitalopram Oxalate Other (See Comments)     ? Hyponatremia per pt hx       Past Medical History:   Diagnosis Date    Acute deep vein thrombosis (DVT) of popliteal vein ([facility] [address] 7/27/2021    Arthritis     Cancer ([facility] [address] 2002    Colon     Chronic pain     abdominal due to IBS    Depression     GERD (gastroesophageal reflux disease)     Hypertension     Migraine headache 2014    Mixed hyperlipidemia 9/3/2020    Occipital neuralgia 2014    PUD (peptic ulcer disease)     S/P colon resection 2002    clear presently        Past Surgical History:   Procedure Laterality Date    CHOLECYSTECTOMY  2015    HYSTERECTOMY (CERVIX STATUS UNKNOWN)  1965    IR KYPHOPLASTY THORACIC FIRST LEVEL  5/19/2021    IR KYPHOPLASTY THORACIC FIRST LEVEL 5/19/2021 [facility]GIO IR    IR KYPHOPLASTY THORACIC FIRST LEVEL  5/19/2021    TONSILLECTOMY AND ADENOIDECTOMY      WISDOM TOOTH EXTRACTION      x4       Family History   Problem Relation Age of Onset    Parkinson's Disease Sister     Hypertension Brother     Heart Attack Father     Cancer Brother         prostate    Hypertension Father     No Known Problems Son     Stroke Mother     Hypertension Mother     No Known Problems Daughter        Social History     Tobacco Use    Smoking status: Never     Passive exposure: Never    Smokeless tobacco: Never   Substance Use Topics    Alcohol use: Yes     Alcohol/week: 0.0 - 1.0 standard drinks       ROS   Review of Systems   Constitutional:  Negative for fever. HENT:  Negative for ear pain and sore throat. Eyes:  Negative for pain and visual disturbance. Respiratory:  Negative for cough and shortness of breath. Cardiovascular:  Negative for chest pain. Gastrointestinal:  Negative for abdominal pain, anal bleeding and blood in stool. Genitourinary:  Negative for dysuria and hematuria. Musculoskeletal:  Positive for arthralgias and back pain. Skin:  Negative for rash. Neurological:  Positive for weakness (generalized). Negative for headaches. Hematological:  Does not bruise/bleed easily. Psychiatric/Behavioral:  Negative for suicidal ideas. Objective     BP (!) 142/63   Pulse 70   Temp 98.2 °F (36.8 °C) (Temporal)   Resp 14   Ht 5' 3\" (1.6 m)   Wt 167 lb (75.8 kg)   SpO2 97%   BMI 29.58 kg/m²      Physical Exam  Constitutional:       Appearance: Normal appearance. HENT:      Head: Normocephalic and atraumatic. Right Ear: External ear normal.      Left Ear: External ear normal.   Eyes:      General: No scleral icterus. Right eye: No discharge.         Left eye: No discharge.      Conjunctiva/sclera: Conjunctivae normal.   Cardiovascular:      Rate and Rhythm: Normal rate and regular rhythm.      Pulses: Normal pulses.      Heart sounds: Normal heart sounds.   Pulmonary:      Effort: Pulmonary effort is normal.      Breath sounds: Normal breath sounds.   Abdominal:      General: Abdomen is flat. Bowel sounds are normal. There is no distension.      Palpations: Abdomen is soft.      Tenderness: There is no abdominal tenderness.   Musculoskeletal:         General: No swelling (BUE) or tenderness (BUE).      Cervical back: Neck supple.      Right lower leg: Edema present.      Left lower leg: Edema present.      Comments: Spinous processes are NTTP. LLE>RLE - chronic/unchanged   Lymphadenopathy:      Cervical: No cervical adenopathy.   Skin:     General: Skin is warm and dry.      Findings: No erythema.   Neurological:      Mental Status: She is alert. Mental status is at baseline.      Gait: Gait abnormal (with DME).   Psychiatric:         Mood and Affect: Mood normal.         LABS   Data Review:   Lab Results   Component Value Date/Time    WBC 5.2 02/20/2023 02:59 PM    HGB 12.4 02/20/2023 02:59 PM    HCT 38.9 02/20/2023 02:59 PM     02/20/2023 02:59 PM    MCV 87.0 02/20/2023 02:59 PM       Lab Results   Component Value Date/Time     02/20/2023 02:59 PM    K 4.9 02/20/2023 02:59 PM     02/20/2023 02:59 PM    CO2 30 02/20/2023 02:59 PM    BUN 19 02/20/2023 02:59 PM    GFRAA >60 06/24/2022 02:57 PM       Lab Results   Component Value Date/Time    CHOL 202 02/20/2023 02:59 PM    HDL 56 02/20/2023 02:59 PM       No results found for: HBA1C, [id number]    Assessment/Plan:   1. Malignant neoplasm of sigmoid colon: In remission.  Asymptomatic. Observation.    2. Chronic Pain: +Unspecified thoracic, thoracolumbar and lumbosacral intervertebral disc disorder. Improved with increasing her gabapentin.  She has some generalized  weakness secondary to having foregone PT and OT services due to being displaced from her assisted living facility.  -We are completing paperwork so that she can live at [facility].  - Meanwhile, I will place orders for PT and OT services. - Activity as tolerated with use of walker.  -Okay to continue with lidocaine patches. Order placed. - Continue medications as prescribed. 3. Depression: Stable per patient history. Situational at this time.  -I will follow-up with her in 3 months to assess her symptoms. When she moves into [facility], I suspect that her symptoms will be better. - Continue with Rx as prescribed for now    4. Impaired glucose tolerance (oral): Stable/unchanged.  -We will need to check an A1c in 6 months (August)  -I encouraged her to maintain a low-carb diet. 5. Localized edema: Chronic and unchanged.  -Okay to continue Lasix as needed. 6. Recurrent UTI: She continues to self catheterize  -We discussed how self-catheterization can increase her risk of bladder infections. - I encouraged her to follow-up with her urologist for additional testing. Once the etiology is known as to why she has urinary retention, we can begin to develop a treatment plan. We will discuss further treatment plan whether or not it is in her best interest to pursue potential treatment at her age and with her comorbidities. For now, she can continue to self catheterize, pending her appointment with her urologist.  -Her daughter asked if prophylactic antibiotics could be prescribed. I discussed the danger of long-term use of prophylactic antibiotics and how it can increase the risk of antibiotic resistance. She has had a history of resistant bladder infections. I advised against this treatment plan. We will ultimately defer to her urologist.          ICD-10-CM    1.  Unspecified thoracic, thoracolumbar and lumbosacral intervertebral disc disorder  M51.9 lidocaine (LIDODERM) 5 %     External Referral To Physical Therapy     External Referral To Occupational Therapy      2. Malignant neoplasm of sigmoid colon ([facility] [address]  C18.7       3. Depression, unspecified depression type  F32. A       4. Impaired glucose tolerance (oral)  R73.02       5. Localized edema  R60.0 External Referral To Physical Therapy     External Referral To Occupational Therapy      6. Recurrent UTI  N39.0       7. Lumbar disc disease  M51.9 External Referral To Physical Therapy     External Referral To Occupational Therapy      8. Neuropathy  G62.9 lidocaine (LIDODERM) 5 %      9. At risk for falls  Z91.81 External Referral To Physical Therapy     External Referral To Occupational Therapy      10. Leg weakness, bilateral  R29.898 External Referral To Physical Therapy     External Referral To Occupational Therapy      11. Bilateral chronic knee pain  M25.561 External Referral To Physical Therapy    M25.562 External Referral To Occupational Therapy    G89.29             Health Maintenance Due   Topic Date Due    DTaP/Tdap/Td vaccine (1 - Tdap) Never done    Shingles vaccine (1 of 2) Never done         Lab review: labs are reviewed in the EHR    I have discussed the diagnosis with the patient and the intended plan as seen in the above orders. The patient has received an after-visit summary and questions were answered concerning future plans. I have discussed medication side effects and warnings with the patient as well. I have reviewed the plan of care with the patient, accepted their input and they are in agreement with the treatment goals. All questions were answered. The patient understands the plan of care. Handouts provided today with above information. Pt instructed if symptoms worsen to call the office or report to the ED for continued care. Greater than 50% of the visit time was spent in counseling and/or coordination of care.       Voice recognition was used to generate this report, which may have resulted in some phonetic based errors in grammar and contents. Even though attempts were made to correct all the mistakes, some may have been missed, and remained in the body of the document. No follow-up provider specified.     Lance Alejandro MD

## 2023-02-24 RX ORDER — LIDOCAINE 50 MG/G
1 PATCH TOPICAL DAILY
Qty: 10 PATCH | Refills: 3 | Status: SHIPPED | OUTPATIENT
Start: 2023-02-24 | End: 2023-03-06

## 2023-03-02 DIAGNOSIS — M51.9 UNSPECIFIED THORACIC, THORACOLUMBAR AND LUMBOSACRAL INTERVERTEBRAL DISC DISORDER: Primary | ICD-10-CM

## 2023-03-02 NOTE — TELEPHONE ENCOUNTER
Patients daughter called inquiring on status of printed rx. She is asking if it can picked up today, so signed by another provider. Spoke with patients daughter and she will wait until the morning to  the printed script.

## 2023-03-02 NOTE — TELEPHONE ENCOUNTER
Patient's daughter is calling statin the new assisted living facility need a printed script for the Gabapentin 90 day supply 4 times a day. She said everything else was great. That is the only other thing they need. Call her at [phone] and she will pick it up.

## 2023-03-03 ENCOUNTER — APPOINTMENT (OUTPATIENT)
Facility: HOSPITAL | Age: 88
End: 2023-03-03
Payer: MEDICARE

## 2023-03-03 ENCOUNTER — HOSPITAL ENCOUNTER (EMERGENCY)
Facility: HOSPITAL | Age: 88
Discharge: HOME | End: 2023-03-03
Attending: EMERGENCY MEDICINE
Payer: MEDICARE

## 2023-03-03 VITALS
WEIGHT: 167 LBS | HEIGHT: 64 IN | SYSTOLIC BLOOD PRESSURE: 172 MMHG | TEMPERATURE: 97.4 F | HEART RATE: 81 BPM | DIASTOLIC BLOOD PRESSURE: 71 MMHG | RESPIRATION RATE: 18 BRPM | OXYGEN SATURATION: 98 % | BODY MASS INDEX: 28.51 KG/M2

## 2023-03-03 DIAGNOSIS — M25.551 RIGHT HIP PAIN: Primary | ICD-10-CM

## 2023-03-03 PROCEDURE — 72131 CT LUMBAR SPINE W/O DYE: CPT

## 2023-03-03 PROCEDURE — 73700 CT LOWER EXTREMITY W/O DYE: CPT

## 2023-03-03 PROCEDURE — 99284 EMERGENCY DEPT VISIT MOD MDM: CPT

## 2023-03-03 RX ORDER — GABAPENTIN 100 MG/1
200 CAPSULE ORAL 4 TIMES DAILY
Qty: 240 CAPSULE | Refills: 5 | Status: SHIPPED | OUTPATIENT
Start: 2023-03-03 | End: 2023-08-30

## 2023-03-03 ASSESSMENT — PAIN - FUNCTIONAL ASSESSMENT: PAIN_FUNCTIONAL_ASSESSMENT: 0-10

## 2023-03-03 ASSESSMENT — LIFESTYLE VARIABLES
HOW MANY STANDARD DRINKS CONTAINING ALCOHOL DO YOU HAVE ON A TYPICAL DAY: PATIENT DOES NOT DRINK
HOW OFTEN DO YOU HAVE A DRINK CONTAINING ALCOHOL: NEVER

## 2023-03-03 ASSESSMENT — PAIN SCALES - GENERAL: PAINLEVEL_OUTOF10: 9

## 2023-03-03 NOTE — ED NOTES
Pt A&Ox 4. Patient resting comfortably on the stretcher with family at the bedside. Patient has no signs or symptoms of acute distress at this time. Patient has no concerns or questions about their treatment plan.       Tomi Buckner RN  03/03/23 [id number]

## 2023-03-03 NOTE — ED TRIAGE NOTES
Patient is brought in by daughter for c/o fall about a month ago. Right hip hurts. Has been walking on it since. NAD noted.

## 2023-03-03 NOTE — ED NOTES
Discharge instructions reviewed with patient. Patient verbalized understanding. Patient advised to follow up as directed on discharge instructions. Patient denies questions, needs or concerns at this time. Patient verbalized understanding. No s/sx of distress noted.        Juliet Gonzales RN  03/03/23 [id number]

## 2023-03-03 NOTE — ED PROVIDER NOTES
[id number] [facility] EMERGENCY DEPT   3/3/23     Emergency Physician: Mary Gomez MD  Initial Documentation: 5:08 PM EST     Patient: Mane Luciano, 80 y.o. female     MRN: [id number]  : 1935    Location: [facility]     Chief Complaint: Hip Pain       HPI: The patient presents to the emergency department complaining of Hip pain. The patient denies any trauma. She is having ongoing hip discomfort for some time. She has been with her family member for about 10 weeks. She used to live in a nursing home. When she was in the nursing home, she was getting physical therapy. She has not had any physiotherapy for over 2 months. She now complaining of worsening discomfort in her right hip. It is worse with weightbearing and ambulation. Review of Systems: 14 organ system review of systems is complete and is negative except as addressed in the HPI. PCP: Ruth Tamayo MD    Social History     Socioeconomic History    Marital status:    Tobacco Use    Smoking status: Never     Passive exposure: Never    Smokeless tobacco: Never   Substance and Sexual Activity    Alcohol use:  Yes     Alcohol/week: 0.0 - 1.0 standard drinks    Drug use: Never    Sexual activity: Not Currently     Social Determinants of Health     Financial Resource Strain: Low Risk     Difficulty of Paying Living Expenses: Not hard at all   Food Insecurity: No Food Insecurity    Worried About Running Out of Food in the Last Year: Never true    Ran Out of Food in the Last Year: Never true   Transportation Needs: Unknown    Lack of Transportation (Non-Medical): No   Housing Stability: Unknown    Unstable Housing in the Last Year: No        Family History   Problem Relation Age of Onset    Parkinson's Disease Sister     Hypertension Brother     Heart Attack Father     Cancer Brother         prostate    Hypertension Father     No Known Problems Son     Stroke Mother     Hypertension Mother     No Known Problems Daughter         Past Medical History:   Diagnosis Date    Acute deep vein thrombosis (DVT) of popliteal vein ([facility] [address] 7/27/2021    Arthritis     Cancer ([facility] [address] 2002    Colon     Chronic pain     abdominal due to IBS    Depression     GERD (gastroesophageal reflux disease)     Hypertension     Migraine headache 2014    Mixed hyperlipidemia 9/3/2020    Occipital neuralgia 2014    PUD (peptic ulcer disease)     S/P colon resection 2002    clear presently         Past Surgical History:  2015: CHOLECYSTECTOMY  1965: HYSTERECTOMY (CERVIX STATUS UNKNOWN)  5/19/2021: IR KYPHOPLASTY THORACIC FIRST LEVEL      Comment:  IR KYPHOPLASTY THORACIC FIRST LEVEL 5/19/2021 [facility]                ANGIO IR  5/19/2021: IR KYPHOPLASTY THORACIC FIRST LEVEL  No date: TONSILLECTOMY AND ADENOIDECTOMY  No date: WISDOM TOOTH EXTRACTION      Comment:  x4     Immunization History   Administered Date(s) Administered    COVID-19, PFIZER Bivalent BOOSTER, DO NOT Dilute, (age 12y+), IM, 30 mcg/0.3 mL 10/05/2021, 10/19/2022    COVID-19, PFIZER PURPLE top, DILUTE for use, (age 15 y+), 30mcg/0.3mL 01/29/2021, 02/19/2021    Influenza Virus Vaccine 10/01/2020    Influenza, FLUZONE (age 72 y+), High Dose, 0.7mL 10/19/2022    Influenza, High Dose (Fluzone 65 yrs and older) 09/27/2018, 10/31/2021    Influenza, Triv, inactivated, subunit, adjuvanted, IM (Fluad 65 yrs and older) 10/10/2019    PPD Test 02/16/2023       Allergies   Allergen Reactions    Nitrofurantoin Nausea And Vomiting    Tamsulosin Myalgia and Dizziness or Vertigo    Pneumococcal Vaccine Other (See Comments)    Escitalopram Oxalate Other (See Comments)     ? Hyponatremia per pt hx       No current facility-administered medications for this encounter. Current Outpatient Medications   Medication Sig Dispense Refill    gabapentin (NEURONTIN) 100 MG capsule Take 2 capsules by mouth 4 times daily for 180 days.  Indications: neuropathy Max Daily Amount: 800 mg 240 capsule 5    lidocaine (LIDODERM) 5 % Place 1 patch onto the skin daily for 10 days 12 hours on, 12 hours off.  10 patch 3    mirtazapine (REMERON) 30 MG tablet Take 30 mg by mouth nightly Indications: Depression      Multiple Vitamin (MULTIVITAMIN ADULT PO) Take by mouth      Lidocaine HCl 4 % CREA Apply topically daily as needed (pain)      Throat Lozenges (COUGH DROPS MT) by Transmucosal route 3 times daily as needed Indications: Cough      Calcium Carbonate Antacid (TUMS E-X PO) Take by mouth as needed (GERD)      neomycin-bacitracin-polymyxin (NEOSPORIN) 400-5-5000 ointment Apply topically 2 times daily Indications: Minor Cuts to Skin Apply topically 2 times daily prn      bismuth subsalicylate (PEPTO BISMOL) 262 MG/15ML suspension Take 30 mLs by mouth 3 times daily as needed for Diarrhea or Nausea      ibuprofen (ADVIL;MOTRIN) 200 MG tablet Take 200 mg by mouth every 6 hours as needed for Pain      aluminum & magnesium hydroxide-simethicone (MAALOX) 200-200-20 MG/5ML SUSP suspension Take 30 mLs by mouth daily as needed (IBS DIARRHEA)      cefdinir (OMNICEF) 300 MG capsule Take 300 mg by mouth 2 times daily (Patient not taking: Reported on 2/24/2023)      cetirizine (ZYRTEC) 10 MG tablet Take 10 mg by mouth daily as needed for Allergies or Rhinitis      vitamin D 25 MCG (1000 UT) CAPS Take 1,000 Units by mouth daily Indications: Vitamin D Deficiency      dicyclomine (BENTYL) 10 MG capsule Take 10 mg by mouth 4 times daily Indications: Pain      famotidine (PEPCID) 20 MG tablet Take 20 mg by mouth daily Indications: Gastroesophageal Reflux Disease      fexofenadine (ALLEGRA) 180 MG tablet Take 180 mg by mouth daily as needed (ALLERGIC RHINITIS)      furosemide (LASIX) 20 MG tablet Take 20 mg by mouth every other day Indications: prn for edema      hydrocortisone 1 % cream Apply topically 2 times daily      melatonin 5 MG TABS tablet Take 10 mg by mouth nightly as needed Indications: Trouble Sleeping      omeprazole (PRILOSEC) 20 MG delayed release capsule Take 20 mg by mouth daily Indications: Gastroesophageal Reflux Disease      ondansetron (ZOFRAN) 4 MG tablet Take 4 mg by mouth every 8 hours as needed for Nausea      Phenylephrine HCl 10 MG TABS Take 10 mg by mouth as needed Indications: Congestion of the Sinuses Around the Nose      polyethylene glycol (GLYCOLAX) 17 GM/SCOOP powder Take 17 g by mouth daily as needed      pravastatin (PRAVACHOL) 10 MG tablet Take 10 mg by mouth at bedtime Indications: High Amount of Fats in the Blood      Probiotic Product (ACIDOPHILUS PROBIOTIC) CAPS capsule Take 4 mg by mouth daily          Physical Exam:Body mass index is 28.67 kg/m². Vitals:    03/03/23 1248   BP: (!) 151/78   Pulse: 73   Resp: 18   Temp: 97.9 °F (36.6 °C)   SpO2: 97%     General: Well developed, well nourished, alert, appears stated age  [de-identified]: Normocephalic, atraumatic. No scleral icterus. Extraocular movements intact. Normal mucous membranes. Uvula midline. Airway widely patent. Respiratory: No accessory muscle use. No wheeze, No rales, No rhonchi. Normal chest wall excursion. No subcutaneous air, no rib crepitus  Cardiovascular: Regular rhythm and rate, Normal pulses, Normal perfusion. No edema. Gastrointestinal: Non distended, No masses. No ascites. No organomegaly. No evidence of trauma, nontender  Musculoskeletal: Pain for the right hip. Full range of motion at all other tested joints. No joint effusions. Neurological: Normal strength, Normal sensation. Normal speech. No ataxia. Cranial nerves II-XII normal as tested. Skin: No rash, petechia or purpura. Warm and dry  Psychiatric: No suicidal ideation, No homicidal ideation. No hallucinations. Organized thoughts. Normal mood. normal affect. Heme: No lymphadenopathy. : Deferred    EKG:     Imaging:   CT HIP RIGHT WO CONTRAST   Final Result      1. No acute fracture or subluxation. 2.  Slight osteoarthrosis at the hip joint.             CT LUMBAR SPINE WO CONTRAST   Final Result      T12 moderate compression deformity, appears increased since 2021 CT, otherwise   age indeterminate, with cement augmentation again demonstrated. -L2 mild superior compression, also appears increased since 2021, otherwise age   indeterminate. Lumbar dextrocurvature. Notable degenerative changes summarized above.   -Uptake severe spinal canal stenoses suspected at L3-L4 and L4-L5.   -Multifocal significant foraminal stenoses summarized above. See additional details above. Labs: Labs Reviewed - No data to display  ED Medication Orders (From admission, onward)      None           ______________________________________________________________________  Medical Decision Making:  Imaging studies do not show any evidence for acute fracture or dislocation of the right hip      DIAGNOSIS:   1. Right hip pain        PRESCRIPTIONS:   New Prescriptions    No medications on file       DISPOSITION Decision To Discharge 03/03/2023 05:08:40 PM     FOLLOW-UP: Tamiko Caldwell MD    This chart was completed using  an electronic medical record and dictation software.   It may contain unedited transcription errors           Rah Gomez MD  03/03/23 2562

## 2023-03-03 NOTE — ED NOTES
Pt A&Ox 4. Patient resting comfortably on the stretcher with family at the bedside. Patient has no signs or symptoms of acute distress at this time. Patient has no concerns or questions about their treatment plan.       Micki Cowan RN  03/03/23 1156

## 2023-03-05 ENCOUNTER — HOSPITAL ENCOUNTER (EMERGENCY)
Facility: HOSPITAL | Age: 88
Discharge: HOME | End: 2023-03-05
Attending: EMERGENCY MEDICINE
Payer: MEDICARE

## 2023-03-05 VITALS
SYSTOLIC BLOOD PRESSURE: 162 MMHG | RESPIRATION RATE: 16 BRPM | OXYGEN SATURATION: 95 % | DIASTOLIC BLOOD PRESSURE: 71 MMHG | HEART RATE: 68 BPM | TEMPERATURE: 97 F

## 2023-03-05 PROCEDURE — 99283 EMERGENCY DEPT VISIT LOW MDM: CPT

## 2023-03-05 ASSESSMENT — ENCOUNTER SYMPTOMS
TROUBLE SWALLOWING: 0
WHEEZING: 0
ABDOMINAL PAIN: 0
SHORTNESS OF BREATH: 0
SORE THROAT: 0
NAUSEA: 0

## 2023-03-05 NOTE — ED NOTES
Incontinence care provided. Pt brief changed and placed on purewick. No c/o pain or discomfort at this time. VSS, NAD noted.   Awaiting MD nicolas.     Verna Rondon RN  03/05/23 4367

## 2023-03-05 NOTE — ED NOTES
Pt updated on POC, informed that we are still awaiting [facility] for transport. VSS, VITALY noted.        Brittaney Chavez RN  03/05/23 0770

## 2023-03-05 NOTE — ED TRIAGE NOTES
Patient was seen here for back pain on yesterday. Patient had a fall trying to go go the bathroom . Patient had ground level fall without LOC. Patient does not take any blood thinners. Patient states that she does not have pain when she is not moving. Patient is alert and oriented at time of triage.

## 2023-03-05 NOTE — ED PROVIDER NOTES
`[facility] EMERGENCY DEPT  eMERGENCY dEPARTMENT eNCOUnter      Pt Name: Mana Hernandez  MRN: [id number]  [place] 1935 of evaluation: 3/5/2023  Provider:Ricky Ulloa, [address] [place] [address]       Chief Complaint   Patient presents with    Back Pain        HPI    Mana Hernandez is a 80 y.o. female who was seen here for back pain yesterday. . Patient had a fall trying to go go the bathroom . Patient had bed  level fall without LOC. Patient does not take any blood thinners. Patient states that she does not have pain when she is not moving. Patient is alert and oriented at time of triage. Patient was in the ER last night for a fall and had trauma fall work done. ROS  Review of Systems   Constitutional:  Negative for activity change and appetite change. HENT:  Negative for congestion, sore throat and trouble swallowing. Denies head trauma   Respiratory:  Negative for shortness of breath and wheezing. Cardiovascular:  Negative for chest pain and palpitations. Gastrointestinal:  Negative for abdominal pain and nausea. Genitourinary: Negative. Musculoskeletal: Negative. Skin: Negative. Neurological: Negative. Psychiatric/Behavioral:  Positive for confusion (mild confusion). Negative for agitation. Except as noted above the remainder of the review of systems was reviewed and negative.        PAST MEDICAL HISTORY     Past Medical History:   Diagnosis Date    Acute deep vein thrombosis (DVT) of popliteal vein ([facility] [address] 7/27/2021    Arthritis     Cancer ([facility] [address] 2002    Colon     Chronic pain     abdominal due to IBS    Depression     GERD (gastroesophageal reflux disease)     Hypertension     Migraine headache 2014    Mixed hyperlipidemia 9/3/2020    Occipital neuralgia 2014    PUD (peptic ulcer disease)     S/P colon resection 2002    clear presently          SURGICAL HISTORY       Past Surgical History:   Procedure Laterality Date    CHOLECYSTECTOMY  2015    HYSTERECTOMY (CERVIX STATUS UNKNOWN)  1965    IR KYPHOPLASTY THORACIC FIRST LEVEL  5/19/2021    IR KYPHOPLASTY THORACIC FIRST LEVEL 5/19/2021 [address] [facility] IR    IR KYPHOPLASTY THORACIC FIRST LEVEL  5/19/2021    TONSILLECTOMY AND ADENOIDECTOMY      WISDOM TOOTH EXTRACTION      x4         CURRENTMEDICATIONS       Previous Medications    ALUMINUM & MAGNESIUM HYDROXIDE-SIMETHICONE (MAALOX) 200-200-20 MG/5ML SUSP SUSPENSION    Take 30 mLs by mouth daily as needed (IBS DIARRHEA)    BISMUTH SUBSALICYLATE (PEPTO BISMOL) 262 MG/15ML SUSPENSION    Take 30 mLs by mouth 3 times daily as needed for Diarrhea or Nausea    CALCIUM CARBONATE ANTACID (TUMS E-X PO)    Take by mouth as needed (GERD)    CEFDINIR (OMNICEF) 300 MG CAPSULE    Take 300 mg by mouth 2 times daily    CETIRIZINE (ZYRTEC) 10 MG TABLET    Take 10 mg by mouth daily as needed for Allergies or Rhinitis    DICYCLOMINE (BENTYL) 10 MG CAPSULE    Take 10 mg by mouth 4 times daily Indications: Pain    FAMOTIDINE (PEPCID) 20 MG TABLET    Take 20 mg by mouth daily Indications: Gastroesophageal Reflux Disease    FEXOFENADINE (ALLEGRA) 180 MG TABLET    Take 180 mg by mouth daily as needed (ALLERGIC RHINITIS)    FUROSEMIDE (LASIX) 20 MG TABLET    Take 20 mg by mouth every other day Indications: prn for edema    GABAPENTIN (NEURONTIN) 100 MG CAPSULE    Take 2 capsules by mouth 4 times daily for 180 days. Indications: neuropathy Max Daily Amount: 800 mg    HYDROCORTISONE 1 % CREAM    Apply topically 2 times daily    IBUPROFEN (ADVIL;MOTRIN) 200 MG TABLET    Take 200 mg by mouth every 6 hours as needed for Pain    LIDOCAINE (LIDODERM) 5 %    Place 1 patch onto the skin daily for 10 days 12 hours on, 12 hours off.     LIDOCAINE HCL 4 % CREA    Apply topically daily as needed (pain)    MELATONIN 5 MG TABS TABLET    Take 10 mg by mouth nightly as needed Indications: Trouble Sleeping    MIRTAZAPINE (REMERON) 30 MG TABLET    Take 30 mg by mouth nightly Indications: Depression    MULTIPLE VITAMIN (MULTIVITAMIN ADULT PO)    Take by mouth    NEOMYCIN-BACITRACIN-POLYMYXIN (NEOSPORIN) 400-5-5000 OINTMENT    Apply topically 2 times daily Indications: Minor Cuts to Skin Apply topically 2 times daily prn    OMEPRAZOLE (PRILOSEC) 20 MG DELAYED RELEASE CAPSULE    Take 20 mg by mouth daily Indications: Gastroesophageal Reflux Disease    ONDANSETRON (ZOFRAN) 4 MG TABLET    Take 4 mg by mouth every 8 hours as needed for Nausea    PHENYLEPHRINE HCL 10 MG TABS    Take 10 mg by mouth as needed Indications: Congestion of the Sinuses Around the Nose    POLYETHYLENE GLYCOL (GLYCOLAX) 17 GM/SCOOP POWDER    Take 17 g by mouth daily as needed    PRAVASTATIN (PRAVACHOL) 10 MG TABLET    Take 10 mg by mouth at bedtime Indications: High Amount of Fats in the Blood    PROBIOTIC PRODUCT (ACIDOPHILUS PROBIOTIC) CAPS CAPSULE    Take 4 mg by mouth daily    THROAT LOZENGES (COUGH DROPS MT)    by Transmucosal route 3 times daily as needed Indications: Cough    VITAMIN D 25 MCG (1000 UT) CAPS    Take 1,000 Units by mouth daily Indications: Vitamin D Deficiency       ALLERGIES     Nitrofurantoin, Tamsulosin, Pneumococcal vaccine, and Escitalopram oxalate    FAMILY HISTORY       Family History   Problem Relation Age of Onset    Parkinson's Disease Sister     Hypertension Brother     Heart Attack Father     Cancer Brother         prostate    Hypertension Father     No Known Problems Son     Stroke Mother     Hypertension Mother     No Known Problems Daughter           SOCIAL HISTORY       Social History     Socioeconomic History    Marital status:    Tobacco Use    Smoking status: Never     Passive exposure: Never    Smokeless tobacco: Never   Substance and Sexual Activity    Alcohol use:  Yes     Alcohol/week: 0.0 - 1.0 standard drinks    Drug use: Never    Sexual activity: Not Currently     Social Determinants of Health     Financial Resource Strain: Low Risk     Difficulty of Paying Living Expenses: Not hard at all   Food Insecurity: No Food Insecurity    Worried About Running Out of Food in the Last Year: Never true    Ran Out of Food in the Last Year: Never true   Transportation Needs: Unknown    Lack of Transportation (Non-Medical): No   Housing Stability: Unknown    Unstable Housing in the Last Year: No         PHYSICAL EXAM       ED Triage Vitals [03/05/23 0200]   BP Temp Temp Source Heart Rate Resp SpO2 Height Weight   (!) 145/56 97 °F (36.1 °C) Temporal 68 16 96 % -- --       Physical Exam  Constitutional:       Appearance: Normal appearance. HENT:      Nose: Nose normal.   Eyes:      Extraocular Movements: Extraocular movements intact. Pupils: Pupils are equal, round, and reactive to light. Cardiovascular:      Rate and Rhythm: Normal rate and regular rhythm. Pulmonary:      Effort: No respiratory distress. Breath sounds: No wheezing or rhonchi. Abdominal:      General: Abdomen is flat. Palpations: Abdomen is soft. Musculoskeletal:      Cervical back: Normal range of motion. Neurological:      Mental Status: She is alert. No results found for this or any previous visit (from the past 24 hour(s)). CT LUMBAR SPINE WO CONTRAST    Result Date: 3/3/2023  EXAMINATION: CT lumbar spine without contrast INDICATION: Right hip pain COMPARISON: CT 5/24/2021 TECHNIQUE: CT of the lumbar spine without contrast. All CT scans at this facility are performed using dose optimization technique as appropriate to a performed exam, to include automated exposure control, adjustment of the mA and/or kV according to patient size (including appropriate matching first site specific examinations), or use of iterative reconstruction technique. FINDINGS: General: Mild dextrocurvature apex L2. T12 moderate compression deformity with cement augmentation and mild retropulsion. L2 mild superior endplate depression. Elsewhere vertebral body heights preserved. Mild retrolisthesis of L1-L2 and L2-L3. Lumbar lordosis maintained.  Advanced right greater than left lower lumbar facet arthropathy. Mild bilateral SI joint degenerative changes. Osteopenia limits evaluation of bony detail. Miscellaneous: Mild burden of atherosclerotic calcifications. Evidence of left upper pole renal cyst. Levels: -Mild disc herniations and/or endplate spurring at Y1-R0 levels. -Facet arthropathy most pronounced at the lower lumbar spine right greater than left. -L4-L5 likely severe spinal canal stenosis, and L3-L4 likely moderate to severe spinal canal stenoses. Lesser degree spinal canal stenosis suspected elsewhere. -Multifocal notable foraminal stenoses, most pronounced at left L1-L2, bilateral L2-L3, right greater than left L5-S1. T12 moderate compression deformity, appears increased since 2021 CT, otherwise age indeterminate, with cement augmentation again demonstrated. -L2 mild superior compression, also appears increased since 2021, otherwise age indeterminate. Lumbar dextrocurvature. Notable degenerative changes summarized above. -Uptake severe spinal canal stenoses suspected at L3-L4 and L4-L5. -Multifocal significant foraminal stenoses summarized above. See additional details above. CT HIP RIGHT WO CONTRAST    Result Date: 3/3/2023  PROCEDURE:  CT RIGHT LOWER EXTREMITY (HIP) WITHOUT CONTRAST INDICATION:  Right hip pain. COMPARISON:  CT abdomen-pelvis 07/27/19. Plain films 03/30/21 TECHNIQUE:  Volumetric CT imaging of the right hip is performed without intravenous contrast injection. Coronal and sagittal multiplanar reconstruction images are generated subsequently at the same workstation. Current scan is performed using dose optimization techniques as appropriate to the performed exam including the automated exposure control, adjustment of mA and/or kV according to patient size, and use of iterative reconstructive technique.  FINDINGS: Bones, Joints: - No acute fracture or subluxation. - Tiny marginal osteophyte around the right femoral head and acetabulum. Small subcortical cyst at the acetabular rim. - No erosion or periosteal reaction. No convincing evidence for AVN. - Degenerative changes at L5-S1 with endplate osteophytes and vacuum disc phenomenon. Soft Tissue: - Unremarkable intrapelvic contents. - No bladder or proximal thigh hemorrhage/hematoma. - No significant joint effusion in the right hip joint. 1.  No acute fracture or subluxation. 2.  Slight osteoarthrosis at the hip joint. PROCEDURES:  Unless otherwise noted below, none     Procedures    EMERGENCY DEPARTMENT COURSE and DIFFERENTIALDIAGNOSIS/ MDM:   Vitals:    Vitals:    03/05/23 0200   BP: (!) 145/56   Pulse: 68   Resp: 16   Temp: 97 °F (36.1 °C)   TempSrc: Temporal   SpO2: 96%     MDM      3:48 AM Upon re-evaluation the patient's has no complaints. . Pt has non-toxic appearance and condition is stable for discharge. All questions and concerns were addressed. FINAL IMPRESSION      Low level fall      DISPOSITION/PLAN     DISPOSITION  - D/C  back to assistant living home. DISCHARGE MEDICATIONS:  New Prescriptions    No medications on file        PATIENT REFERRED TO:   PCP  for follow up. (Please note that portions of this note were completed with a voice recognitionprogram.  Efforts were made to edit the dictations but occasionally words are mis-transcribed.)    Hampton Brittle.  Zainab Wood MD(electronically signed)  Attending Emergency Physician          Carolyn Sanderson MD  03/08/23 8655

## 2023-03-05 NOTE — ED NOTES
Report given to [facility] team. [facility] team at bedside and preparing patient for transport.        Moshe Paul RN  03/05/23 8627

## 2023-03-05 NOTE — ED NOTES
I have reviewed discharge instruction and prescriptions with patient and daughter . Both verbalized understanding and has no further questions at this time. Education taught and patient verbalized understanding of education. Patient discharged with all belongings transported via [place] to [address] [facility].         Arron Elias RN  03/05/23 7915

## 2023-03-05 NOTE — ED NOTES
Daughter arrived to bedside, currently speaking with Ezra Padron MD.     Deepti Jean-Baptiste RN  03/05/23 4631

## 2023-03-14 NOTE — Clinical Note
Status[de-identified] INPATIENT [101]  Type of Bed: Medical [8]  Cardiac Monitoring Required?: No  Inpatient Hospitalization Certified Necessary for the Following Reasons: 3.  Patient receiving treatment that can only be provided in an inpatient setting (further c larification in H&P documentation)  Admitting Diagnosis: Thoracic compression fracture [facility]) [[id number]]  Admitting Physician: Dakota Lemus [[id number]]  Attending Physician: Dakota Lemus [[id number]]  Estimated Length of Stay: 2 Midnights  Discharge P vivek[de-identified] Home with Office Follow-up Endpoint: Immediate endpoint: perifollicular erythema and edema. Vaseline and ice applied. Post care reviewed with patient.

## 2023-03-25 ENCOUNTER — APPOINTMENT (OUTPATIENT)
Facility: HOSPITAL | Age: 88
End: 2023-03-25
Payer: MEDICARE

## 2023-03-25 ENCOUNTER — HOSPITAL ENCOUNTER (EMERGENCY)
Facility: HOSPITAL | Age: 88
Discharge: HOME | End: 2023-03-25
Attending: EMERGENCY MEDICINE
Payer: MEDICARE

## 2023-03-25 VITALS
SYSTOLIC BLOOD PRESSURE: 170 MMHG | HEART RATE: 65 BPM | OXYGEN SATURATION: 98 % | RESPIRATION RATE: 20 BRPM | BODY MASS INDEX: 25.61 KG/M2 | DIASTOLIC BLOOD PRESSURE: 50 MMHG | HEIGHT: 64 IN | WEIGHT: 150 LBS | TEMPERATURE: 98.1 F
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
## 2023-03-25 DIAGNOSIS — S42.295A OTHER CLOSED NONDISPLACED FRACTURE OF PROXIMAL END OF LEFT HUMERUS, INITIAL ENCOUNTER: ICD-10-CM

## 2023-03-25 PROCEDURE — 73030 X-RAY EXAM OF SHOULDER: CPT

## 2023-03-25 PROCEDURE — 73502 X-RAY EXAM HIP UNI 2-3 VIEWS: CPT

## 2023-03-25 PROCEDURE — 99283 EMERGENCY DEPT VISIT LOW MDM: CPT

## 2023-03-25 PROCEDURE — 6370000000 HC RX 637 (ALT 250 FOR IP): Performed by: EMERGENCY MEDICINE

## 2023-03-25 PROCEDURE — 73590 X-RAY EXAM OF LOWER LEG: CPT

## 2023-03-25 RX ORDER — ACETAMINOPHEN 325 MG/1
650 TABLET ORAL
Status: COMPLETED | OUTPATIENT
Start: 2023-03-25 | End: 2023-03-25

## 2023-03-25 RX ORDER — ACETAMINOPHEN 500 MG
1000 TABLET ORAL EVERY 6 HOURS PRN
Qty: 30 TABLET | Refills: 0 | Status: SHIPPED | OUTPATIENT
Start: 2023-03-25 | End: 2023-04-01

## 2023-03-25 RX ADMIN — ACETAMINOPHEN 650 MG: 325 TABLET ORAL at 08:26

## 2023-03-25 ASSESSMENT — PAIN - FUNCTIONAL ASSESSMENT: PAIN_FUNCTIONAL_ASSESSMENT: 0-10

## 2023-03-25 ASSESSMENT — PAIN DESCRIPTION - LOCATION: LOCATION: HIP;SHOULDER

## 2023-03-25 ASSESSMENT — ENCOUNTER SYMPTOMS
EYES NEGATIVE: 1
CHEST TIGHTNESS: 0
ABDOMINAL PAIN: 0

## 2023-03-25 ASSESSMENT — PAIN DESCRIPTION - ORIENTATION: ORIENTATION: LEFT

## 2023-03-25 ASSESSMENT — PAIN SCALES - GENERAL
PAINLEVEL_OUTOF10: 5
PAINLEVEL_OUTOF10: 5

## 2023-03-25 ASSESSMENT — PAIN DESCRIPTION - DESCRIPTORS: DESCRIPTORS: SORE

## 2023-03-25 NOTE — ED PROVIDER NOTES
(REMERON) 30 MG tablet Take 30 mg by mouth nightly Indications: Depression      Multiple Vitamin (MULTIVITAMIN ADULT PO) Take by mouth      Lidocaine HCl 4 % CREA Apply topically daily as needed (pain)      Throat Lozenges (COUGH DROPS MT) by Transmucosal route 3 times daily as needed Indications: Cough      Calcium Carbonate Antacid (TUMS E-X PO) Take by mouth as needed (GERD)      neomycin-bacitracin-polymyxin (NEOSPORIN) 400-5-5000 ointment Apply topically 2 times daily Indications: Minor Cuts to Skin Apply topically 2 times daily prn      bismuth subsalicylate (PEPTO BISMOL) 262 MG/15ML suspension Take 30 mLs by mouth 3 times daily as needed for Diarrhea or Nausea      ibuprofen (ADVIL;MOTRIN) 200 MG tablet Take 200 mg by mouth every 6 hours as needed for Pain      aluminum & magnesium hydroxide-simethicone (MAALOX) 200-200-20 MG/5ML SUSP suspension Take 30 mLs by mouth daily as needed (IBS DIARRHEA)      cetirizine (ZYRTEC) 10 MG tablet Take 10 mg by mouth daily as needed for Allergies or Rhinitis      vitamin D 25 MCG (1000 UT) CAPS Take 1,000 Units by mouth daily Indications: Vitamin D Deficiency      dicyclomine (BENTYL) 10 MG capsule Take 10 mg by mouth 4 times daily Indications: Pain      famotidine (PEPCID) 20 MG tablet Take 20 mg by mouth daily Indications: Gastroesophageal Reflux Disease      fexofenadine (ALLEGRA) 180 MG tablet Take 180 mg by mouth daily as needed (ALLERGIC RHINITIS)      furosemide (LASIX) 20 MG tablet Take 20 mg by mouth every other day Indications: prn for edema      hydrocortisone 1 % cream Apply topically 2 times daily      melatonin 5 MG TABS tablet Take 10 mg by mouth nightly as needed Indications: Trouble Sleeping      omeprazole (PRILOSEC) 20 MG delayed release capsule Take 20 mg by mouth daily Indications: Gastroesophageal Reflux Disease      ondansetron (ZOFRAN) 4 MG tablet Take 4 mg by mouth every 8 hours as needed for Nausea      Phenylephrine HCl 10 MG TABS Take 10 mg by errors may be present. If questions arise, please do not hesitate to contact me or call our department.        Elisha Huddleston MD  03/25/23 6996

## 2023-03-25 NOTE — ED TRIAGE NOTES
Pt to ED from [facility] at [facility] with complaints of left hip and left shoulder pain that started this morning after ground level fall. Fall occurred around 0200. Pt states she was attempting to walk to bathroom when rollator slipped from under her and caused fall, did not hit head, denies LOC, not on blood thinners.

## 2023-03-25 NOTE — ED NOTES
I have reviewed discharge instructions with pt and pts caregiver, pt and caregiver verbalized understanding. Pt discharged from ED via stretcher, discharged in care if [facility].       Ashli Conteh RN  03/25/23 0546

## 2023-03-25 NOTE — DISCHARGE INSTRUCTIONS
You can use your walker however when you are resting keep the sling on to help with the healing of your shoulder. Take Tylenol as needed for your pain and get out of bed with assistance until you are fully healed. Use your rollator as you had in the past and return if you are at all worsened or concerned.

## 2023-05-19 ENCOUNTER — OFFICE VISIT (OUTPATIENT)
Age: 88
End: 2023-05-19
Payer: MEDICARE

## 2023-05-19 VITALS
DIASTOLIC BLOOD PRESSURE: 63 MMHG | SYSTOLIC BLOOD PRESSURE: 148 MMHG | WEIGHT: 170 LBS | TEMPERATURE: 98.6 F | HEART RATE: 63 BPM | HEIGHT: 64 IN | OXYGEN SATURATION: 97 % | BODY MASS INDEX: 29.02 KG/M2 | RESPIRATION RATE: 12 BRPM

## 2023-05-19 DIAGNOSIS — R73.9 HYPERGLYCEMIA: ICD-10-CM

## 2023-05-19 DIAGNOSIS — Z87.81 HISTORY OF FRACTURE: ICD-10-CM

## 2023-05-19 DIAGNOSIS — M51.9 LUMBAR DISC DISEASE: Primary | ICD-10-CM

## 2023-05-19 DIAGNOSIS — M79.604 BILATERAL LEG PAIN: ICD-10-CM

## 2023-05-19 DIAGNOSIS — M51.9 UNSPECIFIED THORACIC, THORACOLUMBAR AND LUMBOSACRAL INTERVERTEBRAL DISC DISORDER: ICD-10-CM

## 2023-05-19 DIAGNOSIS — M79.605 BILATERAL LEG PAIN: ICD-10-CM

## 2023-05-19 PROCEDURE — 99211 OFF/OP EST MAY X REQ PHY/QHP: CPT

## 2023-05-19 PROCEDURE — 1036F TOBACCO NON-USER: CPT | Performed by: INTERNAL MEDICINE

## 2023-05-19 PROCEDURE — 1090F PRES/ABSN URINE INCON ASSESS: CPT | Performed by: INTERNAL MEDICINE

## 2023-05-19 PROCEDURE — G8428 CUR MEDS NOT DOCUMENT: HCPCS | Performed by: INTERNAL MEDICINE

## 2023-05-19 PROCEDURE — 99214 OFFICE O/P EST MOD 30 MIN: CPT | Performed by: INTERNAL MEDICINE

## 2023-05-19 PROCEDURE — G8417 CALC BMI ABV UP PARAM F/U: HCPCS | Performed by: INTERNAL MEDICINE

## 2023-05-19 PROCEDURE — 1123F ACP DISCUSS/DSCN MKR DOCD: CPT | Performed by: INTERNAL MEDICINE

## 2023-05-19 RX ORDER — LIDOCAINE 50 MG/G
PATCH TOPICAL
COMMUNITY
Start: 2023-05-17

## 2023-05-19 RX ORDER — LIDOCAINE 50 MG/G
1 PATCH TOPICAL DAILY
Qty: 90 PATCH | Refills: 3 | Status: SHIPPED | OUTPATIENT
Start: 2023-05-19

## 2023-05-30 ENCOUNTER — TELEPHONE (OUTPATIENT)
Age: 88
End: 2023-05-30

## 2023-05-30 RX ORDER — GABAPENTIN 100 MG/1
200 CAPSULE ORAL 3 TIMES DAILY
Qty: 540 CAPSULE | Refills: 1 | Status: CANCELLED | OUTPATIENT
Start: 2023-05-30 | End: 2023-11-26

## 2023-05-30 NOTE — TELEPHONE ENCOUNTER
Please let [address] staff know that she is to take 200 mg of gabapentin 3 times daily. She will take Tylenol as needed. The maximum dose of her gabapentin is a 600 mg daily. I am updating her chart to reflect this.     Dr. Aida Glover  [facility], [address] [place] [address], [address] [place] [address].  Phone: [phone]  Fax: [phone]

## 2023-05-30 NOTE — TELEPHONE ENCOUNTER
----- Message from Rolly Gallo Bridger Villegas sent at 5/30/2023 11:21 AM EDT -----  Regarding: Gabapentin dosage  Contact: [phone]  Dr. Feliberto Goins:  Could you provide an order clarifying the dosage for the gabapentin please. The staff at [facility] is confused as to how the dosage has changed. The dosage is : Take 2 capsules by mouth 4 times daily for 180 days. Max Daily Amount: 800 mg  It is my understanding  dosage was to change to 3 times a day but same dosage (2 capsules). 8 am, 12 noon and 9 pm.  If my mom is having pain do to afternoon activity she will take Tylenol to get her through to the 9 pm dose  The staff has instead cut the dosage in half and completely cut out the noon and 4 pm doses. Could you clarify with sending over an order to [facility] . Thank you.

## 2023-05-31 NOTE — TELEPHONE ENCOUNTER
Patient daughter called and was advise of the message and understand. [address] still needs to be contacted because they have her dosage completely wrong. Please Advise

## 2023-05-31 NOTE — TELEPHONE ENCOUNTER
Called and left vm at [facility] requesting that a nurse return this RN's call regarding a change in medication for this patient.

## 2023-06-06 ASSESSMENT — ENCOUNTER SYMPTOMS
SORE THROAT: 0
BACK PAIN: 1
ABDOMINAL PAIN: 0
COUGH: 0
BLOOD IN STOOL: 0
ANAL BLEEDING: 0
EYE PAIN: 0
SHORTNESS OF BREATH: 0

## 2023-06-23 NOTE — TELEPHONE ENCOUNTER
Ordering xarelto 15mg bid x 21 days followed by 20mg daily thereafter. Referral to Hematology placed.       Dr. Dk Haile  [facility], [address] [place] [address], [address] [place] [address] Str.  Phone: [phone]  Fax: [phone] Pediatric Hemodialysis Weekly Note    June 23, 2023  4:45 PM    Dario Chacko was seen and examined while on dialysis.  Professional oversight of the patient's dialysis care, access care, and co-morbidities were addressed as necessary with the patient, caregivers, and/or staff.    Recent Results (from the past 168 hour(s))   Basic metabolic panel   Result Value Ref Range Status    Sodium 136 136 - 145 mmol/L Final    Potassium 4.3 3.4 - 5.3 mmol/L Final    Chloride 94 (L) 98 - 107 mmol/L Final    Carbon Dioxide (CO2) 26 22 - 29 mmol/L Final    Anion Gap 16 (H) 7 - 15 mmol/L Final    Urea Nitrogen 42.3 (H) 6.0 - 20.0 mg/dL Final    Creatinine 7.35 (H) 0.51 - 0.95 mg/dL Final    Calcium 8.7 8.6 - 10.0 mg/dL Final    Glucose 111 (H) 70 - 99 mg/dL Final    GFR Estimate 8 (L) >60 mL/min/1.73m2 Final   Hemoglobin   Result Value Ref Range Status    Hemoglobin 10.3 (L) 11.7 - 15.7 g/dL Final   Phosphorus   Result Value Ref Range Status    Phosphorus 5.5 (H) 2.5 - 4.5 mg/dL Final   HLA Carmela Class I, Single Antigen   Result Value Ref Range Status    SA 1 TEST METHOD SA EDTA FCS  Final    SA 1 CELL Class I  Final    SA1 HI RISK CARMELA None  Final    SA1 MOD RISK CARMELA B:8  Final    SA 1  COMMENTS   Final      HLA PRA Test performed by modified testing procedure that may also include pretreatment of serum. Pretreatment may be the addition of fetal calf serum, EDTA, and/or adsorption.  High-risk, MFI > 3,000.  Mod-risk, -3,000.     *Note: Due to a large number of results and/or encounters for the requested time period, some results have not been displayed. A complete set of results can be found in Results Review.       Notes/changes to orders:  none    This note reflects a true and accurate representation of the condition of the patient.  I have personally assessed the patient as well as the EMR for relevant vital signs, labs, and imaging.  Findings were discussed with parent/caregiver in person.  An  was not  utilized.    Madalyn Osorio MD

## 2023-08-23 ENCOUNTER — TELEPHONE (OUTPATIENT)
Age: 88
End: 2023-08-23

## 2023-08-23 NOTE — TELEPHONE ENCOUNTER
Plan of Care from [address] [place] [address] at [facility] has been signed, faxed, and scanned.

## 2023-08-31 ENCOUNTER — OFFICE VISIT (OUTPATIENT)
Age: 88
End: 2023-08-31

## 2023-08-31 VITALS — BODY MASS INDEX: 29.02 KG/M2 | TEMPERATURE: 97.6 F | HEIGHT: 64 IN | WEIGHT: 170 LBS

## 2023-08-31 DIAGNOSIS — Z86.718 HISTORY OF BLOOD CLOTS: ICD-10-CM

## 2023-08-31 DIAGNOSIS — G89.29 CHRONIC BILATERAL LOW BACK PAIN, UNSPECIFIED WHETHER SCIATICA PRESENT: ICD-10-CM

## 2023-08-31 DIAGNOSIS — M54.50 CHRONIC BILATERAL LOW BACK PAIN, UNSPECIFIED WHETHER SCIATICA PRESENT: ICD-10-CM

## 2023-08-31 DIAGNOSIS — Z91.81 AT RISK FOR FALLS: Primary | ICD-10-CM

## 2023-08-31 NOTE — PROGRESS NOTES
Tamekamarlee Rambo presents today for   Chief Complaint   Patient presents with    Lower Back Pain       Beti Mcmullenert preferred language for health care discussion is english/other. Is someone accompanying this pt? Yes, daughter    Is the patient using any DME equipment during OV? Yes, wheelchair      Depression Screening:  Depression: Not at risk    PHQ-2 Score: 2         Learning Assessment:  No question data found. Fall Risk  Amb Fall Risk Assessment and TUG Test 8/31/2023   Do you feel unsteady or are you worried about falling?  yes   2 or more falls in past year? yes   Fall with injury in past year? no   Timed Up and Go Test > 12 seconds? -   Fall in past 12 months? -   Able to walk? -   Total Score -         Pt currently taking Anticoagulant therapy? No, not current.

## 2023-10-22 ENCOUNTER — APPOINTMENT (OUTPATIENT)
Facility: HOSPITAL | Age: 88
End: 2023-10-22
Payer: MEDICARE

## 2023-10-22 ENCOUNTER — HOSPITAL ENCOUNTER (EMERGENCY)
Facility: HOSPITAL | Age: 88
Discharge: HOME | End: 2023-10-22
Attending: STUDENT IN AN ORGANIZED HEALTH CARE EDUCATION/TRAINING PROGRAM
Payer: MEDICARE

## 2023-10-22 VITALS
HEIGHT: 64 IN | DIASTOLIC BLOOD PRESSURE: 60 MMHG | HEART RATE: 69 BPM | RESPIRATION RATE: 11 BRPM | BODY MASS INDEX: 27.83 KG/M2 | WEIGHT: 163 LBS | SYSTOLIC BLOOD PRESSURE: 161 MMHG | OXYGEN SATURATION: 99 % | TEMPERATURE: 97.5 F

## 2023-10-22 DIAGNOSIS — S06.0X0A CONCUSSION WITHOUT LOSS OF CONSCIOUSNESS, INITIAL ENCOUNTER: Primary | ICD-10-CM

## 2023-10-22 DIAGNOSIS — M53.3 COCCYX PAIN: ICD-10-CM

## 2023-10-22 LAB
ALBUMIN SERPL-MCNC: 3.8 G/DL (ref 3.4–5)
ALBUMIN/GLOB SERPL: 0.9 (ref 0.8–1.7)
ALP SERPL-CCNC: 123 U/L (ref 45–117)
ALT SERPL-CCNC: 18 U/L (ref 13–56)
ANION GAP SERPL CALC-SCNC: 5 MMOL/L (ref 3–18)
AST SERPL-CCNC: 20 U/L (ref 10–38)
BASOPHILS # BLD: 0 K/UL (ref 0–0.1)
BASOPHILS NFR BLD: 1 % (ref 0–2)
BILIRUB SERPL-MCNC: 0.4 MG/DL (ref 0.2–1)
BUN SERPL-MCNC: 13 MG/DL (ref 7–18)
BUN/CREAT SERPL: 15 (ref 12–20)
CALCIUM SERPL-MCNC: 9.1 MG/DL (ref 8.5–10.1)
CHLORIDE SERPL-SCNC: 107 MMOL/L (ref 100–111)
CO2 SERPL-SCNC: 29 MMOL/L (ref 21–32)
CREAT SERPL-MCNC: 0.89 MG/DL (ref 0.6–1.3)
DIFFERENTIAL METHOD BLD: ABNORMAL
EKG ATRIAL RATE: 56 BPM
EKG DIAGNOSIS: NORMAL
EKG P AXIS: 62 DEGREES
EKG P-R INTERVAL: 148 MS
EKG Q-T INTERVAL: 472 MS
EKG QRS DURATION: 70 MS
EKG QTC CALCULATION (BAZETT): 455 MS
EKG R AXIS: 0 DEGREES
EKG T AXIS: -31 DEGREES
EKG VENTRICULAR RATE: 56 BPM
EOSINOPHIL # BLD: 0.3 K/UL (ref 0–0.4)
EOSINOPHIL NFR BLD: 6 % (ref 0–5)
ERYTHROCYTE [DISTWIDTH] IN BLOOD BY AUTOMATED COUNT: 14 % (ref 11.6–14.5)
GLOBULIN SER CALC-MCNC: 4.3 G/DL (ref 2–4)
GLUCOSE SERPL-MCNC: 84 MG/DL (ref 74–99)
HCT VFR BLD AUTO: 41 % (ref 35–45)
HGB BLD-MCNC: 13 G/DL (ref 12–16)
IMM GRANULOCYTES # BLD AUTO: 0 K/UL (ref 0–0.04)
IMM GRANULOCYTES NFR BLD AUTO: 0 % (ref 0–0.5)
INR PPP: 1 (ref 0.9–1.1)
LYMPHOCYTES # BLD: 1.8 K/UL (ref 0.9–3.6)
LYMPHOCYTES NFR BLD: 34 % (ref 21–52)
MAGNESIUM SERPL-MCNC: 2.1 MG/DL (ref 1.6–2.6)
MCH RBC QN AUTO: 27.3 PG (ref 24–34)
MCHC RBC AUTO-ENTMCNC: 31.7 G/DL (ref 31–37)
MCV RBC AUTO: 86.1 FL (ref 78–100)
MONOCYTES # BLD: 0.5 K/UL (ref 0.05–1.2)
MONOCYTES NFR BLD: 9 % (ref 3–10)
NEUTS SEG # BLD: 2.5 K/UL (ref 1.8–8)
NEUTS SEG NFR BLD: 50 % (ref 40–73)
NRBC # BLD: 0 K/UL (ref 0–0.01)
NRBC BLD-RTO: 0 PER 100 WBC
PLATELET # BLD AUTO: 275 K/UL (ref 135–420)
PMV BLD AUTO: 10.9 FL (ref 9.2–11.8)
POTASSIUM SERPL-SCNC: 3.9 MMOL/L (ref 3.5–5.5)
PROT SERPL-MCNC: 8.1 G/DL (ref 6.4–8.2)
PROTHROMBIN TIME: 12.9 SEC (ref 11.9–14.7)
RBC # BLD AUTO: 4.76 M/UL (ref 4.2–5.3)
SODIUM SERPL-SCNC: 141 MMOL/L (ref 136–145)
WBC # BLD AUTO: 5.1 K/UL (ref 4.6–13.2)

## 2023-10-22 PROCEDURE — 85610 PROTHROMBIN TIME: CPT

## 2023-10-22 PROCEDURE — 85025 COMPLETE CBC W/AUTO DIFF WBC: CPT

## 2023-10-22 PROCEDURE — 93005 ELECTROCARDIOGRAM TRACING: CPT | Performed by: STUDENT IN AN ORGANIZED HEALTH CARE EDUCATION/TRAINING PROGRAM

## 2023-10-22 PROCEDURE — 70450 CT HEAD/BRAIN W/O DYE: CPT

## 2023-10-22 PROCEDURE — 72125 CT NECK SPINE W/O DYE: CPT

## 2023-10-22 PROCEDURE — 83735 ASSAY OF MAGNESIUM: CPT

## 2023-10-22 PROCEDURE — 99285 EMERGENCY DEPT VISIT HI MDM: CPT

## 2023-10-22 PROCEDURE — 93010 ELECTROCARDIOGRAM REPORT: CPT | Performed by: INTERNAL MEDICINE

## 2023-10-22 PROCEDURE — 80053 COMPREHEN METABOLIC PANEL: CPT

## 2023-10-22 PROCEDURE — 2580000003 HC RX 258: Performed by: STUDENT IN AN ORGANIZED HEALTH CARE EDUCATION/TRAINING PROGRAM

## 2023-10-22 PROCEDURE — 72220 X-RAY EXAM SACRUM TAILBONE: CPT

## 2023-10-22 PROCEDURE — 6370000000 HC RX 637 (ALT 250 FOR IP): Performed by: STUDENT IN AN ORGANIZED HEALTH CARE EDUCATION/TRAINING PROGRAM

## 2023-10-22 RX ORDER — 0.9 % SODIUM CHLORIDE 0.9 %
500 INTRAVENOUS SOLUTION INTRAVENOUS ONCE
Status: COMPLETED | OUTPATIENT
Start: 2023-10-22 | End: 2023-10-22

## 2023-10-22 RX ORDER — PROCHLORPERAZINE EDISYLATE 5 MG/ML
10 INJECTION INTRAMUSCULAR; INTRAVENOUS
Status: DISCONTINUED | OUTPATIENT
Start: 2023-10-22 | End: 2023-10-22 | Stop reason: HOSPADM

## 2023-10-22 RX ORDER — HYDRALAZINE HYDROCHLORIDE 25 MG/1
25 TABLET, FILM COATED ORAL
Status: COMPLETED | OUTPATIENT
Start: 2023-10-22 | End: 2023-10-22

## 2023-10-22 RX ORDER — DIPHENHYDRAMINE HYDROCHLORIDE 50 MG/ML
25 INJECTION INTRAMUSCULAR; INTRAVENOUS
Status: DISCONTINUED | OUTPATIENT
Start: 2023-10-22 | End: 2023-10-22 | Stop reason: HOSPADM

## 2023-10-22 RX ORDER — ACETAMINOPHEN 500 MG
1000 TABLET ORAL EVERY 6 HOURS PRN
COMMUNITY

## 2023-10-22 RX ADMIN — SODIUM CHLORIDE 500 ML: 9 INJECTION, SOLUTION INTRAVENOUS at 17:35

## 2023-10-22 RX ADMIN — HYDRALAZINE HYDROCHLORIDE 25 MG: 25 TABLET, FILM COATED ORAL at 18:08

## 2023-10-22 ASSESSMENT — PAIN SCALES - GENERAL: PAINLEVEL_OUTOF10: 4

## 2023-10-22 ASSESSMENT — PAIN - FUNCTIONAL ASSESSMENT: PAIN_FUNCTIONAL_ASSESSMENT: 0-10

## 2023-10-22 NOTE — ED NOTES
I have introduced myself to the patient and discussed anticipated plan of care. Patient resting quietly on stretcher in low and locked position. Call bell in reach. Side rail up x1. Patient on cardiac monitor and continuous pulse oximetry. Daughter at bedside.       Michael Rosario RN  10/22/23 5408

## 2023-10-22 NOTE — ED NOTES
Per patient, she has been experiencing 'listing' to the right while sitting for a few months and is actively having this investigated.       Adamaris Rose RN  10/22/23 0712

## 2023-10-22 NOTE — ED NOTES
Patient back from CT. Updated on radiology results  Consents to IVF but declines benadryl and compazine, states it will sedate her.         Tray Ortiz RN  10/22/23 4865

## 2023-10-22 NOTE — ED NOTES
ETA 1hr [facility].  Pt and daughter updated     Felicitas Perales RN  10/22/23 1930       Felicitas Perales RN  10/22/23 1287

## 2023-10-22 NOTE — ED NOTES
Discharge instructions given to daughter at bedside. Daughter verbalizes understanding and states she has no questions at this time. Pt resting comfortably in bed in no apparent distress and call bell within reach.       Steffanie Burk RN  10/1935

## 2023-10-22 NOTE — ED NOTES
Patient states that she needs to urinate but is too weak to get to [facility]. External urinary catheter placed.       Jasmeet Huber RN  10/22/23 2747

## 2023-10-22 NOTE — ED PROVIDER NOTES
[facility] EMERGENCY DEPT  EMERGENCY DEPARTMENT ENCOUNTER      Pt Name: Oanh Josue  MRN: [id number]  [facility] 1935  Date of evaluation: 10/22/2023  Provider: Jarred Lopez MD    CHIEF COMPLAINT       Chief Complaint   Patient presents with    Fall    Dizziness         HISTORY OF PRESENT ILLNESS   (Location/Symptom, Timing/Onset, Context/Setting, Quality, Duration, Modifying Factors, Severity)  Note limiting factors. Oanh Josue is a 80 y.o. female who presents to the emergency department by EMS for a fall last night. She states that she was going to hang up her robe when she fell backwards and landed on her coccyx, and fell back and hit her head. She remembers everything that happened. Denies any nausea or vomiting. She states that initially she felt fine and went about her day however when she woke up today she felt like her whole head was heavy and uncomfortable. She states that she is just felt incredibly dizzy today. She typically does have problems with imbalance and vertigo but this feels different from her normal.  She also complains of pain in her coccyx. Denies any nausea or vomiting. Does not take any blood thinners. States she does not normally take blood pressure medications and does not normally have high blood pressure. She did not have any symptoms prior to her fall. Denies any chest pain, trouble breathing, dizziness, lightheadedness or headache prior to her fall. Nursing Notes were reviewed. REVIEW OF SYSTEMS    (2-9 systems for level 4, 10 or more for level 5)     Constitutional: [no fever]  HENT: [no ear pain]  Eyes: [no change in vision]  Respiratory: [no SOB]  Cardio: [no chest pain]  GI: [no blood in stool]  : [no hematuria]  MSK: [no back pain]  Skin: [no rashes]  Neuro: Positive for headache    Except as noted above the remainder of the review of systems was reviewed and negative.        PAST MEDICAL HISTORY     Past Medical History:   Diagnosis Date

## 2023-10-22 NOTE — ED TRIAGE NOTES
Fall yesterday evening. Patient states she was using rolling walker to get to door to hang up bathroom, reached up and then unknown speicifics fell backwards hitting her tail bone and then the back of her head on the floor. Got up and went on with her evening. Here today because she hasn't been feeling good all day, achy and dizziness.

## 2023-10-22 NOTE — DISCHARGE INSTRUCTIONS
Recommend taking extra strength Tylenol every 4-6 hours and ibuprofen 600 mg every 6 hours as needed for pain and discomfort.

## 2023-10-23 NOTE — ED NOTES
[facility] called and stated \"unable to transport pt due to previous transports with pt being verbally abusive. \" Made MD aware. Reaching out to other transport services at this time.      Alexa Bee RN  10/22/23 2004

## 2024-01-13 ENCOUNTER — HOSPITAL ENCOUNTER (EMERGENCY)
Facility: HOSPITAL | Age: 89
Discharge: HOME | End: 2024-01-13
Attending: EMERGENCY MEDICINE
Payer: MEDICARE

## 2024-01-13 ENCOUNTER — APPOINTMENT (OUTPATIENT)
Facility: HOSPITAL | Age: 89
End: 2024-01-13
Payer: MEDICARE

## 2024-01-13 VITALS
HEART RATE: 64 BPM | DIASTOLIC BLOOD PRESSURE: 44 MMHG | OXYGEN SATURATION: 95 % | SYSTOLIC BLOOD PRESSURE: 128 MMHG | RESPIRATION RATE: 19 BRPM | HEIGHT: 64 IN | BODY MASS INDEX: 27.83 KG/M2 | WEIGHT: 163 LBS | TEMPERATURE: 98.6 F

## 2024-01-13 DIAGNOSIS — S22.42XA CLOSED FRACTURE OF MULTIPLE RIBS OF LEFT SIDE, INITIAL ENCOUNTER: Primary | ICD-10-CM
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
## 2024-01-13 LAB
ALBUMIN SERPL-MCNC: 3.6 G/DL (ref 3.4–5)
ALBUMIN/GLOB SERPL: 1 (ref 0.8–1.7)
ALP SERPL-CCNC: 122 U/L (ref 45–117)
ALT SERPL-CCNC: 19 U/L (ref 13–56)
ANION GAP SERPL CALC-SCNC: 6 MMOL/L (ref 3–18)
APPEARANCE UR: CLEAR
AST SERPL-CCNC: 27 U/L (ref 10–38)
BACTERIA URNS QL MICRO: NEGATIVE /HPF
BASOPHILS # BLD: 0 K/UL (ref 0–0.1)
BASOPHILS NFR BLD: 1 % (ref 0–2)
BILIRUB SERPL-MCNC: 0.6 MG/DL (ref 0.2–1)
BILIRUB UR QL: NEGATIVE
BUN SERPL-MCNC: 15 MG/DL (ref 7–18)
BUN/CREAT SERPL: 18 (ref 12–20)
CALCIUM SERPL-MCNC: 9.4 MG/DL (ref 8.5–10.1)
CHLORIDE SERPL-SCNC: 103 MMOL/L (ref 100–111)
CO2 SERPL-SCNC: 29 MMOL/L (ref 21–32)
COLOR UR: YELLOW
CREAT SERPL-MCNC: 0.82 MG/DL (ref 0.6–1.3)
DIFFERENTIAL METHOD BLD: ABNORMAL
EKG ATRIAL RATE: 60 BPM
EKG DIAGNOSIS: NORMAL
EKG P AXIS: 70 DEGREES
EKG P-R INTERVAL: 144 MS
EKG Q-T INTERVAL: 410 MS
EKG QRS DURATION: 78 MS
EKG QTC CALCULATION (BAZETT): 410 MS
EKG R AXIS: 7 DEGREES
EKG T AXIS: -32 DEGREES
EKG VENTRICULAR RATE: 60 BPM
EOSINOPHIL # BLD: 0.2 K/UL (ref 0–0.4)
EOSINOPHIL NFR BLD: 4 % (ref 0–5)
EPITH CASTS URNS QL MICRO: NORMAL /LPF (ref 0–5)
ERYTHROCYTE [DISTWIDTH] IN BLOOD BY AUTOMATED COUNT: 13.3 % (ref 11.6–14.5)
GLOBULIN SER CALC-MCNC: 3.5 G/DL (ref 2–4)
GLUCOSE SERPL-MCNC: 108 MG/DL (ref 74–99)
GLUCOSE UR STRIP.AUTO-MCNC: NEGATIVE MG/DL
HCT VFR BLD AUTO: 38.3 % (ref 35–45)
HGB BLD-MCNC: 12.5 G/DL (ref 12–16)
HGB UR QL STRIP: NEGATIVE
IMM GRANULOCYTES # BLD AUTO: 0 K/UL (ref 0–0.04)
IMM GRANULOCYTES NFR BLD AUTO: 0 % (ref 0–0.5)
KETONES UR QL STRIP.AUTO: NEGATIVE MG/DL
LEUKOCYTE ESTERASE UR QL STRIP.AUTO: ABNORMAL
LYMPHOCYTES # BLD: 1.2 K/UL (ref 0.9–3.6)
LYMPHOCYTES NFR BLD: 35 % (ref 21–52)
MAGNESIUM SERPL-MCNC: 1.9 MG/DL (ref 1.6–2.6)
MCH RBC QN AUTO: 28 PG (ref 24–34)
MCHC RBC AUTO-ENTMCNC: 32.6 G/DL (ref 31–37)
MCV RBC AUTO: 85.9 FL (ref 78–100)
MONOCYTES # BLD: 0.4 K/UL (ref 0.05–1.2)
MONOCYTES NFR BLD: 12 % (ref 3–10)
NEUTS SEG # BLD: 1.7 K/UL (ref 1.8–8)
NEUTS SEG NFR BLD: 47 % (ref 40–73)
NITRITE UR QL STRIP.AUTO: NEGATIVE
NRBC # BLD: 0 K/UL (ref 0–0.01)
NRBC BLD-RTO: 0 PER 100 WBC
PH UR STRIP: 7.5 (ref 5–8)
PLATELET # BLD AUTO: 252 K/UL (ref 135–420)
PMV BLD AUTO: 9.8 FL (ref 9.2–11.8)
POTASSIUM SERPL-SCNC: 4.4 MMOL/L (ref 3.5–5.5)
PROT SERPL-MCNC: 7.1 G/DL (ref 6.4–8.2)
PROT UR STRIP-MCNC: NEGATIVE MG/DL
RBC # BLD AUTO: 4.46 M/UL (ref 4.2–5.3)
RBC #/AREA URNS HPF: NEGATIVE /HPF (ref 0–5)
SODIUM SERPL-SCNC: 138 MMOL/L (ref 136–145)
SP GR UR REFRACTOMETRY: 1.01 (ref 1–1.03)
TROPONIN I SERPL HS-MCNC: 10 NG/L (ref 0–54)
UROBILINOGEN UR QL STRIP.AUTO: 0.2 EU/DL (ref 0.2–1)
WBC # BLD AUTO: 3.5 K/UL (ref 4.6–13.2)
WBC URNS QL MICRO: NORMAL /HPF (ref 0–4)

## 2024-01-13 PROCEDURE — 96374 THER/PROPH/DIAG INJ IV PUSH: CPT

## 2024-01-13 PROCEDURE — 93010 ELECTROCARDIOGRAM REPORT: CPT | Performed by: INTERNAL MEDICINE

## 2024-01-13 PROCEDURE — 6370000000 HC RX 637 (ALT 250 FOR IP): Performed by: EMERGENCY MEDICINE

## 2024-01-13 PROCEDURE — 99284 EMERGENCY DEPT VISIT MOD MDM: CPT

## 2024-01-13 PROCEDURE — 71250 CT THORAX DX C-: CPT

## 2024-01-13 PROCEDURE — 84484 ASSAY OF TROPONIN QUANT: CPT

## 2024-01-13 PROCEDURE — 83735 ASSAY OF MAGNESIUM: CPT

## 2024-01-13 PROCEDURE — 80053 COMPREHEN METABOLIC PANEL: CPT

## 2024-01-13 PROCEDURE — 81001 URINALYSIS AUTO W/SCOPE: CPT

## 2024-01-13 PROCEDURE — 6360000002 HC RX W HCPCS: Performed by: EMERGENCY MEDICINE

## 2024-01-13 PROCEDURE — 85025 COMPLETE CBC W/AUTO DIFF WBC: CPT

## 2024-01-13 PROCEDURE — 93005 ELECTROCARDIOGRAM TRACING: CPT | Performed by: EMERGENCY MEDICINE

## 2024-01-13 RX ORDER — LIDOCAINE 50 MG/G
1 PATCH TOPICAL DAILY
Qty: 10 PATCH | Refills: 0 | Status: SHIPPED | OUTPATIENT
Start: 2024-01-13 | End: 2024-01-23

## 2024-01-13 RX ORDER — MORPHINE SULFATE 2 MG/ML
2 INJECTION, SOLUTION INTRAMUSCULAR; INTRAVENOUS
Status: DISCONTINUED | OUTPATIENT
Start: 2024-01-13 | End: 2024-01-13 | Stop reason: HOSPADM

## 2024-01-13 RX ORDER — LIDOCAINE 4 G/G
1 PATCH TOPICAL
Status: DISCONTINUED | OUTPATIENT
Start: 2024-01-13 | End: 2024-01-13 | Stop reason: HOSPADM

## 2024-01-13 RX ORDER — 0.9 % SODIUM CHLORIDE 0.9 %
500 INTRAVENOUS SOLUTION INTRAVENOUS ONCE
Status: DISCONTINUED | OUTPATIENT
Start: 2024-01-13 | End: 2024-01-13 | Stop reason: HOSPADM

## 2024-01-13 RX ORDER — ACETAMINOPHEN AND CODEINE PHOSPHATE 300; 30 MG/1; MG/1
1 TABLET ORAL EVERY 6 HOURS PRN
Qty: 12 TABLET | Refills: 0 | Status: SHIPPED | OUTPATIENT
Start: 2024-01-13 | End: 2024-01-16

## 2024-01-13 RX ORDER — KETOROLAC TROMETHAMINE 15 MG/ML
15 INJECTION, SOLUTION INTRAMUSCULAR; INTRAVENOUS ONCE
Status: COMPLETED | OUTPATIENT
Start: 2024-01-13 | End: 2024-01-13

## 2024-01-13 RX ADMIN — KETOROLAC TROMETHAMINE 15 MG: 15 INJECTION, SOLUTION INTRAMUSCULAR; INTRAVENOUS at 10:19

## 2024-01-13 ASSESSMENT — PAIN SCALES - GENERAL
PAINLEVEL_OUTOF10: 6
PAINLEVEL_OUTOF10: 8
PAINLEVEL_OUTOF10: 8

## 2024-01-13 ASSESSMENT — PAIN DESCRIPTION - ORIENTATION: ORIENTATION: LEFT

## 2024-01-13 ASSESSMENT — PAIN DESCRIPTION - LOCATION: LOCATION: BACK

## 2024-01-13 NOTE — ED PROVIDER NOTES
EMERGENCY DEPARTMENT HISTORY AND PHYSICAL EXAM      Date: 1/13/2024  Patient Name: Liz Sandoval      History of Presenting Illness     Chief Complaint   Patient presents with    Fall       Location/Duration/Severity/Modifying factors   Chief Complaint   Patient presents with    Fall       HPI:  Liz Sandoval is a 88 y.o. female with history of peripheral neuropathy, hyperlipidemia who comes in with complaints of left flank pain and fall.  She states that 12 days ago she was making a quick turn briefly lost her balance and fell.  Since that time she has been having some left lower back/flank pain.  Last night the pain kept her up and she was unable to sleep so felt very drowsy this morning.  She said she went to go step out of bed and immediately felt like her legs were weak and she went right down to the ground.  She says she did slide against the bed landed on her bottom.  She did not hit her head. Pt  denies headache, loss of consciousness, numbness or unilateral weakness, chest pain, palpitations.     PCP: Lyla Reyes MD    Current Facility-Administered Medications   Medication Dose Route Frequency Provider Last Rate Last Admin    morphine (PF) injection 2 mg  2 mg IntraVENous NOW Hemant Colón MD        lidocaine 4 % external patch 1 patch  1 patch TransDERmal NOW Hemant Colón MD   1 patch at 01/13/24 1019    sodium chloride 0.9 % bolus 500 mL  500 mL IntraVENous Once Hemant Colón MD         Current Outpatient Medications   Medication Sig Dispense Refill    lidocaine (LIDODERM) 5 % Place 1 patch onto the skin daily for 10 days 12 hours on, 12 hours off. 10 patch 0    acetaminophen-codeine (TYLENOL/CODEINE #3) 300-30 MG per tablet Take 1 tablet by mouth every 6 hours as needed for Pain for up to 3 days. Intended supply: 3 days. Take lowest dose possible to manage pain Max Daily Amount: 4 tablets 12 tablet 0    amoxicillin-clavulanate (AUGMENTIN) 500-125 MG per tablet Take 1  proofreading.     Hemant Colón MD  Emergency Physician   [facility]             Hemant Colón MD  01/13/24 6204

## 2024-01-13 NOTE — ED NOTES
Patient and family refused morphine and fluids.  Water and snack provided to patient. Patient tolerated well.  Awaiting further orders.

## 2024-01-24 ENCOUNTER — TELEPHONE (OUTPATIENT)
Age: 89
End: 2024-01-24

## 2024-04-28 ENCOUNTER — HOSPITAL ENCOUNTER (EMERGENCY)
Facility: HOSPITAL | Age: 89
Discharge: HOME | End: 2024-04-28
Attending: EMERGENCY MEDICINE
Payer: MEDICARE

## 2024-04-28 ENCOUNTER — APPOINTMENT (OUTPATIENT)
Facility: HOSPITAL | Age: 89
End: 2024-04-28
Payer: MEDICARE

## 2024-04-28 VITALS
BODY MASS INDEX: 28.89 KG/M2 | TEMPERATURE: 97.9 F | HEART RATE: 63 BPM | OXYGEN SATURATION: 95 % | RESPIRATION RATE: 18 BRPM | HEIGHT: 62 IN | SYSTOLIC BLOOD PRESSURE: 153 MMHG | WEIGHT: 157 LBS | DIASTOLIC BLOOD PRESSURE: 58 MMHG

## 2024-04-28 DIAGNOSIS — S16.1XXA CERVICAL STRAIN, ACUTE, INITIAL ENCOUNTER: ICD-10-CM

## 2024-04-28 DIAGNOSIS — S30.0XXA SACRAL CONTUSION, INITIAL ENCOUNTER: Primary | ICD-10-CM

## 2024-04-28 PROCEDURE — 72125 CT NECK SPINE W/O DYE: CPT

## 2024-04-28 PROCEDURE — 72192 CT PELVIS W/O DYE: CPT

## 2024-04-28 PROCEDURE — 70450 CT HEAD/BRAIN W/O DYE: CPT

## 2024-04-28 PROCEDURE — 99284 EMERGENCY DEPT VISIT MOD MDM: CPT

## 2024-04-28 PROCEDURE — 6370000000 HC RX 637 (ALT 250 FOR IP): Performed by: EMERGENCY MEDICINE

## 2024-04-28 RX ORDER — LIDOCAINE 4 G/G
1 PATCH TOPICAL
Status: DISCONTINUED | OUTPATIENT
Start: 2024-04-28 | End: 2024-04-29 | Stop reason: HOSPADM

## 2024-04-28 RX ORDER — LIDOCAINE 4 G/G
1 PATCH TOPICAL DAILY PRN
Qty: 15 PATCH | Refills: 1 | Status: SHIPPED | OUTPATIENT
Start: 2024-04-28 | End: 2024-04-28

## 2024-04-28 RX ORDER — LIDOCAINE 4 G/G
1 PATCH TOPICAL DAILY PRN
Qty: 15 PATCH | Refills: 1 | Status: SHIPPED | OUTPATIENT
Start: 2024-04-28

## 2024-04-28 RX ORDER — KETOROLAC TROMETHAMINE 10 MG/1
10 TABLET, FILM COATED ORAL EVERY 6 HOURS PRN
Qty: 20 TABLET | Refills: 0 | Status: SHIPPED | OUTPATIENT
Start: 2024-04-28 | End: 2024-04-28

## 2024-04-28 RX ORDER — KETOROLAC TROMETHAMINE 10 MG/1
10 TABLET, FILM COATED ORAL EVERY 6 HOURS PRN
Qty: 20 TABLET | Refills: 0 | Status: SHIPPED | OUTPATIENT
Start: 2024-04-28

## 2024-04-28 RX ORDER — ACETAMINOPHEN 500 MG
1000 TABLET ORAL
Status: COMPLETED | OUTPATIENT
Start: 2024-04-28 | End: 2024-04-28

## 2024-04-28 RX ADMIN — ACETAMINOPHEN 1000 MG: 500 TABLET ORAL at 20:23

## 2024-04-28 ASSESSMENT — PAIN SCALES - GENERAL
PAINLEVEL_OUTOF10: 5
PAINLEVEL_OUTOF10: 5

## 2024-04-28 ASSESSMENT — PAIN DESCRIPTION - LOCATION
LOCATION: BACK;NECK
LOCATION: BACK;NECK

## 2024-04-28 NOTE — ED TRIAGE NOTES
Pt arrived by [place] [address] from [place] due to a ground level fall. EMS reports pt uses a walker and fell in bathroom on buttocks. EMS states \"her life alert button did not work and pt reports being on ground aprox 45 mins.\" Pt not on blood thinners, A0X4 upon arrival, denies hitting head, and no LOC. Pt c/o of lower back pain and sacral pain. Noted abrasion to lower left side of back.

## 2024-04-29 ENCOUNTER — TELEPHONE (OUTPATIENT)
Age: 89
End: 2024-04-29

## 2024-04-29 DIAGNOSIS — E78.5 HYPERLIPIDEMIA, UNSPECIFIED HYPERLIPIDEMIA TYPE: Primary | ICD-10-CM

## 2024-04-29 DIAGNOSIS — R73.9 HYPERGLYCEMIA: ICD-10-CM

## 2024-04-29 NOTE — ED NOTES
Pt ambulated with walker. Pt tolerated walking well and felt as if she could get home via personal car with her daughter. MD aware of all.

## 2024-04-29 NOTE — TELEPHONE ENCOUNTER
Please schedule her for a follow-up 40 with me sometime in the summer with fasting labs schedule I week before.    Dr. Lyla Reyes  [facility] [address], [facility] [address]  [place] [address]  Phone: [phone]  Fax: [phone]

## 2024-04-29 NOTE — ED PROVIDER NOTES
EMERGENCY DEPARTMENT HISTORY AND PHYSICAL EXAM      Date: 4/28/2024  Patient Name: Liz Sandoval      History of Presenting Illness     Chief Complaint   Patient presents with    Fall    Tailbone Pain    Back Pain       Location/Duration/Severity/Modifying factors   Chief Complaint   Patient presents with    Fall    Tailbone Pain    Back Pain       HPI:  Liz Sandoval is a 88 y.o. female with PMH significant for multiple medical comorbidities, not on oral anticoagulant presents with pain to the tailbone and shoulders bilaterally after ground-level fall onto her buttocks at home.  Was unable to stand on her own for about 45 minutes so called the ambulance for help.  History pt  denies getting her head, losing consciousness, spinal pain over the thoracic and lumbar spine. Treatments attempted at home include none.    PCP: Lyla Reyes MD    Current Facility-Administered Medications   Medication Dose Route Frequency Provider Last Rate Last Admin    lidocaine 4 % external patch 1 patch  1 patch TransDERmal NOW Delvin Chery, DO   1 patch at 04/28/24 2106     Current Outpatient Medications   Medication Sig Dispense Refill    ketorolac (TORADOL) 10 MG tablet Take 1 tablet by mouth every 6 hours as needed for Pain 20 tablet 0    lidocaine (SALONPAS PAIN RELIEVING) 4 % external patch Place 1 patch onto the skin daily as needed (Tailbone, low back pain) 15 patch 1    acetaminophen (TYLENOL) 500 MG tablet Take 2 tablets by mouth every 6 hours as needed for Pain      gabapentin (NEURONTIN) 100 MG capsule Take 2 capsules by mouth 3 times daily for 180 days. Intended supply: 90 days Max Daily Amount: 600 mg 540 capsule 1    acetaminophen (TYLENOL) 500 MG tablet Take 2 tablets by mouth every 6 hours as needed for Pain 30 tablet 0    mirtazapine (REMERON) 30 MG tablet Take 1 tablet by mouth nightly Indications: Depression      Multiple Vitamin (MULTIVITAMIN ADULT PO) Take by mouth      Throat Lozenges

## 2024-04-29 NOTE — ED NOTES
Discharge teaching provided to pt and daughter regarding treatment received, medications prescribed, and follow-up care. Pt and daughter verbalized understanding directions and follow up care. Pt left wheelchair, in NAD and with discharge paperwork in hand.

## 2024-07-12 ENCOUNTER — HOSPITAL ENCOUNTER (EMERGENCY)
Facility: HOSPITAL | Age: 89
Discharge: HOME | End: 2024-07-12
Attending: EMERGENCY MEDICINE
Payer: MEDICARE

## 2024-07-12 VITALS
RESPIRATION RATE: 17 BRPM | BODY MASS INDEX: 29.44 KG/M2 | HEIGHT: 62 IN | HEART RATE: 68 BPM | WEIGHT: 160 LBS | DIASTOLIC BLOOD PRESSURE: 71 MMHG | TEMPERATURE: 98.7 F | SYSTOLIC BLOOD PRESSURE: 134 MMHG | OXYGEN SATURATION: 99 %

## 2024-07-12 DIAGNOSIS — R51.9 ACUTE NONINTRACTABLE HEADACHE, UNSPECIFIED HEADACHE TYPE: Primary | ICD-10-CM

## 2024-07-12 PROCEDURE — 6370000000 HC RX 637 (ALT 250 FOR IP): Performed by: EMERGENCY MEDICINE

## 2024-07-12 PROCEDURE — 99283 EMERGENCY DEPT VISIT LOW MDM: CPT

## 2024-07-12 RX ORDER — CETIRIZINE HYDROCHLORIDE 10 MG/1
10 TABLET ORAL
Status: COMPLETED | OUTPATIENT
Start: 2024-07-12 | End: 2024-07-12

## 2024-07-12 RX ORDER — ACETAMINOPHEN 500 MG
1000 TABLET ORAL
Status: COMPLETED | OUTPATIENT
Start: 2024-07-12 | End: 2024-07-12

## 2024-07-12 RX ADMIN — CETIRIZINE HYDROCHLORIDE 10 MG: 10 TABLET, FILM COATED ORAL at 15:15

## 2024-07-12 RX ADMIN — ACETAMINOPHEN 1000 MG: 500 TABLET ORAL at 15:15

## 2024-07-12 ASSESSMENT — PAIN SCALES - GENERAL
PAINLEVEL_OUTOF10: 1
PAINLEVEL_OUTOF10: 5

## 2024-07-12 ASSESSMENT — PAIN DESCRIPTION - LOCATION
LOCATION: HEAD
LOCATION: HEAD;FACE

## 2024-07-12 ASSESSMENT — PAIN - FUNCTIONAL ASSESSMENT: PAIN_FUNCTIONAL_ASSESSMENT: 0-10

## 2024-07-12 NOTE — ED NOTES
Assisted to bathroom to perform self cath without difficulty. Pt given d/c instructions and verbalized understanding. Requests to wait in waiting room for transport. Near registration desk in case she requires any assistance.

## 2024-07-12 NOTE — ED PROVIDER NOTES
omeprazole (PRILOSEC) 20 MG delayed release capsule Take 1 capsule by mouth daily Indications: Gastroesophageal Reflux Disease      ondansetron (ZOFRAN) 4 MG tablet Take 1 tablet by mouth every 8 hours as needed for Nausea      Phenylephrine HCl 10 MG TABS Take 1 tablet by mouth as needed Indications: Congestion of the Sinuses Around the Nose      polyethylene glycol (GLYCOLAX) 17 GM/SCOOP powder Take 17 g by mouth daily as needed      pravastatin (PRAVACHOL) 10 MG tablet Take 1 tablet by mouth at bedtime Indications: High Amount of Fats in the Blood      Probiotic Product (ACIDOPHILUS PROBIOTIC) CAPS capsule Take 4 mg by mouth daily             DISCONTINUED MEDICATIONS:  Current Discharge Medication List          PATIENT REFERRED TO:  Follow Up with:  Lyla Reyes MD  [address] [address]  [phone]          Lyla Reyes MD  [address] [address]  [phone]    In 3 days      _______________________________    Clinical Impression:   1. Acute nonintractable headache, unspecified headache type         Marcio Aviles MD using Dragon dictation      _______________________________     Marcio Aviles MD  07/12/24 3992       Marcio Aviles MD  07/12/24 0547

## 2024-07-12 NOTE — ED TRIAGE NOTES
Pt to ED via EMS for eval of headache since she woke up this AM. Pt reports it is distracting and she is unable to read. Last ate yogurt at about 1pm, states she thinks she is dehydrated. Pain is in face for headache, in cheeks. Pt is A/Ox4, uses wheelchair or rollater to ambulate. Pt took tylenol earlier today for headache.

## 2024-07-13 NOTE — ED NOTES
Pt sitting in wheelchair in waiting room awaiting transport to residence. Pt is awake and alert. No needs or complaints verbalized at this time.

## 2024-07-25 SDOH — ECONOMIC STABILITY: FOOD INSECURITY: WITHIN THE PAST 12 MONTHS, THE FOOD YOU BOUGHT JUST DIDN'T LAST AND YOU DIDN'T HAVE MONEY TO GET MORE.: NEVER TRUE

## 2024-07-25 SDOH — ECONOMIC STABILITY: INCOME INSECURITY: HOW HARD IS IT FOR YOU TO PAY FOR THE VERY BASICS LIKE FOOD, HOUSING, MEDICAL CARE, AND HEATING?: NOT HARD AT ALL

## 2024-07-25 SDOH — ECONOMIC STABILITY: FOOD INSECURITY: WITHIN THE PAST 12 MONTHS, YOU WORRIED THAT YOUR FOOD WOULD RUN OUT BEFORE YOU GOT MONEY TO BUY MORE.: NEVER TRUE

## 2024-07-25 SDOH — ECONOMIC STABILITY: TRANSPORTATION INSECURITY
IN THE PAST 12 MONTHS, HAS LACK OF TRANSPORTATION KEPT YOU FROM MEETINGS, WORK, OR FROM GETTING THINGS NEEDED FOR DAILY LIVING?: NO

## 2024-07-26 ENCOUNTER — OFFICE VISIT (OUTPATIENT)
Facility: CLINIC | Age: 89
End: 2024-07-26
Payer: MEDICARE

## 2024-07-26 VITALS
RESPIRATION RATE: 17 BRPM | TEMPERATURE: 98.3 F | BODY MASS INDEX: 26.79 KG/M2 | HEART RATE: 59 BPM | SYSTOLIC BLOOD PRESSURE: 159 MMHG | WEIGHT: 145.6 LBS | HEIGHT: 62 IN | OXYGEN SATURATION: 97 % | DIASTOLIC BLOOD PRESSURE: 74 MMHG

## 2024-07-26 DIAGNOSIS — F32.A DEPRESSION, UNSPECIFIED DEPRESSION TYPE: ICD-10-CM

## 2024-07-26 DIAGNOSIS — E78.5 HYPERLIPIDEMIA, UNSPECIFIED HYPERLIPIDEMIA TYPE: ICD-10-CM

## 2024-07-26 DIAGNOSIS — M51.9 LUMBAR DISC DISEASE: ICD-10-CM

## 2024-07-26 DIAGNOSIS — Z71.89 ADVANCE CARE PLANNING: ICD-10-CM

## 2024-07-26 DIAGNOSIS — Z00.00 MEDICARE ANNUAL WELLNESS VISIT, SUBSEQUENT: Primary | ICD-10-CM

## 2024-07-26 DIAGNOSIS — C18.7 MALIGNANT NEOPLASM OF SIGMOID COLON (HCC): ICD-10-CM

## 2024-07-26 DIAGNOSIS — R73.9 HYPERGLYCEMIA: ICD-10-CM

## 2024-07-26 DIAGNOSIS — G31.84 MCI (MILD COGNITIVE IMPAIRMENT): ICD-10-CM

## 2024-07-26 PROCEDURE — 1090F PRES/ABSN URINE INCON ASSESS: CPT | Performed by: INTERNAL MEDICINE

## 2024-07-26 PROCEDURE — 99213 OFFICE O/P EST LOW 20 MIN: CPT | Performed by: INTERNAL MEDICINE

## 2024-07-26 PROCEDURE — 1036F TOBACCO NON-USER: CPT | Performed by: INTERNAL MEDICINE

## 2024-07-26 PROCEDURE — 1123F ACP DISCUSS/DSCN MKR DOCD: CPT | Performed by: INTERNAL MEDICINE

## 2024-07-26 PROCEDURE — G8419 CALC BMI OUT NRM PARAM NOF/U: HCPCS | Performed by: INTERNAL MEDICINE

## 2024-07-26 PROCEDURE — G0439 PPPS, SUBSEQ VISIT: HCPCS | Performed by: INTERNAL MEDICINE

## 2024-07-26 PROCEDURE — G8427 DOCREV CUR MEDS BY ELIG CLIN: HCPCS | Performed by: INTERNAL MEDICINE

## 2024-07-26 RX ORDER — LIDOCAINE HCL 4% 4 G/100G
1 CREAM TOPICAL 4 TIMES DAILY
COMMUNITY

## 2024-07-26 ASSESSMENT — PATIENT HEALTH QUESTIONNAIRE - PHQ9
8. MOVING OR SPEAKING SO SLOWLY THAT OTHER PEOPLE COULD HAVE NOTICED. OR THE OPPOSITE, BEING SO FIGETY OR RESTLESS THAT YOU HAVE BEEN MOVING AROUND A LOT MORE THAN USUAL: NOT AT ALL
10. IF YOU CHECKED OFF ANY PROBLEMS, HOW DIFFICULT HAVE THESE PROBLEMS MADE IT FOR YOU TO DO YOUR WORK, TAKE CARE OF THINGS AT HOME, OR GET ALONG WITH OTHER PEOPLE: NOT DIFFICULT AT ALL
6. FEELING BAD ABOUT YOURSELF - OR THAT YOU ARE A FAILURE OR HAVE LET YOURSELF OR YOUR FAMILY DOWN: NOT AT ALL
3. TROUBLE FALLING OR STAYING ASLEEP: NOT AT ALL
SUM OF ALL RESPONSES TO PHQ QUESTIONS 1-9: 0
SUM OF ALL RESPONSES TO PHQ9 QUESTIONS 1 & 2: 0
9. THOUGHTS THAT YOU WOULD BE BETTER OFF DEAD, OR OF HURTING YOURSELF: NOT AT ALL
4. FEELING TIRED OR HAVING LITTLE ENERGY: NOT AT ALL
1. LITTLE INTEREST OR PLEASURE IN DOING THINGS: NOT AT ALL
SUM OF ALL RESPONSES TO PHQ QUESTIONS 1-9: 0
SUM OF ALL RESPONSES TO PHQ QUESTIONS 1-9: 0
7. TROUBLE CONCENTRATING ON THINGS, SUCH AS READING THE NEWSPAPER OR WATCHING TELEVISION: NOT AT ALL
2. FEELING DOWN, DEPRESSED OR HOPELESS: NOT AT ALL
5. POOR APPETITE OR OVEREATING: NOT AT ALL
SUM OF ALL RESPONSES TO PHQ QUESTIONS 1-9: 0

## 2024-07-26 NOTE — PROGRESS NOTES
[facility]:  7/29/2024, MRN: [id number]      Liz Sandoval is a 89 y.o. female and presents to clinic for Follow-up and Medicare AWV      Subjective:   The patient is an 89 year-old female with history of sigmoid colon cancer status post surgery, prediabetes, history of strictures, osteopenia, hyponatremia, lumbar disc disease, hypertension, right popliteal DVT, IBS, chronic BLE edema, left colles radius fracture, vitamin D deficiency, vertebral body fracture, allergic rhinitis, GERD, and urinary retention.     1. MCI: She failed the clock drawing exercise today. +Forgetfulness. She forgets dates.     4/28/24 Head CT: No clearly acute intracranial findings. Mild to moderate burden of periventricular presumed chronic microvascular ischemic change    2. Prediabetes/HLD:  Her A1C last yr was 5.8. Overdue for labs. On pravastatin 40mg daily.  No adverse effects.    3. Chronic Pain: H/o lumbar disc disease. Today she reports: she is taking gabapentin 100mg tid. Taking tylenol prn. Pain is tolerable.     4. Depression/anxiety: On mirtazapine 30mg daily.  She denies depression and anxiety symptoms but her daughter has concerns regarding her mood.  She has had some forgetfulness as mentioned #1 in addition to some depression per her daughter who was with her today.    5.  Irregular bowel movements and Pulmonary Nodule Hx: She is taking laxatives and antidiarrheal medications off and on with the goal of having a bowel movement every day.  Her weight is 145 pounds.  It was 170 pounds last year. No abdominal pain. +Unintentional weight loss. She reports irregular Bms in the past 6 months. No rectal bleeding or pain. She strains off/on. She is scheduled with GI in September.     1/13/24 Chest/Abdomen/Pelvis CT: Minimally displaced acute fractures of LEFT ninth-11th rib necks. Adjacent minimal LEFT lung base hypoventilation related atelectasis. Multilevel degenerative changes in the spine, predominantly in the

## 2024-07-26 NOTE — PROGRESS NOTES
Liz Sandoval presents today for   Chief Complaint   Patient presents with    Follow-up    Medicare AWV     BP retake: 159/74       \"Have you been to the ER, urgent care clinic since your last visit?  Hospitalized since your last visit?\"    YES - When: approximately 2  weeks ago.  Where and Why: [facility]-no dx.    “Have you seen or consulted any other health care providers outside of [facility] since your last visit?”    NO

## 2024-07-26 NOTE — PATIENT INSTRUCTIONS
while other may be subject to a deductible, co-insurance, and/or copay.    Some of these benefits include a comprehensive review of your medical history including lifestyle, illnesses that may run in your family, and various assessments and screenings as appropriate.    After reviewing your medical record and screening and assessments performed today your provider may have ordered immunizations, labs, imaging, and/or referrals for you.  A list of these orders (if applicable) as well as your Preventive Care list are included within your After Visit Summary for your review.    Other Preventive Recommendations:    A preventive eye exam performed by an eye specialist is recommended every 1-2 years to screen for glaucoma; cataracts, macular degeneration, and other eye disorders.  A preventive dental visit is recommended every 6 months.  Try to get at least 150 minutes of exercise per week or 10,000 steps per day on a pedometer .  Order or download the FREE \"Exercise & Physical Activity: Your Everyday Guide\" from [employer]. Call 1-370.493.5568 or search [employer] online.  You need 9426-6569 mg of calcium and 8484-1442 IU of vitamin D per day. It is possible to meet your calcium requirement with diet alone, but a vitamin D supplement is usually necessary to meet this goal.  When exposed to the sun, use a sunscreen that protects against both UVA and UVB radiation with an SPF of 30 or greater. Reapply every 2 to 3 hours or after sweating, drying off with a towel, or swimming.  Always wear a seat belt when traveling in a car. Always wear a helmet when riding a bicycle or motorcycle.

## 2024-07-29 ASSESSMENT — ENCOUNTER SYMPTOMS
SHORTNESS OF BREATH: 0
ANAL BLEEDING: 0
SORE THROAT: 0
EYE PAIN: 0
COUGH: 0
ABDOMINAL PAIN: 0
BACK PAIN: 1
BLOOD IN STOOL: 0

## 2024-07-29 NOTE — ACP (ADVANCE CARE PLANNING)
Advance Care Planning     General Advance Care Planning (ACP) Conversation    Date of Conversation: 7/25/24    Conducted with: Patient with Decision Making Capacity    Healthcare Decision Maker:  Today we documented Decision Maker(s) consistent with ACP documents on file.    Content/Action Overview:  Has ACP document(s) on file - reflects the patient's care preferences    She has no changes to made to her pre-existing advance directive.  She understood the conversation we had today and presently has decision making capacity regarding this issue.    Length of Voluntary ACP Conversation in minutes:  <16 minutes (Non-Billable)    Lyla Reyes MD

## 2024-08-22 ENCOUNTER — HOSPITAL ENCOUNTER (EMERGENCY)
Facility: HOSPITAL | Age: 89
Discharge: HOME | End: 2024-08-22
Attending: EMERGENCY MEDICINE
Payer: MEDICARE

## 2024-08-22 VITALS
SYSTOLIC BLOOD PRESSURE: 173 MMHG | RESPIRATION RATE: 20 BRPM | BODY MASS INDEX: 27.05 KG/M2 | HEART RATE: 62 BPM | TEMPERATURE: 98 F | WEIGHT: 147 LBS | HEIGHT: 62 IN | OXYGEN SATURATION: 99 % | DIASTOLIC BLOOD PRESSURE: 61 MMHG

## 2024-08-22 DIAGNOSIS — N39.0 RECURRENT UTI (URINARY TRACT INFECTION): Primary | ICD-10-CM

## 2024-08-22 DIAGNOSIS — R03.0 ELEVATED BLOOD PRESSURE READING: ICD-10-CM

## 2024-08-22 LAB
APPEARANCE UR: CLEAR
BACTERIA URNS QL MICRO: ABNORMAL /HPF
BILIRUB UR QL: NEGATIVE
COLOR UR: YELLOW
EPITH CASTS URNS QL MICRO: ABNORMAL /LPF (ref 0–5)
GLUCOSE UR STRIP.AUTO-MCNC: NEGATIVE MG/DL
HGB UR QL STRIP: NEGATIVE
KETONES UR QL STRIP.AUTO: NEGATIVE MG/DL
LEUKOCYTE ESTERASE UR QL STRIP.AUTO: ABNORMAL
NITRITE UR QL STRIP.AUTO: NEGATIVE
PH UR STRIP: 7 (ref 5–8)
PROT UR STRIP-MCNC: NEGATIVE MG/DL
RBC #/AREA URNS HPF: NEGATIVE /HPF (ref 0–5)
SP GR UR REFRACTOMETRY: 1 (ref 1–1.03)
UROBILINOGEN UR QL STRIP.AUTO: 0.2 EU/DL (ref 0.2–1)
WBC URNS QL MICRO: ABNORMAL /HPF (ref 0–4)

## 2024-08-22 PROCEDURE — 87088 URINE BACTERIA CULTURE: CPT

## 2024-08-22 PROCEDURE — 2500000003 HC RX 250 WO HCPCS: Performed by: EMERGENCY MEDICINE

## 2024-08-22 PROCEDURE — 87186 SC STD MICRODIL/AGAR DIL: CPT

## 2024-08-22 PROCEDURE — 6360000002 HC RX W HCPCS: Performed by: EMERGENCY MEDICINE

## 2024-08-22 PROCEDURE — 81001 URINALYSIS AUTO W/SCOPE: CPT

## 2024-08-22 PROCEDURE — 96372 THER/PROPH/DIAG INJ SC/IM: CPT

## 2024-08-22 PROCEDURE — 99284 EMERGENCY DEPT VISIT MOD MDM: CPT

## 2024-08-22 PROCEDURE — 87086 URINE CULTURE/COLONY COUNT: CPT

## 2024-08-22 RX ORDER — NITROFURANTOIN 25; 75 MG/1; MG/1
100 CAPSULE ORAL 2 TIMES DAILY
Qty: 14 CAPSULE | Refills: 0 | Status: SHIPPED | OUTPATIENT
Start: 2024-08-22 | End: 2024-08-22 | Stop reason: CLARIF

## 2024-08-22 RX ORDER — LEVOFLOXACIN 500 MG/1
TABLET, FILM COATED ORAL
Qty: 5 TABLET | Refills: 0 | Status: SHIPPED | OUTPATIENT
Start: 2024-08-22

## 2024-08-22 RX ORDER — LIDOCAINE HYDROCHLORIDE 10 MG/ML
5 INJECTION, SOLUTION EPIDURAL; INFILTRATION; INTRACAUDAL; PERINEURAL
Status: DISCONTINUED | OUTPATIENT
Start: 2024-08-22 | End: 2024-08-22

## 2024-08-22 RX ADMIN — LIDOCAINE HYDROCHLORIDE 1000 MG: 10 INJECTION, SOLUTION EPIDURAL; INFILTRATION; INTRACAUDAL; PERINEURAL at 09:38

## 2024-08-22 ASSESSMENT — PAIN - FUNCTIONAL ASSESSMENT
PAIN_FUNCTIONAL_ASSESSMENT: 0-10
PAIN_FUNCTIONAL_ASSESSMENT: NONE - DENIES PAIN

## 2024-08-22 ASSESSMENT — ENCOUNTER SYMPTOMS
CONSTIPATION: 1
DIARRHEA: 0
ABDOMINAL DISTENTION: 0
SHORTNESS OF BREATH: 0
BLOOD IN STOOL: 0
NAUSEA: 0
COUGH: 0
TROUBLE SWALLOWING: 0
BACK PAIN: 0
VOMITING: 0
ABDOMINAL PAIN: 0

## 2024-08-22 ASSESSMENT — PAIN SCALES - GENERAL: PAINLEVEL_OUTOF10: 5

## 2024-08-22 NOTE — ED TRIAGE NOTES
Pt arrives via EMS. C/o possible UTI. States she self caths and her urine looks cloudy. Just got off antibiotics last week. Also c/o nausea and a headache

## 2024-08-22 NOTE — ED PROVIDER NOTES
[facility] EMERGENCY DEPT  EMERGENCY DEPARTMENT ENCOUNTER      Pt Name: Liz Sandoval  MRN: [id number]  Birthdate 1935  Date of evaluation: 8/22/2024  Provider: Bassam Montes DO    CHIEF COMPLAINT       Chief Complaint   Patient presents with    urinary problems    Nausea         HISTORY OF PRESENT ILLNESS   (Location/Symptom, Timing/Onset, Context/Setting, Quality, Duration, Modifying Factors, Severity)  Note limiting factors.   Liz Sandoval is a 89 y.o. female with past medical history of recurrent UTIs, arthritis, chronic pain, GERD, hypertension who presents to the emergency department with chief complaint of frequent urination.  She is concerned for urinary tract infection.  She has been self cathing for decades.  She states that she just recently completed antibiotics for urinary tract infection but continues to have the symptoms.  She does not have any history of diabetes.  She does not recall what antibiotic it was but she got it from urgent care.  She does report that she has been constipated for about 2 days and does take stool softeners but says that they do not always work.  She indicates that sometimes she does not have a bowel movement for 4 days.  She denies any flank pain and no hematuria, abdominal pain, fever, vomiting, and no other complaint.    The history is provided by the patient. No  was used.       Nursing Notes were reviewed.    REVIEW OF SYSTEMS    (2-9 systems for level 4, 10 or more for level 5)     Review of Systems   Constitutional:  Negative for chills, diaphoresis and fever.   HENT:  Negative for trouble swallowing.    Eyes:  Negative for visual disturbance.   Respiratory:  Negative for cough and shortness of breath.    Cardiovascular:  Negative for chest pain and palpitations.   Gastrointestinal:  Positive for constipation. Negative for abdominal distention, abdominal pain, blood in stool, diarrhea, nausea and vomiting.   Genitourinary:  Positive for

## 2024-08-22 NOTE — ED NOTES
Spoke with the daughter regarding transport of this patient and she states that transport needs to be called d/t this patient being weak. Transport is being set up. At this time, RN educated patient about antibiotic administration.

## 2024-08-22 NOTE — ED NOTES
Call to [facility] for transport to [facility]- ETA 1100 per dispatch.    Primary nurse notified.

## 2024-08-22 NOTE — ED NOTES
RN  assumed care of patient at this time. Patient is here with urinary frequency. Patient placed on PURWIC at this time.

## 2024-08-22 NOTE — ED NOTES
Discharge instructions reviewed with patient.  Patient verbalized understanding.  Patient advised to follow up with provider as directed on discharge instructions. Patient reported a tolerable level of pain upon discharge.  Patient denies questions, needs or concerns at this time.  Patient verbalized understanding. Patient ambulated out of the ER with a steady gait.     Patient/daughter instructed to follow up with elevated blood pressure numbers during this hospital visit.

## 2024-08-22 NOTE — ED NOTES
RN attempted to call report on this patient at this time and per , no staff is available. Left a message with my name and phone number to return call for continuation of care.

## 2024-08-24 LAB
BACTERIA SPEC CULT: ABNORMAL
CC UR VC: ABNORMAL
SERVICE CMNT-IMP: ABNORMAL

## 2024-09-13 ENCOUNTER — HOSPITAL ENCOUNTER (OUTPATIENT)
Facility: HOSPITAL | Age: 89
Setting detail: SPECIMEN
Discharge: HOME | End: 2024-09-16
Payer: MEDICARE

## 2024-09-13 DIAGNOSIS — C18.7 MALIGNANT NEOPLASM OF SIGMOID COLON (HCC): ICD-10-CM

## 2024-09-13 DIAGNOSIS — R73.9 HYPERGLYCEMIA: ICD-10-CM

## 2024-09-13 DIAGNOSIS — E78.5 HYPERLIPIDEMIA, UNSPECIFIED HYPERLIPIDEMIA TYPE: ICD-10-CM

## 2024-09-13 DIAGNOSIS — G31.84 MCI (MILD COGNITIVE IMPAIRMENT): ICD-10-CM

## 2024-09-13 LAB
ALBUMIN SERPL-MCNC: 3.6 G/DL (ref 3.4–5)
ALBUMIN/GLOB SERPL: 1.1 (ref 0.8–1.7)
ALP SERPL-CCNC: 101 U/L (ref 45–117)
ALT SERPL-CCNC: 16 U/L (ref 13–56)
ANION GAP SERPL CALC-SCNC: 6 MMOL/L (ref 3–18)
AST SERPL-CCNC: 21 U/L (ref 10–38)
BASOPHILS # BLD: 0 K/UL (ref 0–0.1)
BASOPHILS NFR BLD: 1 % (ref 0–2)
BILIRUB DIRECT SERPL-MCNC: 0.1 MG/DL (ref 0–0.2)
BILIRUB SERPL-MCNC: 0.4 MG/DL (ref 0.2–1)
BUN SERPL-MCNC: 16 MG/DL (ref 7–18)
BUN/CREAT SERPL: 19 (ref 12–20)
CALCIUM SERPL-MCNC: 9.3 MG/DL (ref 8.5–10.1)
CHLORIDE SERPL-SCNC: 104 MMOL/L (ref 100–111)
CHOLEST SERPL-MCNC: 177 MG/DL
CO2 SERPL-SCNC: 27 MMOL/L (ref 21–32)
CREAT SERPL-MCNC: 0.83 MG/DL (ref 0.6–1.3)
DIFFERENTIAL METHOD BLD: ABNORMAL
EOSINOPHIL # BLD: 0.1 K/UL (ref 0–0.4)
EOSINOPHIL NFR BLD: 3 % (ref 0–5)
ERYTHROCYTE [DISTWIDTH] IN BLOOD BY AUTOMATED COUNT: 13.5 % (ref 11.6–14.5)
EST. AVERAGE GLUCOSE BLD GHB EST-MCNC: 120 MG/DL
GLOBULIN SER CALC-MCNC: 3.4 G/DL (ref 2–4)
GLUCOSE SERPL-MCNC: 68 MG/DL (ref 74–99)
HBA1C MFR BLD: 5.8 % (ref 4.2–5.6)
HCT VFR BLD AUTO: 40.9 % (ref 35–45)
HDLC SERPL-MCNC: 58 MG/DL (ref 40–60)
HDLC SERPL: 3.1 (ref 0–5)
HGB BLD-MCNC: 12.3 G/DL (ref 12–16)
IMM GRANULOCYTES # BLD AUTO: 0 K/UL (ref 0–0.04)
IMM GRANULOCYTES NFR BLD AUTO: 0 % (ref 0–0.5)
LDLC SERPL CALC-MCNC: 99.4 MG/DL (ref 0–100)
LIPID PANEL: NORMAL
LYMPHOCYTES # BLD: 1.3 K/UL (ref 0.9–3.6)
LYMPHOCYTES NFR BLD: 32 % (ref 21–52)
MCH RBC QN AUTO: 27.2 PG (ref 24–34)
MCHC RBC AUTO-ENTMCNC: 30.1 G/DL (ref 31–37)
MCV RBC AUTO: 90.3 FL (ref 78–100)
MONOCYTES # BLD: 0.5 K/UL (ref 0.05–1.2)
MONOCYTES NFR BLD: 11 % (ref 3–10)
NEUTS SEG # BLD: 2.1 K/UL (ref 1.8–8)
NEUTS SEG NFR BLD: 52 % (ref 40–73)
NRBC # BLD: 0 K/UL (ref 0–0.01)
NRBC BLD-RTO: 0 PER 100 WBC
PLATELET # BLD AUTO: 268 K/UL (ref 135–420)
PMV BLD AUTO: 11.3 FL (ref 9.2–11.8)
POTASSIUM SERPL-SCNC: 4 MMOL/L (ref 3.5–5.5)
PROT SERPL-MCNC: 7 G/DL (ref 6.4–8.2)
RBC # BLD AUTO: 4.53 M/UL (ref 4.2–5.3)
SODIUM SERPL-SCNC: 137 MMOL/L (ref 136–145)
T4 FREE SERPL-MCNC: 1 NG/DL (ref 0.7–1.5)
TRIGL SERPL-MCNC: 98 MG/DL
TSH SERPL DL<=0.05 MIU/L-ACNC: 2.81 UIU/ML (ref 0.36–3.74)
VLDLC SERPL CALC-MCNC: 19.6 MG/DL
WBC # BLD AUTO: 4 K/UL (ref 4.6–13.2)

## 2024-09-13 PROCEDURE — 80061 LIPID PANEL: CPT

## 2024-09-13 PROCEDURE — 80053 COMPREHEN METABOLIC PANEL: CPT

## 2024-09-13 PROCEDURE — 84439 ASSAY OF FREE THYROXINE: CPT

## 2024-09-13 PROCEDURE — 36415 COLL VENOUS BLD VENIPUNCTURE: CPT

## 2024-09-13 PROCEDURE — 84443 ASSAY THYROID STIM HORMONE: CPT

## 2024-09-13 PROCEDURE — 82378 CARCINOEMBRYONIC ANTIGEN: CPT

## 2024-09-13 PROCEDURE — 85025 COMPLETE CBC W/AUTO DIFF WBC: CPT

## 2024-09-13 PROCEDURE — 83036 HEMOGLOBIN GLYCOSYLATED A1C: CPT

## 2024-09-13 PROCEDURE — 82248 BILIRUBIN DIRECT: CPT

## 2024-09-14 LAB — CEA SERPL-MCNC: 2.9 NG/ML

## 2024-09-20 ENCOUNTER — OFFICE VISIT (OUTPATIENT)
Facility: CLINIC | Age: 89
End: 2024-09-20
Payer: MEDICARE

## 2024-09-20 VITALS
HEART RATE: 61 BPM | DIASTOLIC BLOOD PRESSURE: 71 MMHG | TEMPERATURE: 97.4 F | SYSTOLIC BLOOD PRESSURE: 149 MMHG | RESPIRATION RATE: 17 BRPM | HEIGHT: 62 IN | BODY MASS INDEX: 27.05 KG/M2 | WEIGHT: 147 LBS | OXYGEN SATURATION: 97 %

## 2024-09-20 DIAGNOSIS — E78.5 HYPERLIPIDEMIA, UNSPECIFIED HYPERLIPIDEMIA TYPE: ICD-10-CM

## 2024-09-20 DIAGNOSIS — G31.84 MCI (MILD COGNITIVE IMPAIRMENT): ICD-10-CM

## 2024-09-20 DIAGNOSIS — I10 HYPERTENSION, UNSPECIFIED TYPE: ICD-10-CM

## 2024-09-20 DIAGNOSIS — R73.9 HYPERGLYCEMIA: Primary | ICD-10-CM

## 2024-09-20 DIAGNOSIS — C18.7 MALIGNANT NEOPLASM OF SIGMOID COLON (HCC): ICD-10-CM

## 2024-09-20 PROCEDURE — 1090F PRES/ABSN URINE INCON ASSESS: CPT | Performed by: INTERNAL MEDICINE

## 2024-09-20 PROCEDURE — 1036F TOBACCO NON-USER: CPT | Performed by: INTERNAL MEDICINE

## 2024-09-20 PROCEDURE — 99214 OFFICE O/P EST MOD 30 MIN: CPT | Performed by: INTERNAL MEDICINE

## 2024-09-20 PROCEDURE — G8419 CALC BMI OUT NRM PARAM NOF/U: HCPCS | Performed by: INTERNAL MEDICINE

## 2024-09-20 PROCEDURE — 1123F ACP DISCUSS/DSCN MKR DOCD: CPT | Performed by: INTERNAL MEDICINE

## 2024-09-20 PROCEDURE — G8427 DOCREV CUR MEDS BY ELIG CLIN: HCPCS | Performed by: INTERNAL MEDICINE

## 2024-09-20 RX ORDER — LOSARTAN POTASSIUM 100 MG/1
100 TABLET ORAL DAILY
Qty: 30 TABLET | Refills: 5 | Status: SHIPPED | OUTPATIENT
Start: 2024-09-20

## 2024-09-20 RX ORDER — DONEPEZIL HYDROCHLORIDE 5 MG/1
5 TABLET, FILM COATED ORAL NIGHTLY
Qty: 30 TABLET | Refills: 5 | Status: SHIPPED | OUTPATIENT
Start: 2024-09-20 | End: 2024-09-20 | Stop reason: CLARIF

## 2024-09-20 RX ORDER — LOSARTAN POTASSIUM 100 MG/1
100 TABLET ORAL DAILY
Qty: 30 TABLET | Refills: 5 | Status: SHIPPED | OUTPATIENT
Start: 2024-09-20 | End: 2024-09-20

## 2024-09-20 NOTE — PROGRESS NOTES
Liz Sandoval presents today for   Chief Complaint   Patient presents with    Follow-up     6 wks f/u       BP retake; 149/71    \"Have you been to the ER, urgent care clinic since your last visit?  Hospitalized since your last visit?\"    YES - When: approximately 1 months ago.  Where and Why: [facility].    “Have you seen or consulted any other health care providers outside of [facility] since your last visit?”    NO

## 2024-09-20 NOTE — PROGRESS NOTES
[facility]:  10/20/2024, MRN: [id number]      Liz Sandoval is a 89 y.o. female and presents to clinic for Follow-up (6 wks f/u)      Subjective:   The patient is an 89 year-old female with history of sigmoid colon cancer status post surgery, prediabetes, history of strictures, osteopenia, hyponatremia, lumbar disc disease, HTN, right popliteal DVT, IBS, chronic BLE edema, left colles radius fracture, vitamin D deficiency, vertebral body fracture, allergic rhinitis, GERD, and urinary retention.     1. MCI: She was unable to complete the clock drawing exercise today.  At her last visit she had some forgetfulness.  Her slums questionnaire today: 16    4/28/24 Head CT: No clearly acute intracranial findings. Mild to moderate burden of periventricular presumed chronic microvascular ischemic change    2.  Prediabetes/Hyperglycemia and Hyperlipidemia: Taking pravastatin 40 mg daily.  Labs this month show a normal set of LFTs.  Her lipid panel was normal.     Latest Reference Range & Units 09/13/24 11:49   Hemoglobin A1C 4.2 - 5.6 % 5.8 (H)   (H): Data is abnormally high    3.  Hypertension: Earlier this year the patient had unintentional weight loss.  Her weight is up 2 pounds since her July appointment.  BMI is 26.  Her weight is 147 pounds.  Blood pressure is elevated at 151/71.  It was 159/74 at her last visit. She takes lasix 20mg every other day prn.  BP is still a little elevated. It is elevated in the 140-150s range.     4. Cancer Hx: In remission. Asymptomatic.      Latest Reference Range & Units 09/13/24 11:49   CEA ng/mL 2.9         Patient Active Problem List    Diagnosis Date Noted    T12 compression fracture (HCC) 05/15/2021    Mixed hyperlipidemia 09/03/2020    Depression 09/03/2020    Impaired glucose tolerance 10/10/2019    Lumbar disc disease 09/01/2018    Irritable bowel syndrome with both constipation and diarrhea 09/01/2018    Vitamin D deficiency 09/01/2018    Allergic rhinitis

## 2024-10-14 ENCOUNTER — HOSPITAL ENCOUNTER (EMERGENCY)
Facility: HOSPITAL | Age: 89
Discharge: HOME | End: 2024-10-15
Attending: EMERGENCY MEDICINE
Payer: MEDICARE

## 2024-10-14 DIAGNOSIS — K52.9 GASTROENTERITIS: Primary | ICD-10-CM

## 2024-10-14 PROCEDURE — 99285 EMERGENCY DEPT VISIT HI MDM: CPT

## 2024-10-14 PROCEDURE — 51701 INSERT BLADDER CATHETER: CPT

## 2024-10-15 ENCOUNTER — APPOINTMENT (OUTPATIENT)
Facility: HOSPITAL | Age: 89
End: 2024-10-15
Payer: MEDICARE

## 2024-10-15 VITALS
SYSTOLIC BLOOD PRESSURE: 137 MMHG | DIASTOLIC BLOOD PRESSURE: 48 MMHG | OXYGEN SATURATION: 91 % | HEART RATE: 82 BPM | WEIGHT: 156.2 LBS | RESPIRATION RATE: 20 BRPM | BODY MASS INDEX: 28.57 KG/M2 | TEMPERATURE: 97.8 F

## 2024-10-15 LAB
ALBUMIN SERPL-MCNC: 3.9 G/DL (ref 3.4–5)
ALBUMIN/GLOB SERPL: 1 (ref 0.8–1.7)
ALP SERPL-CCNC: 102 U/L (ref 45–117)
ALT SERPL-CCNC: 17 U/L (ref 13–56)
ANION GAP SERPL CALC-SCNC: 9 MMOL/L (ref 3–18)
APPEARANCE UR: CLEAR
AST SERPL-CCNC: 28 U/L (ref 10–38)
BACTERIA URNS QL MICRO: NEGATIVE /HPF
BASOPHILS # BLD: 0 K/UL (ref 0–0.1)
BASOPHILS NFR BLD: 0 % (ref 0–2)
BILIRUB SERPL-MCNC: 0.4 MG/DL (ref 0.2–1)
BILIRUB UR QL: NEGATIVE
BUN SERPL-MCNC: 26 MG/DL (ref 7–18)
BUN/CREAT SERPL: 23 (ref 12–20)
CALCIUM SERPL-MCNC: 9.1 MG/DL (ref 8.5–10.1)
CHLORIDE SERPL-SCNC: 104 MMOL/L (ref 100–111)
CO2 SERPL-SCNC: 26 MMOL/L (ref 21–32)
COLOR UR: YELLOW
CREAT SERPL-MCNC: 1.12 MG/DL (ref 0.6–1.3)
DIFFERENTIAL METHOD BLD: ABNORMAL
EKG ATRIAL RATE: 80 BPM
EKG DIAGNOSIS: NORMAL
EKG P AXIS: 54 DEGREES
EKG P-R INTERVAL: 142 MS
EKG Q-T INTERVAL: 376 MS
EKG QRS DURATION: 84 MS
EKG QTC CALCULATION (BAZETT): 433 MS
EKG R AXIS: 3 DEGREES
EKG T AXIS: 67 DEGREES
EKG VENTRICULAR RATE: 80 BPM
EOSINOPHIL # BLD: 0.1 K/UL (ref 0–0.4)
EOSINOPHIL NFR BLD: 1 % (ref 0–5)
EPITH CASTS URNS QL MICRO: NORMAL /LPF (ref 0–5)
ERYTHROCYTE [DISTWIDTH] IN BLOOD BY AUTOMATED COUNT: 13.4 % (ref 11.6–14.5)
GLOBULIN SER CALC-MCNC: 4.1 G/DL (ref 2–4)
GLUCOSE SERPL-MCNC: 145 MG/DL (ref 74–99)
GLUCOSE UR STRIP.AUTO-MCNC: NEGATIVE MG/DL
HCT VFR BLD AUTO: 42.8 % (ref 35–45)
HGB BLD-MCNC: 13.8 G/DL (ref 12–16)
HGB UR QL STRIP: ABNORMAL
IMM GRANULOCYTES # BLD AUTO: 0 K/UL (ref 0–0.04)
IMM GRANULOCYTES NFR BLD AUTO: 0 % (ref 0–0.5)
KETONES UR QL STRIP.AUTO: NEGATIVE MG/DL
LEUKOCYTE ESTERASE UR QL STRIP.AUTO: ABNORMAL
LYMPHOCYTES # BLD: 1.1 K/UL (ref 0.9–3.6)
LYMPHOCYTES NFR BLD: 9 % (ref 21–52)
MCH RBC QN AUTO: 27.9 PG (ref 24–34)
MCHC RBC AUTO-ENTMCNC: 32.2 G/DL (ref 31–37)
MCV RBC AUTO: 86.5 FL (ref 78–100)
MONOCYTES # BLD: 0.6 K/UL (ref 0.05–1.2)
MONOCYTES NFR BLD: 5 % (ref 3–10)
NEUTS SEG # BLD: 10.1 K/UL (ref 1.8–8)
NEUTS SEG NFR BLD: 84 % (ref 40–73)
NITRITE UR QL STRIP.AUTO: NEGATIVE
NRBC # BLD: 0 K/UL (ref 0–0.01)
NRBC BLD-RTO: 0 PER 100 WBC
PH UR STRIP: 5.5 (ref 5–8)
PLATELET # BLD AUTO: 257 K/UL (ref 135–420)
PMV BLD AUTO: 10.1 FL (ref 9.2–11.8)
POTASSIUM SERPL-SCNC: 4.1 MMOL/L (ref 3.5–5.5)
PROT SERPL-MCNC: 8 G/DL (ref 6.4–8.2)
PROT UR STRIP-MCNC: 30 MG/DL
RBC # BLD AUTO: 4.95 M/UL (ref 4.2–5.3)
RBC #/AREA URNS HPF: NORMAL /HPF (ref 0–5)
SODIUM SERPL-SCNC: 139 MMOL/L (ref 136–145)
SP GR UR REFRACTOMETRY: >1.03 (ref 1–1.03)
TROPONIN I SERPL HS-MCNC: 6 NG/L (ref 0–54)
UROBILINOGEN UR QL STRIP.AUTO: 0.2 EU/DL (ref 0.2–1)
WBC # BLD AUTO: 12 K/UL (ref 4.6–13.2)
WBC URNS QL MICRO: NORMAL /HPF (ref 0–4)

## 2024-10-15 PROCEDURE — 80053 COMPREHEN METABOLIC PANEL: CPT

## 2024-10-15 PROCEDURE — 93010 ELECTROCARDIOGRAM REPORT: CPT | Performed by: INTERNAL MEDICINE

## 2024-10-15 PROCEDURE — 96374 THER/PROPH/DIAG INJ IV PUSH: CPT

## 2024-10-15 PROCEDURE — 84484 ASSAY OF TROPONIN QUANT: CPT

## 2024-10-15 PROCEDURE — 81001 URINALYSIS AUTO W/SCOPE: CPT

## 2024-10-15 PROCEDURE — 74177 CT ABD & PELVIS W/CONTRAST: CPT

## 2024-10-15 PROCEDURE — 93005 ELECTROCARDIOGRAM TRACING: CPT | Performed by: EMERGENCY MEDICINE

## 2024-10-15 PROCEDURE — 6360000002 HC RX W HCPCS: Performed by: EMERGENCY MEDICINE

## 2024-10-15 PROCEDURE — 6360000004 HC RX CONTRAST MEDICATION: Performed by: EMERGENCY MEDICINE

## 2024-10-15 PROCEDURE — 85025 COMPLETE CBC W/AUTO DIFF WBC: CPT

## 2024-10-15 RX ORDER — IOPAMIDOL 612 MG/ML
80 INJECTION, SOLUTION INTRAVASCULAR
Status: COMPLETED | OUTPATIENT
Start: 2024-10-15 | End: 2024-10-15

## 2024-10-15 RX ORDER — ONDANSETRON 2 MG/ML
4 INJECTION INTRAMUSCULAR; INTRAVENOUS
Status: COMPLETED | OUTPATIENT
Start: 2024-10-15 | End: 2024-10-15

## 2024-10-15 RX ADMIN — IOPAMIDOL 80 ML: 612 INJECTION, SOLUTION INTRAVENOUS at 02:12

## 2024-10-15 RX ADMIN — ONDANSETRON 4 MG: 2 INJECTION INTRAMUSCULAR; INTRAVENOUS at 00:30

## 2024-10-15 ASSESSMENT — PAIN DESCRIPTION - LOCATION: LOCATION: ABDOMEN

## 2024-10-15 ASSESSMENT — PAIN DESCRIPTION - DESCRIPTORS: DESCRIPTORS: ACHING

## 2024-10-15 ASSESSMENT — LIFESTYLE VARIABLES
HOW OFTEN DO YOU HAVE A DRINK CONTAINING ALCOHOL: NEVER
HOW MANY STANDARD DRINKS CONTAINING ALCOHOL DO YOU HAVE ON A TYPICAL DAY: PATIENT DOES NOT DRINK

## 2024-10-15 ASSESSMENT — PAIN - FUNCTIONAL ASSESSMENT: PAIN_FUNCTIONAL_ASSESSMENT: 0-10

## 2024-10-15 ASSESSMENT — PAIN SCALES - GENERAL: PAINLEVEL_OUTOF10: 3

## 2024-10-15 ASSESSMENT — PAIN DESCRIPTION - PAIN TYPE: TYPE: ACUTE PAIN

## 2024-10-15 NOTE — ED TRIAGE NOTES
Patient A/O x 4, presented to the ED with complaint of nausea, vomiting, diarrhea with some abdominal pain x 1 day. Patient arrived to ED via [facility] from [facility].

## 2024-10-15 NOTE — ED NOTES
Patient cleaned of stool from diarrhea episode while at home. Patient placed on clean bed pad and straight cath performed to collect urine sample. Patient tolerated well. Patient returned to position of comfort, call bell with in reach, bed in lowest locked position and bed rails up x 2.

## 2024-10-15 NOTE — ED NOTES
I have reviewed discharge instructions with the patient.  The patient verbalized understanding.  Spoke to patient's daughter, Reyna, via telephone, updated her on patient's diagnosis and follow-up recommendations. She verbalized understanding.

## 2024-10-15 NOTE — ED PROVIDER NOTES
EMERGENCY DEPARTMENT HISTORY AND PHYSICAL EXAM    12:10 AM EDT seen at this time in room 2        Date: 10/14/2024  Patient Name: Liz Sandoval    History of Presenting Illness     Chief Complaint   Patient presents with    Emesis    Diarrhea    Fatigue         History Provided By: patient/EMS    Additional History (Context): Liz Sandoval is a 89 y.o. female presents with from nursing facility, acute onset at 8 PM of vomiting sweats diarrhea feeling bad.  No pain specifically no abdominal pain.  Continues to feel bad and she has been throwing up bile.  Diarrhea is resolved no measured fever.  Significant history of colon cancer status postoperation.    PCP: Lyla Reyes MD    Chief Complaint:   Duration:    Timing:    Location:   Quality:   Severity:   Modifying Factors:   Associated Symptoms:       No current facility-administered medications for this encounter.     Current Outpatient Medications   Medication Sig Dispense Refill    losartan (COZAAR) 100 MG tablet Take 1 tablet by mouth daily 30 tablet 5    Lidocaine HCl (BENGAY LIDOCAINE) 4 % CREA Apply 1 g topically 4 times daily As needed      lidocaine (SALONPAS PAIN RELIEVING) 4 % external patch Place 1 patch onto the skin daily as needed (Tailbone, low back pain) 15 patch 1    acetaminophen (TYLENOL) 500 MG tablet Take 2 tablets by mouth every 6 hours as needed for Pain      gabapentin (NEURONTIN) 100 MG capsule Take 2 capsules by mouth 3 times daily for 180 days. Intended supply: 90 days Max Daily Amount: 600 mg 540 capsule 1    acetaminophen (TYLENOL) 500 MG tablet Take 2 tablets by mouth every 6 hours as needed for Pain 30 tablet 0    mirtazapine (REMERON) 30 MG tablet Take 1 tablet by mouth nightly Indications: Depression      Multiple Vitamin (MULTIVITAMIN ADULT PO) Take by mouth      Throat Lozenges (COUGH DROPS MT) by Transmucosal route 3 times daily as needed Indications: Cough      Calcium Carbonate Antacid (TUMS E-X PO) Take by

## 2024-10-15 NOTE — ED NOTES
Patient's Daughter, Reyna, called, requested she be called with updates because she is out of town, phone number in chart.

## 2024-10-21 ENCOUNTER — PATIENT MESSAGE (OUTPATIENT)
Facility: CLINIC | Age: 89
End: 2024-10-21

## 2025-02-21 NOTE — CASE COMMUNICATION
Patient has been discharged from [address] [facility] [address] , goals met/max potential.  She is safe and independent in her home, and has assistance for meals, laundry and getting in and out of shower.     Thank you for your referral. [phone]

## (undated) DEVICE — MARKER,SKIN,WI/RULER AND LABELS: Brand: MEDLINE

## (undated) DEVICE — BANDAGE ADH W0.75XL3IN UNIV WVN FAB NAT GEN USE STRP N ADH

## (undated) DEVICE — SET EPI 18GA L3.5IN TUOHY NDL W/ 20GA CLS TIP NYL CATH

## (undated) DEVICE — Device: Brand: MEDEX

## (undated) DEVICE — SYR 10ML LUER LOK 1/5ML GRAD --

## (undated) DEVICE — MEDIA CONTRAST 10ML 200MG/ML 41%